# Patient Record
Sex: MALE | Race: WHITE | NOT HISPANIC OR LATINO | Employment: OTHER | URBAN - METROPOLITAN AREA
[De-identification: names, ages, dates, MRNs, and addresses within clinical notes are randomized per-mention and may not be internally consistent; named-entity substitution may affect disease eponyms.]

---

## 2017-03-16 ENCOUNTER — TRANSFERRED RECORDS (OUTPATIENT)
Dept: HEALTH INFORMATION MANAGEMENT | Facility: CLINIC | Age: 67
End: 2017-03-16

## 2017-11-09 ENCOUNTER — TRANSFERRED RECORDS (OUTPATIENT)
Dept: HEALTH INFORMATION MANAGEMENT | Facility: CLINIC | Age: 67
End: 2017-11-09

## 2018-03-28 ENCOUNTER — TRANSFERRED RECORDS (OUTPATIENT)
Dept: HEALTH INFORMATION MANAGEMENT | Facility: CLINIC | Age: 68
End: 2018-03-28

## 2018-04-12 ENCOUNTER — TRANSFERRED RECORDS (OUTPATIENT)
Dept: HEALTH INFORMATION MANAGEMENT | Facility: CLINIC | Age: 68
End: 2018-04-12

## 2018-04-12 LAB
EJECTION FRACTION: 44 %
EJECTION FRACTION: 44 %

## 2018-05-24 ENCOUNTER — TRANSFERRED RECORDS (OUTPATIENT)
Dept: HEALTH INFORMATION MANAGEMENT | Facility: CLINIC | Age: 68
End: 2018-05-24

## 2018-06-19 ENCOUNTER — TRANSFERRED RECORDS (OUTPATIENT)
Dept: HEALTH INFORMATION MANAGEMENT | Facility: CLINIC | Age: 68
End: 2018-06-19

## 2018-08-01 ENCOUNTER — TRANSFERRED RECORDS (OUTPATIENT)
Dept: HEALTH INFORMATION MANAGEMENT | Facility: CLINIC | Age: 68
End: 2018-08-01

## 2018-09-16 ENCOUNTER — TRANSFERRED RECORDS (OUTPATIENT)
Dept: HEALTH INFORMATION MANAGEMENT | Facility: CLINIC | Age: 68
End: 2018-09-16

## 2018-09-26 ENCOUNTER — TRANSFERRED RECORDS (OUTPATIENT)
Dept: HEALTH INFORMATION MANAGEMENT | Facility: CLINIC | Age: 68
End: 2018-09-26

## 2018-10-01 ENCOUNTER — TRANSFERRED RECORDS (OUTPATIENT)
Dept: HEALTH INFORMATION MANAGEMENT | Facility: CLINIC | Age: 68
End: 2018-10-01

## 2020-04-12 ENCOUNTER — TRANSFERRED RECORDS (OUTPATIENT)
Dept: HEALTH INFORMATION MANAGEMENT | Facility: CLINIC | Age: 70
End: 2020-04-12

## 2020-09-09 ENCOUNTER — TRANSFERRED RECORDS (OUTPATIENT)
Dept: HEALTH INFORMATION MANAGEMENT | Facility: CLINIC | Age: 70
End: 2020-09-09

## 2020-12-18 ENCOUNTER — TRANSFERRED RECORDS (OUTPATIENT)
Dept: HEALTH INFORMATION MANAGEMENT | Facility: CLINIC | Age: 70
End: 2020-12-18

## 2021-01-25 NOTE — TELEPHONE ENCOUNTER
RECORDS RECEIVED FROM: Ref by Dr. Montana from [facility], imaging and records    Previous surgery 10/01/2018 at [place], Thoracic and Lumbar fusion at [facility].   DATE RECEIVED: Mar 4, 2021   NOTES STATUS DETAILS   OFFICE NOTE from referring provider Care Everywhere    OFFICE NOTE from other specialist N/A    DISCHARGE SUMMARY from hospital N/A    DISCHARGE REPORT from the ER N/A    OPERATIVE REPORT Care Everywhere x4   MEDICATION LIST Care Everywhere    IMPLANT RECORD/STICKER Care Everywhere    LABS     CBC/DIFF N/A    CULTURES N/A    INJECTIONS DONE IN RADIOLOGY N/A    MRI In process    CT SCAN In process    XRAYS (IMAGES & REPORTS) In process    TUMOR     PATHOLOGY  Slides & report N/A      01/25/21   7:54 AM   Called [facility] for all images  Janice Hampton CMA

## 2021-01-29 ENCOUNTER — TRANSFERRED RECORDS (OUTPATIENT)
Dept: HEALTH INFORMATION MANAGEMENT | Facility: CLINIC | Age: 71
End: 2021-01-29

## 2021-02-01 NOTE — TELEPHONE ENCOUNTER
Action 2.1.21 1:00 PM PANDA   Action Taken Request sent to [place]     Action Catrina 2/3/2021 12:37PM   Action Taken CSS Resolved images below from [place]   9/23/20, 1/21/20 -Entire Spine Scoliosis   9/23/20 - Lumbar Spine  9/9/20 - MR Thoracic lumbar spine   7/1/20 -CT Thoracic Lumbar Spine   Knee images (9/2020)  Colored images in  PACS

## 2021-02-26 ENCOUNTER — DOCUMENTATION ONLY (OUTPATIENT)
Dept: CARE COORDINATION | Facility: CLINIC | Age: 71
End: 2021-02-26

## 2021-03-04 ENCOUNTER — ANCILLARY PROCEDURE (OUTPATIENT)
Dept: GENERAL RADIOLOGY | Facility: CLINIC | Age: 71
End: 2021-03-04
Attending: ORTHOPAEDIC SURGERY
Payer: COMMERCIAL

## 2021-03-04 ENCOUNTER — PRE VISIT (OUTPATIENT)
Dept: ORTHOPEDICS | Facility: CLINIC | Age: 71
End: 2021-03-04

## 2021-03-04 ENCOUNTER — OFFICE VISIT (OUTPATIENT)
Dept: ORTHOPEDICS | Facility: CLINIC | Age: 71
End: 2021-03-04
Payer: COMMERCIAL

## 2021-03-04 VITALS — BODY MASS INDEX: 26.51 KG/M2 | WEIGHT: 200 LBS | HEIGHT: 73 IN

## 2021-03-04 DIAGNOSIS — Z98.1 HX OF SPINAL FUSION: Primary | ICD-10-CM

## 2021-03-04 DIAGNOSIS — Z98.1 HX OF SPINAL FUSION: ICD-10-CM

## 2021-03-04 DIAGNOSIS — S24.109D: ICD-10-CM

## 2021-03-04 DIAGNOSIS — Z98.1 HISTORY OF SPINAL FUSION: ICD-10-CM

## 2021-03-04 DIAGNOSIS — M40.30 FLAT BACK: Primary | ICD-10-CM

## 2021-03-04 PROCEDURE — 72170 X-RAY EXAM OF PELVIS: CPT | Mod: GC | Performed by: RADIOLOGY

## 2021-03-04 PROCEDURE — 99205 OFFICE O/P NEW HI 60 MIN: CPT | Mod: GC | Performed by: ORTHOPAEDIC SURGERY

## 2021-03-04 RX ORDER — FLUTICASONE PROPIONATE 50 MCG
2 SPRAY, SUSPENSION (ML) NASAL DAILY PRN
Status: ON HOLD | COMMUNITY
End: 2021-07-29

## 2021-03-04 RX ORDER — NITROGLYCERIN 0.4 MG/1
0.4 TABLET SUBLINGUAL PRN
COMMUNITY
Start: 2021-01-03

## 2021-03-04 RX ORDER — METOPROLOL SUCCINATE 25 MG/1
12.5 TABLET, EXTENDED RELEASE ORAL AT BEDTIME
Status: ON HOLD | COMMUNITY
Start: 2020-07-20 | End: 2021-07-02

## 2021-03-04 RX ORDER — ATORVASTATIN CALCIUM 80 MG/1
80 TABLET, FILM COATED ORAL AT BEDTIME
COMMUNITY
Start: 2021-03-02

## 2021-03-04 RX ORDER — DOCUSATE SODIUM 100 MG/1
100 CAPSULE, LIQUID FILLED ORAL 2 TIMES DAILY
Status: ON HOLD | COMMUNITY
End: 2021-06-22

## 2021-03-04 RX ORDER — MORPHINE SULFATE 15 MG/1
15-30 TABLET ORAL
Status: ON HOLD | COMMUNITY
Start: 2021-02-22 | End: 2021-07-01

## 2021-03-04 RX ORDER — DIAZEPAM 2 MG
2-4 TABLET ORAL
Status: ON HOLD | COMMUNITY
Start: 2020-09-03 | End: 2021-06-30

## 2021-03-04 RX ORDER — ASPIRIN 81 MG
1 TABLET, DELAYED RELEASE (ENTERIC COATED) ORAL DAILY
COMMUNITY

## 2021-03-04 RX ORDER — POLYETHYLENE GLYCOL 3350 17 G/17G
17 POWDER, FOR SOLUTION ORAL DAILY PRN
Status: ON HOLD | COMMUNITY
Start: 2020-11-09 | End: 2021-06-30

## 2021-03-04 ASSESSMENT — PATIENT HEALTH QUESTIONNAIRE - PHQ9
SUM OF ALL RESPONSES TO PHQ QUESTIONS 1-9: 6
SUM OF ALL RESPONSES TO PHQ QUESTIONS 1-9: 6

## 2021-03-04 ASSESSMENT — MIFFLIN-ST. JEOR: SCORE: 1711.07

## 2021-03-04 NOTE — NURSING NOTE
"Reason For Visit:   Chief Complaint   Patient presents with     Consult     wants opinion of surgery coming up, .  Ref by Dr. Montana from [facility], imaging and records Previous surgery 10/01/2018 at [place], Thoracic and Lumbar fusion at [place]        Primary MD: Dr. Tyler Dunphy [facility]  Ref. MD: Dr. Montana from [place]     ?  No  Occupation Disabled Vet    Date of injury: 2009  Type of injury: Fall, also hurt while in the service    Date of surgery: 2018  Type of surgery: Decompression Posterior ThoracoLumbar, Instrumented Fusion.    Smoker: No  Request smoking cessation information: No    Ht 1.854 m (6' 1\")   Wt 90.7 kg (200 lb)   BMI 26.39 kg/m      Pain Assessment  Patient Currently in Pain: Yes  0-10 Pain Scale: 4  Primary Pain Location: Back    Oswestry (PEDRITO) Questionnaire    OSWESTRY DISABILITY INDEX 3/4/2021   Count 9   Sum 28   Oswestry Score (%) 62.22          Visual Analog Pain Scale  Back Pain Scale 0-10: 4  Right leg pain: 0  Left leg pain: 0  Neck Pain Scale 0-10: 0  Right arm pain: 0  Left arm pain: 0    Promis 10 Assessment    PROMIS 10 3/4/2021   In general, would you say your health is: Very good   In general, would you say your quality of life is: Poor   In general, how would you rate your physical health? Poor   In general, how would you rate your mental health, including your mood and your ability to think? Excellent   In general, how would you rate your satisfaction with your social activities and relationships? Good   In general, please rate how well you carry out your usual social activities and roles Fair   To what extent are you able to carry out your everyday physical activities such as walking, climbing stairs, carrying groceries, or moving a chair? A little   How often have you been bothered by emotional problems such as feeling anxious, depressed or irritable? Sometimes   How would you rate your fatigue on average? Moderate   How would you rate your pain " on average?   0 = No Pain  to  10 = Worst Imaginable Pain 7   In general, would you say your health is: 4   In general, would you say your quality of life is: 1   In general, how would you rate your physical health? 1   In general, how would you rate your mental health, including your mood and your ability to think? 5   In general, how would you rate your satisfaction with your social activities and relationships? 3   In general, please rate how well you carry out your usual social activities and roles. (This includes activities at home, at work and in your community, and responsibilities as a parent, child, spouse, employee, friend, etc.) 2   To what extent are you able to carry out your everyday physical activities such as walking, climbing stairs, carrying groceries, or moving a chair? 2   In the past 7 days, how often have you been bothered by emotional problems such as feeling anxious, depressed, or irritable? 3   In the past 7 days, how would you rate your fatigue on average? 3   In the past 7 days, how would you rate your pain on average, where 0 means no pain, and 10 means worst imaginable pain? 7   Global Mental Health Score 12   Global Physical Health Score 8   PROMIS TOTAL - SUBSCORES 20                Magnolia Vela LPN

## 2021-03-04 NOTE — PROGRESS NOTES
"Service Date: 03/04/2021      HISTORY OF PRESENT ILLNESS:  Mr. Simmons is a 70-year-old male presenting to [facility] today for evaluation of back pain and impaired ability to ambulate.  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  He describes a history of quadriplegia that followed and he slowly was able to regain his ability to ambulate.  In 2018, he underwent posterior decompression thoracolumbar fusion at [facility].  It was thought that he would be primarily a sitter and was fused in a fairly straight posture.  The patient continued to work on his ability to ambulate and has returned to primarily an ambulating status.  Due to the orientation of his fusion, he has found it to be quite difficult to maintain an erect posture and ambulate with low energy.  He primarily describes pain between his shoulder blades as well as down into his pelvis.  His thighs become very tired with any sort of distance walking or standing.      The patient has a fairly significant past cardiac history having had a pacemaker placed as well as prior quadruple bypass.  He also describes a history of a cardiac arrest requiring him to be airlifted to a facility.  He denies any symptoms of angina today.      PHYSICAL EXAMINATION:   Ht 1.854 m (6' 1\")   Wt 90.7 kg (200 lb)   BMI 26.39 kg/m     PEDRITO 62.22%.  VAS 4.  The patient is alert and fairly talkative.  Presents with a walker.  He is fiercely independent and is able to stand and ambulate throughout the examination room.  Stands with a profound flat back posture and demonstrates severely limited mobility while ambulating.  Incision is midline and well healed without significant tenderness.  There is a fair amount of paraspinal fibrosis.  Wears knee braces bilaterally.  Sensation intact in bilateral lower extremities.  Left hip flexion and knee flexion 4/5, otherwise 5/5 " in lower extremity strength exam.  Left ankle is swollen as he describes following a prior surgery.      IMAGING:  Full-spine EOS x-ray was obtained prior to today's visit.  The patient is standing with his knees flexed and his mid and upper thoracic spine fully extended.  Associated sagittal measurements can be referenced below.  There is a very hendricks PILL mismatch as well as some kyphosis across the thoracolumbar junction.  Pelvic retroversion is immediately apparent.  There is quite a bit of motion artifact on this study.  Spinal CT dated 07/01/2020 was reviewed.  Instrumentation appears stable without significant loosening.  There is vacuum disk phenomenon as well as fairly significant osteophyte formation at the superadjacent levels.  In the lumbar spine, posterolateral fusion mass appears intact.  There is fairly wide laminectomy defect throughout the lumbar region.  Opportunistic bone mineral density ranges between 100 and 120 Hounsfield units. DEXA on 1/26/21 demonstrated a lowest T-score of -2.4.       ASSESSMENT:   1.  Flat back syndrome.  History of T9-pelvis posterior instrumentation and fusion with proximal junctional degenerative disk disease and thoracic hypokyphosis.      PLAN:   1.  The patient's difficulty and pain are clearly related to his flat back syndrome.  His original deformity surgery was well designed for a sitting patient.  However, given his fantastic recovery, Jorje is now primarily an ambulator.  In order to correct the deformity, he would either require a pedicle subtraction osteotomy at L4 versus L5 or it may be possible to provide enough lordosis with segmental Kurtz-Childs osteotomies.  I would also recommend extending his fusion up at least 2 levels due to the vacuum disk phenomenon that is present.  I had a lengthy conversation with the patient in the clinic today where we looked at his imaging and my proposed procedure.  He would like to continue to follow up at the [facility] at this time and will give us a call back if he decides to pursue further surgical intervention with us.   2.  If the patient does elect to move forward with surgical intervention here at the [facility], we would refer him to PAC Clinic for a preoperative evaluation.  I would then also add the patient to the complex spine conference for medical and surgical decision making complexities.   3.  I would then have the patient return to clinic for a preoperative discussion regarding the final operative plan.            BRANDI AKINS JR, MD       As dictated by MICHAEL CABRAL MD     I saw and evaluated the patient and developed the plan.  My previsit time reviewing his imaging studies was 10 minutes. My face to face time with the patient in the room was 70 minutes. My time preparing a potential surgical plan was 5 minutes.  Brandi Akins MD           D: 2021   T: 2021   MT: marianna      Name:     RUFINA BLAS   MRN:      [id number]        Account:      [id number]   :      1950           Service Date: 2021      Document: [id number]

## 2021-03-04 NOTE — LETTER
"    3/4/2021         RE: Darleen Simmons  [address]  [place] [address]        Dear Colleague,    Thank you for referring your patient, Darleen Simmons, to the [facility] [place]. Please see a copy of my visit note below.    Service Date: 03/04/2021      HISTORY OF PRESENT ILLNESS:  Mr. Simmons is a 70-year-old male presenting to [facility] today for evaluation of back pain and impaired ability to ambulate.  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  He describes a history of quadriplegia that followed and he slowly was able to regain his ability to ambulate.  In 2018, he underwent posterior decompression thoracolumbar fusion at [facility].  It was thought that he would be primarily a sitter and was fused in a fairly straight posture.  The patient continued to work on his ability to ambulate and has returned to primarily an ambulating status.  Due to the orientation of his fusion, he has found it to be quite difficult to maintain an erect posture and ambulate with low energy.  He primarily describes pain between his shoulder blades as well as down into his pelvis.  His thighs become very tired with any sort of distance walking or standing.      The patient has a fairly significant past cardiac history having had a pacemaker placed as well as prior quadruple bypass.  He also describes a history of a cardiac arrest requiring him to be airlifted to a facility.  He denies any symptoms of angina today.      PHYSICAL EXAMINATION:   Ht 1.854 m (6' 1\")   Wt 90.7 kg (200 lb)   BMI 26.39 kg/m     PEDRITO 62.22%.  VAS 4.  The patient is alert and fairly talkative.  Presents with a walker.  He is fiercely independent and is able to stand and ambulate throughout the examination room.  Stands with a profound flat back posture and demonstrates severely limited mobility while " ambulating.  Incision is midline and well healed without significant tenderness.  There is a fair amount of paraspinal fibrosis.  Wears knee braces bilaterally.  Sensation intact in bilateral lower extremities.  Left hip flexion and knee flexion 4/5, otherwise 5/5 in lower extremity strength exam.  Left ankle is swollen as he describes following a prior surgery.      IMAGING:  Full-spine EOS x-ray was obtained prior to today's visit.  The patient is standing with his knees flexed and his mid and upper thoracic spine fully extended.  Associated sagittal measurements can be referenced below.  There is a very hendricks PILL mismatch as well as some kyphosis across the thoracolumbar junction.  Pelvic retroversion is immediately apparent.  There is quite a bit of motion artifact on this study.  Spinal CT dated 07/01/2020 was reviewed.  Instrumentation appears stable without significant loosening.  There is vacuum disk phenomenon as well as fairly significant osteophyte formation at the superadjacent levels.  In the lumbar spine, posterolateral fusion mass appears intact.  There is fairly wide laminectomy defect throughout the lumbar region.  Opportunistic bone mineral density ranges between 100 and 120 Hounsfield units. DEXA on 1/26/21 demonstrated a lowest T-score of -2.4.       ASSESSMENT:   1.  Flat back syndrome.  History of T9-pelvis posterior instrumentation and fusion with proximal junctional degenerative disk disease and thoracic hypokyphosis.      PLAN:   1.  The patient's difficulty and pain are clearly related to his flat back syndrome.  His original deformity surgery was well designed for a sitting patient.  However, given his fantastic recovery, Jorje is now primarily an ambulator.  In order to correct the deformity, he would either require a pedicle subtraction osteotomy at L4 versus L5 or it may be possible to provide enough lordosis with segmental Kurtz-Childs osteotomies.  I would also recommend extending  his fusion up at least 2 levels due to the vacuum disk phenomenon that is present.  I had a lengthy conversation with the patient in the clinic today where we looked at his imaging and my proposed procedure.  He would like to continue to follow up at the [facility] at this time and will give us a call back if he decides to pursue further surgical intervention with us.   2.  If the patient does elect to move forward with surgical intervention here at the [facility], we would refer him to PAC Clinic for a preoperative evaluation.  I would then also add the patient to the complex spine conference for medical and surgical decision making complexities.   3.  I would then have the patient return to clinic for a preoperative discussion regarding the final operative plan.            BRANDI AKINS JR, MD       As dictated by MICHAEL CABRAL MD     I saw and evaluated the patient and developed the plan.  My previsit time reviewing his imaging studies was 10 minutes. My face to face time with the patient in the room was 70 minutes. My time preparing a potential surgical plan was 5 minutes.  Brandi Akins MD           D: 2021   T: 2021   MT: marianna      Name:     RUFINA BLAS   MRN:      [id number]        Account:      [id number]   :      1950           Service Date: 2021      Document: [id number]

## 2021-03-05 ASSESSMENT — PATIENT HEALTH QUESTIONNAIRE - PHQ9: SUM OF ALL RESPONSES TO PHQ QUESTIONS 1-9: 6

## 2021-03-19 ENCOUNTER — TELEPHONE (OUTPATIENT)
Dept: ORTHOPEDICS | Facility: CLINIC | Age: 71
End: 2021-03-19

## 2021-03-19 NOTE — TELEPHONE ENCOUNTER
See phone message. See dictation for plan.    Lives in [place].    I called back & spoke to pt & wife.  He has decided to have his spine surgery here & wanted to know next steps.  I told them::  1) PAC RISK EVAL ONLY appt. [phone]  2) After PAC appt then we will discuss his case at Complex Spine Conference  2nd WEd of every month  3) After Conference then RTN Video appt. With  to decide about surgery.  If surgery order is written , then needs Insurance approval before can pick a date.    They understood & he will call to schedule.    Call back prn.  They agreed.  W.O./Alda Ellis RN.

## 2021-03-19 NOTE — TELEPHONE ENCOUNTER
[facility] Call Center    Phone Message    May a detailed message be left on voicemail: yes     Reason for Call: Other: call back     Action Taken: Message routed to:  [facility]o    Travel Screening: Not Applicable     Patient wants Alda to call back to discuss scheduling surgery // and what test needs to be done before surgery    Patient says he Wants call back asap

## 2021-03-22 ENCOUNTER — TELEPHONE (OUTPATIENT)
Dept: ORTHOPEDICS | Facility: CLINIC | Age: 71
End: 2021-03-22

## 2021-03-22 NOTE — TELEPHONE ENCOUNTER
[facility] Call Center    Phone Message    May a detailed message be left on voicemail: yes     Reason for Call: Other: Message is for Alda - patient is following instructions to call back after he talks to pre-anesthesia.    Action Taken: Message routed to:  [facility] Orthopedics    Travel Screening: Not Applicable

## 2021-03-22 NOTE — TELEPHONE ENCOUNTER
FUTURE VISIT INFORMATION      SURGERY INFORMATION:    Date:     Location:     Surgeon:      Anesthesia Type:      Procedure:     Consult:     *procedure not scheduled yet    RECORDS REQUESTED FROM:       Primary Care Provider: Dr. Dunphy    Most recent EKG+ Tracing: 10.2.18 [facility]    Most recent ECHO:8.9.17 [facility]      Action 3.22.21 MJ   Action Taken Requested EKG strips from [facility]     Action 3.24.21 MJ   Action Taken Sent strips to scanning.

## 2021-03-22 NOTE — TELEPHONE ENCOUNTER
See phone message & my previous Triage note of Fri 3-19-21 & dictation for plan.    I called pt back.  He made PAC Risk eval only apt for tomorrow 3-23-21 so I told him I notified our Spine PA Danielle to add him to our Complex Spine conference April 14 so he needs to call Ortho clinic [phone] & make RTN Video appt with  for April 28 to make final decision about surgery. He agreed.  Call back prn.  Pt agreed.  W.O./Alda Ellis RN.

## 2021-03-23 ENCOUNTER — VIRTUAL VISIT (OUTPATIENT)
Dept: SURGERY | Facility: CLINIC | Age: 71
End: 2021-03-23
Attending: STUDENT IN AN ORGANIZED HEALTH CARE EDUCATION/TRAINING PROGRAM
Payer: COMMERCIAL

## 2021-03-23 ENCOUNTER — ANESTHESIA EVENT (OUTPATIENT)
Dept: SURGERY | Facility: CLINIC | Age: 71
End: 2021-03-23

## 2021-03-23 ENCOUNTER — TELEPHONE (OUTPATIENT)
Dept: SURGERY | Facility: CLINIC | Age: 71
End: 2021-03-23

## 2021-03-23 ENCOUNTER — TELEPHONE (OUTPATIENT)
Dept: ORTHOPEDICS | Facility: CLINIC | Age: 71
End: 2021-03-23

## 2021-03-23 ENCOUNTER — OFFICE VISIT (OUTPATIENT)
Dept: SURGERY | Facility: CLINIC | Age: 71
End: 2021-03-23
Payer: COMMERCIAL

## 2021-03-23 ENCOUNTER — PRE VISIT (OUTPATIENT)
Dept: SURGERY | Facility: CLINIC | Age: 71
End: 2021-03-23

## 2021-03-23 DIAGNOSIS — M40.30 FLATBACK SYNDROME: ICD-10-CM

## 2021-03-23 DIAGNOSIS — I25.5 ISCHEMIC CARDIOMYOPATHY WITH IMPLANTABLE CARDIOVERTER-DEFIBRILLATOR (ICD): Primary | ICD-10-CM

## 2021-03-23 DIAGNOSIS — Z95.810 ISCHEMIC CARDIOMYOPATHY WITH IMPLANTABLE CARDIOVERTER-DEFIBRILLATOR (ICD): Primary | ICD-10-CM

## 2021-03-23 DIAGNOSIS — Z01.818 PREOP EXAMINATION: Primary | ICD-10-CM

## 2021-03-23 PROCEDURE — 99204 OFFICE O/P NEW MOD 45 MIN: CPT | Mod: 95 | Performed by: CLINICAL NURSE SPECIALIST

## 2021-03-23 RX ORDER — SIMETHICONE 125 MG
375 TABLET,CHEWABLE ORAL PRN
COMMUNITY

## 2021-03-23 RX ORDER — BENZOCAINE/MENTHOL 6 MG-10 MG
LOZENGE MUCOUS MEMBRANE 2 TIMES DAILY PRN
Status: ON HOLD | COMMUNITY
End: 2021-07-29

## 2021-03-23 RX ORDER — ACETAMINOPHEN 500 MG
500-1000 TABLET ORAL EVERY 8 HOURS PRN
Status: ON HOLD | COMMUNITY
End: 2021-06-30

## 2021-03-23 RX ORDER — ASPIRIN 81 MG/1
81 TABLET ORAL DAILY
Status: ON HOLD | COMMUNITY
End: 2021-06-30

## 2021-03-23 SDOH — HEALTH STABILITY: MENTAL HEALTH: HOW OFTEN DO YOU HAVE 6 OR MORE DRINKS ON ONE OCCASION?: NOT ASKED

## 2021-03-23 SDOH — HEALTH STABILITY: MENTAL HEALTH: HOW MANY STANDARD DRINKS CONTAINING ALCOHOL DO YOU HAVE ON A TYPICAL DAY?: NOT ASKED

## 2021-03-23 SDOH — HEALTH STABILITY: MENTAL HEALTH: HOW OFTEN DO YOU HAVE A DRINK CONTAINING ALCOHOL?: NOT ASKED

## 2021-03-23 ASSESSMENT — LIFESTYLE VARIABLES: TOBACCO_USE: 1

## 2021-03-23 ASSESSMENT — ENCOUNTER SYMPTOMS: DYSRHYTHMIAS: 1

## 2021-03-23 NOTE — TELEPHONE ENCOUNTER
Records Requested  03/23/21    Facility  [facility] recs. [id number]   Outcome Sent a fax for: last echocardiogram, last stress test, last angiogram    3/25/2021 2:36PM called [facility] records, following up on request sent 3/23/21. Spoke with Linnette, she stated records were sent to a different fax number from a similar request on the patient. Notified linnette that the fax number they sent it to was not the fax number that was on my request. Linnette will try to work on my request today and send what she can. Notified Linnette that I will call back tomorrow morning if I do not see the records. Looks like a 10/2/2018 ECG tracing was received in [employer].  - Amay     3/25/2021 4:26PM received recs, sent to scan and notified provider - Amay   12/18/2020 angiogram op report   4/12/2018 echocardiography       Records Requested  03/23/21    Facility  [facility] recs fx. [phone]   Outcome Sent a fax for most recent cardiology note    3/25/2021 2:35PM received most recent note, 1/29/2021 [facility] report. Sent to scan. - Amay

## 2021-03-23 NOTE — PROGRESS NOTES
Preoperative Assessment Center Medication History Note    Medication history completed on March 23, 2021 by this writer. See Epic admission navigator for prior to admission medications. Operating room staff will still need to confirm medications and last dose information on day of surgery.     Medication history interview sources:  patient, payor information, care everywhere.     Changes made to PTA medication list (reason)  Added: tylenol, simethicone.   Deleted: none  Changed: metoprolol, miralax, xarelto, aspirin, morphine dose/sig, diazepam dose/sig    Additional medication history information (including reliability of information, actions taken by pharmacist):    Re: Xarelto - March 25, 2021 - pateint only taking once daily (original Rx was for BID dosing).    Note - discussed with patient's vascular surgery team on March 25, 2021 and per Dr. Kim patient can stop the Xarelto now.  They have sent a message to patient regarding this and I have a call out to him to confirm plan moving forward. Marked as 'not taking' for now.     -- Patient is on chronic daily opioid therapy though is reducing his dose as much as able and is not averaging > 60 mg OME/day at this time.   -- No recent (within 30 days) course of antibiotics  -- No recent (within 30 days) course of systemic steroids  -- Patient declines being on any other prescription or over-the-counter medications    Prior to Admission medications    Medication Sig Last Dose Taking? Auth Provider   acetaminophen (TYLENOL) 500 MG tablet Take 500-1,000 mg by mouth every 8 hours as needed for mild pain Taking Yes Unknown, Entered By History   aspirin 81 MG EC tablet Take 81 mg by mouth daily Taking Yes Unknown, Entered By History   atorvastatin (LIPITOR) 80 MG tablet Take 80 mg by mouth At Bedtime  Taking Yes Reported, Patient   diazepam (VALIUM) 2 MG tablet Take 2-4 mg by mouth nightly as needed  Taking Yes Reported, Patient   diclofenac (VOLTAREN) 1 % topical gel  Apply topically as needed (shoulder pain)  Taking Yes Reported, Patient   docusate sodium (DSS) 100 MG capsule Take 100 mg by mouth 2 times daily Taking Yes Reported, Patient   fluticasone (FLONASE) 50 MCG/ACT nasal spray Spray 2 sprays into both nostrils daily as needed  Taking Yes Reported, Patient   hydrocortisone (CORTAID) 1 % external cream Apply topically 2 times daily as needed Taking Yes Unknown, Entered By History   metoprolol succinate ER (TOPROL-XL) 25 MG 24 hr tablet Take 12.5 mg by mouth At Bedtime  Taking Yes Reported, Patient   morphine (MSIR) 15 MG IR tablet Take 15-45 mg by mouth every 3 hours as needed Every 3 hrs as needed Taking Yes Reported, Patient   multivitamin, therapeutic with minerals (THERA-VIT-M) TABS tablet Take 1 tablet by mouth daily Taking Yes Reported, Patient   polyethylene glycol (MIRALAX) 17 GM/Dose powder Take 17 g by mouth daily as needed  Taking Yes Reported, Patient   rivaroxaban ANTICOAGULANT (XARELTO) 2.5 MG TABS tablet Take 2.5 mg by mouth every evening  Taking Yes Reported, Patient   simethicone (MYLICON) 125 MG chewable tablet Take 375 mg by mouth as needed for intestinal gas Taking Yes Unknown, Entered By History   naloxone (NARCAN) 4 MG/0.1ML nasal spray Spray 4 mg into one nostril alternating nostrils once as needed for opioid reversal every 2-3 minutes until assistance arrives Not Taking  Unknown, Entered By History   nitroGLYcerin (NITROSTAT) 0.4 MG sublingual tablet Place 0.4 mg under the tongue as needed Not Taking  Reported, Patient       Medication history completed by: Jose Roberto Gonzalez [facility]

## 2021-03-23 NOTE — PROGRESS NOTES
Jorje is a 70 year old who is being evaluated via a billable video visit.      How would you like to obtain your AVS? Mail a copy  If the video visit is dropped, the invitation should be resent by:[email or web address]   Will anyone else be joining your video visit? No    HPI         Review of Systems                Physical Exam     KOBY Montana LPN

## 2021-03-23 NOTE — CONSULTS
Anesthesia Consult Note      Reason for consult: Flatback syndrome    Date of Encounter: 3/23/2021  Referring Physician: Dr. Akins  Primary Care Physician:  Dunphy, Tyler J HPI Gale W Troy is a 70 year old male who presents for anesthesia consult in preparation for complex spine surgery with Dr. Akins, david TBD as surgical evaluation and planning continues. Surgery will take place at [facility]. History is obtained from the patient, wife and medical records.       At the beginning of this visit patient ID was confirmed using name and .     Video-Visit Details    Type of service:  Video Visit    Patient verbally consented to video service today: YES      Video Start Time: 2:04pm  Video End Time (time video stopped): pm    Originating Location (pt. Location): Home    Distant Location (provider location):  [facility] PREOPERATIVE [facility]ESSMENT [facility]    Mode of Communication:  Video Conference via [employer]    Patient who has been recently followed by Dr. Akins for progressive back pain and impaired ability to ambulate. 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 This was treated surgically in . Afterwards he experienced quadriplegia but he slowly was able to regain his ability to ambulate. In 2018, he underwent posterior decompression thoracolumbar fusion at [facility]. At that time It was thought that he would be primarily a sitter and he was fused in a fairly straight posture. The patient continued to work on his rehab and has returned to ambulation, however because of his fusion, he has a stooped posture. He describes pain between his shoulder blades as well as down into his pelvis. His thighs become very tired with any sort of distance walking or standing. His imaging has been reviewed and he has been counseled regarding surgical options.      His history is otherwise significant for HLD, HYPERTENSION, CAD s/p MI  and CABG X 4 in 1986, ischemic cardiomyopathy, ICD placement, PAD, s/p left SFA balloon angioplasty in the past to assist healing a latissimus dorsi flap to the left calf, atrial fibrillation, flutter, chronically anticoagulated,  former smoker, and history of blood transfusions.     Today patient denies fever, cough, shortness of breath, chest pain, irregular HR, or ankle edema. He reports that his heart condition is currently the best it has been in a long time. His ejection fraction was at one time reportedly as low as 38%, now up to 48-52%. He is working with physical therapy three times weekly and is able to walk on the treadmill on for 30-45 minutes. He is followed by a cardiologist and has routine device checks. He reports having satellite visits. He reports that most of his cardiac testing has been completed through the [employer] system.       Past Medical History  Past Medical History:   Diagnosis Date     Acute osteomyelitis of left lower leg (H) 2017     Acute superficial venous thrombosis of lower extremity, right 2018     Atrial flutter (H)      Automatic implantable cardioverter-defibrillator in situ      CAD (coronary artery disease)      Depression      Flatback syndrome      History of acute inferior wall MI      HTN (hypertension)      Hyperlipidemia LDL goal <100      Ischemic cardiomyopathy      Kyphosis (acquired) (postural)      JOANNA (obstructive sleep apnea)      PAD (peripheral artery disease) (H)      Paroxysmal atrial fibrillation (H)      PVD (peripheral vascular disease) (H)        Past Surgical History  Past Surgical History:   Procedure Laterality Date     BYPASS GRAFT ARTERY CORONARY  1986     Decompression posterior lumbar and instrumented fusion  2018     Endovascular femoral and/or popliteal and/or tibial artery angioplasty/stent       Hardware removal tib/fib       I and D left extremity wound       Knee hardware removal       KNEE SURGERY       STSG left extremity wound        "TONSILLECTOMY           Prior to Admission Medications  Current Outpatient Medications   Medication Sig Dispense Refill     acetaminophen (TYLENOL) 500 MG tablet Take 500-1,000 mg by mouth every 8 hours as needed for mild pain       aspirin 81 MG EC tablet Take 81 mg by mouth daily       atorvastatin (LIPITOR) 80 MG tablet Take 80 mg by mouth At Bedtime        diazepam (VALIUM) 2 MG tablet Take 2-4 mg by mouth nightly as needed        diclofenac (VOLTAREN) 1 % topical gel Apply topically as needed (shoulder pain)        docusate sodium (DSS) 100 MG capsule Take 100 mg by mouth 2 times daily       fluticasone (FLONASE) 50 MCG/ACT nasal spray Spray 2 sprays into both nostrils daily as needed        hydrocortisone (CORTAID) 1 % external cream Apply topically 2 times daily as needed       metoprolol succinate ER (TOPROL-XL) 25 MG 24 hr tablet Take 12.5 mg by mouth At Bedtime        morphine (MSIR) 15 MG IR tablet Take 15-45 mg by mouth every 3 hours as needed Every 3 hrs as needed       multivitamin, therapeutic with minerals (THERA-VIT-M) TABS tablet Take 1 tablet by mouth daily       naloxone (NARCAN) 4 MG/0.1ML nasal spray Spray 4 mg into one nostril alternating nostrils once as needed for opioid reversal every 2-3 minutes until assistance arrives       nitroGLYcerin (NITROSTAT) 0.4 MG sublingual tablet Place 0.4 mg under the tongue as needed       polyethylene glycol (MIRALAX) 17 GM/Dose powder Take 17 g by mouth daily as needed        rivaroxaban ANTICOAGULANT (XARELTO) 2.5 MG TABS tablet Take 2.5 mg by mouth every evening        simethicone (MYLICON) 125 MG chewable tablet Take 375 mg by mouth as needed for intestinal gas         Allergies  Allergies   Allergen Reactions     Bee Venom Anaphylaxis     Can't breath     Wasp Venom Protein Shortness Of Breath and Swelling     sob  swelling  Other reaction(s): Swelling  sob  \"black wasps\"     Adhesive Tape      Paper tape - rash     Gelfoam [Gelatin]      rash     " Hydroxyzine Other (See Comments)     Nightmares     Influenza Vaccines Other (See Comments)     Avoid influenza vaccine, history of Guillain Organ      Lorazepam Anxiety     Other reaction(s): Irritability, Mental Status Change, Other (see comments)  Pt states he becomes very angry and mean after taking at [facility].    Tolerates diazepam       Social History  Social History     Socioeconomic History     Marital status:      Spouse name: Not on file     Number of children: Not on file     Years of education: Not on file     Highest education level: Not on file   Occupational History     Not on file   Social Needs     Financial resource strain: Not on file     Food insecurity     Worry: Not on file     Inability: Not on file     Transportation needs     Medical: Not on file     Non-medical: Not on file   Tobacco Use     Smoking status: Former Smoker     Types: Cigarettes     Quit date: 1988     Years since quittin.2     Smokeless tobacco: Former User   Substance and Sexual Activity     Alcohol use: Not Currently     Drug use: Not on file     Sexual activity: Not on file   Lifestyle     Physical activity     Days per week: Not on file     Minutes per session: Not on file     Stress: Not on file   Relationships     Social connections     Talks on phone: Not on file     Gets together: Not on file     Attends [employer] service: Not on file     Active member of club or organization: Not on file     Attends meetings of clubs or organizations: Not on file     Relationship status: Not on file     Intimate partner violence     Fear of current or ex partner: Not on file     Emotionally abused: Not on file     Physically abused: Not on file     Forced sexual activity: Not on file   Other Topics Concern     Not on file   Social History Narrative     Not on file       Family History  Family History   Problem Relation Age of Onset     Coronary Artery Disease Father      Cancer Mother      Heart Disease Sister       Suicide Brother      Heart Disease Sister        Review of Systems    The complete review of systems is negative other than noted in the HPI or here.   ROS/MED HX  ENT/Pulmonary:     (+) JOANNA risk factors, hypertension, tobacco use, Past use,     Neurologic: Comment: Spinal stenosis      Cardiovascular:     (+) hypertension-Peripheral Vascular Disease-- Other. CAD -past MI CABG-date: X4 1986. -Taking blood thinners Pt has not received instructions: CHF etiology: ischemic pacemaker, type: Medtronic, ICD  type;Medtronic dysrhythmias, a-fib and a-flutter, Previous cardiac testing   Echo: Date: 2016 Results:     Stress Test: Date: Results:     ECG Reviewed: Date: 2018 Results:  Biventricular pacer, atrial flutter  Cath: Date: Results:   METS/Exercise Tolerance: 3 - Able to walk 1-2 blocks without stopping    Hematologic:     (+) anemia, history of blood transfusion, no previous transfusion reaction, Known PRBC Anitbodies: Yes, - Patient unable to provide details of antibody,     Musculoskeletal: Comment: Severe back pain. Stooped forward positioning, reported 38 degrees      GI/Hepatic:  - neg GI/hepatic ROS     Renal/Genitourinary:  - neg Renal ROS     Endo:  - neg endo ROS     Psychiatric/Substance Use:     (+) psychiatric history depression     Infectious Disease: Comment: History MRSA LLE      Malignancy:  - neg malignancy ROS     Other:  - neg other ROS    (+) , H/O Chronic Pain,       Dental   Upper/lower dentures    Constitutional: Awake, alert, no apparent distress, and appears stated age. Accompanied by wife who is nurse practitioner.   Respiratory: No cough or obvious dyspnea.  Neuropsychiatric: Calm, cooperative. Normal affect.     Please refer to the physical examination documented by the anesthesiologist in the anesthesia record on the day of surgery    Labs / testing: (personally reviewed) Last Cr 0.75 on 2/11/21  Last CBC 1/21/20   WBC 6.6  Hgb 13.3  hematocrit 41.8  Platelets 184  Antibody screen OSH  in 2015  Positive Anti-Begum A  Last EKG 2018 Biventricular pacer, atrial flutter  2016 Echo (old)  Interpretation Summary  Very technically difficult transthoracic echocardiogram with Definity contrast enhancement.  The left ventricle is mildly enlarged.  The left ventricular systolic function was depressed with an estimated ejection fraction of 40-45%.  There is mild concentric hypertrophy.  There was a wall motion abnormality involving the inferior and inferolateral walls.  There is a pacemaker noted traversing the right heart chambers.  There was severe left atrial enlargement.  The aortic valve is mildly sclerotic.  Mitral valve leaflets appear thickened.  There is mild mitral regurgitation.  Trace or physiologic pulmonic regurgitation.  Incidentally noted was a right pleural effusion.  Echocardiogram 2018 ([place])  Final impressions:  Suboptimall image quality despite administration of contrast agent.   Mild-moderately enlarged left ventricle, mildly decreased function calciulated ejection fraction of 44%.  Mild eccentric increase in left ventricular wall thickness.   Probable hypokinesis of inferior +inferoseptal (base to mid), and all apical wall segments of left ventricle.  Calcified mitral annulus, mild mitral valve regurgitation, mild left atrial enlargement.   Normal right ventricular size probable mildly decreased systolic function, unable to estimate RV systolic pressure.   Device leads in right atrium, targeted to right atrium, right ventricle X 2 and coronary sinus.  Trivial tricuspid valve regurgitation, mild atrial enlargement.  Normal inferior vena cava size with normal inspiratory collapse (>50%)  Comments  Compared to the prior study done 8/9/17, the following changes have occurred.   1. The left ventricular ejection fraction has decreased.  2. Wall motion abnormalities are better visualized on today's contrast study.     Pharmacological stress test 2018  Final impressions:  Stress echocardiogram  was changed to a resting study due to reduced measured LVEF, new wall motion abnormalities, and supoptimal image quality despite contrast agent administration.   Dobutatamine stress not done  Test changed to a ZAMZAM (above).    Xray pelvis 3/4/21                                                                   Impression:  1. No acute osseous abnormality.  2. Partially visualized fusion and instrumentation of the lower lumbar  spine with 2 sacral alar iliac screws without evidence of hardware  complication.     2/11/21 Lower extremity MARIAMA  Right: Doppler Waveforms:       Abnormal signals starting at or above the superficial femoral level.   Resting Index:     MARIAMA (PT)-  0.67      MARIAMA (DP)-  0.68      TBI-  0.42   TcPO2:     Values as noted.    Left: Doppler Waveforms:  Normal at all levels evaluated.   Resting Index:     MARIAMA (PT)-  0.87      MARIAMA (DP)-  0.83      TBI-  0.43   TcPO2:     Values as noted.    Conclusions: Right:  Moderate peripheral arterial disease starting at or proximal to the superficial femoral level. Left:  Moderate peripheral arterial disease location not well determined.  Normal TCPO2 values at all sites.   Unchanged from prior study.    Xray spine 3 view 1/27/21  Posterior wilfredo and pedicle screw fixation T10 through sacrum with  bilateral sacroiliac screws. Vertebroplasty L2 with six lumbar-type vertebrae.  Anterior plate and screw fixation C5 to C7. 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 AICD. Fracture of superior sternal wires. Full-length standing views  from the head to the toes obtained for scoliosis evaluation. No significant  change since 9/23/2020. Degenerative changes both knees with varus deformity  Bilaterally.    MR Thoracic and Lumbar spine 9/9/20  1. 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  2. Stable syrinx within the upper thoracic spinal cord.  3. The thecal sac is patent throughout the thoracic and lumbar spine allowing  for  metallic artifact with the exception of the prominent calcified disc  osteophyte complex at T11-12 on the right where there is partial effacement and  mild cord deformity.    Imaging and cardiac testing reviewed by this provider    Outside records reviewed from: Care Everywhere    ASSESSMENT and PLAN  Darleen Simmons is a 70 year old male being considered for complex spine surgery with Dr. Akins, date TBD as surgical evaluation and planning continues. He has the following specific operative considerations:   RCRI: 0.9% risk of significant cardiac event  VTE: 0.5%  JOANNA: 2/8=Low risk   CHADS/Vasc: 4=High risk   PONV: 2     --Flatback syndrome with fused bent forward positioning. Progressive back pain. Above procedure being considered. MSIR prn.  --No history of problems with anesthesia but reports he is sometimes hard to medicate adequately.   --Complex cardiac history as above including HLD atorvastatin, HYPERTENSION, metoprolol, CAD, s/p CABG X 4 in 1986, ischemic cardiomopathy, PAD s/p intervention, ICD, atrial fib/flutter. Patient reports feeling well without cardiac symptoms and able to walk on treadmill 30-40 minutes 3 times weekly. Discussed need to obtain OSH cardiac records for review to determine if updated testing is needed prior to surgery. Chronically anticoagulated on Xarelto and on aspirin after vascular procedures. Pharm D has reached out to his vascular team to begin discussion of periop management.  --Former smoker. Denies pulmonary symptoms.   --History of blood transfusions with reported blood antibody. Outside records show positive Anti-Begum A.     Discussed anesthesia expectations and questions answered. Informed patient that our team will work on gathering his cardiovascular records for further review and will inform him if anything else required prior to sugery. Also informed patient that he will an H and P after receiving a surgery date, and will need to have an updated type and screen drawn  1-3 days prior to surgery in our system.    Discussed with Dr. Ang.     ADDENDUM: Patient's outside cardiac records were reviewed by Anesthesia, last from 2018. He was contacted to inform that he would need a cardiac evaluation with updated testing. Patient and wife wanted to come to [facility] for this. Orders placed and visit arranged.     Cardiac evaluation with Dr. Reis. Per notes:  ADDENDUM (5/5/2121): I reviewed the Lexiscan results with Dr. Lentz.  There are multiple areas of nontransmural infarction in all three vessel territories.  However, there is no ischemia.  I do not have the remote cardiac history but suspect the NSTEMIs are remote, possibly dating back to the CABG decades ago.  In the absence of ischemia, I would not recommend coronary angiography.   The patient is at moderate risk of a myocardial infarction given the Hx of multivessel CAD and prior infarctions.  I would continue him on Lopressor and Lipitor throughout the perioperative phase and would consider keeping him on ASA if the surgical risk of bleeding is not deemed high.  The Rivaroxaban may be stopped 48 hours prior to surgery and should be resumed post op when the risk of bleeding is deemed low.     Yovany Reis MD       NM Stress test  5/5/21  Conclusion      The nuclear stress test is abnormal.  There is a medium sized area of a moderate degree of nontransmural infarction in the entire apical segment(s) of the left ventricle. This appears to be in the left anterior descending distribution. No significant area of ischemia identified.  There is a medium sized area of a severe degree of transmural infarction in the entire inferior segment(s) of the left ventricle. This appears to be in the right coronary artery distribution. No ischemia identified.  There is a medium sized area of transmural infarction in the entire inferolateral segment(s) of the left ventricle. This appears to be in the left circumflex  distribution. No ischemia identified.  LVEDv  187ml. LVESv 86ml. LV EF 54%.     ECG Summary    ECG Baseline electrocardiogram demonstrates ventricular pacing.   The stress electrocardiogram was non-diagnostic due to ventricular pre-excitation.        BEBETO Avila CNS      Preoperative Assessment Center  [facility]  Office phone: [phone]  Fax: [phone]

## 2021-03-23 NOTE — TELEPHONE ENCOUNTER
[facility] Call Center    Phone Message    May a detailed message be left on voicemail: yes     Reason for Call: Other: Anesthesia eval was very encouraging per Neyda. Please call back patient with next steps.     Action Taken: Message routed to:  [facility] ([facility] ortho    Travel Screening: Not Applicable

## 2021-03-23 NOTE — TELEPHONE ENCOUNTER
Writer called and reassured pt that at this time they need to wait for the 4/28/2021 virtual apt for the next steps with Dr. Akins. Pt agreed to plan and states that they will anxiously await for that apt.     Magnolia Vela LPN

## 2021-03-23 NOTE — PHARMACY - PREOPERATIVE ASSESSMENT CENTER
Anticoagulation Note - Preoperative Assessment Center (PAC) Pharmacist     Patient was interviewed on March 23, 2021 as a part of PAC clinic appointment. The purpose of this note is to document the perioperative anticoagulation plan outlined by the providers caring for Darleen Simmons.     Current Regimen  Anticoagulation Regimen as of March 23, 2021: rivaroxaban 2.5 mg po daily in the evening (note dosing is supposed to be 2.5 mg BID but patient only taking once daily in the evening). Indication: h/o LLE angiogram w/ angioplasty and stenting of SFA on 12/18/2020  Prescriber:  Dr. Kim (vascular surgery at [place])  Expected Duration of therapy: undetermined  Current medications that may interact with this include: aspirin  Renal function: last SCr 2/11/21 from care everywhere ([place]) SCR 0.75 mg/dL     Perioperative plan  Darleen Simmons is being seen for PAC eval only for potential revision spine surgery with Dr. Akins in the future (Surgery >30 days out).  I spoke with Temi Saunders RN with Dr. Kim and she confirmed with Dr. Kim that patient can stop Xarelto now and continue on just the aspirin 81 mg daily.  They sent a message to the patient regarding this change in plan.  She also confirmed that it is acceptable to hold the aspirin for 7-10 days pre op once surgery is scheduled and to just resume aspirin as soon as it is acceptable from a surgical bleeding standpoint postoperatively.     Jose Roberto Gonzalez RPH  March 23, 2021  10:37 AM

## 2021-03-23 NOTE — ANESTHESIA PREPROCEDURE EVALUATION
"Anesthesia Pre-Procedure Evaluation    Patient: Darleen Simmons   MRN: [id number] : 1950        Preoperative Diagnosis: * No surgery found *   Procedure :      Past Medical History:   Diagnosis Date     Acute osteomyelitis of left lower leg (H) 2017     Acute superficial venous thrombosis of lower extremity, right 2018     Atrial flutter (H)      Automatic implantable cardioverter-defibrillator in situ      CAD (coronary artery disease)      Depression      Flatback syndrome      History of acute inferior wall MI      HTN (hypertension)      Hyperlipidemia LDL goal <100      Ischemic cardiomyopathy      Kyphosis (acquired) (postural)      JOANNA (obstructive sleep apnea)      PAD (peripheral artery disease) (H)      Paroxysmal atrial fibrillation (H)      PVD (peripheral vascular disease) (H)       Past Surgical History:   Procedure Laterality Date     BYPASS GRAFT ARTERY CORONARY  1986     Decompression posterior lumbar and instrumented fusion  2018     Endovascular femoral and/or popliteal and/or tibial artery angioplasty/stent       Hardware removal tib/fib       I and D left extremity wound       Knee hardware removal       KNEE SURGERY       STSG left extremity wound       STSG right lower extremity       TONSILLECTOMY        Allergies   Allergen Reactions     Bee Venom Anaphylaxis     Can't breath     Wasp Venom Protein Shortness Of Breath and Swelling     sob  swelling  Other reaction(s): Swelling  sob  \"black wasps\"     Adhesive Tape      Paper tape - rash     Gelfoam [Gelatin]      rash     Hydroxyzine Other (See Comments)     Nightmares     Influenza Vaccines Other (See Comments)     Avoid influenza vaccine, history of Guillain [place]      Lorazepam Anxiety     Other reaction(s): Irritability, Mental Status Change, Other (see comments)  Pt states he becomes very angry and mean after taking at [facility].    Tolerates diazepam      Social History     Tobacco Use     Smoking status: Former Smoker     " Types: Cigarettes     Quit date: 1988     Years since quittin.2     Smokeless tobacco: Former User   Substance Use Topics     Alcohol use: Not Currently      Wt Readings from Last 1 Encounters:   21 90.7 kg (200 lb)        Anesthesia Evaluation   Pt has had prior anesthetic. Type: General and MAC.    No history of anesthetic complications       ROS/MED HX  ENT/Pulmonary:     (+) JOANNA risk factors, hypertension, tobacco use, Past use,     Neurologic: Comment: Spinal stenosis      Cardiovascular: Comment: left SFA balloon angioplasty in the past to assist healing a latissimus dorsi flap to the left calf.    (+) hypertension-Peripheral Vascular Disease-- Other. CAD -past MI CABG-date: . -Taking blood thinners Pt has not received instructions: CHF etiology: ischemic pacemaker, type: Medtronic, ICD  type;Medtronic dysrhythmias, a-fib and a-flutter, Previous cardiac testing   Echo: Date:  Results:    Stress Test: Date: Results:    ECG Reviewed: Date:  Results:  Biventricular pacer, atrial flutter  Cath: Date: Results:      METS/Exercise Tolerance: 3 - Able to walk 1-2 blocks without stopping    Hematologic:     (+) anemia, history of blood transfusion, no previous transfusion reaction, Known PRBC Anitbodies: Yes, - Patient unable to provide details of antibody,     Musculoskeletal: Comment: Severe back pain. Stooped forward positioning, reported 38 degrees      GI/Hepatic:  - neg GI/hepatic ROS     Renal/Genitourinary:  - neg Renal ROS     Endo:  - neg endo ROS     Psychiatric/Substance Use:     (+) psychiatric history depression     Infectious Disease: Comment: History MRSA LLE      Malignancy:  - neg malignancy ROS     Other:  - neg other ROS    (+) , H/O Chronic Pain,        Physical Exam    Airway   unable to assess          Respiratory Devices and Support         Dental       (+) upper dentures and lower dentures      Cardiovascular    unable to assess         Pulmonary    Unable to  assess           Other findings: Virtual visit    OUTSIDE LABS:  CBC: No results found for: WBC, HGB, HCT, PLT  BMP: No results found for: NA, POTASSIUM, CHLORIDE, CO2, BUN, CR, GLC  COAGS: No results found for: PTT, INR, FIBR  POC: No results found for: BGM, HCG, HCGS  HEPATIC: No results found for: ALBUMIN, PROTTOTAL, ALT, AST, GGT, ALKPHOS, BILITOTAL, BILIDIRECT, AAKASH  OTHER: No results found for: PH, LACT, A1C, [place], PHOS, MAG, LIPASE, AMYLASE, TSH, T4, T3, CRP, SED          PAC Discussion and Assessment    ASA Classification: 3  Case is suitable for: [place]  Anesthetic techniques and relevant risks discussed: GA  Invasive monitoring and risk discussed: No    Possibility and Risk of blood transfusion discussed: Yes            PAC Resident/NP Anesthesia Assessment: Darleen Simmons is a 70 year old male being considered for complex spine surgery with Dr. Akins, date TBD as surgical evaluation and planning continues. He has the following specific operative considerations:   RCRI: 0.9% risk of significant cardiac event  VTE: 0.5%  JOANNA: 2/8=Low risk   CHADS/Vasc: 4=High risk   PONV: 2     --Flatback syndrome with fused bent forward positioning. Progressive back pain. Above procedure being considered. MSIR prn.  --No history of problems with anesthesia.  --Complex cardiac history as above including HLD atorvastatin, HYPERTENSION, metoprolol, CAD, s/p CABG X 4 in 1986, ischemic cardiomopathy, PAD s/p intervention, ICD, atrial fib/flutter. Patient reports feeling well without cardiac symptoms and able to walk on treadmill 30-40 minutes 3 times weekly. Discussed need to obtain OSH cardiac records for review to determine if updated testing is needed prior to surgery. Chronically anticoagulated on Xarelto and on aspirin after vascular procedures. Pharm D has reached out to his vascular team to begin discussion of periop management.  --Former smoker. Denies pulmonary symptoms.   --History of blood transfusions with  reported blood antibody. Outside records show positive Anti-Begum A.     Discussed anesthesia expectations and questions answered. Informed patient that our team will work on gathering his cardiovascular records for further review and will inform him if anything else required prior to sugery. Also informed patient that he will an H and P after receiving a surgery date, and will need to have an updated type and screen drawn 1-3 days prior to surgery in our system.    Discussed with Dr. Ang.     ADDENDUM: Patient's outside cardiac records were reviewed by Anesthesia, last from 2018. He was contacted to inform that he would need a cardiac evaluation with updated testing. Patient and wife wanted to come to [facility] for this. Orders placed and visit arranged.     Cardiac evaluation with Dr. Reis. Per notes:  ADDENDUM (5/5/2121): I reviewed the Lexiscan results with Dr. Lentz.  There are multiple areas of nontransmural infarction in all three vessel territories.  However, there is no ischemia.  I do not have the remote cardiac history but suspect the NSTEMIs are remote, possibly dating back to the CABG decades ago.  In the absence of ischemia, I would not recommend coronary angiography.   The patient is at moderate risk of a myocardial infarction given the Hx of multivessel CAD and prior infarctions.  I would continue him on Lopressor and Lipitor throughout the perioperative phase and would consider keeping him on ASA if the surgical risk of bleeding is not deemed high.  The Rivaroxaban may be stopped 48 hours prior to surgery and should be resumed post op when the risk of bleeding is deemed low.     Yovany Reis MD       NM Stress test  5/5/21  Conclusion      The nuclear stress test is abnormal.  There is a medium sized area of a moderate degree of nontransmural infarction in the entire apical segment(s) of the left ventricle. This appears to be in the left anterior descending  distribution. No significant area of ischemia identified.  There is a medium sized area of a severe degree of transmural infarction in the entire inferior segment(s) of the left ventricle. This appears to be in the right coronary artery distribution. No ischemia identified.  There is a medium sized area of transmural infarction in the entire inferolateral segment(s) of the left ventricle. This appears to be in the left circumflex distribution. No ischemia identified.  LVEDv  187ml. LVESv 86ml. LV EF 54%.     ECG Summary    ECG Baseline electrocardiogram demonstrates ventricular pacing.   The stress electrocardiogram was non-diagnostic due to ventricular pre-excitation.              Reviewed and Signed by PAC Mid-Level Provider/Resident  Mid-Level Provider/Resident: BEBETO Rahman, CNS  Date: 3/23/21  Time: 2:57pm                               BEBETO Avila CNS

## 2021-04-20 ENCOUNTER — TRANSFERRED RECORDS (OUTPATIENT)
Dept: HEALTH INFORMATION MANAGEMENT | Facility: CLINIC | Age: 71
End: 2021-04-20

## 2021-04-20 LAB
ALT SERPL-CCNC: 20 IU/L (ref 6–40)
AST SERPL-CCNC: 19 IU/L (ref 10–40)
CREAT SERPL-MCNC: 0.72 MG/DL (ref 0.7–1.2)
GFR SERPL CREATININE-BSD FRML MDRD: >60 ML/MIN/1.73M2
GLUCOSE SERPL-MCNC: 105 MG/DL (ref 70–99)
POTASSIUM SERPL-SCNC: 4.3 MEQ/L (ref 3.4–5.1)

## 2021-04-22 ENCOUNTER — TELEPHONE (OUTPATIENT)
Dept: ORTHOPEDICS | Facility: CLINIC | Age: 71
End: 2021-04-22

## 2021-04-22 NOTE — TELEPHONE ENCOUNTER
[facility] Call Center    Phone Message    May a detailed message be left on voicemail: yes     Reason for Call: Other: This pt of Dr. Akins's requested a call back from Alda or someone on Dr. Akins's care team. The pt would not tell me what this call was in regards to. The pt requested that Alda reaches back out today, if possible.     Action Taken: Message routed to:  [facility] ([facility] Ortho    Travel Screening: Not Applicable

## 2021-04-22 NOTE — TELEPHONE ENCOUNTER
RN called patient on behalf of Alda EDEN as she left clinic early today.  Per patient he is really frustrated at the process of getting surgery approval. He claims he has no heart issues and he is in a lot of pain and he wants to know if he has a surgery date. RN doesn't see a case request put it or have any surgery date on file. RN can see that patient's next appt with Dr. Akins is on the 28th which is next week. RN explained to patient again but patient stated he just really wanted some answers and would really like Alda EDEN to call her back.  RN told patient he will notify Alda EDEN to call patient back.    Hannah Hampton RN

## 2021-04-23 NOTE — ADDENDUM NOTE
Addendum  created 04/23/21 1229 by Ivy Healy, BEBETO CNS    Clinical Note Signed, Diagnosis association updated, Order list changed, Visit diagnoses modified

## 2021-04-23 NOTE — TELEPHONE ENCOUNTER
See phone message from pt.   I called pt & wife back-see my triage note in other phone encounter same day.  Alda Ellis RN.

## 2021-04-23 NOTE — TELEPHONE ENCOUNTER
See phone message.  I called back & spoke to pt & wife.  They had questions about process to get surgery done.    See PAC Risk eval dictation for plan.  Ivy JACKSON from PAC clinic let me know that she called them yesterday & told them she was finally able to get Old cardiology records from [place] & reviewed them & because his last Cardiology eval. Was 2018,  it needs to be repeated before they can say if he can have spine surgery safely.  She said he can be seen at home by Cardiology or here at .  The pt & wife both said where ever can be done soon & they both agreed might be better to do it here so they have established care with Card. here at  in case he needs any Cardiac CAre in hospital during or after surgery.  Wife will call [phone] to schedule & knows he will need an Echo. Before appt possibly same day.  Cardiology will order & schedule if needed.    I also reexplained next steps to keep RTN Video appt with  for next week 4-28-21 to make a final decision about surgery & if they decide yes,  then write surgery order.  Then PA Dept has to get Insurance approval before we can pick a surgery date.  They understood.  Call back prn. They agreed.  [facility] JACKSON PAC clinic //Alda Ellis RN.

## 2021-04-28 ENCOUNTER — VIRTUAL VISIT (OUTPATIENT)
Dept: ORTHOPEDICS | Facility: CLINIC | Age: 71
End: 2021-04-28
Payer: COMMERCIAL

## 2021-04-28 DIAGNOSIS — Z98.1 HX OF SPINAL FUSION: Primary | ICD-10-CM

## 2021-04-28 DIAGNOSIS — M40.30 FLAT BACK: ICD-10-CM

## 2021-04-28 DIAGNOSIS — S24.109D: ICD-10-CM

## 2021-04-28 PROCEDURE — 99417 PROLNG OP E/M EACH 15 MIN: CPT | Performed by: ORTHOPAEDIC SURGERY

## 2021-04-28 PROCEDURE — 99215 OFFICE O/P EST HI 40 MIN: CPT | Mod: 95 | Performed by: ORTHOPAEDIC SURGERY

## 2021-04-28 NOTE — NURSING NOTE
Teaching Flowsheet   Relevant Diagnosis: spine  Teaching Topic: preop     Person(s) involved in teaching:   Patient and Wife     Motivation Level:  Asks Questions: Yes  Eager to Learn: Yes  Cooperative: Yes  Receptive (willing/able to accept information): Yes  Any cultural factors/[employer] beliefs that may influence understanding or compliance? No       Patient and Family demonstrates understanding of the following:  Reason for the appointment, diagnosis and treatment plan: Yes  Knowledge of proper use of medications and conditions for which they are ordered (with special attention to potential side effects or drug interactions): Yes  Which situations necessitate calling provider and whom to contact: Yes       Teaching Concerns Addressed: over phone       Proper use and care of meds. (medical equip, care aids, etc.): Yes  Nutritional needs and diet plan: Yes  Pain management techniques: Yes  Wound Care: Yes  How and/when to access community resources: Yes     Instructional Materials Used/Given: preop pkt mailed     Time spent with patient: 15 minutes.

## 2021-04-28 NOTE — LETTER
4/28/2021         RE: Darleen Simmons  [id number] [address] [address]        Dear Colleague,    Thank you for referring your patient, Darleen Simmons, to the [facility] [place]. Please see a copy of my visit note below.    Reason For Visit:   Chief Complaint   Patient presents with     RECHECK     [id number]. .  F/U PAC RISK Eval-PAC  REVIEWED CARDIAC RECORDS FROM [place] & ORDERED NEW ECHO & LEXISCAN & SEE CARDIOLOGY AGAIN. F/U Complex Spine conference. Rediscuss Surgery for Flat back syndrome.  History of T9-pelvis posterior instrumentation and fusion with proximal junctional degenerative disk disease and thoracic hypokyphosis         Primary MD: Dunphy, Tyler J  Ref. MD: Est    ?  No  Occupation Disabled Vet     Date of injury: 2009  Type of injury: Fall, also hurt while in the service     Date of surgery: 2018  Type of surgery: Decompression Posterior ThoracoLumbar, Instrumented Fusion.     Smoker: No  Request smoking cessation information: No    There were no vitals taken for this visit.    Pain Assessment  Patient Currently in Pain: Yes  0-10 Pain Scale: 5  Primary Pain Location: Back    Oswestry (PEDRITO) Questionnaire    OSWESTRY DISABILITY INDEX 4/28/2021   Count 9   Sum 25   Oswestry Score (%) 55.56   Some recent data might be hidden        Visual Analog Pain Scale  Back Pain Scale 0-10: 5  Right leg pain: 0  Left leg pain: 0  Neck Pain Scale 0-10: 0  Right arm pain: 0  Left arm pain: 0    Promis 10 Assessment    PROMIS 10 4/28/2021   In general, would you say your health is: Very good   In general, would you say your quality of life is: Fair   In general, how would you rate your physical health? Poor   In general, how would you rate your mental health, including your mood and your ability to think? Excellent   In general, how would you rate your satisfaction with your social activities and relationships? Very good   In general, please rate how well you carry  out your usual social activities and roles Very good   To what extent are you able to carry out your everyday physical activities such as walking, climbing stairs, carrying groceries, or moving a chair? Completely   How often have you been bothered by emotional problems such as feeling anxious, depressed or irritable? Often   How would you rate your fatigue on average? Very severe   How would you rate your pain on average?   0 = No Pain  to  10 = Worst Imaginable Pain 5   In general, would you say your health is: 4   In general, would you say your quality of life is: 2   In general, how would you rate your physical health? 1   In general, how would you rate your mental health, including your mood and your ability to think? 5   In general, how would you rate your satisfaction with your social activities and relationships? 4   In general, please rate how well you carry out your usual social activities and roles. (This includes activities at home, at work and in your community, and responsibilities as a parent, child, spouse, employee, friend, etc.) 4   To what extent are you able to carry out your everyday physical activities such as walking, climbing stairs, carrying groceries, or moving a chair? 5   In the past 7 days, how often have you been bothered by emotional problems such as feeling anxious, depressed, or irritable? 4   In the past 7 days, how would you rate your fatigue on average? 5   In the past 7 days, how would you rate your pain on average, where 0 means no pain, and 10 means worst imaginable pain? 5   Global Mental Health Score 13   Global Physical Health Score 10   PROMIS TOTAL - SUBSCORES 23   Some recent data might be hidden          Magnolia Vela LPN        Jorje is a 70 year old who is being evaluated via a billable video visit.      How would you like to obtain your AVS? Mail a copy  If the video visit is dropped, the invitation should be resent by: Text to cell phone: [phone]  Will anyone  else be joining your video visit? No      Video Start Time: 10:41 AM  Video-Visit Details    Type of service:  Video Visit    Video End Time:11:44    Originating Location (pt. Location): Home    Distant Location (provider location):  [facility]     Platform used for Video Visit: Doximity     Flatback syndrome. Needs osteotomy(ies) to correct. Prior surgery by Dr. Montana at [place] who personally called me and asked me to see Jorje and consider albina on his surgery. Jorje has been evalauted by PAC and there are concerns about his heart given his heart surgery a number of years ago and not having a recent cardiology eval to understand his current heart physiology.        We had >55 minutes of discussion about his condition. He is troubled by having to heart a current heart evaluation as the previous ones have been good. He does have a pacemake and a h/o CAD.  He reiterated issues with his leg and infection and that other doctors had recommended amputation.   He has had a vascular stent in his leg and had been on anitcoagulation but that has recently been stopped.    Radiographic sagittal plane measures  PI 50  L4-S1 28  LL 15  T10-L2 24  TPA 29    Target alignment for age 70 and PI 50  TPA 14-18  PI-LL 9.6  PT 20.7   SVA 40mm  Surgery plan:  PSF T7-S1  Segmental instrumentation T7-8  Reinsert instrumentation T9-S1  Pelvic fixation  Kurtz-Childs osteotomies thoracic and lumbar  TLIF T12-L1 possibly L1-2, L2-3 insertion intervertebral device same levels  Possible pedicle subtraction osteotomy L4 or L3  Tether T5-7  Image guided surgery    Surgery sheet completed and submitted    Akhil Akins MD

## 2021-04-28 NOTE — PROGRESS NOTES
Reason For Visit:   Chief Complaint   Patient presents with     RECHECK     [id number]. .  F/U PAC RISK Eval-PAC  REVIEWED CARDIAC RECORDS FROM [place] & ORDERED NEW ECHO & LEXISCAN & SEE CARDIOLOGY AGAIN. F/U Complex Spine conference. Rediscuss Surgery for Flat back syndrome.  History of T9-pelvis posterior instrumentation and fusion with proximal junctional degenerative disk disease and thoracic hypokyphosis         Primary MD: Dunphy, Tyler J  Ref. MD: Est    ?  No  Occupation Disabled Vet     Date of injury: 2009  Type of injury: Fall, also hurt while in the service     Date of surgery: 2018  Type of surgery: Decompression Posterior ThoracoLumbar, Instrumented Fusion.     Smoker: No  Request smoking cessation information: No    There were no vitals taken for this visit.    Pain Assessment  Patient Currently in Pain: Yes  0-10 Pain Scale: 5  Primary Pain Location: Back    Oswestry (PEDRITO) Questionnaire    OSWESTRY DISABILITY INDEX 4/28/2021   Count 9   Sum 25   Oswestry Score (%) 55.56   Some recent data might be hidden        Visual Analog Pain Scale  Back Pain Scale 0-10: 5  Right leg pain: 0  Left leg pain: 0  Neck Pain Scale 0-10: 0  Right arm pain: 0  Left arm pain: 0    Promis 10 Assessment    PROMIS 10 4/28/2021   In general, would you say your health is: Very good   In general, would you say your quality of life is: Fair   In general, how would you rate your physical health? Poor   In general, how would you rate your mental health, including your mood and your ability to think? Excellent   In general, how would you rate your satisfaction with your social activities and relationships? Very good   In general, please rate how well you carry out your usual social activities and roles Very good   To what extent are you able to carry out your everyday physical activities such as walking, climbing stairs, carrying groceries, or moving a chair? Completely   How often have you been bothered by emotional  problems such as feeling anxious, depressed or irritable? Often   How would you rate your fatigue on average? Very severe   How would you rate your pain on average?   0 = No Pain  to  10 = Worst Imaginable Pain 5   In general, would you say your health is: 4   In general, would you say your quality of life is: 2   In general, how would you rate your physical health? 1   In general, how would you rate your mental health, including your mood and your ability to think? 5   In general, how would you rate your satisfaction with your social activities and relationships? 4   In general, please rate how well you carry out your usual social activities and roles. (This includes activities at home, at work and in your community, and responsibilities as a parent, child, spouse, employee, friend, etc.) 4   To what extent are you able to carry out your everyday physical activities such as walking, climbing stairs, carrying groceries, or moving a chair? 5   In the past 7 days, how often have you been bothered by emotional problems such as feeling anxious, depressed, or irritable? 4   In the past 7 days, how would you rate your fatigue on average? 5   In the past 7 days, how would you rate your pain on average, where 0 means no pain, and 10 means worst imaginable pain? 5   Global Mental Health Score 13   Global Physical Health Score 10   PROMIS TOTAL - SUBSCORES 23   Some recent data might be hidden          Magnolia Vela LPN        Jorje is a 70 year old who is being evaluated via a billable video visit.      How would you like to obtain your AVS? Mail a copy  If the video visit is dropped, the invitation should be resent by: Text to cell phone: [phone]  Will anyone else be joining your video visit? No      Video Start Time: 10:41 AM  Video-Visit Details    Type of service:  Video Visit    Video End Time:11:44    Originating Location (pt. Location): Home    Distant Location (provider location):  [facility] [place]     Platform used for Video Visit: Doximity     Flatback syndrome. Needs osteotomy(ies) to correct. Prior surgery by Dr. Montana at [place] who personally called me and asked me to see Jorje and consider albina on his surgery. Jorje has been evalauted by PAC and there are concerns about his heart given his heart surgery a number of years ago and not having a recent cardiology eval to understand his current heart physiology.        We had >55 minutes of discussion about his condition. He is troubled by having to heart a current heart evaluation as the previous ones have been good. He does have a pacemake and a h/o CAD.  He reiterated issues with his leg and infection and that other doctors had recommended amputation.   He has had a vascular stent in his leg and had been on anitcoagulation but that has recently been stopped.    Radiographic sagittal plane measures  PI 50  L4-S1 28  LL 15  T10-L2 24  TPA 29    Target alignment for age 70 and PI 50  TPA 14-18  PI-LL 9.6  PT 20.7   SVA 40mm  Surgery plan:  PSF T7-S1  Segmental instrumentation T7-8  Reinsert instrumentation T9-S1  Pelvic fixation  Kurtz-Childs osteotomies thoracic and lumbar  TLIF T12-L1 possibly L1-2, L2-3 insertion intervertebral device same levels  Possible pedicle subtraction osteotomy L4 or L3  Tether T5-7  Image guided surgery    Surgery sheet completed and submitted    Akhil Akins MD

## 2021-04-29 ENCOUNTER — PREP FOR PROCEDURE (OUTPATIENT)
Dept: ORTHOPEDICS | Facility: CLINIC | Age: 71
End: 2021-04-29

## 2021-04-29 ENCOUNTER — TRANSFERRED RECORDS (OUTPATIENT)
Dept: HEALTH INFORMATION MANAGEMENT | Facility: CLINIC | Age: 71
End: 2021-04-29

## 2021-04-29 DIAGNOSIS — Z98.1 H/O SPINAL FUSION: ICD-10-CM

## 2021-04-29 DIAGNOSIS — M40.30 FLATBACK SYNDROME: Primary | ICD-10-CM

## 2021-04-29 DIAGNOSIS — Z87.828 H/O SPINAL CORD INJURY: ICD-10-CM

## 2021-04-30 NOTE — TELEPHONE ENCOUNTER
Action    Action Taken 4-30: requested pacemaker interrogation from CC 2-3-21  5-3: sent pacemaker interrogation to scanning

## 2021-05-02 NOTE — PROGRESS NOTES
[facility] [place]    Referring Provider:  Evelio Francis  Primary Care Provider:   Dunphy, Tyler J  Indication for Consultation:  Pre-operative evaluation for lumbar fusion surgery.     HPI: Darleen Simmons is a 70 year old male being seen today for evaluation of pre-operative evaluation for lumbar fusion surgery.   The patient's risk factor profile is: (+) HTN, (-) DM, (+) hypercholesterolemia, (+)  Prior tobacco use, (-) fam Hx premature CAD.  The patient has a history of three-vessel coronary artery disease with a four-vessel bypass in 1986 and subsequent PCI in 2011.  The records from his bypass and PCI are unavailable for review.  He additionally has ischemic cardiomyopathy with an ICD placed.  Last device check shows device is programmed to VVI pacing mode and atrial EGM shows atrial fibrillation.  He had no ventricular arrhythmias or ICD therapies on that last device check.  The patient denies a history of chest discomfort, dyspnea, PND, orthopnea, pedal edema, palpitations, lightheadedness, and syncope.  He notes he stays active by doing physical therapy 3 times a week and walking on a treadmill for 30 minutes at a time with each physical therapy session.  Further notes he is able to walk up and down stairs in his home.  Denies any symptoms with exertion.  Notes his last back surgery was in 2018 he tolerated this without any issues.  No recent stress testing or coronary angiogram.  Per the patient and his wife, blood pressures at home range 120-130/60-80.    PAST MEDICAL HISTORY:  Past Medical History:   Diagnosis Date     Acute osteomyelitis of left lower leg (H) 2017     Acute superficial venous thrombosis of lower extremity, right 2018     Atrial flutter (H)      Automatic implantable cardioverter-defibrillator in situ      CAD (coronary artery disease)      Depression      Flatback syndrome      History of acute inferior wall MI      HTN (hypertension)      Hyperlipidemia LDL goal <100       Ischemic cardiomyopathy      Kyphosis (acquired) (postural)      JOANNA (obstructive sleep apnea)      PAD (peripheral artery disease) (H)      Paroxysmal atrial fibrillation (H)      PVD (peripheral vascular disease) (H)        CURRENT MEDICATIONS:  Current Outpatient Medications   Medication Sig     acetaminophen (TYLENOL) 500 MG tablet Take 500-1,000 mg by mouth every 8 hours as needed for mild pain     aspirin 81 MG EC tablet Take 81 mg by mouth daily     atorvastatin (LIPITOR) 80 MG tablet Take 80 mg by mouth At Bedtime      diazepam (VALIUM) 2 MG tablet Take 2-4 mg by mouth nightly as needed      diclofenac (VOLTAREN) 1 % topical gel Apply topically as needed (shoulder pain)      docusate sodium (DSS) 100 MG capsule Take 100 mg by mouth 2 times daily     fluticasone (FLONASE) 50 MCG/ACT nasal spray Spray 2 sprays into both nostrils daily as needed      hydrocortisone (CORTAID) 1 % external cream Apply topically 2 times daily as needed     metoprolol succinate ER (TOPROL-XL) 25 MG 24 hr tablet Take 12.5 mg by mouth At Bedtime      morphine (MSIR) 15 MG IR tablet Take 15-45 mg by mouth every 3 hours as needed Every 3 hrs as needed     multivitamin, therapeutic with minerals (THERA-VIT-M) TABS tablet Take 1 tablet by mouth daily     naloxone (NARCAN) 4 MG/0.1ML nasal spray Spray 4 mg into one nostril alternating nostrils once as needed for opioid reversal every 2-3 minutes until assistance arrives     nitroGLYcerin (NITROSTAT) 0.4 MG sublingual tablet Place 0.4 mg under the tongue as needed     polyethylene glycol (MIRALAX) 17 GM/Dose powder Take 17 g by mouth daily as needed      rivaroxaban ANTICOAGULANT (XARELTO) 2.5 MG TABS tablet Take 2.5 mg by mouth every evening      simethicone (MYLICON) 125 MG chewable tablet Take 375 mg by mouth as needed for intestinal gas     No current facility-administered medications for this visit.        PAST SURGICAL HISTORY:  Past Surgical History:   Procedure Laterality Date      BYPASS GRAFT ARTERY CORONARY  1986     Decompression posterior lumbar and instrumented fusion  2018     Endovascular femoral and/or popliteal and/or tibial artery angioplasty/stent       Hardware removal tib/fib       I and D left extremity wound       Knee hardware removal       KNEE SURGERY       STSG left extremity wound       TONSILLECTOMY         ALLERGIES  Wasp venom protein, Adhesive tape, Gelfoam [gelatin], Hydroxyzine, Influenza vaccines, and Lorazepam    FAMILY HX:  Family History   Problem Relation Age of Onset     Coronary Artery Disease Father      Cancer Mother      Heart Disease Sister      Suicide Brother      Heart Disease Sister        SOCIAL HX:  Social History     Socioeconomic History     Marital status:      Spouse name: Not on file     Number of children: Not on file     Years of education: Not on file     Highest education level: Not on file   Occupational History     Not on file   Social Needs     Financial resource strain: Not on file     Food insecurity     Worry: Not on file     Inability: Not on file     Transportation needs     Medical: Not on file     Non-medical: Not on file   Tobacco Use     Smoking status: Former Smoker     Types: Cigarettes     Quit date: 1988     Years since quittin.3     Smokeless tobacco: Former User   Substance and Sexual Activity     Alcohol use: Not Currently     Drug use: Not on file     Sexual activity: Not on file   Lifestyle     Physical activity     Days per week: Not on file     Minutes per session: Not on file     Stress: Not on file   Relationships     Social connections     Talks on phone: Not on file     Gets together: Not on file     Attends Congregation service: Not on file     Active member of club or organization: Not on file     Attends meetings of clubs or organizations: Not on file     Relationship status: Not on file     Intimate partner violence     Fear of current or ex partner: Not on file     Emotionally abused: Not on file      Physically abused: Not on file     Forced sexual activity: Not on file   Other Topics Concern     Not on file   Social History Narrative     Not on file       ROS:  Constitutional: No fever, chills, or sweats. No weight gain/loss.   HEENT: No visual disturbance, ear ache, epistaxis, sore throat.   Allergies/Immunologic: Negative.   Respiratory: No cough, hemoptysis.   Cardiovascular: As per HPI.   GI: No nausea, vomiting, hematemesis, melena, or hematochezia.   : No urinary frequency, dysuria, or hematuria.   Integument: No rash.   Psychiatric: No anxiety / depression.   Neuro: No speech disturbance, focal sensory or motor deficit.   Endocrinology: No polyuria / polyphagia.   Musculoskeletal: No myalgia.    VITAL SIGNS:  There were no vitals taken for this visit.  There is no height or weight on file to calculate BMI.  Wt Readings from Last 2 Encounters:   03/04/21 90.7 kg (200 lb)       PHYSICAL EXAM  Darleen Simmons is a 70 year old male.in no acute distress.  HEENT: Eyes Nonicteric.  Neck: JVP 2cm H20.  Carotids +2 bilaterally without bruits.  Lungs: CTA.  Cor: RRR. Normal S1 and S2.  No murmur, rub, or gallop.  PMI in Lf 5th ICS.  Abd: Soft, nontender, nondistended.  NABS.  No pulsatile mass.  Extremities: No C/C/E.  Pulses +3/3 symmetric in upper and lower extremities.  Neuro: Grossly intact.  Psych: A&O x 3.  Skin: No rash.    LABS  No results for input(s): WBC, HGB, HCT, PLT in the last 91134 hours.  Recent Labs   Lab Test 04/20/21   POTASSIUM 4.3   *   CR 0.72     No results for input(s): CHOL, HDL, LDL, TRIG, CHOLHDLRATIO, NHDL in the last 58829 hours.     EKG:  Ventricular paced.     EP:  5/2/21      ECHO: 4/12/2020      CARDIAC MRI: None    CORONARY CTA: None    STRESS TEST:  None    CARDIAC CATH: None    ABIs:  2/11/2021  Right: Doppler Waveforms:     Abnormal signals starting at or above the superficial femoral level.   Resting Index:     MARIAMA (PT)-  0.67      MARIAMA (DP)-  0.68      TBI-  0.42    TcPO2:     Values as noted.    Left: Doppler Waveforms:     Normal at all levels evaluated.   Resting Index:     MARIAMA (PT)-  0.87      MARIAMA (DP)-  0.83      TBI-  0.43   TcPO2:     Values as noted.    Conclusions:   Right:  Moderate peripheral arterial disease starting at or proximal to the superficial femoral level.   Left:  Moderate peripheral arterial disease location not well determined. Normal TCPO2 values at all sites.  Unchanged from prior study.    PERIPHERAL INTERVENTION: (12/18/20)  1. Ultrasound guided access to right common femoral artery and placement of a 6 fr sheath   2. Left leg angiography   3. Left common femoral artery 3rd order vessel catheter placement   4. Intravascular Lithotripsy (IVL) of the left proximal and distal SFA with a 5.5 mm lithotripsy balloon.   5. Stenting of the proximal SFA with a 6 x 40 mm Innova stent, post-dilated to a 5 mm diameter.   6. Closure of right femoral arteriotomy with a perclose device (Left)    ARTERIAL DUPLEX AORTA / ILIAC:  5/21/19    IMPRESSION:  Distal abdominal aorta is normal in caliber.   Velocities in the aorta and throughout the iliac vessels are increased compared to the prior exam.    RIGHT: Stented common iliac artery is patent with no focal stenosis. Velocities mildly increase through the external iliac artery where the maximal velocity is 195cm/sec distally. No focal stenosis is seen and no significant stenosis is appreciated on color flow.  Moderate stenosis right internal iliac artery. Right common femoral artery and SFA are patent and negative for stenosis. Mildly elevated velocities in the PFA with no visual stenosis.    LEFT:  Stented left common and external iliac arteries are patent and negative for stenosis. Mild probable darius intimal hyperplasia is noted in the proximal to mid portion of the stented external iliac artery but with no significant in stent stenosis. The left internal iliac artery elevated velocities could not be reproduced on  today's exam.     ARTERIAL DUPLEX, LLE  5/21/2019  IMPRESSION:  1) Mild to moderate elevated velocities in the profunda femoral  artery(195cm/sec) consistent with mild to moderate stenosis.  2) High grade stenosis of the proximal SFA (364cm/sec). Remainder of the SFA is patent with no additional focal stenosis.   3) Scattered atheromatous plaque in the lower extremity arteries. Left common femoral and popliteal arteries are patent with no stenosis.    ASSESSMENT AND PLAN:   Darleen Simmons is a 70 year old man with a PMHx coronary artery disease status post CABG '86 and PCI '11 (details unavailable), ICM s/p ICD, HTN, PAD, permanent atrial fibrillation and former tobacco abuse who presents for preoperative evaluation prior to going lumbar fusion surgery in the setting of chronic low back pain.  He is tolerating physical activity and physical therapy 3 times weekly with no symptoms of ischemia.  Further endorses no symptoms suggestive of heart failure or arrhythmia. TTE today shows LVEF 45-50% with RCA and LCx territories with wall motion abnormalities, similar to prior echo.  His RCRI score is 2 which correlates to 10.1% risk of 30-day MI, death and cardiac arrest.  While he is asymptomatic, given the remote nature of his CABG, will proceed with ischemic evaluation and obtain a nuclear stress test.  Should this be normal or show small area of ischemia, he can proceed with surgery.    # CAD s/p CABG '86 and PCI '11  # ICM s/p ICD  -Nuclear stress test as above, if normal or small area of ischemia can proceed with surgery  -Continue atorvastatin, ASA can be held in the perioperative as needed     # Permanent atrial fibrillation (most recent device check shows device is programmed to VVI and EGM consistent with atrial fibrillation)  -Can hold Xarelto in the perioperative period (48 hours before surgery)  -Continue Toprol-XL 12.5 mg daily    FOLLOW UP:  PCP    Sharad Vera MD   Cardiology Fellow, PGY-5  [phone]      ATTENDING NOTE:  Patient has been seen and evaluated by me on 05/04/2021. I have reviewed the cardiology consultation.  Pleave refer to it for additonal details.  I have reviewed today's vital signs, medications, labs, and imaging results.  I have reviewed and edited, as necessary, the history, review of systems, physical examination, and assessment and plan.  I have discussed my assessment and plan with the cardiology fellow.  Darleen Simmons is a 70 year old male with risk factor profile (+) HTN, (-) DM, (+) hypercholesterolemia, (+)  prior tobacco use, (-) fam Hx premature CAD, Hx 3V CAD s/p 4V CABG (1986), s/p PCI (2011), prior records not available, referred to cardiology prior to consideration for lumbar fusion (scoliosis, flat back syndrome).  He walks on treadmill for 30 minutes several times a week without cardiopulmonary symptoms.  No symptoms of angina, CHF, valvular heart disease, or arrhythmia.   His cardiopulmonaryexam is unremarkable.  His LVEF is 45-50%, WMA c/w prior RCA and possible LCx infarction.  He has reported Hx of A Flutter but is not anticoagulated with full dose Xarelto.  He also has Hx PAD with prior stenting in left iliac or SFA and is on low dose Rivaroxaban (2.5 mg every day).  He is on BB and ASA 81 mg every day, and Lipitor 80 mg every day.  His ECG shows no P waves and ventricular paced rhythm.  Recent EP interrogation showed 97% AS-, 3% AS-VS.  His RCRI score = 2.  Schedule Lexiscan to ensure to significant myocardium at risk.  If small area of ischemia or normal Lexiscan, proceed with surgery.   Xarelto can be held 48 hours prior to surgery.   Continue beta blocker throughout the perioperative phase.  ASA can be temporarily held.    LEXISCAN: (5/5/21):     The nuclear stress test is abnormal.     There is a medium sized area of a moderate degree of nontransmural infarction in the entire apical segment(s) of the left ventricle. This appears to be in the left anterior  descending distribution. No significant area of ischemia identified.     There is a medium sized area of a severe degree of transmural infarction in the entire inferior segment(s) of the left ventricle. This appears to be in the right coronary artery distribution.     There is a medium sized area of transmural infarction in the entire inferolateral segment(s) of the left ventricle. This appears to be in the left circumflex distribution.     LVEDv  187ml. LVESv 86ml. LV EF 54%.    ADDENDUM (5/5/2121): I reviewed the Lexiscan results with Dr. Lentz.  There are multiple areas of nontransmural infarction in all three vessel territories.  However, there is no ischemia.  I do not have the remote cardiac history but suspect the NSTEMIs are remote, possibly dating back to the CABG decades ago.  In the absence of ischemia, I would not recommend coronary angiography.   The patient is at moderate risk of a myocardial infarction given the Hx of multivessel CAD and prior infarctions.  I would continue him on Lopressor and Lipitor throughout the perioperative phase and would consider keeping him on ASA if the surgical risk of bleeding is not deemed high.  The Rivaroxaban may be stopped 48 hours prior to surgery and should be resumed post op when the risk of bleeding is deemed low.    Yovany Reis MD     Cardiovascular Division    CC  Patient Care Team:  Dunphy, Tyler J as PCP - General (Internal Medicine)  Wallace Montana MD, MD as Akhil Munoz MD as MD (Orthopaedic Surgery)  Akhil Akins MD as Assigned Musculoskeletal Provider  TRACY MATSON

## 2021-05-04 ENCOUNTER — HOSPITAL ENCOUNTER (OUTPATIENT)
Dept: CARDIOLOGY | Facility: CLINIC | Age: 71
End: 2021-05-04
Attending: CLINICAL NURSE SPECIALIST
Payer: COMMERCIAL

## 2021-05-04 ENCOUNTER — PRE VISIT (OUTPATIENT)
Dept: CARDIOLOGY | Facility: CLINIC | Age: 71
End: 2021-05-04

## 2021-05-04 VITALS
SYSTOLIC BLOOD PRESSURE: 137 MMHG | BODY MASS INDEX: 28.75 KG/M2 | HEART RATE: 82 BPM | OXYGEN SATURATION: 97 % | HEIGHT: 74 IN | DIASTOLIC BLOOD PRESSURE: 69 MMHG | WEIGHT: 224 LBS

## 2021-05-04 DIAGNOSIS — I25.10 CORONARY ARTERY DISEASE INVOLVING NATIVE CORONARY ARTERY OF NATIVE HEART WITHOUT ANGINA PECTORIS: ICD-10-CM

## 2021-05-04 DIAGNOSIS — M40.30 FLATBACK SYNDROME: ICD-10-CM

## 2021-05-04 DIAGNOSIS — Z01.810 PRE-OPERATIVE CARDIOVASCULAR EXAMINATION: Primary | ICD-10-CM

## 2021-05-04 PROCEDURE — G0463 HOSPITAL OUTPT CLINIC VISIT: HCPCS | Mod: 25

## 2021-05-04 PROCEDURE — 99204 OFFICE O/P NEW MOD 45 MIN: CPT | Mod: 25 | Performed by: INTERNAL MEDICINE

## 2021-05-04 PROCEDURE — 93306 TTE W/DOPPLER COMPLETE: CPT | Mod: 26 | Performed by: STUDENT IN AN ORGANIZED HEALTH CARE EDUCATION/TRAINING PROGRAM

## 2021-05-04 PROCEDURE — 999N000208 ECHOCARDIOGRAM COMPLETE

## 2021-05-04 PROCEDURE — 93005 ELECTROCARDIOGRAM TRACING: CPT

## 2021-05-04 PROCEDURE — 255N000002 HC RX 255 OP 636: Performed by: STUDENT IN AN ORGANIZED HEALTH CARE EDUCATION/TRAINING PROGRAM

## 2021-05-04 RX ORDER — METHOCARBAMOL 750 MG/1
1250 TABLET ORAL
Status: ON HOLD | COMMUNITY
Start: 2021-04-22 | End: 2021-07-29

## 2021-05-04 RX ADMIN — HUMAN ALBUMIN MICROSPHERES AND PERFLUTREN 5 ML: 10; .22 INJECTION, SOLUTION INTRAVENOUS at 08:06

## 2021-05-04 ASSESSMENT — MIFFLIN-ST. JEOR: SCORE: 1827.87

## 2021-05-04 ASSESSMENT — PAIN SCALES - GENERAL: PAINLEVEL: NO PAIN (0)

## 2021-05-04 NOTE — PATIENT INSTRUCTIONS
It was a pleasure to see you in the cardiology clinic today.    If you have any questions, you can reach my nurse, Lexus Monge, at [phone].  Press Option #1 for the [facility], and then press Option #f 4 or nursing.    Note the new medications: None    Stop the following medications: None    The results from today include: None    Tests ordered today: Lexiscan    I would like you to follow up with his primary care provider as needed.    Sincerely,      Yovany Reis MD     [facility]

## 2021-05-04 NOTE — LETTER
5/4/2021      RE: Darleen Simmons  [address] [facility] [address]       Dear Colleague,    Thank you for the opportunity to participate in the care of your patient, Darleen Simmons, at the [facility]. Please see a copy of my visit note below.    [facility] CARDIOLOGY CONSULTATION    Referring Provider:  Evelio Francis  Primary Care Provider:   Dunphy, Tyler J  Indication for Consultation:  Pre-operative evaluation for lumbar fusion surgery.     HPI: Darleen Simmons is a 70 year old male being seen today for evaluation of pre-operative evaluation for lumbar fusion surgery.   The patient's risk factor profile is: (+) HTN, (-) DM, (+) hypercholesterolemia, (+)  Prior tobacco use, (-) fam Hx premature CAD.  The patient has a history of three-vessel coronary artery disease with a four-vessel bypass in 1986 and subsequent PCI in 2011.  The records from his bypass and PCI are unavailable for review.  He additionally has ischemic cardiomyopathy with an ICD placed.  Last device check shows device is programmed to VVI pacing mode and atrial EGM shows atrial fibrillation.  He had no ventricular arrhythmias or ICD therapies on that last device check.  The patient denies a history of chest discomfort, dyspnea, PND, orthopnea, pedal edema, palpitations, lightheadedness, and syncope.  He notes he stays active by doing physical therapy 3 times a week and walking on a treadmill for 30 minutes at a time with each physical therapy session.  Further notes he is able to walk up and down stairs in his home.  Denies any symptoms with exertion.  Notes his last back surgery was in 2018 he tolerated this without any issues.  No recent stress testing or coronary angiogram.  Per the patient and his wife, blood pressures at home range 120-130/60-80.    PAST MEDICAL HISTORY:  Past Medical History:   Diagnosis Date     Acute osteomyelitis of left lower leg (H)  2017     Acute superficial venous thrombosis of lower extremity, right 2018     Atrial flutter (H)      Automatic implantable cardioverter-defibrillator in situ      CAD (coronary artery disease)      Depression      Flatback syndrome      History of acute inferior wall MI      HTN (hypertension)      Hyperlipidemia LDL goal <100      Ischemic cardiomyopathy      Kyphosis (acquired) (postural)      JOANNA (obstructive sleep apnea)      PAD (peripheral artery disease) (H)      Paroxysmal atrial fibrillation (H)      PVD (peripheral vascular disease) (H)        CURRENT MEDICATIONS:  Current Outpatient Medications   Medication Sig     acetaminophen (TYLENOL) 500 MG tablet Take 500-1,000 mg by mouth every 8 hours as needed for mild pain     aspirin 81 MG EC tablet Take 81 mg by mouth daily     atorvastatin (LIPITOR) 80 MG tablet Take 80 mg by mouth At Bedtime      diazepam (VALIUM) 2 MG tablet Take 2-4 mg by mouth nightly as needed      diclofenac (VOLTAREN) 1 % topical gel Apply topically as needed (shoulder pain)      docusate sodium (DSS) 100 MG capsule Take 100 mg by mouth 2 times daily     fluticasone (FLONASE) 50 MCG/ACT nasal spray Spray 2 sprays into both nostrils daily as needed      hydrocortisone (CORTAID) 1 % external cream Apply topically 2 times daily as needed     metoprolol succinate ER (TOPROL-XL) 25 MG 24 hr tablet Take 12.5 mg by mouth At Bedtime      morphine (MSIR) 15 MG IR tablet Take 15-45 mg by mouth every 3 hours as needed Every 3 hrs as needed     multivitamin, therapeutic with minerals (THERA-VIT-M) TABS tablet Take 1 tablet by mouth daily     naloxone (NARCAN) 4 MG/0.1ML nasal spray Spray 4 mg into one nostril alternating nostrils once as needed for opioid reversal every 2-3 minutes until assistance arrives     nitroGLYcerin (NITROSTAT) 0.4 MG sublingual tablet Place 0.4 mg under the tongue as needed     polyethylene glycol (MIRALAX) 17 GM/Dose powder Take 17 g by mouth daily as needed       rivaroxaban ANTICOAGULANT (XARELTO) 2.5 MG TABS tablet Take 2.5 mg by mouth every evening      simethicone (MYLICON) 125 MG chewable tablet Take 375 mg by mouth as needed for intestinal gas     No current facility-administered medications for this visit.        PAST SURGICAL HISTORY:  Past Surgical History:   Procedure Laterality Date     BYPASS GRAFT ARTERY CORONARY       Decompression posterior lumbar and instrumented fusion  2018     Endovascular femoral and/or popliteal and/or tibial artery angioplasty/stent       Hardware removal tib/fib       I and D left extremity wound       Knee hardware removal       KNEE SURGERY       STSG left extremity wound       TONSILLECTOMY         ALLERGIES  Wasp venom protein, Adhesive tape, Gelfoam [gelatin], Hydroxyzine, Influenza vaccines, and Lorazepam    FAMILY HX:  Family History   Problem Relation Age of Onset     Coronary Artery Disease Father      Cancer Mother      Heart Disease Sister      Suicide Brother      Heart Disease Sister        SOCIAL HX:  Social History     Socioeconomic History     Marital status:      Spouse name: Not on file     Number of children: Not on file     Years of education: Not on file     Highest education level: Not on file   Occupational History     Not on file   Social Needs     Financial resource strain: Not on file     Food insecurity     Worry: Not on file     Inability: Not on file     Transportation needs     Medical: Not on file     Non-medical: Not on file   Tobacco Use     Smoking status: Former Smoker     Types: Cigarettes     Quit date: 1988     Years since quittin.3     Smokeless tobacco: Former User   Substance and Sexual Activity     Alcohol use: Not Currently     Drug use: Not on file     Sexual activity: Not on file   Lifestyle     Physical activity     Days per week: Not on file     Minutes per session: Not on file     Stress: Not on file   Relationships     Social connections     Talks on phone: Not on  file     Gets together: Not on file     Attends Zoroastrian service: Not on file     Active member of club or organization: Not on file     Attends meetings of clubs or organizations: Not on file     Relationship status: Not on file     Intimate partner violence     Fear of current or ex partner: Not on file     Emotionally abused: Not on file     Physically abused: Not on file     Forced sexual activity: Not on file   Other Topics Concern     Not on file   Social History Narrative     Not on file       ROS:  Constitutional: No fever, chills, or sweats. No weight gain/loss.   HEENT: No visual disturbance, ear ache, epistaxis, sore throat.   Allergies/Immunologic: Negative.   Respiratory: No cough, hemoptysis.   Cardiovascular: As per HPI.   GI: No nausea, vomiting, hematemesis, melena, or hematochezia.   : No urinary frequency, dysuria, or hematuria.   Integument: No rash.   Psychiatric: No anxiety / depression.   Neuro: No speech disturbance, focal sensory or motor deficit.   Endocrinology: No polyuria / polyphagia.   Musculoskeletal: No myalgia.    VITAL SIGNS:  There were no vitals taken for this visit.  There is no height or weight on file to calculate BMI.  Wt Readings from Last 2 Encounters:   03/04/21 90.7 kg (200 lb)       PHYSICAL EXAM  Darleen Simmons is a 70 year old male.in no acute distress.  HEENT: Eyes Nonicteric.  Neck: JVP 2cm H20.  Carotids +2 bilaterally without bruits.  Lungs: CTA.  Cor: RRR. Normal S1 and S2.  No murmur, rub, or gallop.  PMI in Lf 5th ICS.  Abd: Soft, nontender, nondistended.  NABS.  No pulsatile mass.  Extremities: No C/C/E.  Pulses +3/3 symmetric in upper and lower extremities.  Neuro: Grossly intact.  Psych: A&O x 3.  Skin: No rash.    LABS  No results for input(s): WBC, HGB, HCT, PLT in the last 82550 hours.  Recent Labs   Lab Test 04/20/21   POTASSIUM 4.3   *   CR 0.72     No results for input(s): CHOL, HDL, LDL, TRIG, CHOLHDLRATIO, NHDL in the last 28486 hours.      EKG:  Ventricular paced.     EP:  5/2/21      ECHO: 4/12/2020      CARDIAC MRI: None    CORONARY CTA: None    STRESS TEST:  None    CARDIAC CATH: None    ABIs:  2/11/2021  Right: Doppler Waveforms:     Abnormal signals starting at or above the superficial femoral level.   Resting Index:     MARIAMA (PT)-  0.67      MARIAMA (DP)-  0.68      TBI-  0.42   TcPO2:     Values as noted.    Left: Doppler Waveforms:     Normal at all levels evaluated.   Resting Index:     MARIAMA (PT)-  0.87      MARIAMA (DP)-  0.83      TBI-  0.43   TcPO2:     Values as noted.    Conclusions:   Right:  Moderate peripheral arterial disease starting at or proximal to the superficial femoral level.   Left:  Moderate peripheral arterial disease location not well determined. Normal TCPO2 values at all sites.  Unchanged from prior study.    PERIPHERAL INTERVENTION: (12/18/20)  1. Ultrasound guided access to right common femoral artery and placement of a 6 fr sheath   2. Left leg angiography   3. Left common femoral artery 3rd order vessel catheter placement   4. Intravascular Lithotripsy (IVL) of the left proximal and distal SFA with a 5.5 mm lithotripsy balloon.   5. Stenting of the proximal SFA with a 6 x 40 mm Innova stent, post-dilated to a 5 mm diameter.   6. Closure of right femoral arteriotomy with a perclose device (Left)    ARTERIAL DUPLEX AORTA / ILIAC:  5/21/19    IMPRESSION:  Distal abdominal aorta is normal in caliber.   Velocities in the aorta and throughout the iliac vessels are increased compared to the prior exam.    RIGHT: Stented common iliac artery is patent with no focal stenosis. Velocities mildly increase through the external iliac artery where the maximal velocity is 195cm/sec distally. No focal stenosis is seen and no significant stenosis is appreciated on color flow.  Moderate stenosis right internal iliac artery. Right common femoral artery and SFA are patent and negative for stenosis. Mildly elevated velocities in the PFA with no  visual stenosis.    LEFT:  Stented left common and external iliac arteries are patent and negative for stenosis. Mild probable darius intimal hyperplasia is noted in the proximal to mid portion of the stented external iliac artery but with no significant in stent stenosis. The left internal iliac artery elevated velocities could not be reproduced on today's exam.     ARTERIAL DUPLEX, LLE  5/21/2019  IMPRESSION:  1) Mild to moderate elevated velocities in the profunda femoral  artery(195cm/sec) consistent with mild to moderate stenosis.  2) High grade stenosis of the proximal SFA (364cm/sec). Remainder of the SFA is patent with no additional focal stenosis.   3) Scattered atheromatous plaque in the lower extremity arteries. Left common femoral and popliteal arteries are patent with no stenosis.    ASSESSMENT AND PLAN:   Darleen Simmons is a 70 year old man with a PMHx coronary artery disease status post CABG '86 and PCI '11 (details unavailable), ICM s/p ICD, HTN, PAD, permanent atrial fibrillation and former tobacco abuse who presents for preoperative evaluation prior to going lumbar fusion surgery in the setting of chronic low back pain.  He is tolerating physical activity and physical therapy 3 times weekly with no symptoms of ischemia.  Further endorses no symptoms suggestive of heart failure or arrhythmia. TTE today shows LVEF 45-50% with RCA and LCx territories with wall motion abnormalities, similar to prior echo.  His RCRI score is 2 which correlates to 10.1% risk of 30-day MI, death and cardiac arrest.  While he is asymptomatic, given the remote nature of his CABG, will proceed with ischemic evaluation and obtain a nuclear stress test.  Should this be normal or show small area of ischemia, he can proceed with surgery.    # CAD s/p CABG '86 and PCI '11  # ICM s/p ICD  -Nuclear stress test as above, if normal or small area of ischemia can proceed with surgery  -Continue atorvastatin, ASA can be held in the  perioperative as needed     # Permanent atrial fibrillation (most recent device check shows device is programmed to VVI and EGM consistent with atrial fibrillation)  -Can hold Xarelto in the perioperative period (48 hours before surgery)  -Continue Toprol-XL 12.5 mg daily    FOLLOW UP:  PCP    Sharad Vera MD   Cardiology Fellow, PGY-5  [phone]     ATTENDING NOTE:  Patient has been seen and evaluated by me on 05/04/2021. I have reviewed the cardiology consultation.  Pleave refer to it for additonal details.  I have reviewed today's vital signs, medications, labs, and imaging results.  I have reviewed and edited, as necessary, the history, review of systems, physical examination, and assessment and plan.  I have discussed my assessment and plan with the cardiology fellow.  Darleen Simmons is a 70 year old male with risk factor profile (+) HTN, (-) DM, (+) hypercholesterolemia, (+)  prior tobacco use, (-) fam Hx premature CAD, Hx 3V CAD s/p 4V CABG (1986), s/p PCI (2011), prior records not available, referred to cardiology prior to consideration for lumbar fusion (scoliosis, flat back syndrome).  He walks on treadmill for 30 minutes several times a week without cardiopulmonary symptoms.  No symptoms of angina, CHF, valvular heart disease, or arrhythmia.   His cardiopulmonaryexam is unremarkable.  His LVEF is 45-50%, WMA c/w prior RCA and possible LCx infarction.  He has reported Hx of A Flutter but is not anticoagulated with full dose Xarelto.  He also has Hx PAD with prior stenting in left iliac or SFA and is on low dose Rivaroxaban (2.5 mg every day).  He is on BB and ASA 81 mg every day, and Lipitor 80 mg every day.  His ECG shows no P waves and ventricular paced rhythm.  Recent EP interrogation showed 97% AS-, 3% AS-VS.  His RCRI score = 2.  Schedule Lexiscan to ensure to significant myocardium at risk.  If small area of ischemia or normal Lexiscan, proceed with surgery.   Xarelto can be held 48 hours  prior to surgery.   Continue beta blocker throughout the perioperative phase.  ASA can be temporarily held.    LEXISCAN: (5/5/21):     The nuclear stress test is abnormal.     There is a medium sized area of a moderate degree of nontransmural infarction in the entire apical segment(s) of the left ventricle. This appears to be in the left anterior descending distribution. No significant area of ischemia identified.     There is a medium sized area of a severe degree of transmural infarction in the entire inferior segment(s) of the left ventricle. This appears to be in the right coronary artery distribution.     There is a medium sized area of transmural infarction in the entire inferolateral segment(s) of the left ventricle. This appears to be in the left circumflex distribution.     LVEDv  187ml. LVESv 86ml. LV EF 54%.    ADDENDUM (5/5/2121): I reviewed the Lexiscan results with Dr. Lentz.  There are multiple areas of nontransmural infarction in all three vessel territories.  However, there is no ischemia.  I do not have the remote cardiac history but suspect the NSTEMIs are remote, possibly dating back to the CABG decades ago.  In the absence of ischemia, I would not recommend coronary angiography.   The patient is at moderate risk of a myocardial infarction given the Hx of multivessel CAD and prior infarctions.  I would continue him on Lopressor and Lipitor throughout the perioperative phase and would consider keeping him on ASA if the surgical risk of bleeding is not deemed high.  The Rivaroxaban may be stopped 48 hours prior to surgery and should be resumed post op when the risk of bleeding is deemed low.    Yovany Reis MD     Cardiovascular Division    CC  Patient Care Team:  Dunphy, Tyler J as PCP - General (Internal Medicine)  Wallace Montana MD, MD as MD Akins, Akhil Recinos MD as MD (Orthopaedic Surgery)  TRACY MATSON

## 2021-05-04 NOTE — NURSING NOTE
Chief Complaint   Patient presents with     New Patient     referral from Ivy Healy APRN CNS for pre op evaluation      Vitals were taken, medications reconciled and EKG performed.     STAR Kapadia  9:08 AM

## 2021-05-05 ENCOUNTER — HOSPITAL ENCOUNTER (OUTPATIENT)
Dept: NUCLEAR MEDICINE | Facility: CLINIC | Age: 71
Setting detail: NUCLEAR MEDICINE
End: 2021-05-05
Attending: INTERNAL MEDICINE
Payer: COMMERCIAL

## 2021-05-05 ENCOUNTER — TELEPHONE (OUTPATIENT)
Dept: CARDIOLOGY | Facility: CLINIC | Age: 71
End: 2021-05-05

## 2021-05-05 ENCOUNTER — HOSPITAL ENCOUNTER (OUTPATIENT)
Dept: CARDIOLOGY | Facility: CLINIC | Age: 71
Discharge: HOME | End: 2021-05-05
Attending: INTERNAL MEDICINE | Admitting: INTERNAL MEDICINE
Payer: COMMERCIAL

## 2021-05-05 DIAGNOSIS — I25.10 CORONARY ARTERY DISEASE INVOLVING NATIVE CORONARY ARTERY OF NATIVE HEART WITHOUT ANGINA PECTORIS: ICD-10-CM

## 2021-05-05 LAB
CV STRESS MAX HR HE: 80
INTERPRETATION ECG - MUSE: NORMAL
RATE PRESSURE PRODUCT: NORMAL
STRESS ECHO BASELINE DIASTOLIC HE: 69
STRESS ECHO BASELINE HR: 80
STRESS ECHO BASELINE SYSTOLIC BP: 130
STRESS ECHO CALCULATED PERCENT HR: 54 %
STRESS ECHO LAST STRESS DIASTOLIC BP: 69
STRESS ECHO LAST STRESS SYSTOLIC BP: 149
STRESS ECHO TARGET HR: 148

## 2021-05-05 PROCEDURE — 250N000011 HC RX IP 250 OP 636: Performed by: INTERNAL MEDICINE

## 2021-05-05 PROCEDURE — 343N000001 HC RX 343: Performed by: INTERNAL MEDICINE

## 2021-05-05 PROCEDURE — 93017 CV STRESS TEST TRACING ONLY: CPT

## 2021-05-05 PROCEDURE — 93018 CV STRESS TEST I&R ONLY: CPT | Performed by: INTERNAL MEDICINE

## 2021-05-05 PROCEDURE — 93016 CV STRESS TEST SUPVJ ONLY: CPT | Performed by: INTERNAL MEDICINE

## 2021-05-05 PROCEDURE — 78452 HT MUSCLE IMAGE SPECT MULT: CPT | Mod: 26 | Performed by: INTERNAL MEDICINE

## 2021-05-05 PROCEDURE — A9502 TC99M TETROFOSMIN: HCPCS | Performed by: INTERNAL MEDICINE

## 2021-05-05 PROCEDURE — 78452 HT MUSCLE IMAGE SPECT MULT: CPT

## 2021-05-05 RX ORDER — ACYCLOVIR 200 MG/1
0-1 CAPSULE ORAL
Status: DISCONTINUED | OUTPATIENT
Start: 2021-05-05 | End: 2021-05-06 | Stop reason: HOSPADM

## 2021-05-05 RX ORDER — REGADENOSON 0.08 MG/ML
0.4 INJECTION, SOLUTION INTRAVENOUS ONCE
Status: COMPLETED | OUTPATIENT
Start: 2021-05-05 | End: 2021-05-05

## 2021-05-05 RX ORDER — AMINOPHYLLINE 25 MG/ML
50-100 INJECTION, SOLUTION INTRAVENOUS
Status: DISCONTINUED | OUTPATIENT
Start: 2021-05-05 | End: 2021-05-06 | Stop reason: HOSPADM

## 2021-05-05 RX ORDER — CAFFEINE CITRATE 20 MG/ML
60 SOLUTION INTRAVENOUS
Status: DISCONTINUED | OUTPATIENT
Start: 2021-05-05 | End: 2021-05-06 | Stop reason: HOSPADM

## 2021-05-05 RX ORDER — ALBUTEROL SULFATE 90 UG/1
2 AEROSOL, METERED RESPIRATORY (INHALATION) EVERY 5 MIN PRN
Status: DISCONTINUED | OUTPATIENT
Start: 2021-05-05 | End: 2021-05-06 | Stop reason: HOSPADM

## 2021-05-05 RX ADMIN — TETROFOSMIN 38 MCI.: 1.38 INJECTION, POWDER, LYOPHILIZED, FOR SOLUTION INTRAVENOUS at 09:27

## 2021-05-05 RX ADMIN — REGADENOSON 0.4 MG: 0.08 INJECTION, SOLUTION INTRAVENOUS at 09:23

## 2021-05-05 RX ADMIN — TETROFOSMIN 9.9 MCI.: 1.38 INJECTION, POWDER, LYOPHILIZED, FOR SOLUTION INTRAVENOUS at 08:02

## 2021-05-05 NOTE — TELEPHONE ENCOUNTER
----- Message from Yovany Reis MD sent at 5/5/2021  4:19 PM CDT -----  Lexus,    I addended the clinic note.  He is intermediate risk of a cardiovascular event.  Anesthesia and Ortho/Neurosurg should review my addendum.   I would not recommend coronary angiogram (given absence of ischemia) but I am sure we would find problems if we did.    Yovany Reis MD

## 2021-05-05 NOTE — PROGRESS NOTES
Pt here for Lexiscan nuclear stress test.  Medication and side effects reviewed with patient. Lung sounds clear to auscultation bilaterally. Denied caffeine use. Patient tolerated Lexiscan dose without any adverse reactions. VSS. Monitored post injection and then taken to nuclear medicine for follow up imaging.

## 2021-05-05 NOTE — TELEPHONE ENCOUNTER
Patient has had a lot back pain this morning and is not sure he will be able to do the test per wife

## 2021-05-05 NOTE — TELEPHONE ENCOUNTER
[facility] Call Center    Phone Message    May a detailed message be left on voicemail: no     Reason for Call: Other: Saadia called because Pt Jorje has concerns regarding testing scheduled for today 5/5/2021. She would like to speak with Lexus. Please reach out at [phone].     Action Taken: Message routed to:  [facility] ([facility]): Cardiology    Travel Screening: Not Applicable

## 2021-05-08 ENCOUNTER — TELEPHONE (OUTPATIENT)
Dept: ORTHOPEDICS | Facility: CLINIC | Age: 71
End: 2021-05-08

## 2021-05-08 NOTE — TELEPHONE ENCOUNTER
I called pt & wife on TH 5-6-21 & confirmed with them that they had heard from Cardiology & our PAC clinic -see both notes,  That he is medically Cleared OK to have spine surgery & they understand he does have a medium Cardiac risk.  They want to proceed with surgery plan.    I told them we had heard from [place] that Insurance denied surgery coverage so  did a Peer to peer review with Insurance this week & they Denied it again.    Bree in [place] is now working on an appeal with Insurance & will update them when she hears.  They will F/U with Bree [phone].  They understood.    Alda Ellis RN.

## 2021-05-11 ENCOUNTER — HOSPITAL ENCOUNTER (INPATIENT)
Facility: CLINIC | Age: 71
Setting detail: SURGERY ADMIT
End: 2021-05-11
Attending: ORTHOPAEDIC SURGERY | Admitting: ORTHOPAEDIC SURGERY
Payer: COMMERCIAL

## 2021-05-11 DIAGNOSIS — Z87.828 H/O SPINAL CORD INJURY: ICD-10-CM

## 2021-05-11 DIAGNOSIS — M40.30 FLATBACK SYNDROME: ICD-10-CM

## 2021-05-11 DIAGNOSIS — Z98.1 H/O SPINAL FUSION: ICD-10-CM

## 2021-05-12 NOTE — TELEPHONE ENCOUNTER
FUTURE VISIT INFORMATION      SURGERY INFORMATION:    Date: 21    Location: [facility] OR    Surgeon:  Akhil Akins MD Jones, Kristen Elizabeth, MD    Anesthesia Type:  General    Procedure: Posterior spinal fusion Thoracic 7 to Sacral 1 with segmental instrumentation Thoracic 7 to 8; reinsertion of instrumentation Thoracic 9 to Sacral 1; pelvic fixation Kurtz Childs osteotomies and transforaminal lumbar interbody fusions Thoracic 12 to Lumbar 3; possible pedicle subtraction osteotomy Lumbar 3 or 4; tether Thoracic 5-7    Consult: virtual visit     RECORDS REQUESTED FROM:       [facility]    Most recent EKG+ Tracin21    Most recent ECHO: 3/23/21    Most recent Cardiac Stress Test: 21

## 2021-05-17 ENCOUNTER — TEAM CONFERENCE (OUTPATIENT)
Dept: ORTHOPEDICS | Facility: CLINIC | Age: 71
End: 2021-05-17

## 2021-05-17 NOTE — TELEPHONE ENCOUNTER
Complex spine case review    Date of meetin21  Presented by:   Dr. Mable MD  Recorded by:   Danielle Rizvi PA-C     *Final Report*    [facility]    Darleen SINGH Troy was discussed at the Complex Spine Case Review.  Representatives from Spine Surgery, Neurosurgery, Radiology and Anesthesia were present at the meeting.    Concerns:  10% cardiac risk with surgery  Will need T&S 48 hr pre-op due to antibody    Recommendation:  Anesthesia recommends he be done at [place] where Cath lab available.   Discussed this will add time to length of case.   Anesthesia would need to be pre-assigned, Dr. Matheus Guerrier will pre-assign.  No clear contraindication to high dose TXA but anesthesia to do a review and report back on appropriate dose.     Danielle Rizvi PA-C

## 2021-05-20 ENCOUNTER — DOCUMENTATION ONLY (OUTPATIENT)
Dept: OTHER | Facility: CLINIC | Age: 71
End: 2021-05-20

## 2021-05-25 RX ORDER — EPINEPHRINE 0.3 MG/.3ML
0.3 INJECTION SUBCUTANEOUS PRN
Status: ON HOLD | COMMUNITY
End: 2021-07-29

## 2021-05-25 NOTE — PROGRESS NOTES
Preoperative Assessment Center Medication History Note    Medication history completed on May 25, 2021 by this writer. See Epic admission navigator for prior to admission medications. Operating room staff will still need to confirm medications and last dose information on day of surgery.     Medication history interview sources:  patient, maris    Changes made to PTA medication list (reason)  Added: epi pen, Uriva Rx, Follinex   Deleted: Xarelto  Changed: valium sig,     Additional medication history information (including reliability of information, actions taken by pharmacist):    -- Patient is on daily morphine. Average oral morphine dosing <60 mg/day so no formal pain management plan created. Please feel free to consult the inpatient pain management team if there are any issues with postoperative pain management.   -- No recent (within 30 days) course of antibiotics  -- No recent (within 30 days) course of systemic steroids  -- Patient declines being on any other prescription or over-the-counter medications    Prior to Admission medications    Medication Sig Last Dose Taking? Auth Provider   acetaminophen (TYLENOL) 500 MG tablet Take 500-1,000 mg by mouth every 8 hours as needed for mild pain Taking Yes Unknown, Entered By History   aspirin 81 MG EC tablet Take 81 mg by mouth daily Taking Yes Unknown, Entered By History   atorvastatin (LIPITOR) 80 MG tablet Take 80 mg by mouth At Bedtime  Taking Yes Reported, Patient   D3-50 1.25 MG (68581 UT) capsule Take 1,250 mcg by mouth every 7 days Takes on Saturdays. Taking Yes Reported, Patient   diazepam (VALIUM) 2 MG tablet Take 2-4 mg by mouth nightly as needed for muscle spasms  Taking Yes Reported, Patient   diclofenac (VOLTAREN) 1 % topical gel Apply topically as needed (shoulder pain)  Taking Yes Reported, Patient   docusate sodium (DSS) 100 MG capsule Take 100 mg by mouth 2 times daily Taking Yes Reported, Patient   EPINEPHrine (ANY BX GENERIC EQUIV) 0.3 MG/0.3ML  injection 2-pack Inject 0.3 mg into the muscle as needed for anaphylaxis Taking Yes Unknown, Entered By History   fluticasone (FLONASE) 50 MCG/ACT nasal spray Spray 2 sprays into both nostrils daily as needed  Taking Yes Reported, Patient   metoprolol succinate ER (TOPROL-XL) 25 MG 24 hr tablet Take 12.5 mg by mouth At Bedtime  Taking Yes Reported, Patient   morphine (MSIR) 15 MG IR tablet Take 15-30 mg by mouth every 3 hours as needed Every 3 hrs as needed Taking Yes Reported, Patient   multivitamin, therapeutic with minerals (THERA-VIT-M) TABS tablet Take 1 tablet by mouth daily Taking Yes Reported, Patient   naloxone (NARCAN) 4 MG/0.1ML nasal spray Spray 4 mg into one nostril alternating nostrils once as needed for opioid reversal every 2-3 minutes until assistance arrives Taking Yes Unknown, Entered By History   NONFORMULARY Uriva Rx - supplement for bladder tone Taking Yes Unknown, Entered By History   NONFORMULARY Follinex supplement - herbal supplement for hair growth. Taking Yes Unknown, Entered By History   polyethylene glycol (MIRALAX) 17 GM/Dose powder Take 17 g by mouth daily as needed  Taking Yes Reported, Patient   simethicone (MYLICON) 125 MG chewable tablet Take 375 mg by mouth as needed for intestinal gas Taking Yes Unknown, Entered By History   hydrocortisone (CORTAID) 1 % external cream Apply topically 2 times daily as needed Not Taking  Unknown, Entered By History   nitroGLYcerin (NITROSTAT) 0.4 MG sublingual tablet Place 0.4 mg under the tongue as needed Not Taking  Reported, Patient        Medication history completed by: Jose Roberto Gonzalez [facility]

## 2021-05-27 ENCOUNTER — OFFICE VISIT (OUTPATIENT)
Dept: SURGERY | Facility: CLINIC | Age: 71
End: 2021-05-27
Payer: COMMERCIAL

## 2021-05-27 ENCOUNTER — PRE VISIT (OUTPATIENT)
Dept: SURGERY | Facility: CLINIC | Age: 71
End: 2021-05-27

## 2021-05-27 ENCOUNTER — ANESTHESIA EVENT (OUTPATIENT)
Dept: SURGERY | Facility: CLINIC | Age: 71
End: 2021-05-27

## 2021-05-27 VITALS
TEMPERATURE: 97.7 F | HEIGHT: 73 IN | BODY MASS INDEX: 29.29 KG/M2 | WEIGHT: 221 LBS | DIASTOLIC BLOOD PRESSURE: 79 MMHG | HEART RATE: 84 BPM | OXYGEN SATURATION: 97 % | RESPIRATION RATE: 12 BRPM | SYSTOLIC BLOOD PRESSURE: 131 MMHG

## 2021-05-27 DIAGNOSIS — Z11.59 ENCOUNTER FOR SCREENING FOR OTHER VIRAL DISEASES: Primary | ICD-10-CM

## 2021-05-27 DIAGNOSIS — Z01.818 PREOP EXAMINATION: ICD-10-CM

## 2021-05-27 DIAGNOSIS — R76.8 RED BLOOD CELL ANTIBODY POSITIVE: Primary | ICD-10-CM

## 2021-05-27 PROCEDURE — 99215 OFFICE O/P EST HI 40 MIN: CPT | Performed by: CLINICAL NURSE SPECIALIST

## 2021-05-27 RX ORDER — ACETAMINOPHEN 325 MG/1
975 TABLET ORAL ONCE
Status: CANCELLED | OUTPATIENT
Start: 2021-05-27 | End: 2021-05-27

## 2021-05-27 RX ORDER — GABAPENTIN 100 MG/1
100 CAPSULE ORAL
Status: CANCELLED | OUTPATIENT
Start: 2021-05-27

## 2021-05-27 RX ORDER — CEFAZOLIN SODIUM 2 G/50ML
2 SOLUTION INTRAVENOUS SEE ADMIN INSTRUCTIONS
Status: CANCELLED | OUTPATIENT
Start: 2021-05-27

## 2021-05-27 RX ORDER — CEFAZOLIN SODIUM 2 G/50ML
2 SOLUTION INTRAVENOUS
Status: CANCELLED | OUTPATIENT
Start: 2021-05-27

## 2021-05-27 RX ORDER — PROPOFOL 10 MG/ML
25-150 INJECTION, EMULSION INTRAVENOUS CONTINUOUS
Status: CANCELLED | OUTPATIENT
Start: 2021-05-27

## 2021-05-27 ASSESSMENT — MIFFLIN-ST. JEOR: SCORE: 1806.33

## 2021-05-27 ASSESSMENT — PAIN SCALES - GENERAL: PAINLEVEL: MODERATE PAIN (5)

## 2021-05-27 ASSESSMENT — ENCOUNTER SYMPTOMS: DYSRHYTHMIAS: 1

## 2021-05-27 ASSESSMENT — LIFESTYLE VARIABLES: TOBACCO_USE: 1

## 2021-05-27 NOTE — ANESTHESIA PREPROCEDURE EVALUATION
"Anesthesia Pre-Procedure Evaluation    Patient: Darleen Simmons   MRN: [id number] : 1950        Preoperative Diagnosis: * No surgery found *   Procedure :      Past Medical History:   Diagnosis Date     Acute osteomyelitis of left lower leg (H) 2017     Acute superficial venous thrombosis of lower extremity, right 2018     Atrial flutter (H)      Automatic implantable cardioverter-defibrillator in situ      CAD (coronary artery disease)      Depression      Flatback syndrome      History of acute inferior wall MI      HTN (hypertension)      Hyperlipidemia LDL goal <100      Ischemic cardiomyopathy      Kyphosis (acquired) (postural)      JOANNA (obstructive sleep apnea)      PAD (peripheral artery disease) (H)      Paroxysmal atrial fibrillation (H)      PVD (peripheral vascular disease) (H)       Past Surgical History:   Procedure Laterality Date     BYPASS GRAFT ARTERY CORONARY  1986     Decompression posterior lumbar and instrumented fusion       Endovascular femoral and/or popliteal and/or tibial artery angioplasty/stent       Hardware removal tib/fib       I and D left extremity wound       Knee hardware removal       KNEE SURGERY       STSG left extremity wound       TONSILLECTOMY        Allergies   Allergen Reactions     Wasp Venom Protein Shortness Of Breath and Swelling     sob  swelling  Other reaction(s): Swelling  sob  \"black wasps\"     Adhesive Tape      Paper tape - rash     Gelfoam [Gelatin]      rash     Hydroxyzine Other (See Comments)     Nightmares     Influenza Vaccines Other (See Comments)     Avoid influenza vaccine, history of Guillain Fyffe      Lorazepam Anxiety     Other reaction(s): Irritability, Mental Status Change, Other (see comments)  Pt states he becomes very angry and mean after taking at [facility].    Tolerates diazepam      Social History     Tobacco Use     Smoking status: Former Smoker     Types: Cigarettes     Quit date: 1988     Years since quittin.4 "     Smokeless tobacco: Former User   Substance Use Topics     Alcohol use: Not Currently      Wt Readings from Last 1 Encounters:   05/27/21 100.2 kg (221 lb)        Anesthesia Evaluation   Pt has had prior anesthetic. Type: General and MAC.    No history of anesthetic complications       ROS/MED HX  ENT/Pulmonary:     (+) JOANNA risk factors, hypertension, tobacco use, Past use,  (-) recent URI   Neurologic: Comment: Spinal stenosis      Cardiovascular: Comment: left SFA balloon angioplasty in the past to assist healing a latissimus dorsi flap to the left calf.    (+) hypertension-Peripheral Vascular Disease-- Other. CAD -past MI CABG-date: X4 1986. -Taking blood thinners Pt has not received instructions: Instructions Given to patient: Planned 7 day hold for aspirin. CHF etiology: ischemic Last EF: 54% date: 5/5/21 pacemaker, type: Medtronic, settings: VVI, ICD  type;Medtronic dysrhythmias, a-fib and a-flutter, Previous cardiac testing   Echo: Date: 5/4/21 Results:    Stress Test: Date: 5/5/21 Results:    ECG Reviewed: Date: 5/4/21 Results:  A fib with V paced rhythm, BiV pacer  Cath: Date: Results:      METS/Exercise Tolerance: 3 - Able to walk 1-2 blocks without stopping    Hematologic:     (+) anemia, history of blood transfusion, no previous transfusion reaction, Known PRBC Anitbodies: Yes, - Patient unable to provide details of antibody,     Musculoskeletal: Comment: Severe back pain. Stooped forward positioning, reported 38 degrees      GI/Hepatic:  - neg GI/hepatic ROS     Renal/Genitourinary:  - neg Renal ROS     Endo:  - neg endo ROS     Psychiatric/Substance Use:     (+) psychiatric history depression H/O chronic opiod use .     Infectious Disease: Comment: History MRSA LLE      Malignancy:  - neg malignancy ROS     Other:  - neg other ROS    (+) , H/O Chronic Pain,        Physical Exam    Airway        Mallampati: I   TM distance: > 3 FB   Neck ROM: limited   Mouth opening: > 3 cm    Respiratory Devices and  Support         Dental       (+) upper dentures and lower dentures      Cardiovascular          Rhythm and rate: regular and normal     Pulmonary           breath sounds clear to auscultation           OUTSIDE LABS:  CBC: No results found for: WBC, HGB, HCT, PLT  BMP:   Lab Results   Component Value Date    POTASSIUM 4.3 04/20/2021    CR 0.72 04/20/2021     (H) 04/20/2021     COAGS: No results found for: PTT, INR, FIBR  POC: No results found for: BGM, HCG, HCGS  HEPATIC:   Lab Results   Component Value Date    ALT 20 04/20/2021    AST 19 04/20/2021     OTHER: No results found for: PH, LACT, A1C, [place], PHOS, MAG, LIPASE, AMYLASE, TSH, T4, T3, CRP, SED          PAC Discussion and Assessment    ASA Classification: 3  Case is suitable for: [place]  Anesthetic techniques and relevant risks discussed: GA  Invasive monitoring and risk discussed: No    Possibility and Risk of blood transfusion discussed: Yes            PAC Resident/NP Anesthesia Assessment: Darleen Simmons is a 70 year old male scheduled to undergo o-arm/stealth assisted Posterior spinal fusion Thoracic 7 to Sacral 1 with segmental instrumentation Thoracic 7 to 8; reinsertion of instrumentation Thoracic 9 to Sacral 1; pelvic fixation, Kurtz Childs osteotomies and transforaminal lumbar interbody fusions Thoracic 12 to Lumbar 3; possible pedicle subtraction osteotomy Lumbar 3 or 4; tether Thoracic 5-7 with Dr. Akins on 6/21/21. He has the following specific operative considerations:   - RCRI : 0.9% risk of major adverse cardiac event.   - VTE risk: 0.5%  - JOANNA # of risks 2/8 = Low risk   - If afib, [id number] score 4.  Risk category High.    - Risk of PONV score = 2.  If > 2, anti-emetic intervention recommended.     --Flatback syndrome with fused bent forward positioning. Progressive back pain. Above procedure planned. MSIR prn.  --No history of problems with anesthesia but reports he is sometimes hard to medicate adequately. Limited neck  "extension due to fusion.  --Complex cardiac history as above including HLD atorvastatin at HS, HYPERTENSION, metoprolol at HS, CAD, s/p CABG X 4 in 1986, ischemic cardiomopathy, PAD s/p intervention, ICD, atrial fib/flutter. Patient reports that he continues to feel well without cardiac symptoms and able to walk on treadmill 30-40 minutes 3 times weekly. Above cardiac preop evaluation with approval for surgery with moderate risk. Per Dr. Reis's note  \"Last device check shows device is programmed to VVI pacing mode and atrial EGM shows atrial fibrillation. He had no ventricular arrhythmias or ICD therapies on that last device check\"  --Former smoker. Denies pulmonary symptoms. Low risk for JOANNA.  --History of blood transfusions with reported blood antibody. Outside records show positive Anti-Begum A. Discussed need to have updated type and screen drawn within 72 hours of surgery. Plan made for patient to have updated labs including type and screen on day before surgery at [place]. Order and instructions provided. Surgery team also notified.   --Optimal recovery pathway.       Patient was discussed with Dr Gerardo who provided orders for DOS.      Reviewed and Signed by PAC Mid-Level Provider/Resident  Mid-Level Provider/Resident: BEBETO Rahman, CNS                                   BEBETO Avila CNS  "

## 2021-05-27 NOTE — PATIENT INSTRUCTIONS
Preparing for Your Surgery      Name:  Darleen Simmons   MRN:  [id number]   :  1950   Today's Date:  2021       Arriving for surgery:  Surgery date:  21  Arrival time:  5:30AM    Restrictions due to COVID 19:  One consistent visitor per patient is allowed.  The visitor will be allowed in the pre-op area.  Visitors are asked to leave the building during the surgery.  No ill visitors.  All visitors must wear face mask.    [employer] parking is available for anyone with mobility limitations or disabilities.  ([employer]  24 hours/ 7 days a week; [employer]  7 am- 3:30 pm, Mon- Fri)    Please come to:     [facility] [address]  [address] [place] [address]    When entering the hospital you will be asked COVID screening questions, you will then be directed to Security and registration.  Registration will direct you to the correct lobby to wait until escorted to the preop area. Preop number- [phone]?     What can I eat or drink?  -  You may eat and drink normally for up to 8 hours before your surgery. (Until 21, 11:30PM)  -  You may have clear liquids until 2 hours before surgery. (Until 21, 5:30AM)    Examples of clear liquids:  Water  Clear broth  Juices (apple, white grape, white cranberry  and cider) without pulp  Noncarbonated, powder based beverages  (lemonade and Tito-Aid)  Sodas (Sprite, 7-Up, ginger ale and seltzer)  Coffee or tea (without milk or cream)  Gatorade    -  No Alcohol for at least 24 hours before surgery     Which medicines can I take?    Hold Aspirin for 7 days before surgery.   Hold Multivitamins for 7 days before surgery.  Hold Supplements, Uriva and Follinex for 7 days before surgery.  Hold Ibuprofen (Advil, Motrin) for 1 day before surgery--unless otherwise directed by surgeon.  Hold Diclofenac(Voltaren) gel for 24 hours prior to surgery.  Hold Naproxen (Aleve) for 4 days before surgery.    -  DO NOT  take these medications the day of surgery:    Docusate    Miralax    Simethicone(Mylicon)        -  PLEASE TAKE these medications the day of surgery:    Acetaminophen(Tylenol) as needed  Flonase Nasal spray    Morphine as needed        How do I prepare myself?  - Please take 2 showers before surgery using Scrubcare or Hibiclens soap.    Use this soap only from the neck to your toes.     Leave the soap on your skin for one minute--then rinse thoroughly.      You may use your own shampoo and conditioner; no other hair products.   - Please remove all jewelry and body piercings.  - No lotions, deodorants or fragrance.  - Bring your ID and insurance card.    - All patients are required to have a Covid-19 test within 4 days of surgery/procedure.      -Patients will be contacted by the [facility] scheduling team within 1 week of surgery to make an appointment.      - Patients may call the Scheduling team at [phone] if they have not been scheduled within 4 days of  surgery.      When entering the hospital you will be asked COVID screening questions, you will then be directed to Security and registration.  Registration will direct you to the correct lobby to wait until escorted to the preop area. Preop number- [phone]    Questions or Concerns:    - For any questions regarding the day of surgery or your hospital stay, please contact the Pre Admission Nursing Office at [phone].       - If you have health changes between today and your surgery please call your surgeon.       For questions after surgery please call your surgeons office.

## 2021-05-27 NOTE — H&P (VIEW-ONLY)
Pre-Operative H & P     CC:  Preoperative exam to assess for increased cardiopulmonary risk while undergoing surgery and anesthesia.    Date of Encounter: 5/27/2021  Primary Care Physician:  Dunphy, Tyler J  Reason for visit: Flatback syndrome [M40.30]  H/O spinal fusion [Z98.1]  H/O spinal cord injury [Z87.828]  HPI  Darleen Simmons is a 70 year old male who presents for pre-operative H & P in preparation for o-arm/stealth assisted Posterior spinal fusion Thoracic 7 to Sacral 1 with segmental instrumentation Thoracic 7 to 8; reinsertion of instrumentation Thoracic 9 to Sacral 1; pelvic fixation, Kurtz Childs osteotomies and transforaminal lumbar interbody fusions Thoracic 12 to Lumbar 3; possible pedicle subtraction osteotomy Lumbar 3 or 4; tether Thoracic 5-7 with Dr. Akins on 6/21/21 at [facility]. History is obtained from the patient, wife and medical records.    Patient who has been recently followed by Dr. Akins for progressive back pain and impaired ability to ambulate. 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 Afterwards he experienced quadriplegia but he slowly was able to regain his ability to ambulate. In 2018, he underwent posterior decompression thoracolumbar fusion at [facility]. At that time It was thought that he would be primarily a sitter and he was fused in a fairly straight posture. The patient continued to work on his rehab and has returned to ambulation, however because of his fusion, he has a stooped posture. He describes pain between his shoulder blades as well as down into his pelvis. His thighs become very tired with any sort of distance walking or standing. His imaging has been reviewed and he has been counseled regarding surgical options.      His history is otherwise significant for HLD, HYPERTENSION, CAD s/p MI and CABG X 4 in 1986, ischemic cardiomyopathy, ICD  "placement, PAD, s/p left SFA balloon angioplasty in the past to assist healing a latissimus dorsi flap to the left calf, atrial fibrillation, flutter, chronically anticoagulated,  former smoker, and history of blood transfusions.     The patient was initially seen by the [facility] for risk evaluation on 3/23/21. At that time he was informed that we would need to obtain his last cardiac records for evaluation as they were not available to us. He reported that most of his cardiac testing had been completed through the [facility] system.      Patient's outside cardiac records were reviewed by Anesthesia, last from 2018. He was contacted to inform that he would need a cardiac evaluation with updated testing. Patient and wife wanted to come to [facility] for this. Orders placed and visit arranged. He saw Dr. Reis on 5/4/21 with notes:  \"I reviewed the Lexiscan results with Dr. Lentz.  There are multiple areas of nontransmural infarction in all three vessel territories.  However, there is no ischemia.  I do not have the remote cardiac history but suspect the NSTEMIs are remote, possibly dating back to the CABG decades ago.  In the absence of ischemia, I would not recommend coronary angiography.   The patient is at moderate risk of a myocardial infarction given the Hx of multivessel CAD and prior infarctions.  I would continue him on Lopressor and Lipitor throughout the perioperative phase and would consider keeping him on ASA if the surgical risk of bleeding is not deemed high.\"     Mr. Simmons returns today with his wife to complete his history and physical for above procedure. He reports that nothing has changed. He denies fever, cough, shortness of breath, chest pain, and irregular HR. He continues to work with physical therapy three times weekly and is able to walk on the treadmill on for 30-45 minutes.       Past Medical History  Past Medical History:   Diagnosis Date     Acute osteomyelitis of left " lower leg (H) 2017     Acute superficial venous thrombosis of lower extremity, right 2018     Atrial flutter (H)      Automatic implantable cardioverter-defibrillator in situ      CAD (coronary artery disease)      Depression      Flatback syndrome      History of acute inferior wall MI      HTN (hypertension)      Hyperlipidemia LDL goal <100      Ischemic cardiomyopathy      Kyphosis (acquired) (postural)      JOANNA (obstructive sleep apnea)      PAD (peripheral artery disease) (H)      Paroxysmal atrial fibrillation (H)      PVD (peripheral vascular disease) (H)        Past Surgical History  Past Surgical History:   Procedure Laterality Date     BYPASS GRAFT ARTERY CORONARY  1986     Decompression posterior lumbar and instrumented fusion  2018     Endovascular femoral and/or popliteal and/or tibial artery angioplasty/stent       Hardware removal tib/fib       I and D left extremity wound       Knee hardware removal       KNEE SURGERY       STSG left extremity wound       TONSILLECTOMY         Hx of Blood transfusions/reactions: Multiple in past. Antibody screen OSH in 2015 Positive Anti-Begum A.     Hx of abnormal bleeding or anti-platelet use: ASA 81 mg daily. He is no longer anticoagulated.     Menstrual history: No LMP for male patient.    Steroid use in the last year: Denies.     Personal or FH with difficulty with Anesthesia:  Reports being difficult to medicate at times.     Prior to Admission Medications  Current Outpatient Medications   Medication Sig Dispense Refill     acetaminophen (TYLENOL) 500 MG tablet Take 500-1,000 mg by mouth every 8 hours as needed for mild pain       aspirin 81 MG EC tablet Take 81 mg by mouth daily       atorvastatin (LIPITOR) 80 MG tablet Take 80 mg by mouth At Bedtime        D3-50 1.25 MG (94079 UT) capsule Take 1,250 mcg by mouth every 7 days Takes on Saturdays.       diazepam (VALIUM) 2 MG tablet Take 2-4 mg by mouth nightly as needed for muscle spasms        diclofenac  "(VOLTAREN) 1 % topical gel Apply topically as needed (shoulder pain)        docusate sodium (DSS) 100 MG capsule Take 100 mg by mouth 2 times daily       EPINEPHrine (ANY BX GENERIC EQUIV) 0.3 MG/0.3ML injection 2-pack Inject 0.3 mg into the muscle as needed for anaphylaxis       fluticasone (FLONASE) 50 MCG/ACT nasal spray Spray 2 sprays into both nostrils daily as needed        metoprolol succinate ER (TOPROL-XL) 25 MG 24 hr tablet Take 12.5 mg by mouth At Bedtime        morphine (MSIR) 15 MG IR tablet Take 15-30 mg by mouth every 3 hours as needed Every 3 hrs as needed       multivitamin, therapeutic with minerals (THERA-VIT-M) TABS tablet Take 1 tablet by mouth daily       naloxone (NARCAN) 4 MG/0.1ML nasal spray Spray 4 mg into one nostril alternating nostrils once as needed for opioid reversal every 2-3 minutes until assistance arrives       NONFORMULARY Uriva Rx - supplement for bladder tone       NONFORMULARY Follinex supplement - herbal supplement for hair growth.       polyethylene glycol (MIRALAX) 17 GM/Dose powder Take 17 g by mouth daily as needed        simethicone (MYLICON) 125 MG chewable tablet Take 375 mg by mouth as needed for intestinal gas       hydrocortisone (CORTAID) 1 % external cream Apply topically 2 times daily as needed       nitroGLYcerin (NITROSTAT) 0.4 MG sublingual tablet Place 0.4 mg under the tongue as needed         Allergies  Allergies   Allergen Reactions     Wasp Venom Protein Shortness Of Breath and Swelling     sob  swelling  Other reaction(s): Swelling  sob  \"black wasps\"     Adhesive Tape      Paper tape - rash     Gelfoam [Gelatin]      rash     Hydroxyzine Other (See Comments)     Nightmares     Influenza Vaccines Other (See Comments)     Avoid influenza vaccine, history of Guillain Pearl      Lorazepam Anxiety     Other reaction(s): Irritability, Mental Status Change, Other (see comments)  Pt states he becomes very angry and mean after taking at [facility] " hosp.    Tolerates diazepam       Social History  Social History     Socioeconomic History     Marital status:      Spouse name: Not on file     Number of children: Not on file     Years of education: Not on file     Highest education level: Not on file   Occupational History     Not on file   Social Needs     Financial resource strain: Not on file     Food insecurity     Worry: Not on file     Inability: Not on file     Transportation needs     Medical: Not on file     Non-medical: Not on file   Tobacco Use     Smoking status: Former Smoker     Types: Cigarettes     Quit date: 1988     Years since quittin.4     Smokeless tobacco: Former User   Substance and Sexual Activity     Alcohol use: Not Currently     Drug use: Not on file     Sexual activity: Not on file   Lifestyle     Physical activity     Days per week: Not on file     Minutes per session: Not on file     Stress: Not on file   Relationships     Social connections     Talks on phone: Not on file     Gets together: Not on file     Attends Taoism service: Not on file     Active member of club or organization: Not on file     Attends meetings of clubs or organizations: Not on file     Relationship status: Not on file     Intimate partner violence     Fear of current or ex partner: Not on file     Emotionally abused: Not on file     Physically abused: Not on file     Forced sexual activity: Not on file   Other Topics Concern     Not on file   Social History Narrative     Not on file       Family History  Family History   Problem Relation Age of Onset     Coronary Artery Disease Father      Cancer Mother      Heart Disease Sister      Suicide Brother      Heart Disease Sister      ROS/MED HISTORY  The complete review of systems is negative other than noted in the HPI or here.    ENT/Pulmonary:     (+) JOANNA risk factors, hypertension, tobacco use, Past use,  (-) recent URI   Neurologic: Comment: Spinal stenosis     Cardiovascular: Comment: left  "SFA balloon angioplasty in the past to assist healing a latissimus dorsi flap to the left calf.    (+) hypertension-Peripheral Vascular Disease-- Other. CAD -past MI CABG-date: X4 1986. -Taking blood thinners Pt has not received instructions: Instructions Given to patient: Planned 7 day hold for aspirin. CHF etiology: ischemic Last EF: 54% date: 5/5/21 pacemaker, type: Medtronic, settings: VVIR, ICD  type;Medtronic dysrhythmias, a-fib and a-flutter, Previous cardiac testing   Echo: Date: 5/4/21 Results:    Stress Test: Date: 5/5/21 Results:    ECG Reviewed: Date: 5/4/21 Results:  A fib with V paced rhythm, BiV pacer  Cath: Date: Results:      METS/Exercise Tolerance: 3 - Able to walk 1-2 blocks without stopping    Hematologic:     (+) anemia, history of blood transfusion, no previous transfusion reaction, Known PRBC Anitbodies: Yes, - Patient unable to provide details of antibody,     Musculoskeletal: Comment: Severe back pain. Stooped forward positioning, reported 38 degrees      GI/Hepatic:  - neg GI/hepatic ROS     Renal/Genitourinary:  - neg Renal ROS     Endo:  - neg endo ROS     Psychiatric/Substance Use:     (+) psychiatric history depression H/O chronic opiod use .     Infectious Disease: Comment: History MRSA LLE      Malignancy:  - neg malignancy ROS     Other:  - neg other ROS    (+) , H/O Chronic Pain,      OSH Labs: Personally reviewed 4/20/21  Na 141  K 4.3  Cl 104  Cr 0.72  GFR >60  Glu 105  LFTs normal  WBC 8.8  Hgb 14.4  hematocrit 43.1  Platelets 173    Temp: 97.7  F (36.5  C) Temp src: Oral BP: 131/79 Pulse: 84   Resp: 12 SpO2: 97 %         221 lbs 0 oz  6' 1\"   Body mass index is 29.16 kg/m .       Physical Exam  Constitutional: Awake, alert, cooperative, no apparent distress, and appears stated age. In w/c. Accompanied by wife.   Eyes: Pupils equal, round and reactive to light, extra ocular muscles intact, sclera clear, conjunctiva normal.  HENT: Normocephalic, oral pharynx with moist mucus " membranes, dentures.No goiter appreciated.   Respiratory: Clear to auscultation bilaterally, no crackles or wheezing. No cough or obvious dyspnea.  Cardiovascular: Regular rate and rhythm, normal S1 and S2, and no murmur noted.  Carotids +2, no bruits. Mild swelling in left ankle after previous surgeries. Palpable pulses to radial  DP and PT arteries.   GI: Normal bowel sounds, soft, non-distended, non-tender, no masses palpated.   Lymph/Hematologic: No cervical lymphadenopathy and no supraclavicular lymphadenopathy.  Genitourinary: Deferred.   Skin: Warm and dry.    Musculoskeletal: Limited ROM of neck. There is no redness, warmth, or swelling of the joints. At the left outer ankle there is a lump which is a graft site from a previous surgery. Gross motor strength is normal.    Neurologic: Awake, alert, oriented to name, place and time. Cranial nerves II-XII are grossly intact.    Neuropsychiatric: Calm, cooperative. Normal affect.     EKG: Personally reviewed 5/4/21 Atrial fibrillation with V paced rhythm, biventricular pacer.   Cardiac echo:  5/4/21  Interpretation Summary  Technically difficult study. Poor acoustic windows.  Mildly (EF 45-50%) reduced left ventricular function is present. There is a  pattern of wall motion abnormalities consistent with a prior RCA and,  possibly, LCx infarction.  The RV is not clearly visualized despite the use of contrast, but the function  appears to be mildly reduced with normal size.  There are no significant valvular abnormalities.  IVC diameter and respiratory changes fall into an intermediate range  suggesting an RA pressure of 8 mmHg.  There is no prior study for direct comparison.  Stress test: NM Stress test  5/5/21  Conclusion  The nuclear stress test is abnormal.  There is a medium sized area of a moderate degree of nontransmural infarction in the entire apical segment(s) of the left ventricle. This appears to be in the left anterior descending distribution. No  significant area of ischemia identified.  There is a medium sized area of a severe degree of transmural infarction in the entire inferior segment(s) of the left ventricle. This appears to be in the right coronary artery distribution. No ischemia identified.  There is a medium sized area of transmural infarction in the entire inferolateral segment(s) of the left ventricle. This appears to be in the left circumflex distribution. No ischemia identified.  LVEDv  187ml. LVESv 86ml. LV EF 54%.     ECG Summary     ECG    Baseline electrocardiogram demonstrates ventricular pacing.   The stress electrocardiogram was non-diagnostic due to ventricular pre-excitation.    Xray pelvis 3/4/21                                                                   Impression:  1. No acute osseous abnormality.  2. Partially visualized fusion and instrumentation of the lower lumbar  spine with 2 sacral alar iliac screws without evidence of hardware  complication.     2/11/21 Lower extremity MARIAMA  Right: Doppler Waveforms:       Abnormal signals starting at or above the superficial femoral level.   Resting Index:     MARIAMA (PT)-  0.67      MARIAMA (DP)-  0.68      TBI-  0.42   TcPO2:     Values as noted.    Left: Doppler Waveforms:  Normal at all levels evaluated.   Resting Index:     MARIAMA (PT)-  0.87      MARIAMA (DP)-  0.83      TBI-  0.43   TcPO2:     Values as noted.    Conclusions: Right:  Moderate peripheral arterial disease starting at or proximal to the superficial femoral level. Left:  Moderate peripheral arterial disease location not well determined.  Normal TCPO2 values at all sites.   Unchanged from prior study.     Xray spine 3 view 1/27/21  Posterior wilfredo and pedicle screw fixation T10 through sacrum with  bilateral sacroiliac screws. Vertebroplasty L2 with six lumbar-type vertebrae.  Anterior plate and screw fixation C5 to C7. 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 AICD. Fracture of superior sternal wires. Full-length standing  views  from the head to the toes obtained for scoliosis evaluation. No significant  change since 9/23/2020. Degenerative changes both knees with varus deformity  Bilaterally.     MR Thoracic and Lumbar spine 9/9/20  1. 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  2. Stable syrinx within the upper thoracic spinal cord.  3. The thecal sac is patent throughout the thoracic and lumbar spine allowing  for metallic artifact with the exception of the prominent calcified disc  osteophyte complex at T11-12 on the right where there is partial effacement and  mild cord deformity    Imaging and cardiac testing reviewed by this provider     Outside records reviewed from: [facility], [facility], [place] Everywhere    ASSESSMENT and PLAN  Darleen Simmons is a 70 year old male scheduled to undergo o-arm/stealth assisted Posterior spinal fusion Thoracic 7 to Sacral 1 with segmental instrumentation Thoracic 7 to 8; reinsertion of instrumentation Thoracic 9 to Sacral 1; pelvic fixation, Kurtz Childs osteotomies and transforaminal lumbar interbody fusions Thoracic 12 to Lumbar 3; possible pedicle subtraction osteotomy Lumbar 3 or 4; tether Thoracic 5-7 with Dr. Akins on 6/21/21. He has the following specific operative considerations:   - RCRI : 0.9% risk of major adverse cardiac event.   - Anesthesia considerations:  Refer to PAC assessment in anesthesia records  - VTE risk: 0.5%  - JOANNA # of risks 2/8 = Low risk   - If afib, [id number] score 4.  Risk category High.    - Risk of PONV score = 2.  If > 2, anti-emetic intervention recommended.     --Flatback syndrome with fused bent forward positioning. Progressive back pain. Above procedure planned. MSIR prn.  --No history of problems with anesthesia but reports he is sometimes hard to medicate adequately. Limited neck extension due to fusion.   --Complex cardiac history as above including HLD atorvastatin at HS, HYPERTENSION, metoprolol at HS, CAD,  "s/p CABG X 4 in 1986, ischemic cardiomopathy, PAD s/p intervention, ICD, atrial fib/flutter. Patient reports that he continues to feel well without cardiac symptoms and able to walk on treadmill 30-40 minutes 3 times weekly. Above cardiac preop evaluation with approval for surgery with moderate risk. Per Dr. Reis s note  \"Last device check shows device is programmed to VVI pacing mode and atrial EGM shows atrial fibrillation.  He had no ventricular arrhythmias or ICD therapies on that last device check\"  --Former smoker. Denies pulmonary symptoms. Low risk for JOANNA.  --History of blood transfusions with reported blood antibody. Outside records show positive Anti-Begum A. Discussed need to have updated type and screen drawn within 72 hours of surgery. Plan made for patient to have updated labs including type and screen on day before surgery at [place]. Order and instructions provided. Surgery team also notified.   --Optimal recovery pathway.     Arrival time, NPO, shower and medication instructions provided by nursing staff today.     Patient was discussed with Dr Gerardo who provided orders for DOS.    BEBETO Avila CNS  Preoperative Assessment Center  [facility]  Phone: [phone]  Fax: [phone]  "

## 2021-05-27 NOTE — H&P
Pre-Operative H & P     CC:  Preoperative exam to assess for increased cardiopulmonary risk while undergoing surgery and anesthesia.    Date of Encounter: 5/27/2021  Primary Care Physician:  Dunphy, Tyler J  Reason for visit: Flatback syndrome [M40.30]  H/O spinal fusion [Z98.1]  H/O spinal cord injury [Z87.828]  HPI  Darleen Simmons is a 70 year old male who presents for pre-operative H & P in preparation for o-arm/stealth assisted Posterior spinal fusion Thoracic 7 to Sacral 1 with segmental instrumentation Thoracic 7 to 8; reinsertion of instrumentation Thoracic 9 to Sacral 1; pelvic fixation, Kurtz Childs osteotomies and transforaminal lumbar interbody fusions Thoracic 12 to Lumbar 3; possible pedicle subtraction osteotomy Lumbar 3 or 4; tether Thoracic 5-7 with Dr. Akins on 6/21/21 at [facility]. History is obtained from the patient, wife and medical records.    Patient who has been recently followed by Dr. Akins for progressive back pain and impaired ability to ambulate. 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 Afterwards he experienced quadriplegia but he slowly was able to regain his ability to ambulate. In 2018, he underwent posterior decompression thoracolumbar fusion at [facility]. At that time It was thought that he would be primarily a sitter and he was fused in a fairly straight posture. The patient continued to work on his rehab and has returned to ambulation, however because of his fusion, he has a stooped posture. He describes pain between his shoulder blades as well as down into his pelvis. His thighs become very tired with any sort of distance walking or standing. His imaging has been reviewed and he has been counseled regarding surgical options.      His history is otherwise significant for HLD, HYPERTENSION, CAD s/p MI and CABG X 4 in 1986, ischemic cardiomyopathy, ICD  "placement, PAD, s/p left SFA balloon angioplasty in the past to assist healing a latissimus dorsi flap to the left calf, atrial fibrillation, flutter, chronically anticoagulated,  former smoker, and history of blood transfusions.     The patient was initially seen by the [facility] Assessment [facility] for risk evaluation on 3/23/21. At that time he was informed that we would need to obtain his last cardiac records for evaluation as they were not available to us. He reported that most of his cardiac testing had been completed through the [facility] system.      Patient's outside cardiac records were reviewed by Anesthesia, last from 2018. He was contacted to inform that he would need a cardiac evaluation with updated testing. Patient and wife wanted to come to [facility] for this. Orders placed and visit arranged. He saw Dr. Reis on 5/4/21 with notes:  \"I reviewed the Lexiscan results with Dr. Lentz.  There are multiple areas of nontransmural infarction in all three vessel territories.  However, there is no ischemia.  I do not have the remote cardiac history but suspect the NSTEMIs are remote, possibly dating back to the CABG decades ago.  In the absence of ischemia, I would not recommend coronary angiography.   The patient is at moderate risk of a myocardial infarction given the Hx of multivessel CAD and prior infarctions.  I would continue him on Lopressor and Lipitor throughout the perioperative phase and would consider keeping him on ASA if the surgical risk of bleeding is not deemed high.\"     Mr. Simmons returns today with his wife to complete his history and physical for above procedure. He reports that nothing has changed. He denies fever, cough, shortness of breath, chest pain, and irregular HR. He continues to work with physical therapy three times weekly and is able to walk on the treadmill on for 30-45 minutes.       Past Medical History  Past Medical History:   Diagnosis Date     Acute osteomyelitis of left " lower leg (H) 2017     Acute superficial venous thrombosis of lower extremity, right 2018     Atrial flutter (H)      Automatic implantable cardioverter-defibrillator in situ      CAD (coronary artery disease)      Depression      Flatback syndrome      History of acute inferior wall MI      HTN (hypertension)      Hyperlipidemia LDL goal <100      Ischemic cardiomyopathy      Kyphosis (acquired) (postural)      JOANNA (obstructive sleep apnea)      PAD (peripheral artery disease) (H)      Paroxysmal atrial fibrillation (H)      PVD (peripheral vascular disease) (H)        Past Surgical History  Past Surgical History:   Procedure Laterality Date     BYPASS GRAFT ARTERY CORONARY  1986     Decompression posterior lumbar and instrumented fusion  2018     Endovascular femoral and/or popliteal and/or tibial artery angioplasty/stent       Hardware removal tib/fib       I and D left extremity wound       Knee hardware removal       KNEE SURGERY       STSG left extremity wound       TONSILLECTOMY         Hx of Blood transfusions/reactions: Multiple in past. Antibody screen OSH in 2015 Positive Anti-Begum A.     Hx of abnormal bleeding or anti-platelet use: ASA 81 mg daily. He is no longer anticoagulated.     Menstrual history: No LMP for male patient.    Steroid use in the last year: Denies.     Personal or FH with difficulty with Anesthesia:  Reports being difficult to medicate at times.     Prior to Admission Medications  Current Outpatient Medications   Medication Sig Dispense Refill     acetaminophen (TYLENOL) 500 MG tablet Take 500-1,000 mg by mouth every 8 hours as needed for mild pain       aspirin 81 MG EC tablet Take 81 mg by mouth daily       atorvastatin (LIPITOR) 80 MG tablet Take 80 mg by mouth At Bedtime        D3-50 1.25 MG (42206 UT) capsule Take 1,250 mcg by mouth every 7 days Takes on Saturdays.       diazepam (VALIUM) 2 MG tablet Take 2-4 mg by mouth nightly as needed for muscle spasms        diclofenac  "(VOLTAREN) 1 % topical gel Apply topically as needed (shoulder pain)        docusate sodium (DSS) 100 MG capsule Take 100 mg by mouth 2 times daily       EPINEPHrine (ANY BX GENERIC EQUIV) 0.3 MG/0.3ML injection 2-pack Inject 0.3 mg into the muscle as needed for anaphylaxis       fluticasone (FLONASE) 50 MCG/ACT nasal spray Spray 2 sprays into both nostrils daily as needed        metoprolol succinate ER (TOPROL-XL) 25 MG 24 hr tablet Take 12.5 mg by mouth At Bedtime        morphine (MSIR) 15 MG IR tablet Take 15-30 mg by mouth every 3 hours as needed Every 3 hrs as needed       multivitamin, therapeutic with minerals (THERA-VIT-M) TABS tablet Take 1 tablet by mouth daily       naloxone (NARCAN) 4 MG/0.1ML nasal spray Spray 4 mg into one nostril alternating nostrils once as needed for opioid reversal every 2-3 minutes until assistance arrives       NONFORMULARY Uriva Rx - supplement for bladder tone       NONFORMULARY Follinex supplement - herbal supplement for hair growth.       polyethylene glycol (MIRALAX) 17 GM/Dose powder Take 17 g by mouth daily as needed        simethicone (MYLICON) 125 MG chewable tablet Take 375 mg by mouth as needed for intestinal gas       hydrocortisone (CORTAID) 1 % external cream Apply topically 2 times daily as needed       nitroGLYcerin (NITROSTAT) 0.4 MG sublingual tablet Place 0.4 mg under the tongue as needed         Allergies  Allergies   Allergen Reactions     Wasp Venom Protein Shortness Of Breath and Swelling     sob  swelling  Other reaction(s): Swelling  sob  \"black wasps\"     Adhesive Tape      Paper tape - rash     Gelfoam [Gelatin]      rash     Hydroxyzine Other (See Comments)     Nightmares     Influenza Vaccines Other (See Comments)     Avoid influenza vaccine, history of Guillain Clune      Lorazepam Anxiety     Other reaction(s): Irritability, Mental Status Change, Other (see comments)  Pt states he becomes very angry and mean after taking at [facility] " hosp.    Tolerates diazepam       Social History  Social History     Socioeconomic History     Marital status:      Spouse name: Not on file     Number of children: Not on file     Years of education: Not on file     Highest education level: Not on file   Occupational History     Not on file   Social Needs     Financial resource strain: Not on file     Food insecurity     Worry: Not on file     Inability: Not on file     Transportation needs     Medical: Not on file     Non-medical: Not on file   Tobacco Use     Smoking status: Former Smoker     Types: Cigarettes     Quit date: 1988     Years since quittin.4     Smokeless tobacco: Former User   Substance and Sexual Activity     Alcohol use: Not Currently     Drug use: Not on file     Sexual activity: Not on file   Lifestyle     Physical activity     Days per week: Not on file     Minutes per session: Not on file     Stress: Not on file   Relationships     Social connections     Talks on phone: Not on file     Gets together: Not on file     Attends Alevism service: Not on file     Active member of club or organization: Not on file     Attends meetings of clubs or organizations: Not on file     Relationship status: Not on file     Intimate partner violence     Fear of current or ex partner: Not on file     Emotionally abused: Not on file     Physically abused: Not on file     Forced sexual activity: Not on file   Other Topics Concern     Not on file   Social History Narrative     Not on file       Family History  Family History   Problem Relation Age of Onset     Coronary Artery Disease Father      Cancer Mother      Heart Disease Sister      Suicide Brother      Heart Disease Sister      ROS/MED HISTORY  The complete review of systems is negative other than noted in the HPI or here.    ENT/Pulmonary:     (+) JOANNA risk factors, hypertension, tobacco use, Past use,  (-) recent URI   Neurologic: Comment: Spinal stenosis     Cardiovascular: Comment: left  "SFA balloon angioplasty in the past to assist healing a latissimus dorsi flap to the left calf.    (+) hypertension-Peripheral Vascular Disease-- Other. CAD -past MI CABG-date: X4 1986. -Taking blood thinners Pt has not received instructions: Instructions Given to patient: Planned 7 day hold for aspirin. CHF etiology: ischemic Last EF: 54% date: 5/5/21 pacemaker, type: Medtronic, settings: VVIR, ICD  type;Medtronic dysrhythmias, a-fib and a-flutter, Previous cardiac testing   Echo: Date: 5/4/21 Results:    Stress Test: Date: 5/5/21 Results:    ECG Reviewed: Date: 5/4/21 Results:  A fib with V paced rhythm, BiV pacer  Cath: Date: Results:      METS/Exercise Tolerance: 3 - Able to walk 1-2 blocks without stopping    Hematologic:     (+) anemia, history of blood transfusion, no previous transfusion reaction, Known PRBC Anitbodies: Yes, - Patient unable to provide details of antibody,     Musculoskeletal: Comment: Severe back pain. Stooped forward positioning, reported 38 degrees      GI/Hepatic:  - neg GI/hepatic ROS     Renal/Genitourinary:  - neg Renal ROS     Endo:  - neg endo ROS     Psychiatric/Substance Use:     (+) psychiatric history depression H/O chronic opiod use .     Infectious Disease: Comment: History MRSA LLE      Malignancy:  - neg malignancy ROS     Other:  - neg other ROS    (+) , H/O Chronic Pain,      OSH Labs: Personally reviewed 4/20/21  Na 141  K 4.3  Cl 104  Cr 0.72  GFR >60  Glu 105  LFTs normal  WBC 8.8  Hgb 14.4  hematocrit 43.1  Platelets 173    Temp: 97.7  F (36.5  C) Temp src: Oral BP: 131/79 Pulse: 84   Resp: 12 SpO2: 97 %         221 lbs 0 oz  6' 1\"   Body mass index is 29.16 kg/m .       Physical Exam  Constitutional: Awake, alert, cooperative, no apparent distress, and appears stated age. In w/c. Accompanied by wife.   Eyes: Pupils equal, round and reactive to light, extra ocular muscles intact, sclera clear, conjunctiva normal.  HENT: Normocephalic, oral pharynx with moist mucus " membranes, dentures.No goiter appreciated.   Respiratory: Clear to auscultation bilaterally, no crackles or wheezing. No cough or obvious dyspnea.  Cardiovascular: Regular rate and rhythm, normal S1 and S2, and no murmur noted.  Carotids +2, no bruits. Mild swelling in left ankle after previous surgeries. Palpable pulses to radial  DP and PT arteries.   GI: Normal bowel sounds, soft, non-distended, non-tender, no masses palpated.   Lymph/Hematologic: No cervical lymphadenopathy and no supraclavicular lymphadenopathy.  Genitourinary: Deferred.   Skin: Warm and dry.    Musculoskeletal: Limited ROM of neck. There is no redness, warmth, or swelling of the joints. At the left outer ankle there is a lump which is a graft site from a previous surgery. Gross motor strength is normal.    Neurologic: Awake, alert, oriented to name, place and time. Cranial nerves II-XII are grossly intact.    Neuropsychiatric: Calm, cooperative. Normal affect.     EKG: Personally reviewed 5/4/21 Atrial fibrillation with V paced rhythm, biventricular pacer.   Cardiac echo:  5/4/21  Interpretation Summary  Technically difficult study. Poor acoustic windows.  Mildly (EF 45-50%) reduced left ventricular function is present. There is a  pattern of wall motion abnormalities consistent with a prior RCA and,  possibly, LCx infarction.  The RV is not clearly visualized despite the use of contrast, but the function  appears to be mildly reduced with normal size.  There are no significant valvular abnormalities.  IVC diameter and respiratory changes fall into an intermediate range  suggesting an RA pressure of 8 mmHg.  There is no prior study for direct comparison.  Stress test: NM Stress test  5/5/21  Conclusion  The nuclear stress test is abnormal.  There is a medium sized area of a moderate degree of nontransmural infarction in the entire apical segment(s) of the left ventricle. This appears to be in the left anterior descending distribution. No  significant area of ischemia identified.  There is a medium sized area of a severe degree of transmural infarction in the entire inferior segment(s) of the left ventricle. This appears to be in the right coronary artery distribution. No ischemia identified.  There is a medium sized area of transmural infarction in the entire inferolateral segment(s) of the left ventricle. This appears to be in the left circumflex distribution. No ischemia identified.  LVEDv  187ml. LVESv 86ml. LV EF 54%.     ECG Summary     ECG    Baseline electrocardiogram demonstrates ventricular pacing.   The stress electrocardiogram was non-diagnostic due to ventricular pre-excitation.    Xray pelvis 3/4/21                                                                   Impression:  1. No acute osseous abnormality.  2. Partially visualized fusion and instrumentation of the lower lumbar  spine with 2 sacral alar iliac screws without evidence of hardware  complication.     2/11/21 Lower extremity MARIAMA  Right: Doppler Waveforms:       Abnormal signals starting at or above the superficial femoral level.   Resting Index:     MARIAMA (PT)-  0.67      MARIAMA (DP)-  0.68      TBI-  0.42   TcPO2:     Values as noted.    Left: Doppler Waveforms:  Normal at all levels evaluated.   Resting Index:     MARIAMA (PT)-  0.87      MARIAMA (DP)-  0.83      TBI-  0.43   TcPO2:     Values as noted.    Conclusions: Right:  Moderate peripheral arterial disease starting at or proximal to the superficial femoral level. Left:  Moderate peripheral arterial disease location not well determined.  Normal TCPO2 values at all sites.   Unchanged from prior study.     Xray spine 3 view 1/27/21  Posterior wilfredo and pedicle screw fixation T10 through sacrum with  bilateral sacroiliac screws. Vertebroplasty L2 with six lumbar-type vertebrae.  Anterior plate and screw fixation C5 to C7. 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 AICD. Fracture of superior sternal wires. Full-length standing  views  from the head to the toes obtained for scoliosis evaluation. No significant  change since 9/23/2020. Degenerative changes both knees with varus deformity  Bilaterally.     MR Thoracic and Lumbar spine 9/9/20  1. 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  2. Stable syrinx within the upper thoracic spinal cord.  3. The thecal sac is patent throughout the thoracic and lumbar spine allowing  for metallic artifact with the exception of the prominent calcified disc  osteophyte complex at T11-12 on the right where there is partial effacement and  mild cord deformity    Imaging and cardiac testing reviewed by this provider     Outside records reviewed from: [facility], [facility] Everywhere    ASSESSMENT and PLAN  Darleen Simmons is a 70 year old male scheduled to undergo o-arm/stealth assisted Posterior spinal fusion Thoracic 7 to Sacral 1 with segmental instrumentation Thoracic 7 to 8; reinsertion of instrumentation Thoracic 9 to Sacral 1; pelvic fixation, Kurtz Childs osteotomies and transforaminal lumbar interbody fusions Thoracic 12 to Lumbar 3; possible pedicle subtraction osteotomy Lumbar 3 or 4; tether Thoracic 5-7 with Dr. Akins on 6/21/21. He has the following specific operative considerations:   - RCRI : 0.9% risk of major adverse cardiac event.   - Anesthesia considerations:  Refer to PAC assessment in anesthesia records  - VTE risk: 0.5%  - JOANNA # of risks 2/8 = Low risk   - If afib, [id number] score 4.  Risk category High.    - Risk of PONV score = 2.  If > 2, anti-emetic intervention recommended.     --Flatback syndrome with fused bent forward positioning. Progressive back pain. Above procedure planned. MSIR prn.  --No history of problems with anesthesia but reports he is sometimes hard to medicate adequately. Limited neck extension due to fusion.   --Complex cardiac history as above including HLD atorvastatin at HS, HYPERTENSION, metoprolol at HS, CAD,  "s/p CABG X 4 in 1986, ischemic cardiomopathy, PAD s/p intervention, ICD, atrial fib/flutter. Patient reports that he continues to feel well without cardiac symptoms and able to walk on treadmill 30-40 minutes 3 times weekly. Above cardiac preop evaluation with approval for surgery with moderate risk. Per Dr. Reis s note  \"Last device check shows device is programmed to VVI pacing mode and atrial EGM shows atrial fibrillation.  He had no ventricular arrhythmias or ICD therapies on that last device check\"  --Former smoker. Denies pulmonary symptoms. Low risk for JOANNA.  --History of blood transfusions with reported blood antibody. Outside records show positive Anti-Begum A. Discussed need to have updated type and screen drawn within 72 hours of surgery. Plan made for patient to have updated labs including type and screen on day before surgery at [place]. Order and instructions provided. Surgery team also notified.   --Optimal recovery pathway.     Arrival time, NPO, shower and medication instructions provided by nursing staff today.     Patient was discussed with Dr Gerardo who provided orders for DOS.    BEBETO Avila CNS  Preoperative Assessment Center  [facility]  Phone: [phone]  Fax: [phone]  "

## 2021-06-04 RX ORDER — MAGNESIUM SULFATE HEPTAHYDRATE 40 MG/ML
2 INJECTION, SOLUTION INTRAVENOUS ONCE
Status: CANCELLED | OUTPATIENT
Start: 2021-06-04 | End: 2021-06-04

## 2021-06-04 RX ORDER — CHLORHEXIDINE GLUCONATE ORAL RINSE 1.2 MG/ML
15 SOLUTION DENTAL ONCE
Status: CANCELLED | OUTPATIENT
Start: 2021-06-04 | End: 2021-06-04

## 2021-06-04 RX ORDER — GABAPENTIN 300 MG/1
300 CAPSULE ORAL ONCE
Status: CANCELLED | OUTPATIENT
Start: 2021-06-04 | End: 2021-06-04

## 2021-06-04 RX ORDER — ACETAMINOPHEN 325 MG/1
975 TABLET ORAL ONCE
Status: CANCELLED | OUTPATIENT
Start: 2021-06-04 | End: 2021-06-04

## 2021-06-04 NOTE — ADDENDUM NOTE
Addendum  created 06/04/21 0656 by Ivy Healy APRN CNS    Order list changed, Order sets accessed

## 2021-06-10 ENCOUNTER — TELEPHONE (OUTPATIENT)
Dept: ORTHOPEDICS | Facility: CLINIC | Age: 71
End: 2021-06-10

## 2021-06-10 NOTE — TELEPHONE ENCOUNTER
[facility] Call Center    Phone Message    May a detailed message be left on voicemail: yes     Reason for Call: Other: This pt of Dr. Akins's called to speak with Alda or someone on Dr. Akins's care team. This pt stated that he has a time sensitive issue/question regarding surgery. The pt requested that someone reach back out to him today (6/10) at [phone].     Action Taken: Message routed to:  [facility] ([facility]): Ortho    Travel Screening: Not Applicable

## 2021-06-10 NOTE — TELEPHONE ENCOUNTER
See phone message.  See triage response separate note from  Vinay GUZMAN.  I called back & spoke to pt & wife.    He stated he has his preop COVID test scheduled for 6-1`7-21 at home in [place] & his clinic has the correct fax# to fax result to us ASAP.   Due to HX..  + Antibody he understands he has to have Type & Screen drawn 3 days or less before surgery & originally he decided he would have blood drawn here at  on Sun.,  But he is worried that waiting until day before surgery will delay surgery,  so he has changed his mind & will come to [facility] on Sat for walk in lab draw at [place] inpt. Lab.  Reviewed instructions again including wife & they agreed.    Call back prn . They agreed.  Alda Ellis RN.

## 2021-06-10 NOTE — TELEPHONE ENCOUNTER
See phone message.  See triage response separate note from  Vinay GUZMAN.  I called back & spoke to pt & wife.    He stated he has his preop COVID test scheduled for 6-1`7-21 at home in [place] & his clinic has the correct fax# to fax result to us ASAP.   Due to HX..  + Antibody he understands he has to have Type & Screen drawn 3 days or less before surgery & originally he decided he would have blood drawn here at  on Sun.,  But he is worried that waiting until day before surgery will delay surgery,  so he has changed his mind & will come to [facility] on Sat for walk in lab draw at [place] inpt. Lab.  Reviewed instructions again including wife & they agreed.    Call back prn . They agreed.  Alda Ellis RN.

## 2021-06-10 NOTE — TELEPHONE ENCOUNTER
RN called patient back prior to sending this phone message to Alda EDEN.  Per patient he wants to knows if he can get his Covid testing and type and in screen done on the same day. RN told him usually the type and screen has to be done about 24-48 hours prior to surgery. Patient would like a better confirmation regarding this and would appreciate a phone call from Alda EDEN. RN told patient that is okay, and RN will notify Alda EDEN to call them.    Hannah Hampton RN

## 2021-06-16 ENCOUNTER — TELEPHONE (OUTPATIENT)
Dept: ORTHOPEDICS | Facility: CLINIC | Age: 71
End: 2021-06-16

## 2021-06-16 NOTE — TELEPHONE ENCOUNTER
RN called and spoke with Saadia. Pleasant conversation. Answered all her questions in regards to storage, what she can bring for her , etc to RN's best ability.    Hannah Hampton RN         Health Call Center    Phone Message    May a detailed message be left on voicemail: yes     Reason for Call: Other: Patients wife would like Alda to call back and clarify if she needs to pack anything else for the overnight stay after surgery 6/21. She has Medicine bottles and toiletry items packed -- is wondering if Pajamas bottoms are provided and to go over what else they need to bring and what is provided by the hospital   ??    Action Taken: Message routed to:  [facility] ([facility]): ortho    Travel Screening: Not Applicable

## 2021-06-20 ENCOUNTER — ANESTHESIA EVENT (OUTPATIENT)
Dept: SURGERY | Facility: CLINIC | Age: 71
DRG: 456 | End: 2021-06-20
Payer: COMMERCIAL

## 2021-06-20 ASSESSMENT — ENCOUNTER SYMPTOMS: DYSRHYTHMIAS: 1

## 2021-06-20 ASSESSMENT — LIFESTYLE VARIABLES: TOBACCO_USE: 1

## 2021-06-20 NOTE — ANESTHESIA PREPROCEDURE EVALUATION
"Anesthesia Pre-Procedure Evaluation    Patient: Darleen Simmons   MRN: [id number] : 1950        Preoperative Diagnosis: * No surgery found *   Procedure :      Past Medical History:   Diagnosis Date     Acute osteomyelitis of left lower leg (H) 2017     Acute superficial venous thrombosis of lower extremity, right 2018     Atrial flutter (H)      Automatic implantable cardioverter-defibrillator in situ      CAD (coronary artery disease)      Depression      Flatback syndrome      History of acute inferior wall MI      HTN (hypertension)      Hyperlipidemia LDL goal <100      Ischemic cardiomyopathy      Kyphosis (acquired) (postural)      JOANNA (obstructive sleep apnea)      PAD (peripheral artery disease) (H)      Paroxysmal atrial fibrillation (H)      PVD (peripheral vascular disease) (H)       Past Surgical History:   Procedure Laterality Date     BYPASS GRAFT ARTERY CORONARY  1986     Decompression posterior lumbar and instrumented fusion       Endovascular femoral and/or popliteal and/or tibial artery angioplasty/stent       Hardware removal tib/fib       I and D left extremity wound       Knee hardware removal       KNEE SURGERY       STSG left extremity wound       TONSILLECTOMY        Allergies   Allergen Reactions     Wasp Venom Protein Shortness Of Breath and Swelling     sob  swelling  Other reaction(s): Swelling  sob  \"black wasps\"     Adhesive Tape      Paper tape - rash     Gelfoam [Gelatin]      rash     Hydroxyzine Other (See Comments)     Nightmares     Influenza Vaccines Other (See Comments)     Avoid influenza vaccine, history of Guillain [place]      Lorazepam Anxiety     Other reaction(s): Irritability, Mental Status Change, Other (see comments)  Pt states he becomes very angry and mean after taking at [facility].    Tolerates diazepam      Social History     Tobacco Use     Smoking status: Former Smoker     Types: Cigarettes     Quit date: 1988     Years since quittin.4 "     Smokeless tobacco: Former User   Substance Use Topics     Alcohol use: Not Currently      Wt Readings from Last 1 Encounters:   05/27/21 100.2 kg (221 lb)        Anesthesia Evaluation   Pt has had prior anesthetic. Type: General and MAC.    No history of anesthetic complications       ROS/MED HX  ENT/Pulmonary:     (+) JOANNA risk factors, hypertension, tobacco use, Past use,  (-) recent URI   Neurologic: Comment: Spinal stenosis      Cardiovascular: Comment: left SFA balloon angioplasty in the past to assist healing a latissimus dorsi flap to the left calf.    (+) hypertension-Peripheral Vascular Disease-- Other. CAD -past MI CABG-date: X4 1986. -Taking blood thinners Pt has not received instructions: Instructions Given to patient: Planned 7 day hold for aspirin. CHF etiology: ischemic Last EF: 54% date: 5/5/21 pacemaker, type: Medtronic, settings: VVI, ICD  type;Medtronic dysrhythmias, a-fib and a-flutter, Previous cardiac testing   Echo: Date: 5/4/21 Results:    Stress Test: Date: 5/5/21 Results:    ECG Reviewed: Date: 5/4/21 Results:  A fib with V paced rhythm, BiV pacer  Cath: Date: Results:      METS/Exercise Tolerance: 3 - Able to walk 1-2 blocks without stopping    Hematologic:     (+) anemia, history of blood transfusion, no previous transfusion reaction, Known PRBC Anitbodies: Yes, - Patient unable to provide details of antibody,     Musculoskeletal: Comment: Severe back pain. Stooped forward positioning, reported 38 degrees      GI/Hepatic:  - neg GI/hepatic ROS     Renal/Genitourinary:  - neg Renal ROS     Endo:  - neg endo ROS     Psychiatric/Substance Use:     (+) psychiatric history depression H/O chronic opiod use .     Infectious Disease: Comment: History MRSA LLE      Malignancy:  - neg malignancy ROS     Other:  - neg other ROS    (+) , H/O Chronic Pain,        Physical Exam    Airway        Mallampati: I   TM distance: > 3 FB   Neck ROM: limited   Mouth opening: > 3 cm    Respiratory Devices and  Support         Dental       (+) upper dentures and lower dentures      Cardiovascular          Rhythm and rate: regular and normal     Pulmonary           breath sounds clear to auscultation           OUTSIDE LABS:  CBC: No results found for: WBC, HGB, HCT, PLT  BMP:   Lab Results   Component Value Date    POTASSIUM 4.3 04/20/2021    CR 0.72 04/20/2021     (H) 04/20/2021     COAGS: No results found for: PTT, INR, FIBR  POC: No results found for: BGM, HCG, HCGS  HEPATIC:   Lab Results   Component Value Date    ALT 20 04/20/2021    AST 19 04/20/2021     OTHER: No results found for: PH, LACT, A1C, [place], PHOS, MAG, LIPASE, AMYLASE, TSH, T4, T3, CRP, SED    Anesthesia Plan    ASA Status:  3   NPO Status:  NPO Appropriate    Anesthesia Type: General.     - Airway: ETT   Induction: Intravenous.   Maintenance: Balanced.   Techniques and Equipment:     - Lines/Monitors: 2nd IV, Arterial Line, Central Line, BIS, NIRS     - Blood: Blood in Room     - Drips/Meds: Ketamine, Sufentanil, Phenylephrine, Tranexamic acid     Consents    Anesthesia Plan(s) and associated risks, benefits, and realistic alternatives discussed. Questions answered and patient/representative(s) expressed understanding.     - Discussed with:  Patient      - Extended Intubation/Ventilatory Support Discussed: Yes.      - Patient is DNR/DNI Status: No    Use of blood products discussed: Yes.     - Discussed with: Patient.     - Consented: consented to blood products            Reason for refusal: other.     Postoperative Care    Pain management: Multi-modal analgesia, IV analgesics.   PONV prophylaxis: Ondansetron (or other 5HT-3), Dexamethasone or Solumedrol     Comments:    Discussed risks and benefits of general ETT anesthesia with patient.  Questions answered, patient states understanding and wishes to proceed.              PAC Discussion and Assessment    ASA Classification: 3  Case is suitable for: [place]  Anesthetic techniques and relevant  "risks discussed: GA  Invasive monitoring and risk discussed: No    Possibility and Risk of blood transfusion discussed: Yes            PAC Resident/NP Anesthesia Assessment: Darleen Simmons is a 70 year old male scheduled to undergo o-arm/stealth assisted Posterior spinal fusion Thoracic 7 to Sacral 1 with segmental instrumentation Thoracic 7 to 8; reinsertion of instrumentation Thoracic 9 to Sacral 1; pelvic fixation, Kurtz Childs osteotomies and transforaminal lumbar interbody fusions Thoracic 12 to Lumbar 3; possible pedicle subtraction osteotomy Lumbar 3 or 4; tether Thoracic 5-7 with Dr. Akins on 6/21/21. He has the following specific operative considerations:   - RCRI : 0.9% risk of major adverse cardiac event.   - VTE risk: 0.5%  - JOANNA # of risks 2/8 = Low risk   - If afib, [id number] score 4.  Risk category High.    - Risk of PONV score = 2.  If > 2, anti-emetic intervention recommended.     --Flatback syndrome with fused bent forward positioning. Progressive back pain. Above procedure planned. MSIR prn.  --No history of problems with anesthesia but reports he is sometimes hard to medicate adequately. Limited neck extension due to fusion.  --Complex cardiac history as above including HLD atorvastatin at HS, HYPERTENSION, metoprolol at HS, CAD, s/p CABG X 4 in 1986, ischemic cardiomopathy, PAD s/p intervention, ICD, atrial fib/flutter. Patient reports that he continues to feel well without cardiac symptoms and able to walk on treadmill 30-40 minutes 3 times weekly. Above cardiac preop evaluation with approval for surgery with moderate risk. Per Dr. Reis's note  \"Last device check shows device is programmed to VVI pacing mode and atrial EGM shows atrial fibrillation. He had no ventricular arrhythmias or ICD therapies on that last device check\"  --Former smoker. Denies pulmonary symptoms. Low risk for JOANNA.  --History of blood transfusions with reported blood antibody. 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 Discussed need to have updated type and screen drawn within 72 hours of surgery. Plan made for patient to have updated labs including type and screen on day before surgery at [place]. Order and instructions provided. Surgery team also notified.   --Optimal recovery pathway.       Patient was discussed with Dr Gerardo who provided orders for DOS.      Reviewed and Signed by PAC Mid-Level Provider/Resident  Mid-Level Provider/Resident: BEBETO Rahman, CNS                                   Catalino Arreaga MD

## 2021-06-21 ENCOUNTER — ANESTHESIA (OUTPATIENT)
Dept: SURGERY | Facility: CLINIC | Age: 71
DRG: 456 | End: 2021-06-21
Payer: COMMERCIAL

## 2021-06-21 ENCOUNTER — APPOINTMENT (OUTPATIENT)
Dept: GENERAL RADIOLOGY | Facility: CLINIC | Age: 71
DRG: 456 | End: 2021-06-21
Attending: ORTHOPAEDIC SURGERY
Payer: COMMERCIAL

## 2021-06-21 ENCOUNTER — HOSPITAL ENCOUNTER (INPATIENT)
Facility: CLINIC | Age: 71
LOS: 16 days | Discharge: NURSING, REHAB OR LONG-TERM CARE | DRG: 456 | End: 2021-07-07
Attending: ORTHOPAEDIC SURGERY | Admitting: ORTHOPAEDIC SURGERY
Payer: COMMERCIAL

## 2021-06-21 ENCOUNTER — ANCILLARY PROCEDURE (OUTPATIENT)
Dept: CARDIOLOGY | Facility: CLINIC | Age: 71
DRG: 456 | End: 2021-06-21
Attending: ORTHOPAEDIC SURGERY
Payer: COMMERCIAL

## 2021-06-21 DIAGNOSIS — M40.30 FLATBACK SYNDROME: ICD-10-CM

## 2021-06-21 DIAGNOSIS — Z98.1 H/O SPINAL FUSION: ICD-10-CM

## 2021-06-21 DIAGNOSIS — Z87.828 H/O SPINAL CORD INJURY: ICD-10-CM

## 2021-06-21 DIAGNOSIS — I25.5 ISCHEMIC CARDIOMYOPATHY: ICD-10-CM

## 2021-06-21 DIAGNOSIS — I25.5 ISCHEMIC CARDIOMYOPATHY: Primary | ICD-10-CM

## 2021-06-21 LAB
ALBUMIN SERPL-MCNC: 2.6 G/DL (ref 3.4–5)
ALP SERPL-CCNC: 30 U/L (ref 40–150)
ALT SERPL W P-5'-P-CCNC: 22 U/L (ref 0–70)
ANGLE RATE OF CLOT GROWTH: 72.1 DEG (ref 59–74)
ANGLE RATE OF CLOT STRENGTH: 73.9 DEGREES (ref 53–72)
ANION GAP SERPL CALCULATED.3IONS-SCNC: 5 MMOL/L (ref 3–14)
ANION GAP SERPL CALCULATED.3IONS-SCNC: 6 MMOL/L (ref 3–14)
APTT PPP: 35 SEC (ref 22–37)
APTT PPP: 39 SEC (ref 22–37)
APTT PPP: 42 SEC (ref 22–37)
AST SERPL W P-5'-P-CCNC: 58 U/L (ref 0–45)
BASE DEFICIT BLDA-SCNC: 1 MMOL/L
BASE DEFICIT BLDA-SCNC: 2.2 MMOL/L
BASE EXCESS BLDA CALC-SCNC: 1.3 MMOL/L
BILIRUB SERPL-MCNC: 1.8 MG/DL (ref 0.2–1.3)
BLD PROD TYP BPU: NORMAL
BLD UNIT ID BPU: 0
BLOOD PRODUCT CODE: NORMAL
BPU ID: NORMAL
BUN SERPL-MCNC: 13 MG/DL (ref 7–30)
BUN SERPL-MCNC: 13 MG/DL (ref 7–30)
CA-I BLD-MCNC: 4.6 MG/DL (ref 4.4–5.2)
CA-I BLD-MCNC: 4.7 MG/DL (ref 4.4–5.2)
CA-I BLD-MCNC: 4.7 MG/DL (ref 4.4–5.2)
CALCIUM SERPL-MCNC: 7.8 MG/DL (ref 8.5–10.1)
CALCIUM SERPL-MCNC: 9.4 MG/DL (ref 8.5–10.1)
CHLORIDE SERPL-SCNC: 107 MMOL/L (ref 94–109)
CHLORIDE SERPL-SCNC: 114 MMOL/L (ref 94–109)
CI HYPERCOAGULATION INDEX: 2.8 RATIO (ref 0–3)
CI HYPERCOAGULATION INDEX: 3.2 RATIO (ref 0–3)
CLOT LYSIS 30M P MA LENFR BLD TEG: 0 % (ref 0–8)
CLOT STRENGTH BLD TEG: 10.5 KD/SC (ref 5.3–13.2)
CO2 SERPL-SCNC: 20 MMOL/L (ref 20–32)
CO2 SERPL-SCNC: 29 MMOL/L (ref 20–32)
CREAT SERPL-MCNC: 0.61 MG/DL (ref 0.66–1.25)
CREAT SERPL-MCNC: 0.68 MG/DL (ref 0.66–1.25)
ERYTHROCYTE [DISTWIDTH] IN BLOOD BY AUTOMATED COUNT: 12.6 % (ref 10–15)
ERYTHROCYTE [DISTWIDTH] IN BLOOD BY AUTOMATED COUNT: 13.7 % (ref 10–15)
ERYTHROCYTE [DISTWIDTH] IN BLOOD BY AUTOMATED COUNT: 14.3 % (ref 10–15)
FIBRINOGEN PPP-MCNC: 216 MG/DL (ref 200–420)
FIBRINOGEN PPP-MCNC: 237 MG/DL (ref 200–420)
FIBRINOGEN PPP-MCNC: 254 MG/DL (ref 200–420)
G ACTUAL CLOT STRENGTH: 10.5 KD/SC (ref 4.5–11)
GFR SERPL CREATININE-BSD FRML MDRD: >90 ML/MIN/{1.73_M2}
GFR SERPL CREATININE-BSD FRML MDRD: >90 ML/MIN/{1.73_M2}
GLUCOSE BLD-MCNC: 101 MG/DL (ref 70–99)
GLUCOSE BLD-MCNC: 168 MG/DL (ref 70–99)
GLUCOSE BLD-MCNC: 98 MG/DL (ref 70–99)
GLUCOSE BLDC GLUCOMTR-MCNC: 83 MG/DL (ref 70–99)
GLUCOSE SERPL-MCNC: 166 MG/DL (ref 70–99)
GLUCOSE SERPL-MCNC: 90 MG/DL (ref 70–99)
HCO3 BLD-SCNC: 24 MMOL/L (ref 21–28)
HCO3 BLD-SCNC: 24 MMOL/L (ref 21–28)
HCO3 BLD-SCNC: 26 MMOL/L (ref 21–28)
HCT VFR BLD AUTO: 36.5 % (ref 40–53)
HCT VFR BLD AUTO: 36.7 % (ref 40–53)
HCT VFR BLD AUTO: 43.2 % (ref 40–53)
HGB BLD-MCNC: 11.1 G/DL (ref 13.3–17.7)
HGB BLD-MCNC: 11.3 G/DL (ref 13.3–17.7)
HGB BLD-MCNC: 12 G/DL (ref 13.3–17.7)
HGB BLD-MCNC: 12.2 G/DL (ref 13.3–17.7)
HGB BLD-MCNC: 12.2 G/DL (ref 13.3–17.7)
HGB BLD-MCNC: 12.5 G/DL (ref 13.3–17.7)
HGB BLD-MCNC: 14.1 G/DL (ref 13.3–17.7)
INR PPP: 1.66 (ref 0.86–1.14)
INR PPP: 1.68 (ref 0.86–1.14)
INR PPP: 1.99 (ref 0.86–1.14)
K TIME TO SPEC CLOT STRENGTH: 1.1 MINUTE (ref 1–3)
K TIME TO SPEC CLOT STRENGTH: 1.2 MIN (ref 1–3)
LACTATE BLD-SCNC: 1.9 MMOL/L (ref 0.7–2)
LY30 LYSIS AT 30 MINUTES: 0 % (ref 0–8)
LY60 LYSIS AT 60 MINUTES: 0.7 % (ref 0–15)
LY60 LYSIS AT 60 MINUTES: 1.1 % (ref 0–15)
MA MAXIMUM CLOT STRENGTH: 67.7 MM (ref 50–70)
MA MAXIMUM CLOT STRENGTH: 67.8 MM (ref 55–74)
MCH RBC QN AUTO: 31.6 PG (ref 26.5–33)
MCH RBC QN AUTO: 31.7 PG (ref 26.5–33)
MCH RBC QN AUTO: 32.1 PG (ref 26.5–33)
MCHC RBC AUTO-ENTMCNC: 32.6 G/DL (ref 31.5–36.5)
MCHC RBC AUTO-ENTMCNC: 32.7 G/DL (ref 31.5–36.5)
MCHC RBC AUTO-ENTMCNC: 33.4 G/DL (ref 31.5–36.5)
MCV RBC AUTO: 95 FL (ref 78–100)
MCV RBC AUTO: 97 FL (ref 78–100)
MCV RBC AUTO: 98 FL (ref 78–100)
NT-PROBNP SERPL-MCNC: 646 PG/ML (ref 0–900)
NUM BPU REQUESTED: 20
O2/TOTAL GAS SETTING VFR VENT: 40 %
PCO2 BLD: 41 MM HG (ref 35–45)
PCO2 BLD: 42 MM HG (ref 35–45)
PCO2 BLD: 46 MM HG (ref 35–45)
PH BLD: 7.33 PH (ref 7.35–7.45)
PH BLD: 7.38 PH (ref 7.35–7.45)
PH BLD: 7.41 PH (ref 7.35–7.45)
PLATELET # BLD AUTO: 124 10E9/L (ref 150–450)
PLATELET # BLD AUTO: 129 10E9/L (ref 150–450)
PLATELET # BLD AUTO: 65 10E9/L (ref 150–450)
PLATELET # BLD AUTO: 67 10E9/L (ref 150–450)
PO2 BLD: 189 MM HG (ref 80–105)
PO2 BLD: 190 MM HG (ref 80–105)
PO2 BLD: 210 MM HG (ref 80–105)
POTASSIUM BLD-SCNC: 3.4 MMOL/L (ref 3.4–5.3)
POTASSIUM BLD-SCNC: 3.5 MMOL/L (ref 3.4–5.3)
POTASSIUM BLD-SCNC: 3.9 MMOL/L (ref 3.4–5.3)
POTASSIUM SERPL-SCNC: 3.9 MMOL/L (ref 3.4–5.3)
POTASSIUM SERPL-SCNC: 4 MMOL/L (ref 3.4–5.3)
PROT SERPL-MCNC: 3.9 G/DL (ref 6.8–8.8)
R TIME UNTIL CLOT FORMS: 3.8 MINUTE (ref 5–10)
R TIME UNTIL CLOT FORMS: 4.2 MIN (ref 4–9)
RBC # BLD AUTO: 3.79 10E12/L (ref 4.4–5.9)
RBC # BLD AUTO: 3.86 10E12/L (ref 4.4–5.9)
RBC # BLD AUTO: 4.39 10E12/L (ref 4.4–5.9)
SODIUM BLD-SCNC: 139 MMOL/L (ref 133–144)
SODIUM BLD-SCNC: 140 MMOL/L (ref 133–144)
SODIUM BLD-SCNC: 141 MMOL/L (ref 133–144)
SODIUM SERPL-SCNC: 140 MMOL/L (ref 133–144)
SODIUM SERPL-SCNC: 141 MMOL/L (ref 133–144)
TRANSFUSION STATUS PATIENT QL: NORMAL
TROPONIN I SERPL-MCNC: 0.03 UG/L (ref 0–0.04)
WBC # BLD AUTO: 19.4 10E9/L (ref 4–11)
WBC # BLD AUTO: 19.7 10E9/L (ref 4–11)
WBC # BLD AUTO: 8 10E9/L (ref 4–11)

## 2021-06-21 PROCEDURE — 84132 ASSAY OF SERUM POTASSIUM: CPT

## 2021-06-21 PROCEDURE — 85018 HEMOGLOBIN: CPT | Performed by: ORTHOPAEDIC SURGERY

## 2021-06-21 PROCEDURE — 272N000002 HC OR SUPPLY OTHER OPNP: Performed by: ORTHOPAEDIC SURGERY

## 2021-06-21 PROCEDURE — 82803 BLOOD GASES ANY COMBINATION: CPT

## 2021-06-21 PROCEDURE — 93005 ELECTROCARDIOGRAM TRACING: CPT

## 2021-06-21 PROCEDURE — 999N000141 HC STATISTIC PRE-PROCEDURE NURSING ASSESSMENT: Performed by: ORTHOPAEDIC SURGERY

## 2021-06-21 PROCEDURE — 250N000011 HC RX IP 250 OP 636: Performed by: NURSE ANESTHETIST, CERTIFIED REGISTERED

## 2021-06-21 PROCEDURE — 84484 ASSAY OF TROPONIN QUANT: CPT | Performed by: ORTHOPAEDIC SURGERY

## 2021-06-21 PROCEDURE — 250N000011 HC RX IP 250 OP 636: Performed by: ANESTHESIOLOGY

## 2021-06-21 PROCEDURE — P9012 CRYOPRECIPITATE EACH UNIT: HCPCS | Performed by: ORTHOPAEDIC SURGERY

## 2021-06-21 PROCEDURE — 36415 COLL VENOUS BLD VENIPUNCTURE: CPT | Performed by: ANESTHESIOLOGY

## 2021-06-21 PROCEDURE — 82330 ASSAY OF CALCIUM: CPT

## 2021-06-21 PROCEDURE — 85027 COMPLETE CBC AUTOMATED: CPT | Performed by: INTERNAL MEDICINE

## 2021-06-21 PROCEDURE — C1713 ANCHOR/SCREW BN/BN,TIS/BN: HCPCS | Performed by: ORTHOPAEDIC SURGERY

## 2021-06-21 PROCEDURE — 85049 AUTOMATED PLATELET COUNT: CPT | Performed by: ORTHOPAEDIC SURGERY

## 2021-06-21 PROCEDURE — 250N000011 HC RX IP 250 OP 636: Performed by: PHYSICIAN ASSISTANT

## 2021-06-21 PROCEDURE — 88300 SURGICAL PATH GROSS: CPT | Mod: 26 | Performed by: PATHOLOGY

## 2021-06-21 PROCEDURE — 258N000003 HC RX IP 258 OP 636: Performed by: PHYSICIAN ASSISTANT

## 2021-06-21 PROCEDURE — 250N000009 HC RX 250

## 2021-06-21 PROCEDURE — 85027 COMPLETE CBC AUTOMATED: CPT | Performed by: ORTHOPAEDIC SURGERY

## 2021-06-21 PROCEDURE — 85027 COMPLETE CBC AUTOMATED: CPT | Performed by: ANESTHESIOLOGY

## 2021-06-21 PROCEDURE — 0PP404Z REMOVAL OF INTERNAL FIXATION DEVICE FROM THORACIC VERTEBRA, OPEN APPROACH: ICD-10-PCS | Performed by: ORTHOPAEDIC SURGERY

## 2021-06-21 PROCEDURE — 999N001017 HC STATISTIC GLUCOSE BY METER IP

## 2021-06-21 PROCEDURE — 0PS40ZZ REPOSITION THORACIC VERTEBRA, OPEN APPROACH: ICD-10-PCS | Performed by: ORTHOPAEDIC SURGERY

## 2021-06-21 PROCEDURE — 360N000085 HC SURGERY LEVEL 5 W/ FLUORO, PER MIN: Performed by: ORTHOPAEDIC SURGERY

## 2021-06-21 PROCEDURE — 258N000003 HC RX IP 258 OP 636

## 2021-06-21 PROCEDURE — 0RG7071 FUSION OF 2 TO 7 THORACIC VERTEBRAL JOINTS WITH AUTOLOGOUS TISSUE SUBSTITUTE, POSTERIOR APPROACH, POSTERIOR COLUMN, OPEN APPROACH: ICD-10-PCS | Performed by: ORTHOPAEDIC SURGERY

## 2021-06-21 PROCEDURE — 250N000011 HC RX IP 250 OP 636

## 2021-06-21 PROCEDURE — 83605 ASSAY OF LACTIC ACID: CPT | Performed by: INTERNAL MEDICINE

## 2021-06-21 PROCEDURE — 0QS00ZZ REPOSITION LUMBAR VERTEBRA, OPEN APPROACH: ICD-10-PCS | Performed by: ORTHOPAEDIC SURGERY

## 2021-06-21 PROCEDURE — 84295 ASSAY OF SERUM SODIUM: CPT | Performed by: ORTHOPAEDIC SURGERY

## 2021-06-21 PROCEDURE — 250N000011 HC RX IP 250 OP 636: Performed by: STUDENT IN AN ORGANIZED HEALTH CARE EDUCATION/TRAINING PROGRAM

## 2021-06-21 PROCEDURE — 87640 STAPH A DNA AMP PROBE: CPT | Performed by: STUDENT IN AN ORGANIZED HEALTH CARE EDUCATION/TRAINING PROGRAM

## 2021-06-21 PROCEDURE — 82330 ASSAY OF CALCIUM: CPT | Performed by: ORTHOPAEDIC SURGERY

## 2021-06-21 PROCEDURE — 88300 SURGICAL PATH GROSS: CPT | Mod: TC | Performed by: ORTHOPAEDIC SURGERY

## 2021-06-21 PROCEDURE — 85730 THROMBOPLASTIN TIME PARTIAL: CPT | Performed by: ORTHOPAEDIC SURGERY

## 2021-06-21 PROCEDURE — 86850 RBC ANTIBODY SCREEN: CPT | Performed by: PHYSICIAN ASSISTANT

## 2021-06-21 PROCEDURE — 84100 ASSAY OF PHOSPHORUS: CPT | Performed by: ORTHOPAEDIC SURGERY

## 2021-06-21 PROCEDURE — 80053 COMPREHEN METABOLIC PANEL: CPT | Performed by: ORTHOPAEDIC SURGERY

## 2021-06-21 PROCEDURE — 84132 ASSAY OF SERUM POTASSIUM: CPT | Performed by: ORTHOPAEDIC SURGERY

## 2021-06-21 PROCEDURE — 250N000013 HC RX MED GY IP 250 OP 250 PS 637: Performed by: STUDENT IN AN ORGANIZED HEALTH CARE EDUCATION/TRAINING PROGRAM

## 2021-06-21 PROCEDURE — 82947 ASSAY GLUCOSE BLOOD QUANT: CPT | Performed by: ORTHOPAEDIC SURGERY

## 2021-06-21 PROCEDURE — 83880 ASSAY OF NATRIURETIC PEPTIDE: CPT | Performed by: ORTHOPAEDIC SURGERY

## 2021-06-21 PROCEDURE — 00UT0KZ SUPPLEMENT SPINAL MENINGES WITH NONAUTOLOGOUS TISSUE SUBSTITUTE, OPEN APPROACH: ICD-10-PCS | Performed by: NEUROLOGICAL SURGERY

## 2021-06-21 PROCEDURE — 250N000025 HC SEVOFLURANE, PER MIN: Performed by: ORTHOPAEDIC SURGERY

## 2021-06-21 PROCEDURE — 250N000013 HC RX MED GY IP 250 OP 250 PS 637: Performed by: PHYSICIAN ASSISTANT

## 2021-06-21 PROCEDURE — 258N000003 HC RX IP 258 OP 636: Performed by: ORTHOPAEDIC SURGERY

## 2021-06-21 PROCEDURE — 85384 FIBRINOGEN ACTIVITY: CPT | Performed by: ORTHOPAEDIC SURGERY

## 2021-06-21 PROCEDURE — 370N000017 HC ANESTHESIA TECHNICAL FEE, PER MIN: Performed by: ORTHOPAEDIC SURGERY

## 2021-06-21 PROCEDURE — 8E0WXBZ COMPUTER ASSISTED PROCEDURE OF TRUNK REGION: ICD-10-PCS | Performed by: ORTHOPAEDIC SURGERY

## 2021-06-21 PROCEDURE — P9041 ALBUMIN (HUMAN),5%, 50ML: HCPCS | Performed by: NURSE ANESTHETIST, CERTIFIED REGISTERED

## 2021-06-21 PROCEDURE — 250N000013 HC RX MED GY IP 250 OP 250 PS 637: Performed by: ANESTHESIOLOGY

## 2021-06-21 PROCEDURE — 86900 BLOOD TYPING SEROLOGIC ABO: CPT | Performed by: PHYSICIAN ASSISTANT

## 2021-06-21 PROCEDURE — 84295 ASSAY OF SERUM SODIUM: CPT

## 2021-06-21 PROCEDURE — 93287 PERI-PX DEVICE EVAL & PRGR: CPT | Mod: 26 | Performed by: INTERNAL MEDICINE

## 2021-06-21 PROCEDURE — 999N000155 HC STATISTIC RAPCV CVP MONITORING

## 2021-06-21 PROCEDURE — 71045 X-RAY EXAM CHEST 1 VIEW: CPT | Mod: 26 | Performed by: RADIOLOGY

## 2021-06-21 PROCEDURE — 999N000157 HC STATISTIC RCP TIME EA 10 MIN

## 2021-06-21 PROCEDURE — 258N000003 HC RX IP 258 OP 636: Performed by: STUDENT IN AN ORGANIZED HEALTH CARE EDUCATION/TRAINING PROGRAM

## 2021-06-21 PROCEDURE — 272N000004 HC RX 272: Performed by: ORTHOPAEDIC SURGERY

## 2021-06-21 PROCEDURE — 999N000065 XR CHEST PORT 1 VIEW

## 2021-06-21 PROCEDURE — 250N000009 HC RX 250: Performed by: ANESTHESIOLOGY

## 2021-06-21 PROCEDURE — 272N000001 HC OR GENERAL SUPPLY STERILE: Performed by: ORTHOPAEDIC SURGERY

## 2021-06-21 PROCEDURE — 93010 ELECTROCARDIOGRAM REPORT: CPT | Mod: 59 | Performed by: INTERNAL MEDICINE

## 2021-06-21 PROCEDURE — 250N000011 HC RX IP 250 OP 636: Performed by: ORTHOPAEDIC SURGERY

## 2021-06-21 PROCEDURE — 86850 RBC ANTIBODY SCREEN: CPT | Performed by: ORTHOPAEDIC SURGERY

## 2021-06-21 PROCEDURE — 0QH204Z INSERTION OF INTERNAL FIXATION DEVICE INTO RIGHT PELVIC BONE, OPEN APPROACH: ICD-10-PCS | Performed by: ORTHOPAEDIC SURGERY

## 2021-06-21 PROCEDURE — C1762 CONN TISS, HUMAN(INC FASCIA): HCPCS | Performed by: ORTHOPAEDIC SURGERY

## 2021-06-21 PROCEDURE — 0QH304Z INSERTION OF INTERNAL FIXATION DEVICE INTO LEFT PELVIC BONE, OPEN APPROACH: ICD-10-PCS | Performed by: ORTHOPAEDIC SURGERY

## 2021-06-21 PROCEDURE — 258N000003 HC RX IP 258 OP 636: Performed by: ANESTHESIOLOGY

## 2021-06-21 PROCEDURE — 86900 BLOOD TYPING SEROLOGIC ABO: CPT | Performed by: ORTHOPAEDIC SURGERY

## 2021-06-21 PROCEDURE — 85396 CLOTTING ASSAY WHOLE BLOOD: CPT | Performed by: ORTHOPAEDIC SURGERY

## 2021-06-21 PROCEDURE — 99291 CRITICAL CARE FIRST HOUR: CPT | Mod: 24 | Performed by: ANESTHESIOLOGY

## 2021-06-21 PROCEDURE — 0SW004Z REVISION OF INTERNAL FIXATION DEVICE IN LUMBAR VERTEBRAL JOINT, OPEN APPROACH: ICD-10-PCS | Performed by: ORTHOPAEDIC SURGERY

## 2021-06-21 PROCEDURE — 710N000010 HC RECOVERY PHASE 1, LEVEL 2, PER MIN: Performed by: ORTHOPAEDIC SURGERY

## 2021-06-21 PROCEDURE — 200N000002 HC R&B ICU UMMC

## 2021-06-21 PROCEDURE — 0QP304Z REMOVAL OF INTERNAL FIXATION DEVICE FROM LEFT PELVIC BONE, OPEN APPROACH: ICD-10-PCS | Performed by: ORTHOPAEDIC SURGERY

## 2021-06-21 PROCEDURE — 85610 PROTHROMBIN TIME: CPT | Performed by: ORTHOPAEDIC SURGERY

## 2021-06-21 PROCEDURE — 999N000015 HC STATISTIC ARTERIAL MONITORING DAILY

## 2021-06-21 PROCEDURE — 80048 BASIC METABOLIC PNL TOTAL CA: CPT | Performed by: ANESTHESIOLOGY

## 2021-06-21 PROCEDURE — 86901 BLOOD TYPING SEROLOGIC RH(D): CPT | Performed by: ORTHOPAEDIC SURGERY

## 2021-06-21 PROCEDURE — 87641 MR-STAPH DNA AMP PROBE: CPT | Performed by: STUDENT IN AN ORGANIZED HEALTH CARE EDUCATION/TRAINING PROGRAM

## 2021-06-21 PROCEDURE — 999N000179 XR SURGERY CARM FLUORO LESS THAN 5 MIN W STILLS: Mod: TC

## 2021-06-21 PROCEDURE — 00NY0ZZ RELEASE LUMBAR SPINAL CORD, OPEN APPROACH: ICD-10-PCS | Performed by: ORTHOPAEDIC SURGERY

## 2021-06-21 PROCEDURE — 250N000009 HC RX 250: Performed by: NURSE ANESTHETIST, CERTIFIED REGISTERED

## 2021-06-21 PROCEDURE — 999N001063 HC STATISTIC THAWING COMPONENT: Performed by: ORTHOPAEDIC SURGERY

## 2021-06-21 PROCEDURE — 93287 PERI-PX DEVICE EVAL & PRGR: CPT

## 2021-06-21 PROCEDURE — 0QP204Z REMOVAL OF INTERNAL FIXATION DEVICE FROM RIGHT PELVIC BONE, OPEN APPROACH: ICD-10-PCS | Performed by: ORTHOPAEDIC SURGERY

## 2021-06-21 PROCEDURE — 250N000009 HC RX 250: Performed by: PHYSICIAN ASSISTANT

## 2021-06-21 PROCEDURE — 82803 BLOOD GASES ANY COMBINATION: CPT | Performed by: ORTHOPAEDIC SURGERY

## 2021-06-21 PROCEDURE — 250N000009 HC RX 250: Performed by: STUDENT IN AN ORGANIZED HEALTH CARE EDUCATION/TRAINING PROGRAM

## 2021-06-21 PROCEDURE — 82947 ASSAY GLUCOSE BLOOD QUANT: CPT

## 2021-06-21 DEVICE — IMP SCR MEDT 5.5/6.0MM SOLERA 7.5X55MM MA 55840007555: Type: IMPLANTABLE DEVICE | Site: SPINE LUMBAR | Status: FUNCTIONAL

## 2021-06-21 DEVICE — GRAFT DUREPAIR DURA MATRIX 2X2" 62100: Type: IMPLANTABLE DEVICE | Site: SPINE THORACIC | Status: FUNCTIONAL

## 2021-06-21 DEVICE — IMP SCR MEDT 5.5/6.0MM SOLERA 7.5X60MM MA 55840007560: Type: IMPLANTABLE DEVICE | Site: SPINE LUMBAR | Status: FUNCTIONAL

## 2021-06-21 DEVICE — IMP ROD MEDT SOLERA CVD 5.5X80MM CHR 1555501080: Type: IMPLANTABLE DEVICE | Site: SPINE LUMBAR | Status: FUNCTIONAL

## 2021-06-21 DEVICE — IMP SCR SET MEDT SOLERA BREAK OFF 5.5MM TI 5540030: Type: IMPLANTABLE DEVICE | Site: SPINE LUMBAR | Status: FUNCTIONAL

## 2021-06-21 DEVICE — GRAFT DUREPAIR DURA MATRIX 12.5X10CM (4X5") 62110: Type: IMPLANTABLE DEVICE | Site: SPINE THORACIC | Status: FUNCTIONAL

## 2021-06-21 DEVICE — IMP SCR MEDT 5.5/6.0MM SOLERA 5.5X55MM MA 55840005555: Type: IMPLANTABLE DEVICE | Site: SPINE LUMBAR | Status: FUNCTIONAL

## 2021-06-21 DEVICE — IMP ROD MEDT SOLERA CVD 5.5X40MM CHR 1555501040: Type: IMPLANTABLE DEVICE | Site: SPINE LUMBAR | Status: FUNCTIONAL

## 2021-06-21 DEVICE — IMPLANTABLE DEVICE: Type: IMPLANTABLE DEVICE | Site: SPINE LUMBAR | Status: FUNCTIONAL

## 2021-06-21 DEVICE — IMP ROD MEDT SOLERA LINED 5.5X500MM CHR 1555200500: Type: IMPLANTABLE DEVICE | Site: SPINE LUMBAR | Status: FUNCTIONAL

## 2021-06-21 DEVICE — IMP SCR MEDT 5.5/6.0MM SOLERA 7.5X65MM MA 55840007565: Type: IMPLANTABLE DEVICE | Site: SPINE LUMBAR | Status: FUNCTIONAL

## 2021-06-21 DEVICE — IMP SCR MEDT 5.5/6.0MM SOLERA 8.5X55MM MA 55840008555: Type: IMPLANTABLE DEVICE | Site: SPINE LUMBAR | Status: FUNCTIONAL

## 2021-06-21 DEVICE — IMP SCR MEDT 5.5/6.0MM SOLERA 6.5X55MM MA 55840006555: Type: IMPLANTABLE DEVICE | Site: SPINE LUMBAR | Status: FUNCTIONAL

## 2021-06-21 RX ORDER — ACETAMINOPHEN 325 MG/1
975 TABLET ORAL EVERY 8 HOURS
Status: DISCONTINUED | OUTPATIENT
Start: 2021-06-21 | End: 2021-06-22

## 2021-06-21 RX ORDER — PHENYLEPHRINE HCL IN 0.9% NACL 50MG/250ML
.1-1 PLASTIC BAG, INJECTION (ML) INTRAVENOUS CONTINUOUS
Status: DISCONTINUED | OUTPATIENT
Start: 2021-06-21 | End: 2021-06-21 | Stop reason: HOSPADM

## 2021-06-21 RX ORDER — ACETAMINOPHEN 650 MG/1
650 SUPPOSITORY RECTAL EVERY 6 HOURS
Status: DISCONTINUED | OUTPATIENT
Start: 2021-06-21 | End: 2021-06-22 | Stop reason: CLARIF

## 2021-06-21 RX ORDER — LIDOCAINE 40 MG/G
CREAM TOPICAL
Status: DISCONTINUED | OUTPATIENT
Start: 2021-06-21 | End: 2021-06-21 | Stop reason: HOSPADM

## 2021-06-21 RX ORDER — ACETAMINOPHEN 325 MG/1
975 TABLET ORAL ONCE
Status: COMPLETED | OUTPATIENT
Start: 2021-06-21 | End: 2021-06-21

## 2021-06-21 RX ORDER — EPHEDRINE SULFATE 50 MG/ML
INJECTION, SOLUTION INTRAMUSCULAR; INTRAVENOUS; SUBCUTANEOUS PRN
Status: DISCONTINUED | OUTPATIENT
Start: 2021-06-21 | End: 2021-06-21

## 2021-06-21 RX ORDER — FENTANYL CITRATE 50 UG/ML
25-50 INJECTION, SOLUTION INTRAMUSCULAR; INTRAVENOUS
Status: DISCONTINUED | OUTPATIENT
Start: 2021-06-21 | End: 2021-06-21 | Stop reason: DRUGHIGH

## 2021-06-21 RX ORDER — HYDROMORPHONE HYDROCHLORIDE 1 MG/ML
.3-.5 INJECTION, SOLUTION INTRAMUSCULAR; INTRAVENOUS; SUBCUTANEOUS
Status: DISCONTINUED | OUTPATIENT
Start: 2021-06-21 | End: 2021-06-22

## 2021-06-21 RX ORDER — ONDANSETRON 4 MG/1
4-8 TABLET, ORALLY DISINTEGRATING ORAL EVERY 6 HOURS PRN
Status: DISCONTINUED | OUTPATIENT
Start: 2021-06-21 | End: 2021-07-07 | Stop reason: HOSPADM

## 2021-06-21 RX ORDER — MULTIVITAMIN,THERAPEUTIC
1 TABLET ORAL DAILY
Status: DISCONTINUED | OUTPATIENT
Start: 2021-06-22 | End: 2021-07-07 | Stop reason: HOSPADM

## 2021-06-21 RX ORDER — AMOXICILLIN 250 MG
2 CAPSULE ORAL 2 TIMES DAILY
Status: DISCONTINUED | OUTPATIENT
Start: 2021-06-21 | End: 2021-06-21

## 2021-06-21 RX ORDER — NALOXONE HYDROCHLORIDE 0.4 MG/ML
0.2 INJECTION, SOLUTION INTRAMUSCULAR; INTRAVENOUS; SUBCUTANEOUS
Status: DISCONTINUED | OUTPATIENT
Start: 2021-06-21 | End: 2021-07-07 | Stop reason: HOSPADM

## 2021-06-21 RX ORDER — LIDOCAINE 40 MG/G
CREAM TOPICAL
Status: DISCONTINUED | OUTPATIENT
Start: 2021-06-21 | End: 2021-07-07 | Stop reason: HOSPADM

## 2021-06-21 RX ORDER — HYDROMORPHONE HYDROCHLORIDE 1 MG/ML
.3-.5 INJECTION, SOLUTION INTRAMUSCULAR; INTRAVENOUS; SUBCUTANEOUS EVERY 5 MIN PRN
Status: DISCONTINUED | OUTPATIENT
Start: 2021-06-21 | End: 2021-06-21 | Stop reason: DRUGHIGH

## 2021-06-21 RX ORDER — DIAZEPAM 2 MG
2-4 TABLET ORAL EVERY 6 HOURS PRN
Status: DISCONTINUED | OUTPATIENT
Start: 2021-06-21 | End: 2021-06-22

## 2021-06-21 RX ORDER — LIDOCAINE HYDROCHLORIDE ANHYDROUS AND DEXTROSE MONOHYDRATE .8; 5 G/100ML; G/100ML
1 INJECTION, SOLUTION INTRAVENOUS CONTINUOUS
Status: DISPENSED | OUTPATIENT
Start: 2021-06-21 | End: 2021-06-23

## 2021-06-21 RX ORDER — ONDANSETRON 2 MG/ML
4-8 INJECTION INTRAMUSCULAR; INTRAVENOUS EVERY 6 HOURS PRN
Status: DISCONTINUED | OUTPATIENT
Start: 2021-06-21 | End: 2021-07-07 | Stop reason: HOSPADM

## 2021-06-21 RX ORDER — DEXAMETHASONE SODIUM PHOSPHATE 4 MG/ML
INJECTION, SOLUTION INTRA-ARTICULAR; INTRALESIONAL; INTRAMUSCULAR; INTRAVENOUS; SOFT TISSUE PRN
Status: DISCONTINUED | OUTPATIENT
Start: 2021-06-21 | End: 2021-06-21

## 2021-06-21 RX ORDER — ACETAMINOPHEN 325 MG/1
650 TABLET ORAL EVERY 4 HOURS PRN
Status: DISCONTINUED | OUTPATIENT
Start: 2021-06-24 | End: 2021-06-22

## 2021-06-21 RX ORDER — LIDOCAINE HYDROCHLORIDE 20 MG/ML
INJECTION, SOLUTION INFILTRATION; PERINEURAL PRN
Status: DISCONTINUED | OUTPATIENT
Start: 2021-06-21 | End: 2021-06-21

## 2021-06-21 RX ORDER — CHOLECALCIFEROL (VITAMIN D3) 1250 MCG
1250 CAPSULE ORAL
Status: DISCONTINUED | OUTPATIENT
Start: 2021-06-26 | End: 2021-07-07 | Stop reason: HOSPADM

## 2021-06-21 RX ORDER — CHLORHEXIDINE GLUCONATE ORAL RINSE 1.2 MG/ML
15 SOLUTION DENTAL ONCE
Status: COMPLETED | OUTPATIENT
Start: 2021-06-21 | End: 2021-06-21

## 2021-06-21 RX ORDER — CALCIUM CHLORIDE 100 MG/ML
INJECTION INTRAVENOUS; INTRAVENTRICULAR PRN
Status: DISCONTINUED | OUTPATIENT
Start: 2021-06-21 | End: 2021-06-21

## 2021-06-21 RX ORDER — ONDANSETRON 2 MG/ML
4 INJECTION INTRAMUSCULAR; INTRAVENOUS EVERY 30 MIN PRN
Status: DISCONTINUED | OUTPATIENT
Start: 2021-06-21 | End: 2021-06-21

## 2021-06-21 RX ORDER — PROPOFOL 10 MG/ML
INJECTION, EMULSION INTRAVENOUS CONTINUOUS PRN
Status: DISCONTINUED | OUTPATIENT
Start: 2021-06-21 | End: 2021-06-21

## 2021-06-21 RX ORDER — PROCHLORPERAZINE MALEATE 5 MG
5 TABLET ORAL EVERY 6 HOURS PRN
Status: DISCONTINUED | OUTPATIENT
Start: 2021-06-21 | End: 2021-07-07 | Stop reason: HOSPADM

## 2021-06-21 RX ORDER — NALOXONE HYDROCHLORIDE 0.4 MG/ML
0.4 INJECTION, SOLUTION INTRAMUSCULAR; INTRAVENOUS; SUBCUTANEOUS
Status: DISCONTINUED | OUTPATIENT
Start: 2021-06-21 | End: 2021-07-07 | Stop reason: HOSPADM

## 2021-06-21 RX ORDER — DIAZEPAM 2 MG
2-4 TABLET ORAL EVERY 6 HOURS PRN
Status: DISCONTINUED | OUTPATIENT
Start: 2021-06-21 | End: 2021-06-21

## 2021-06-21 RX ORDER — CEFAZOLIN SODIUM 2 G/100ML
2 INJECTION, SOLUTION INTRAVENOUS SEE ADMIN INSTRUCTIONS
Status: DISCONTINUED | OUTPATIENT
Start: 2021-06-21 | End: 2021-06-21 | Stop reason: HOSPADM

## 2021-06-21 RX ORDER — ONDANSETRON 4 MG/1
4 TABLET, ORALLY DISINTEGRATING ORAL EVERY 30 MIN PRN
Status: DISCONTINUED | OUTPATIENT
Start: 2021-06-21 | End: 2021-06-21

## 2021-06-21 RX ORDER — SIMETHICONE 125 MG
125 TABLET,CHEWABLE ORAL 3 TIMES DAILY PRN
Status: DISCONTINUED | OUTPATIENT
Start: 2021-06-21 | End: 2021-06-22

## 2021-06-21 RX ORDER — FENTANYL CITRATE 50 UG/ML
INJECTION, SOLUTION INTRAMUSCULAR; INTRAVENOUS PRN
Status: DISCONTINUED | OUTPATIENT
Start: 2021-06-21 | End: 2021-06-21

## 2021-06-21 RX ORDER — GABAPENTIN 300 MG/1
300 CAPSULE ORAL ONCE
Status: COMPLETED | OUTPATIENT
Start: 2021-06-21 | End: 2021-06-21

## 2021-06-21 RX ORDER — CEFAZOLIN SODIUM 2 G/100ML
2 INJECTION, SOLUTION INTRAVENOUS
Status: COMPLETED | OUTPATIENT
Start: 2021-06-21 | End: 2021-06-21

## 2021-06-21 RX ORDER — NOREPINEPHRINE BITARTRATE 0.06 MG/ML
.01-.6 INJECTION, SOLUTION INTRAVENOUS CONTINUOUS
Status: DISCONTINUED | OUTPATIENT
Start: 2021-06-21 | End: 2021-06-23

## 2021-06-21 RX ORDER — SODIUM CHLORIDE, SODIUM LACTATE, POTASSIUM CHLORIDE, CALCIUM CHLORIDE 600; 310; 30; 20 MG/100ML; MG/100ML; MG/100ML; MG/100ML
INJECTION, SOLUTION INTRAVENOUS CONTINUOUS PRN
Status: DISCONTINUED | OUTPATIENT
Start: 2021-06-21 | End: 2021-06-21

## 2021-06-21 RX ORDER — AMOXICILLIN 250 MG
2 CAPSULE ORAL 2 TIMES DAILY
Status: DISCONTINUED | OUTPATIENT
Start: 2021-06-21 | End: 2021-06-22

## 2021-06-21 RX ORDER — GABAPENTIN 100 MG/1
100 CAPSULE ORAL
Status: DISCONTINUED | OUTPATIENT
Start: 2021-06-21 | End: 2021-06-21 | Stop reason: HOSPADM

## 2021-06-21 RX ORDER — FLUTICASONE PROPIONATE 50 MCG
2 SPRAY, SUSPENSION (ML) NASAL DAILY PRN
Status: DISCONTINUED | OUTPATIENT
Start: 2021-06-21 | End: 2021-07-07 | Stop reason: HOSPADM

## 2021-06-21 RX ORDER — LANOLIN ALCOHOL/MO/W.PET/CERES
6 CREAM (GRAM) TOPICAL AT BEDTIME
Status: DISCONTINUED | OUTPATIENT
Start: 2021-06-21 | End: 2021-07-07 | Stop reason: HOSPADM

## 2021-06-21 RX ORDER — ACETAMINOPHEN 325 MG/1
975 TABLET ORAL ONCE
Status: DISCONTINUED | OUTPATIENT
Start: 2021-06-21 | End: 2021-06-21

## 2021-06-21 RX ORDER — CHOLECALCIFEROL (VITAMIN D3) 125 MCG
20000 CAPSULE ORAL DAILY
Status: COMPLETED | OUTPATIENT
Start: 2021-06-22 | End: 2021-06-26

## 2021-06-21 RX ORDER — MORPHINE SULFATE 1 MG/ML
5 INJECTION, SOLUTION EPIDURAL; INTRATHECAL; INTRAVENOUS ONCE
Status: COMPLETED | OUTPATIENT
Start: 2021-06-21 | End: 2021-06-21

## 2021-06-21 RX ORDER — PROPOFOL 10 MG/ML
INJECTION, EMULSION INTRAVENOUS PRN
Status: DISCONTINUED | OUTPATIENT
Start: 2021-06-21 | End: 2021-06-21

## 2021-06-21 RX ORDER — METOPROLOL TARTRATE 1 MG/ML
10 INJECTION, SOLUTION INTRAVENOUS ONCE
Status: COMPLETED | OUTPATIENT
Start: 2021-06-21 | End: 2021-06-21

## 2021-06-21 RX ORDER — DOCUSATE SODIUM 100 MG/1
100 CAPSULE, LIQUID FILLED ORAL 2 TIMES DAILY
Status: DISCONTINUED | OUTPATIENT
Start: 2021-06-21 | End: 2021-06-22

## 2021-06-21 RX ORDER — BISACODYL 10 MG
10 SUPPOSITORY, RECTAL RECTAL DAILY PRN
Status: DISCONTINUED | OUTPATIENT
Start: 2021-06-21 | End: 2021-06-29

## 2021-06-21 RX ORDER — CEFAZOLIN SODIUM 2 G/100ML
2 INJECTION, SOLUTION INTRAVENOUS EVERY 8 HOURS
Status: COMPLETED | OUTPATIENT
Start: 2021-06-22 | End: 2021-06-22

## 2021-06-21 RX ORDER — SODIUM CHLORIDE, SODIUM LACTATE, POTASSIUM CHLORIDE, CALCIUM CHLORIDE 600; 310; 30; 20 MG/100ML; MG/100ML; MG/100ML; MG/100ML
INJECTION, SOLUTION INTRAVENOUS CONTINUOUS
Status: DISCONTINUED | OUTPATIENT
Start: 2021-06-21 | End: 2021-06-21 | Stop reason: HOSPADM

## 2021-06-21 RX ORDER — ATORVASTATIN CALCIUM 40 MG/1
80 TABLET, FILM COATED ORAL AT BEDTIME
Status: DISCONTINUED | OUTPATIENT
Start: 2021-06-21 | End: 2021-07-07 | Stop reason: HOSPADM

## 2021-06-21 RX ORDER — ZINC SULFATE 50(220)MG
220 CAPSULE ORAL DAILY
Status: DISCONTINUED | OUTPATIENT
Start: 2021-06-22 | End: 2021-07-07 | Stop reason: HOSPADM

## 2021-06-21 RX ORDER — LIDOCAINE HYDROCHLORIDE ANHYDROUS AND DEXTROSE MONOHYDRATE .8; 5 G/100ML; G/100ML
1-4 INJECTION, SOLUTION INTRAVENOUS CONTINUOUS
Status: DISCONTINUED | OUTPATIENT
Start: 2021-06-21 | End: 2021-06-21 | Stop reason: HOSPADM

## 2021-06-21 RX ORDER — ALBUMIN, HUMAN INJ 5% 5 %
SOLUTION INTRAVENOUS CONTINUOUS PRN
Status: DISCONTINUED | OUTPATIENT
Start: 2021-06-21 | End: 2021-06-21

## 2021-06-21 RX ORDER — SODIUM CHLORIDE, SODIUM LACTATE, POTASSIUM CHLORIDE, CALCIUM CHLORIDE 600; 310; 30; 20 MG/100ML; MG/100ML; MG/100ML; MG/100ML
INJECTION, SOLUTION INTRAVENOUS CONTINUOUS
Status: DISCONTINUED | OUTPATIENT
Start: 2021-06-21 | End: 2021-06-22

## 2021-06-21 RX ORDER — NITROGLYCERIN 0.4 MG/1
0.4 TABLET SUBLINGUAL EVERY 5 MIN PRN
Status: DISCONTINUED | OUTPATIENT
Start: 2021-06-21 | End: 2021-07-07 | Stop reason: HOSPADM

## 2021-06-21 RX ORDER — SODIUM CHLORIDE 9 MG/ML
INJECTION, SOLUTION INTRAVENOUS CONTINUOUS PRN
Status: DISCONTINUED | OUTPATIENT
Start: 2021-06-21 | End: 2021-06-21

## 2021-06-21 RX ORDER — MAGNESIUM SULFATE HEPTAHYDRATE 40 MG/ML
2 INJECTION, SOLUTION INTRAVENOUS ONCE
Status: DISCONTINUED | OUTPATIENT
Start: 2021-06-21 | End: 2021-06-21 | Stop reason: HOSPADM

## 2021-06-21 RX ORDER — POLYETHYLENE GLYCOL 3350 17 G/17G
17 POWDER, FOR SOLUTION ORAL 2 TIMES DAILY
Status: DISCONTINUED | OUTPATIENT
Start: 2021-06-22 | End: 2021-06-25

## 2021-06-21 RX ORDER — DIAZEPAM 10 MG/2ML
2-4 INJECTION, SOLUTION INTRAMUSCULAR; INTRAVENOUS EVERY 6 HOURS PRN
Status: DISCONTINUED | OUTPATIENT
Start: 2021-06-21 | End: 2021-06-22

## 2021-06-21 RX ORDER — SODIUM CHLORIDE 9 MG/ML
INJECTION, SOLUTION INTRAVENOUS CONTINUOUS
Status: DISCONTINUED | OUTPATIENT
Start: 2021-06-21 | End: 2021-06-21

## 2021-06-21 RX ORDER — ASCORBIC ACID 250 MG
250 TABLET,CHEWABLE ORAL DAILY
Status: DISCONTINUED | OUTPATIENT
Start: 2021-06-22 | End: 2021-07-07 | Stop reason: HOSPADM

## 2021-06-21 RX ADMIN — ACETAMINOPHEN 975 MG: 325 TABLET, FILM COATED ORAL at 07:24

## 2021-06-21 RX ADMIN — Medication 5 MG: at 14:04

## 2021-06-21 RX ADMIN — SUGAMMADEX 200 MG: 100 INJECTION, SOLUTION INTRAVENOUS at 09:57

## 2021-06-21 RX ADMIN — CALCIUM CHLORIDE 500 MG: 100 INJECTION INTRAVENOUS; INTRAVENTRICULAR at 18:26

## 2021-06-21 RX ADMIN — PHENYLEPHRINE HYDROCHLORIDE 100 MCG: 10 INJECTION INTRAVENOUS at 15:47

## 2021-06-21 RX ADMIN — METOPROLOL TARTRATE 10 MG: 5 INJECTION INTRAVENOUS at 07:25

## 2021-06-21 RX ADMIN — Medication 5 MG: at 11:55

## 2021-06-21 RX ADMIN — CHLORHEXIDINE GLUCONATE 15 ML: 1.2 RINSE ORAL at 07:30

## 2021-06-21 RX ADMIN — SODIUM CHLORIDE: 9 INJECTION, SOLUTION INTRAVENOUS at 08:05

## 2021-06-21 RX ADMIN — DEXAMETHASONE SODIUM PHOSPHATE 8 MG: 4 INJECTION, SOLUTION INTRA-ARTICULAR; INTRALESIONAL; INTRAMUSCULAR; INTRAVENOUS; SOFT TISSUE at 09:35

## 2021-06-21 RX ADMIN — PHENYLEPHRINE HYDROCHLORIDE 100 MCG: 10 INJECTION INTRAVENOUS at 16:35

## 2021-06-21 RX ADMIN — HYDROMORPHONE HYDROCHLORIDE 0.5 MG: 1 INJECTION, SOLUTION INTRAMUSCULAR; INTRAVENOUS; SUBCUTANEOUS at 23:24

## 2021-06-21 RX ADMIN — Medication 5 MG: at 11:44

## 2021-06-21 RX ADMIN — PROPOFOL 65 MCG/KG/MIN: 10 INJECTION, EMULSION INTRAVENOUS at 08:15

## 2021-06-21 RX ADMIN — ACETAMINOPHEN 650 MG: 650 SUPPOSITORY RECTAL at 23:03

## 2021-06-21 RX ADMIN — LIDOCAINE HYDROCHLORIDE 1 MG/MIN: 8 INJECTION, SOLUTION INTRAVENOUS at 09:20

## 2021-06-21 RX ADMIN — Medication 2 G: at 16:19

## 2021-06-21 RX ADMIN — CALCIUM CHLORIDE 500 MG: 100 INJECTION INTRAVENOUS; INTRAVENTRICULAR at 16:26

## 2021-06-21 RX ADMIN — ROCURONIUM BROMIDE 70 MG: 10 INJECTION INTRAVENOUS at 08:07

## 2021-06-21 RX ADMIN — PHENYLEPHRINE HYDROCHLORIDE 100 MCG: 10 INJECTION INTRAVENOUS at 11:31

## 2021-06-21 RX ADMIN — TRANEXAMIC ACID 3000 MG: 100 INJECTION, SOLUTION INTRAVENOUS at 08:15

## 2021-06-21 RX ADMIN — SODIUM CHLORIDE: 9 INJECTION, SOLUTION INTRAVENOUS at 20:48

## 2021-06-21 RX ADMIN — HYDROMORPHONE HYDROCHLORIDE 0.3 MG: 1 INJECTION, SOLUTION INTRAMUSCULAR; INTRAVENOUS; SUBCUTANEOUS at 22:29

## 2021-06-21 RX ADMIN — Medication 0.5 MCG/KG/MIN: at 12:17

## 2021-06-21 RX ADMIN — ALBUMIN (HUMAN): 12.5 SOLUTION INTRAVENOUS at 16:30

## 2021-06-21 RX ADMIN — FENTANYL CITRATE 150 MCG: 50 INJECTION, SOLUTION INTRAMUSCULAR; INTRAVENOUS at 08:05

## 2021-06-21 RX ADMIN — MORPHINE SULFATE 5 MG: 1 INJECTION, SOLUTION EPIDURAL; INTRATHECAL; INTRAVENOUS at 07:47

## 2021-06-21 RX ADMIN — SODIUM CHLORIDE, POTASSIUM CHLORIDE, SODIUM LACTATE AND CALCIUM CHLORIDE: 600; 310; 30; 20 INJECTION, SOLUTION INTRAVENOUS at 07:59

## 2021-06-21 RX ADMIN — ALBUMIN (HUMAN): 12.5 SOLUTION INTRAVENOUS at 15:02

## 2021-06-21 RX ADMIN — SODIUM CHLORIDE, POTASSIUM CHLORIDE, SODIUM LACTATE AND CALCIUM CHLORIDE: 600; 310; 30; 20 INJECTION, SOLUTION INTRAVENOUS at 14:52

## 2021-06-21 RX ADMIN — SODIUM CHLORIDE, POTASSIUM CHLORIDE, SODIUM LACTATE AND CALCIUM CHLORIDE: 600; 310; 30; 20 INJECTION, SOLUTION INTRAVENOUS at 18:32

## 2021-06-21 RX ADMIN — CALCIUM CHLORIDE 500 MG: 100 INJECTION INTRAVENOUS; INTRAVENTRICULAR at 18:29

## 2021-06-21 RX ADMIN — LIDOCAINE HYDROCHLORIDE 100 MG: 20 INJECTION, SOLUTION INFILTRATION; PERINEURAL at 08:05

## 2021-06-21 RX ADMIN — PHENYLEPHRINE HYDROCHLORIDE 100 MCG: 10 INJECTION INTRAVENOUS at 16:31

## 2021-06-21 RX ADMIN — PROPOFOL 150 MG: 10 INJECTION, EMULSION INTRAVENOUS at 08:06

## 2021-06-21 RX ADMIN — Medication 0.03 MCG/KG/MIN: at 21:41

## 2021-06-21 RX ADMIN — TRANEXAMIC ACID 10 MG/KG/HR: 1 INJECTION, SOLUTION INTRAVENOUS at 08:55

## 2021-06-21 RX ADMIN — Medication 2 G: at 08:50

## 2021-06-21 RX ADMIN — ALBUMIN (HUMAN): 12.5 SOLUTION INTRAVENOUS at 14:53

## 2021-06-21 RX ADMIN — FENTANYL CITRATE 50 MCG: 50 INJECTION, SOLUTION INTRAMUSCULAR; INTRAVENOUS at 21:17

## 2021-06-21 RX ADMIN — SODIUM CHLORIDE, POTASSIUM CHLORIDE, SODIUM LACTATE AND CALCIUM CHLORIDE: 600; 310; 30; 20 INJECTION, SOLUTION INTRAVENOUS at 22:09

## 2021-06-21 RX ADMIN — FENTANYL CITRATE 50 MCG: 50 INJECTION, SOLUTION INTRAMUSCULAR; INTRAVENOUS at 20:40

## 2021-06-21 RX ADMIN — GABAPENTIN 300 MG: 300 CAPSULE ORAL at 07:24

## 2021-06-21 RX ADMIN — PHENYLEPHRINE HYDROCHLORIDE 150 MCG: 10 INJECTION INTRAVENOUS at 10:01

## 2021-06-21 RX ADMIN — CALCIUM CHLORIDE 500 MG: 100 INJECTION INTRAVENOUS; INTRAVENTRICULAR at 16:12

## 2021-06-21 RX ADMIN — Medication 2 G: at 12:19

## 2021-06-21 RX ADMIN — FENTANYL CITRATE 50 MCG: 50 INJECTION, SOLUTION INTRAMUSCULAR; INTRAVENOUS at 20:16

## 2021-06-21 RX ADMIN — SUFENTANIL CITRATE 0.2 MCG/KG/HR: 50 INJECTION EPIDURAL; INTRAVENOUS at 08:15

## 2021-06-21 ASSESSMENT — MIFFLIN-ST. JEOR: SCORE: 1787.88

## 2021-06-21 NOTE — PROGRESS NOTES
"I saw and examined the patient this morning prior to surgery.  I explained my role as a co-surgeon requested by Dr. Akins to minimize blood loss and operative time during complex spine surgery today.  I gave the patient opportunity to ask questions and I reviewed that surgery carries risk of neurologic worsening or death in addition to the extensive review that Dr. Akins already has carried out on multiple visits with the patient.  The patient states that he has \"quivering\" when he lays down in all of the muscles from his mid-thoracic spine to his legs. He asked if that will change after this surgery. I answered that I don't know. It could worsen if it is due to underlying cord injury or it could improve if it is due to flat alignment of spine that we are changing today, there is not a good predictor of this. He states that this is a common symptom that people have. I told him this not a common symptom that I have seen in my complex spinal deformity practice.    He was a former quadriplegic after accident and has regained ambulation ability.    I asked him several times if his right foot dorsiflexion is weaker than left because he has a right footdrop on my exam today. After asking the question in multiple different ways, he finally told me that until he gets his \"muscles warmed up,\" the right foot is indeed less range of motion in dorsiflexion compared to left. Therefore I verified that this is his baseline.    Of note, I communicated with patient by sitting in a chair next to him at head of bed, because he expressed hearing difficulties with background noise. He stated no difficulty with hearing or comprehension once I was seated next to him.    I answered the patient's questions and he wishes to proceed with surgery.    Sara Estevez MD    [facility] Department of Neurosurgery  Office: [phone]    6/21/2021  7:34 AM        "

## 2021-06-21 NOTE — PROGRESS NOTES
IDALIA HOLLAND ordered 5mg of IV Morphine and 10 mg of IV Metoprolol. Orquidea Tucker RN on 6/21/2021 at 7:13 AM

## 2021-06-21 NOTE — PROGRESS NOTES
"SPIRITUAL HEALTH SERVICES Progress Note  Unit Pre-Surg Referral when admitted    While the medical team worked with pt, SH spoke with spouse (Ayah), who expressed feeling \"a bit nervous.\" I introduced myself to pt and he requested prayer. We prayed for his time in surgery and prayed the Lord's Prayer.    Plan: SH will remain available for any emotional/spiritual needs per referral from pt, family, or staff.    Perry Mclaughlin   Intern  Pager [phone]    "

## 2021-06-21 NOTE — PROGRESS NOTES
Paged device nurse Neema: Jorje Simmons Preop 6. pt has ICD and is having lumbar surgery today. please advise thanks Orquidea *[phone]    Paged device nurse again: Darleen Simmons. pt has ICD and is having spine surgery please advise Orquidea *[phone]  Orquidea Tucker RN on 6/21/2021 at 7:09 AM     Device Nurse Liana came to pt's bedside and assessed/changed settings on ICD. MDA notified of these changes. Device nurse to be notified in PACU. Orquidea Tucker RN on 6/21/2021 at 8:11 AM

## 2021-06-21 NOTE — H&P
SURGICAL ICU ADMISSION NOTE  6/21/2021    PRIMARY TEAM: Orthopedic Surgery  PRIMARY PHYSICIAN: Dr. Akhil Akins    REASON FOR CRITICAL CARE ADMISSION: Close hemodynamic monitoring   ADMITTING PHYSICIAN: Dr. DYLAN Griffin    ASSESSMENT: Jorje Simmons is a 70 year old male with history of hyperlipidemia, hypertension, CAD with MI s/p CABG x4 (1986), ischemic cardiomyopathy s/p ICD placement, PAD s/p left SFA balloon angioplasty to assist a latissimus dorsi flap to the left calf, atrial fibrillation/flutter (no longer on anticoagulation). He underwent T7-S1 posterior spinal fusion with pelvic fixation and osteotomies of T12-L1 and L5 on 6/21/21 with Dr. Akins. Intraoperatively significant blood loss (4.3 L). He is admitted to the SICU postoperatively for close hemodynamic monitoring.     Patient admitted to the SICU hypotensive, requiring NE. Bedside echo obtained with Dr. Griffin. Showed likely decreased LV function but limited due to poor views. No overt signs of volume depletion.     PLAN:   Neuro/ pain/ sedation:  # Acute Pain  # Chronic morphine use for back pain  - Noted small dural rent intraop, no CSF leak, so no HOB precautions.  - Dilaudid PCA- will switch to PRN while patient is somnolent and unable to use PCA, S Tylenol (rectal while somnolent and NPO), Ketamine infusion, valium  - Lidocaine infusion until POD#2  - Holding PTA morphine     Pulmonary care:   # Former smoker, no history of pulmonary disease  - Extubated postop  - Supplemental oxygen to keep saturation above 92 %.  - Incentive spirometer every 15- 30 minutes when awake.     Cardiovascular:    # Hypertension  # Hyperlipidemia  # Coronary Artery Disease c/b MI s/p CABG x4 in 1986  # Ischemic cardiomyopathy s/p ICD placement   # PAD s/p left SFA balloon angioplasty for a latissimus dorsi flap to the left calf  # Atrial Fibrillation/Flutter  - ECHO 5/4/21: mildly reduced (45-50%) w/o significant valvular abnormalities  - Nuclear stress test  5/5/21 demonstrates nontransmural infarction in the entire apical segment of the left ventricle.   - ICD reprogrammed pre-op to VOO, Cardiology to reprogram back to VVI tonight  - requiring NE. Bedside cardiac echo post-op with likely decreased LV function (though limited due to poor views). Will hold off on further volume resuscitation at this time, though will reassess later as needed. Post-op Troponin 0.033- will trend, .  - hold PTA Metoprolol succinate 12.5 mg qHS while requiring pressors  - PTA Atorvastatin      GI/Nutrition:   - NPO while still somnolent, once wakes up okay to advance diet as tolerated  - Bowel regimen  - LFTs given pressor requirements    Renal/Fluids:  - Intra-op fluids: 750 mL 5% albumin, 800 mL NS, 4000 mL LR  - Baseline Cr 0.61  - LR @ 100 ml/hr     Endocrine:    - 8 mg dexamethasone given intraop  - No history of diabetes, thyroid disease, HPA dysregulation.      ID/ Antibiotics:  - Periop Ancef. Patient with prior MRSA infection, most recent swab in 2018 negative. Will repeat here, though continue ancef at this time.     Heme:    # Acute blood loss anemia - EBL 4.3 L    - 1.6 L returned via Cell Saver, additional 3 units pRBCs, TXA given.   - Stopped chronic anticoagulation in May      Prophylaxis:    - Anticoagulation per primary team, none tonight     MSK:    # History of thoracolumbar injury, remote  # History of Quadriplegia s/p extensive spinal surgery  # Flatback syndrome s/p posterior T12-S1 spinal fusion    Activity:  - No activity precautions including spinal  - No lifting > 10 lbs      Lines/ tubes/ drains:   - L radial arterial line  - R IJ MAC  - PIV x2  - Casiano  - Hemovac drain     Disposition:  - Surgical ICU    Patient seen, findings and plan discussed with surgical ICU staff, Dr. Griffin.    Domonique Juarez MD  Surgery, PGY2  [id number]    - - - - - - - - - - - - - - - - - - - - - - - - - - - - - - - - - - - - - - - - - - - - - - - - - - - - - - - - - - - - - -  - - - - - - - - - -     HISTORY OF PRESENTING ILLNESS: Darleen Simmons is a 70 year old male with flat back syndrome who presented to [facility] and underwent o-arm/stealth-assisted posterior spinal fusion T7-S1 with segmental instrumentation T7-T8; reinsertion of instrumentation T9-S1; pelvic fixation, posterior 3 column osteotomy T12-L1; pedicle subtraction osteotomy L5 with Dr. Akins on 6/21/2021. Intraop EBL was 4.3 L and he received 1.6 L cell saver, 3 units pRBC, 750 mL 5% albumin, 800 mL NS, and 4 L LR. He was admitted to the SICU post-operatively.      Patient has been followed by Dr. Akins for progressive back pain which impaired his ability to ambulate. 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 Afterwards he experienced quadriplegia but he slowly was able to regain his ability to ambulate. In 2018, he underwent posterior decompression thoracolumbar fusion at [facility]. At that time It was thought that he would be primarily a sitter and he was fused in a fairly straight posture. The patient continued to work on his rehab and has returned to ambulation, however because of his fusion, he has a stooped posture. He describes pain between his shoulder blades as well as down into his pelvis. His thighs become very tired with any sort of distance walking or standing. He was agreeable for surgical intervention.      His medical history is significant for HLD, HYPERTENSION, CAD s/p MI and CABG X 4 in 1986, ischemic cardiomyopathy, ICD placement, PAD, s/p left SFA balloon angioplasty in the past to assist healing a latissimus dorsi flap to the left calf, atrial fibrillation, flutter (no longer on anticoagulation), former smoker, and history of blood transfusions.     REVIEW OF SYSTEMS: 10 point ROS neg other than the symptoms noted above in the HPI.    PAST MEDICAL HISTORY:     Past Medical History:   Diagnosis Date     Acute osteomyelitis of left lower  "leg (H) 2017     Acute superficial venous thrombosis of lower extremity, right 2018     Atrial flutter (H)      Automatic implantable cardioverter-defibrillator in situ      CAD (coronary artery disease)      Depression      Flatback syndrome      History of acute inferior wall MI      HTN (hypertension)      Hyperlipidemia LDL goal <100      Ischemic cardiomyopathy      Kyphosis (acquired) (postural)      JOANNA (obstructive sleep apnea)      PAD (peripheral artery disease) (H)      Paroxysmal atrial fibrillation (H)      PVD (peripheral vascular disease) (H)        SURGICAL HISTORY:   Past Surgical History:   Procedure Laterality Date     BYPASS GRAFT ARTERY CORONARY  1986     Decompression posterior lumbar and instrumented fusion  2018     Endovascular femoral and/or popliteal and/or tibial artery angioplasty/stent       Hardware removal tib/fib       I and D left extremity wound       Knee hardware removal       KNEE SURGERY       STSG left extremity wound       TONSILLECTOMY         SOCIAL HISTORY:     Former smoker, 33PY, quit '88  No etoh  No illicit substances    FAMILY HISTORY:  Non-contributory    ALLERGIES:      Allergies   Allergen Reactions     Wasp Venom Protein Shortness Of Breath and Swelling     sob  swelling  Other reaction(s): Swelling  sob  \"black wasps\"     Adhesive Tape      Paper tape - rash     Gelfoam [Gelatin]      rash     Hydroxyzine Other (See Comments)     Nightmares     Influenza Vaccines Other (See Comments)     Avoid influenza vaccine, history of Guillain [place]      Lorazepam Anxiety     Other reaction(s): Irritability, Mental Status Change, Other (see comments)  Pt states he becomes very angry and mean after taking at [facility].    Tolerates diazepam       MEDICATIONS:  No current facility-administered medications on file prior to encounter.   acetaminophen (TYLENOL) 500 MG tablet, Take 500-1,000 mg by mouth every 8 hours as needed for mild pain  aspirin 81 MG EC tablet, " Take 81 mg by mouth daily  atorvastatin (LIPITOR) 80 MG tablet, Take 80 mg by mouth At Bedtime   D3-50 1.25 MG (44653 UT) capsule, Take 1,250 mcg by mouth every 7 days Takes on Saturdays.  diazepam (VALIUM) 2 MG tablet, Take 2-4 mg by mouth nightly as needed for muscle spasms   diclofenac (VOLTAREN) 1 % topical gel, Apply topically as needed (shoulder pain)   docusate sodium (DSS) 100 MG capsule, Take 100 mg by mouth 2 times daily  fluticasone (FLONASE) 50 MCG/ACT nasal spray, Spray 2 sprays into both nostrils daily as needed   hydrocortisone (CORTAID) 1 % external cream, Apply topically 2 times daily as needed  metoprolol succinate ER (TOPROL-XL) 25 MG 24 hr tablet, Take 12.5 mg by mouth At Bedtime   morphine (MSIR) 15 MG IR tablet, Take 15-30 mg by mouth every 3 hours as needed Every 3 hrs as needed  multivitamin, therapeutic with minerals (THERA-VIT-M) TABS tablet, Take 1 tablet by mouth daily  polyethylene glycol (MIRALAX) 17 GM/Dose powder, Take 17 g by mouth daily as needed   simethicone (MYLICON) 125 MG chewable tablet, Take 375 mg by mouth as needed for intestinal gas  naloxone (NARCAN) 4 MG/0.1ML nasal spray, Spray 4 mg into one nostril alternating nostrils once as needed for opioid reversal every 2-3 minutes until assistance arrives  nitroGLYcerin (NITROSTAT) 0.4 MG sublingual tablet, Place 0.4 mg under the tongue as needed        PHYSICAL EXAMINATION:  Resp:  [16] 16  BP: (179)/(97) 179/97  SpO2:  [100 %] 100 %    GEN: alternating between sleeping and moaning in pain. Intermittently follows commands.  CV: paced rhythm  RESP: Non-labored breathing on 5L NC, CTAB  ABD: Soft, non-distended, non-tender.  EXT: well healed prior graft LLE  NEURO: bilateral  strength intact and symmetric. Moves BLE- wiggles bilateral toes  SKIN: Normal  PSYCH: Cooperative      LABS: Reviewed.   Arterial Blood Gases   Recent Labs   Lab 06/21/21  1555 06/21/21  1400 06/21/21  1130 06/21/21  1000   PH 7.37 7.33* 7.38 7.41    PCO2 41 46* 41 42   PO2 205* 190* 189* 210*   HCO3 23 24 24 26     Complete Blood Count   Recent Labs   Lab 06/21/21  1555 06/21/21  1400 06/21/21  1130 06/21/21  1000 06/21/21  0738   WBC  --   --   --   --  8.0   HGB 11.8*  11.3* 12.5* 11.1* 12.2* 14.1   *  --   --   --  124*     Basic Metabolic Panel  Recent Labs   Lab 06/21/21  1555 06/21/21  1400 06/21/21  1130 06/21/21  1000 06/21/21  0738    139 140 141 141   POTASSIUM 3.9 3.9 3.5 3.4 3.9   CHLORIDE  --   --   --   --  107   CO2  --   --   --   --  29   BUN  --   --   --   --  13   CR  --   --   --   --  0.61*   * 168* 101* 98 90     Liver Function Tests  Recent Labs   Lab 06/21/21  1555 06/21/21  1400   INR 1.68* 1.66*     Pancreatic Enzymes  No lab results found in last 7 days.  Coagulation Profile  Recent Labs   Lab 06/21/21  1555 06/21/21  1400   INR 1.68* 1.66*   PTT 35 42*     IMAGING:  [place] [facility] [place]

## 2021-06-21 NOTE — BRIEF OP NOTE
[facility]    Brief Operative Note    Pre-operative diagnosis: Flatback syndrome [M40.30]  H/O spinal fusion [Z98.1]  H/O spinal cord injury [Z87.828]  Post-operative diagnosis Flatback syndrome [M40.30]  H/O spinal fusion [Z98.1]  H/O spinal cord injury [Z87.828]    Procedure: Procedure(s):  o-arm/stealth assisted Posterior spinal fusion Thoracic 7 to Sacral 1 with segmental instrumentation Thoracic 7 to 8; reinsertion of instrumentation Thoracic 9 to Sacral 1; pelvic fixation  Posterior 3 column osteotomy T12-L1; pedicle subtraction osteotomy Lumbar 5  Surgeon: Surgeon(s) and Role:     * Akhil Akins MD - Primary     * Sara Estevez MD - Assisting     * Joseph Maravilla MD - Resident - Assisting  Anesthesia: General   Estimated blood loss: 4320cc + 1630cc returned via CellSaver  Drains: Hemovac  Specimens:   ID Type Source Tests Collected by Time Destination   A : Surgical Implants Other (specify in comments) Other SURGICAL PATHOLOGY EXAM Akhil Akins MD 6/21/2021  1:12 PM      Findings:  Good final placement of instrumentation on intraoperative imaging. Correction of deformity. Dural tear repaired primarily.  Complications: None.  Implants:   Implant Name Type Inv. Item Serial No.  Lot No. LRB No. Used Action   Dual John Screw 8.5 x 55mm   [id number] [employer]  N/A 2 Implanted   Cannulated MAS 10.5 x 110mm    [employer] [id number] N/A 2 Implanted   Cannulated MAS 10.5 x 100mm    [employer] [id number] N/A 2 Implanted   IMP SCR MEDT 5.5/6.0MM SOLERA 7.5X60MM MA [id number] - SNA Metallic Hardware/[place] IMP SCR MEDT 5.5/6.0MM SOLERA 7.5X60MM MA [id number] NA [employer]  N/A 7 Implanted   IMP SCR MEDT 5.5/6.0MM SOLERA 7.5X55MM MA [id number] - SNA Metallic Hardware/[place] IMP SCR MEDT 5.5/6.0MM SOLERA 7.5X55MM MA [id number] NA [employer]  N/A 3 Implanted   IMP SCR MEDT 5.5/6.0MM SOLERA 8.5X55MM MA [id number] - [facility] Metallic  "Hardware/[place] IMP SCR MEDT 5.5/6.0MM SOLERA 8.5X55MM MA [id number] NA [employer]  N/A 2 Implanted   IMP SCR MEDT 5.5/6.0MM SOLERA 8.5X65MM MA [id number] - SNA Metallic Hardware/[place] IMP SCR MEDT 5.5/6.0MM SOLERA 8.5X65MM MA [id number] NA [employer]  N/A 1 Implanted   IMP SCR MEDT 5.5/6.0MM SOLERA 7.5X65MM MA [id number] - SNA Metallic Hardware/[place] IMP SCR MEDT 5.5/6.0MM SOLERA 7.5X65MM MA [id number] NA [employer]  N/A 2 Implanted   IMP SCR MEDT 5.5/6.0MM SOLERA 6.5X55MM MA [id number] - SNA Metallic Hardware/[place] IMP SCR MEDT 5.5/6.0MM SOLERA 6.5X55MM MA [id number] NA [employer]  N/A 2 Implanted   IMP SCR MEDT 5.5/6.0MM SOLERA 5.5X55MM MA [id number] - SNA Metallic Hardware/[place] IMP SCR MEDT 5.5/6.0MM SOLERA 5.5X55MM MA [id number] NA [employer]  N/A 2 Implanted   IMP SCR SET MEDT SOLERA BREAK OFF 5.5MM TI [id number] - SNA Metallic Hardware/[place] IMP SCR SET MEDT SOLERA BREAK OFF 5.5MM TI [id number] NA [employer]  N/A 27 Implanted   GRAFT DUREPAIR DURA MATRIX 2X2\" [id number] - SNONE Bone/Tissue/Biologic GRAFT DUREPAIR DURA MATRIX 2X2\" 03960 NONE [employer], INC-NEURO [id number] N/A 1 Implanted   GRAFT DUREPAIR DURA MATRIX 12.5X10CM (4X5\") [id number] - SNONE Bone/Tissue/Biologic GRAFT DUREPAIR DURA MATRIX 12.5X10CM (4X5\") 75585 NONE [employer]-NEURO [id number] N/A 1 Implanted   IMP CINTHIA MEDT SOLERA LINED 5.4Z789KE CHR [id number] - S0 Metallic Hardware/[place] IMP CINTHIA MEDT SOLERA LINED 5.4T264EW CHR [id number] 0 [employer] 0 N/A 2 Implanted   IMP CINTHIA MEDT SOLERA CVD 5.5X40MM CHR [id number] - S0 Metallic Hardware/[place] IMP CINTHIA MEDT SOLERA CVD 5.5X40MM CHR [id number] 0 [employer]  N/A 1 Implanted   IMP CINTHIA MEDT SOLERA CVD 5.5X80MM CHR [id number] - S0 Metallic Hardware/[place] IMP CINTHIA MEDT SOLERA CVD 5.5X80MM CHR [id number] 0 [employer]  N/A 1 Implanted   IMP SCR SET MEDT SOLERA BREAK OFF 5.5MM TI [id number] - S0 Metallic Hardware/[place] IMP SCR SET MEDT SOLERA BREAK OFF 5.5MM TI [id number] 0 [employer] " INC  N/A 2 Wasted

## 2021-06-21 NOTE — OR NURSING
Hearing aides (x2) placed in turquoise cup marked with patient sticker in OR9. Placed with patient chart in OR.  ~SPJ

## 2021-06-21 NOTE — ANESTHESIA PROCEDURE NOTES
Airway       Patient location during procedure: OR  Staff -        Performed By: anesthesiologist and with residents       Procedure performed by resident/fellow/CRNA in presence of a teaching physician.    Consent for Airway        Urgency: elective  Indications and Patient Condition       Indications for airway management: tricia-procedural       Induction type:intravenous       Mask difficulty assessment: 1 - vent by mask    Final Airway Details       Final airway type: endotracheal airway       Successful airway: ETT - single  Endotracheal Airway Details        ETT size (mm): 8.0       Cuffed: yes       Successful intubation technique: flexible bronchoscopy       Grade View of Cords: 1       Position: Right       Measured from: gums/teeth       Secured at (cm): 26       Bite block used: None    Post intubation assessment        Placement verified by: capnometry, equal breath sounds and chest rise        Number of attempts at approach: 1       Number of other approaches attempted: 0       Secured with: pink tape       Ease of procedure: easy       Dentition: Unchanged and Intact    Medication(s) Administered   Medication Administration Time: 6/21/2021 8:14 AM

## 2021-06-21 NOTE — ANESTHESIA PROCEDURE NOTES
Central Line/PA Catheter Placement  Pre-Procedure   Staff -        Anesthesiologist:  Joel Puckett MD       Resident/Fellow: Catalino Arreaga MD       Performed By: anesthesiologist and with residents       Procedure performed by resident/fellow/CRNA in presence of a teaching physician.         Location: OR       Pre-Anesthestic Checklist: patient identified, IV checked, site marked, risks and benefits discussed, informed consent, monitors and equipment checked, pre-op evaluation and at physician/surgeon's request  Timeout:       Correct Patient: Yes        Correct Procedure: Yes        Correct Site: Yes        Correct Position: Yes        Correct Laterality: Yes     Procedure   Procedure: central line       Laterality: right       Insertion Site: internal jugular.       Patient Position: Trendelenburg  Sterile Prep        All elements of maximal sterile barrier technique followed       Patient Prep/Sterile Barriers: draped, hand hygiene, gloves , hat , mask , draped, gown, sterile gel and probe cover       Skin prep: Chloraprep  Insertion/Injection        Technique: ultrasound guided and Seldinger Technique        1. Ultrasound was used to evaluate the access site.       2. Vein evaluated via ultrasound for patency/adequacy.       3. Using real-time ultrasound the needle/catheter was observed entering the artery/vein.       Introducer Type: 9 Fr, 2-lumen MAC        Hands-off Catheter: Hands-off 1-lumen via introducer   Narrative         Secured by: suture       Tegaderm and Biopatch dressing used.       Complications: None apparent,        blood aspirated from all lumens,        All lumens flushed: Yes       Verification method: Placement to be verified post-op

## 2021-06-21 NOTE — ANESTHESIA PROCEDURE NOTES
Arterial Line Procedure Note  Pre-Procedure   Staff -        Anesthesiologist:  Joel Puckett MD       Resident/Fellow: Catalino Arreaga MD       Performed By: anesthesiologist and with residents       Procedure performed by resident/fellow/CRNA in presence of a teaching physician.         Location: OR       Pre-Anesthestic Checklist: patient identified, IV checked, risks and benefits discussed, informed consent, monitors and equipment checked, pre-op evaluation and at physician/surgeon's request  Timeout:       Correct Patient: Yes        Correct Procedure: Yes        Correct Site: Yes        Correct Position: Yes   Procedure   Procedure: arterial line       Laterality: left       Insertion Site: radial.  Sterile Prep        Standard elements of sterile barrier followed       Skin prep: Chloraprep  Insertion/Injection        Technique: Seldinger Technique        Catheter Type/Size: 20 G, 1.75 in/4.5 cm quick cath (integral wire)  Narrative         Secured by: other       Tegaderm dressing used.       Complications: None apparent,      Arterial waveform: Yes

## 2021-06-22 ENCOUNTER — APPOINTMENT (OUTPATIENT)
Dept: CARDIOLOGY | Facility: CLINIC | Age: 71
DRG: 456 | End: 2021-06-22
Attending: SURGERY
Payer: COMMERCIAL

## 2021-06-22 ENCOUNTER — ANCILLARY PROCEDURE (OUTPATIENT)
Dept: CARDIOLOGY | Facility: CLINIC | Age: 71
DRG: 456 | End: 2021-06-22
Attending: STUDENT IN AN ORGANIZED HEALTH CARE EDUCATION/TRAINING PROGRAM
Payer: COMMERCIAL

## 2021-06-22 ENCOUNTER — APPOINTMENT (OUTPATIENT)
Dept: CT IMAGING | Facility: CLINIC | Age: 71
DRG: 456 | End: 2021-06-22
Attending: STUDENT IN AN ORGANIZED HEALTH CARE EDUCATION/TRAINING PROGRAM
Payer: COMMERCIAL

## 2021-06-22 ENCOUNTER — APPOINTMENT (OUTPATIENT)
Dept: OCCUPATIONAL THERAPY | Facility: CLINIC | Age: 71
DRG: 456 | End: 2021-06-22
Attending: STUDENT IN AN ORGANIZED HEALTH CARE EDUCATION/TRAINING PROGRAM
Payer: COMMERCIAL

## 2021-06-22 LAB
ANGLE RATE OF CLOT STRENGTH: 67 DEGREES (ref 53–72)
ANION GAP SERPL CALCULATED.3IONS-SCNC: 6 MMOL/L (ref 3–14)
BASOPHILS # BLD AUTO: 0 10E9/L (ref 0–0.2)
BASOPHILS NFR BLD AUTO: 0.1 %
BLD PROD TYP BPU: NORMAL
BLD PROD TYP BPU: NORMAL
BLD UNIT ID BPU: 0
BLOOD PRODUCT CODE: NORMAL
BPU ID: NORMAL
BUN SERPL-MCNC: 15 MG/DL (ref 7–30)
CALCIUM SERPL-MCNC: 8.3 MG/DL (ref 8.5–10.1)
CHLORIDE SERPL-SCNC: 108 MMOL/L (ref 94–109)
CI HYPERCOAGULATION INDEX: 1.4 RATIO (ref 0–3)
CO2 SERPL-SCNC: 23 MMOL/L (ref 20–32)
CREAT SERPL-MCNC: 0.7 MG/DL (ref 0.66–1.25)
DIFFERENTIAL METHOD BLD: ABNORMAL
EOSINOPHIL # BLD AUTO: 0 10E9/L (ref 0–0.7)
EOSINOPHIL NFR BLD AUTO: 0 %
ERYTHROCYTE [DISTWIDTH] IN BLOOD BY AUTOMATED COUNT: 14.5 % (ref 10–15)
FIBRINOGEN PPP-MCNC: 306 MG/DL (ref 200–420)
G ACTUAL CLOT STRENGTH: 6.4 KD/SC (ref 4.5–11)
GFR SERPL CREATININE-BSD FRML MDRD: >90 ML/MIN/{1.73_M2}
GLUCOSE BLDC GLUCOMTR-MCNC: 107 MG/DL (ref 70–99)
GLUCOSE BLDC GLUCOMTR-MCNC: 123 MG/DL (ref 70–99)
GLUCOSE BLDC GLUCOMTR-MCNC: 171 MG/DL (ref 70–99)
GLUCOSE BLDC GLUCOMTR-MCNC: 98 MG/DL (ref 70–99)
GLUCOSE SERPL-MCNC: 181 MG/DL (ref 70–99)
HBA1C MFR BLD: 6 % (ref 0–5.6)
HCT VFR BLD AUTO: 32.6 % (ref 40–53)
HGB BLD-MCNC: 11 G/DL (ref 13.3–17.7)
IMM GRANULOCYTES # BLD: 0.1 10E9/L (ref 0–0.4)
IMM GRANULOCYTES NFR BLD: 0.5 %
INR PPP: 1.62 (ref 0.86–1.14)
INTERPRETATION ECG - MUSE: NORMAL
K TIME TO SPEC CLOT STRENGTH: 2.1 MINUTE (ref 1–3)
LY30 LYSIS AT 30 MINUTES: ABNORMAL % (ref 0–8)
LY60 LYSIS AT 60 MINUTES: ABNORMAL % (ref 0–15)
LYMPHOCYTES # BLD AUTO: 0.8 10E9/L (ref 0.8–5.3)
LYMPHOCYTES NFR BLD AUTO: 6 %
MA MAXIMUM CLOT STRENGTH: 56 MM (ref 50–70)
MAGNESIUM SERPL-MCNC: 1.2 MG/DL (ref 1.6–2.3)
MCH RBC QN AUTO: 31.4 PG (ref 26.5–33)
MCHC RBC AUTO-ENTMCNC: 33.7 G/DL (ref 31.5–36.5)
MCV RBC AUTO: 93 FL (ref 78–100)
MDC_IDC_LEAD_IMPLANT_DT: NORMAL
MDC_IDC_LEAD_LOCATION: NORMAL
MDC_IDC_LEAD_LOCATION_DETAIL_1: NORMAL
MDC_IDC_LEAD_MFG: NORMAL
MDC_IDC_LEAD_MODEL: NORMAL
MDC_IDC_LEAD_POLARITY_TYPE: NORMAL
MDC_IDC_LEAD_SERIAL: NORMAL
MDC_IDC_MSMT_BATTERY_DTM: NORMAL
MDC_IDC_MSMT_BATTERY_REMAINING_LONGEVITY: 18 MO
MDC_IDC_MSMT_BATTERY_RRT_TRIGGER: 2.73
MDC_IDC_MSMT_BATTERY_STATUS: NORMAL
MDC_IDC_MSMT_BATTERY_VOLTAGE: 2.85 V
MDC_IDC_MSMT_CAP_CHARGE_DTM: NORMAL
MDC_IDC_MSMT_CAP_CHARGE_ENERGY: 18 J
MDC_IDC_MSMT_CAP_CHARGE_TIME: 4.39
MDC_IDC_MSMT_CAP_CHARGE_TYPE: NORMAL
MDC_IDC_MSMT_LEADCHNL_LV_IMPEDANCE_VALUE: 304 OHM
MDC_IDC_MSMT_LEADCHNL_LV_IMPEDANCE_VALUE: 475 OHM
MDC_IDC_MSMT_LEADCHNL_LV_IMPEDANCE_VALUE: 703 OHM
MDC_IDC_MSMT_LEADCHNL_LV_PACING_THRESHOLD_AMPLITUDE: 1.25 V
MDC_IDC_MSMT_LEADCHNL_LV_PACING_THRESHOLD_PULSEWIDTH: 0.5 MS
MDC_IDC_MSMT_LEADCHNL_RA_IMPEDANCE_VALUE: 342 OHM
MDC_IDC_MSMT_LEADCHNL_RA_SENSING_INTR_AMPL: 1 MV
MDC_IDC_MSMT_LEADCHNL_RV_IMPEDANCE_VALUE: 342 OHM
MDC_IDC_MSMT_LEADCHNL_RV_IMPEDANCE_VALUE: 418 OHM
MDC_IDC_MSMT_LEADCHNL_RV_PACING_THRESHOLD_AMPLITUDE: 0.75 V
MDC_IDC_MSMT_LEADCHNL_RV_PACING_THRESHOLD_PULSEWIDTH: 0.4 MS
MDC_IDC_PG_IMPLANT_DTM: NORMAL
MDC_IDC_PG_MFG: NORMAL
MDC_IDC_PG_MODEL: NORMAL
MDC_IDC_PG_SERIAL: NORMAL
MDC_IDC_PG_TYPE: NORMAL
MDC_IDC_SESS_CLINIC_NAME: NORMAL
MDC_IDC_SESS_DTM: NORMAL
MDC_IDC_SESS_TYPE: NORMAL
MDC_IDC_SET_BRADY_LOWRATE: 80 {BEATS}/MIN
MDC_IDC_SET_BRADY_MAX_SENSOR_RATE: 130 {BEATS}/MIN
MDC_IDC_SET_BRADY_MODE: NORMAL
MDC_IDC_SET_CRT_LVRV_DELAY: 0 MS
MDC_IDC_SET_CRT_PACED_CHAMBERS: NORMAL
MDC_IDC_SET_LEADCHNL_LV_PACING_AMPLITUDE: 1.75 V
MDC_IDC_SET_LEADCHNL_LV_PACING_ANODE_ELECTRODE_1: NORMAL
MDC_IDC_SET_LEADCHNL_LV_PACING_ANODE_LOCATION_1: NORMAL
MDC_IDC_SET_LEADCHNL_LV_PACING_CAPTURE_MODE: NORMAL
MDC_IDC_SET_LEADCHNL_LV_PACING_CATHODE_ELECTRODE_1: NORMAL
MDC_IDC_SET_LEADCHNL_LV_PACING_CATHODE_LOCATION_1: NORMAL
MDC_IDC_SET_LEADCHNL_LV_PACING_POLARITY: NORMAL
MDC_IDC_SET_LEADCHNL_LV_PACING_PULSEWIDTH: 0.5 MS
MDC_IDC_SET_LEADCHNL_RA_PACING_ANODE_ELECTRODE_1: NORMAL
MDC_IDC_SET_LEADCHNL_RA_PACING_ANODE_LOCATION_1: NORMAL
MDC_IDC_SET_LEADCHNL_RA_PACING_CAPTURE_MODE: NORMAL
MDC_IDC_SET_LEADCHNL_RA_PACING_CATHODE_ELECTRODE_1: NORMAL
MDC_IDC_SET_LEADCHNL_RA_PACING_CATHODE_LOCATION_1: NORMAL
MDC_IDC_SET_LEADCHNL_RA_PACING_POLARITY: NORMAL
MDC_IDC_SET_LEADCHNL_RA_SENSING_ANODE_ELECTRODE_1: NORMAL
MDC_IDC_SET_LEADCHNL_RA_SENSING_ANODE_LOCATION_1: NORMAL
MDC_IDC_SET_LEADCHNL_RA_SENSING_CATHODE_ELECTRODE_1: NORMAL
MDC_IDC_SET_LEADCHNL_RA_SENSING_CATHODE_LOCATION_1: NORMAL
MDC_IDC_SET_LEADCHNL_RA_SENSING_POLARITY: NORMAL
MDC_IDC_SET_LEADCHNL_RA_SENSING_SENSITIVITY: 4 MV
MDC_IDC_SET_LEADCHNL_RV_PACING_AMPLITUDE: 2 V
MDC_IDC_SET_LEADCHNL_RV_PACING_ANODE_ELECTRODE_1: NORMAL
MDC_IDC_SET_LEADCHNL_RV_PACING_ANODE_LOCATION_1: NORMAL
MDC_IDC_SET_LEADCHNL_RV_PACING_CAPTURE_MODE: NORMAL
MDC_IDC_SET_LEADCHNL_RV_PACING_CATHODE_ELECTRODE_1: NORMAL
MDC_IDC_SET_LEADCHNL_RV_PACING_CATHODE_LOCATION_1: NORMAL
MDC_IDC_SET_LEADCHNL_RV_PACING_POLARITY: NORMAL
MDC_IDC_SET_LEADCHNL_RV_PACING_PULSEWIDTH: 0.4 MS
MDC_IDC_SET_LEADCHNL_RV_SENSING_ANODE_ELECTRODE_1: NORMAL
MDC_IDC_SET_LEADCHNL_RV_SENSING_ANODE_LOCATION_1: NORMAL
MDC_IDC_SET_LEADCHNL_RV_SENSING_CATHODE_ELECTRODE_1: NORMAL
MDC_IDC_SET_LEADCHNL_RV_SENSING_CATHODE_LOCATION_1: NORMAL
MDC_IDC_SET_LEADCHNL_RV_SENSING_POLARITY: NORMAL
MDC_IDC_SET_LEADCHNL_RV_SENSING_SENSITIVITY: 0.3 MV
MDC_IDC_SET_ZONE_DETECTION_BEATS_DENOMINATOR: 32 {BEATS}
MDC_IDC_SET_ZONE_DETECTION_BEATS_NUMERATOR: 24 {BEATS}
MDC_IDC_SET_ZONE_DETECTION_INTERVAL: 320 MS
MDC_IDC_SET_ZONE_DETECTION_INTERVAL: 350 MS
MDC_IDC_SET_ZONE_DETECTION_INTERVAL: 360 MS
MDC_IDC_SET_ZONE_DETECTION_INTERVAL: 400 MS
MDC_IDC_SET_ZONE_DETECTION_INTERVAL: NORMAL
MDC_IDC_SET_ZONE_TYPE: NORMAL
MDC_IDC_STAT_AT_BURDEN_PERCENT: 0 %
MDC_IDC_STAT_AT_DTM_END: NORMAL
MDC_IDC_STAT_AT_DTM_START: NORMAL
MDC_IDC_STAT_BRADY_AP_VP_PERCENT: 0 %
MDC_IDC_STAT_BRADY_AP_VS_PERCENT: 0 %
MDC_IDC_STAT_BRADY_AS_VP_PERCENT: 100 %
MDC_IDC_STAT_BRADY_AS_VS_PERCENT: 0 %
MDC_IDC_STAT_BRADY_DTM_END: NORMAL
MDC_IDC_STAT_BRADY_DTM_START: NORMAL
MDC_IDC_STAT_BRADY_RA_PERCENT_PACED: 0 %
MDC_IDC_STAT_BRADY_RV_PERCENT_PACED: 99.84 %
MDC_IDC_STAT_CRT_DTM_END: NORMAL
MDC_IDC_STAT_CRT_DTM_START: NORMAL
MDC_IDC_STAT_CRT_LV_PERCENT_PACED: 99.84 %
MDC_IDC_STAT_CRT_PERCENT_PACED: 99.84 %
MDC_IDC_STAT_EPISODE_RECENT_COUNT: 0
MDC_IDC_STAT_EPISODE_RECENT_COUNT_DTM_END: NORMAL
MDC_IDC_STAT_EPISODE_RECENT_COUNT_DTM_START: NORMAL
MDC_IDC_STAT_EPISODE_TOTAL_COUNT: 0
MDC_IDC_STAT_EPISODE_TOTAL_COUNT: 25
MDC_IDC_STAT_EPISODE_TOTAL_COUNT_DTM_END: NORMAL
MDC_IDC_STAT_EPISODE_TOTAL_COUNT_DTM_START: NORMAL
MDC_IDC_STAT_EPISODE_TYPE: NORMAL
MDC_IDC_STAT_TACHYTHERAPY_ATP_DELIVERED_RECENT: 0
MDC_IDC_STAT_TACHYTHERAPY_ATP_DELIVERED_TOTAL: 0
MDC_IDC_STAT_TACHYTHERAPY_RECENT_DTM_END: NORMAL
MDC_IDC_STAT_TACHYTHERAPY_RECENT_DTM_START: NORMAL
MDC_IDC_STAT_TACHYTHERAPY_SHOCKS_ABORTED_RECENT: 0
MDC_IDC_STAT_TACHYTHERAPY_SHOCKS_ABORTED_TOTAL: 0
MDC_IDC_STAT_TACHYTHERAPY_SHOCKS_DELIVERED_RECENT: 0
MDC_IDC_STAT_TACHYTHERAPY_SHOCKS_DELIVERED_TOTAL: 0
MDC_IDC_STAT_TACHYTHERAPY_TOTAL_DTM_END: NORMAL
MDC_IDC_STAT_TACHYTHERAPY_TOTAL_DTM_START: NORMAL
MONOCYTES # BLD AUTO: 1.1 10E9/L (ref 0–1.3)
MONOCYTES NFR BLD AUTO: 7.8 %
MRSA DNA SPEC QL NAA+PROBE: NEGATIVE
NEUTROPHILS # BLD AUTO: 11.8 10E9/L (ref 1.6–8.3)
NEUTROPHILS NFR BLD AUTO: 85.6 %
NRBC # BLD AUTO: 0 10*3/UL
NRBC BLD AUTO-RTO: 0 /100
NUM BPU REQUESTED: 1
PHOSPHATE SERPL-MCNC: 3.6 MG/DL (ref 2.5–4.5)
PHOSPHATE SERPL-MCNC: 3.6 MG/DL (ref 2.5–4.5)
PLATELET # BLD AUTO: 40 10E9/L (ref 150–450)
PLATELET # BLD EST: ABNORMAL 10*3/UL
POTASSIUM SERPL-SCNC: 4 MMOL/L (ref 3.4–5.3)
R TIME UNTIL CLOT FORMS: 3.1 MINUTE (ref 5–10)
RBC # BLD AUTO: 3.5 10E12/L (ref 4.4–5.9)
SODIUM SERPL-SCNC: 137 MMOL/L (ref 133–144)
SPECIMEN SOURCE: NORMAL
TRANSFUSION STATUS PATIENT QL: NORMAL
TRANSFUSION STATUS PATIENT QL: NORMAL
TROPONIN I SERPL-MCNC: 0.06 UG/L (ref 0–0.04)
TROPONIN I SERPL-MCNC: 0.07 UG/L (ref 0–0.04)
TROPONIN I SERPL-MCNC: 0.07 UG/L (ref 0–0.04)
WBC # BLD AUTO: 13.8 10E9/L (ref 4–11)

## 2021-06-22 PROCEDURE — P9037 PLATE PHERES LEUKOREDU IRRAD: HCPCS | Performed by: STUDENT IN AN ORGANIZED HEALTH CARE EDUCATION/TRAINING PROGRAM

## 2021-06-22 PROCEDURE — 97166 OT EVAL MOD COMPLEX 45 MIN: CPT | Mod: GO | Performed by: OCCUPATIONAL THERAPIST

## 2021-06-22 PROCEDURE — 85610 PROTHROMBIN TIME: CPT | Performed by: ORTHOPAEDIC SURGERY

## 2021-06-22 PROCEDURE — 258N000003 HC RX IP 258 OP 636: Performed by: ANESTHESIOLOGY

## 2021-06-22 PROCEDURE — 93284 PRGRMG EVAL IMPLANTABLE DFB: CPT

## 2021-06-22 PROCEDURE — 84100 ASSAY OF PHOSPHORUS: CPT | Performed by: ORTHOPAEDIC SURGERY

## 2021-06-22 PROCEDURE — 85396 CLOTTING ASSAY WHOLE BLOOD: CPT | Performed by: STUDENT IN AN ORGANIZED HEALTH CARE EDUCATION/TRAINING PROGRAM

## 2021-06-22 PROCEDURE — 36415 COLL VENOUS BLD VENIPUNCTURE: CPT | Performed by: ORTHOPAEDIC SURGERY

## 2021-06-22 PROCEDURE — 93321 DOPPLER ECHO F-UP/LMTD STD: CPT | Mod: 26 | Performed by: INTERNAL MEDICINE

## 2021-06-22 PROCEDURE — 250N000013 HC RX MED GY IP 250 OP 250 PS 637: Performed by: STUDENT IN AN ORGANIZED HEALTH CARE EDUCATION/TRAINING PROGRAM

## 2021-06-22 PROCEDURE — 99232 SBSQ HOSP IP/OBS MODERATE 35: CPT | Mod: 24 | Performed by: SURGERY

## 2021-06-22 PROCEDURE — 85384 FIBRINOGEN ACTIVITY: CPT | Performed by: ORTHOPAEDIC SURGERY

## 2021-06-22 PROCEDURE — 93284 PRGRMG EVAL IMPLANTABLE DFB: CPT | Mod: 26 | Performed by: INTERNAL MEDICINE

## 2021-06-22 PROCEDURE — 84484 ASSAY OF TROPONIN QUANT: CPT | Performed by: ORTHOPAEDIC SURGERY

## 2021-06-22 PROCEDURE — 97530 THERAPEUTIC ACTIVITIES: CPT | Mod: GO | Performed by: OCCUPATIONAL THERAPIST

## 2021-06-22 PROCEDURE — 250N000011 HC RX IP 250 OP 636: Performed by: STUDENT IN AN ORGANIZED HEALTH CARE EDUCATION/TRAINING PROGRAM

## 2021-06-22 PROCEDURE — 93321 DOPPLER ECHO F-UP/LMTD STD: CPT

## 2021-06-22 PROCEDURE — 83735 ASSAY OF MAGNESIUM: CPT | Performed by: ORTHOPAEDIC SURGERY

## 2021-06-22 PROCEDURE — 72131 CT LUMBAR SPINE W/O DYE: CPT | Mod: 26 | Performed by: RADIOLOGY

## 2021-06-22 PROCEDURE — 999N000127 HC STATISTIC PERIPHERAL IV START W US GUIDANCE

## 2021-06-22 PROCEDURE — 250N000011 HC RX IP 250 OP 636: Performed by: INTERNAL MEDICINE

## 2021-06-22 PROCEDURE — 85025 COMPLETE CBC W/AUTO DIFF WBC: CPT | Performed by: ORTHOPAEDIC SURGERY

## 2021-06-22 PROCEDURE — 999N000015 HC STATISTIC ARTERIAL MONITORING DAILY

## 2021-06-22 PROCEDURE — 258N000003 HC RX IP 258 OP 636: Performed by: STUDENT IN AN ORGANIZED HEALTH CARE EDUCATION/TRAINING PROGRAM

## 2021-06-22 PROCEDURE — 250N000012 HC RX MED GY IP 250 OP 636 PS 637: Performed by: DIETITIAN, REGISTERED

## 2021-06-22 PROCEDURE — 999N001017 HC STATISTIC GLUCOSE BY METER IP

## 2021-06-22 PROCEDURE — 80048 BASIC METABOLIC PNL TOTAL CA: CPT | Performed by: ORTHOPAEDIC SURGERY

## 2021-06-22 PROCEDURE — 255N000002 HC RX 255 OP 636: Performed by: STUDENT IN AN ORGANIZED HEALTH CARE EDUCATION/TRAINING PROGRAM

## 2021-06-22 PROCEDURE — 72131 CT LUMBAR SPINE W/O DYE: CPT

## 2021-06-22 PROCEDURE — 93325 DOPPLER ECHO COLOR FLOW MAPG: CPT | Mod: 26 | Performed by: INTERNAL MEDICINE

## 2021-06-22 PROCEDURE — 83036 HEMOGLOBIN GLYCOSYLATED A1C: CPT | Performed by: ORTHOPAEDIC SURGERY

## 2021-06-22 PROCEDURE — 200N000002 HC R&B ICU UMMC

## 2021-06-22 PROCEDURE — 250N000009 HC RX 250: Performed by: ANESTHESIOLOGY

## 2021-06-22 PROCEDURE — 93308 TTE F-UP OR LMTD: CPT | Mod: 26 | Performed by: INTERNAL MEDICINE

## 2021-06-22 PROCEDURE — 250N000013 HC RX MED GY IP 250 OP 250 PS 637: Performed by: DIETITIAN, REGISTERED

## 2021-06-22 PROCEDURE — G0463 HOSPITAL OUTPT CLINIC VISIT: HCPCS

## 2021-06-22 RX ORDER — SIMETHICONE 125 MG
125 TABLET,CHEWABLE ORAL 3 TIMES DAILY PRN
Status: DISCONTINUED | OUTPATIENT
Start: 2021-06-22 | End: 2021-07-07 | Stop reason: HOSPADM

## 2021-06-22 RX ORDER — HYDROMORPHONE HYDROCHLORIDE 1 MG/ML
.5-1 INJECTION, SOLUTION INTRAMUSCULAR; INTRAVENOUS; SUBCUTANEOUS
Status: DISCONTINUED | OUTPATIENT
Start: 2021-06-22 | End: 2021-06-22

## 2021-06-22 RX ORDER — SODIUM CHLORIDE, SODIUM LACTATE, POTASSIUM CHLORIDE, CALCIUM CHLORIDE 600; 310; 30; 20 MG/100ML; MG/100ML; MG/100ML; MG/100ML
INJECTION, SOLUTION INTRAVENOUS CONTINUOUS
Status: DISCONTINUED | OUTPATIENT
Start: 2021-06-22 | End: 2021-06-25

## 2021-06-22 RX ORDER — NICOTINE POLACRILEX 4 MG
15-30 LOZENGE BUCCAL
Status: DISCONTINUED | OUTPATIENT
Start: 2021-06-22 | End: 2021-07-07 | Stop reason: HOSPADM

## 2021-06-22 RX ORDER — ACETAMINOPHEN 500 MG
500 TABLET ORAL EVERY 8 HOURS SCHEDULED
Status: DISCONTINUED | OUTPATIENT
Start: 2021-06-22 | End: 2021-06-24

## 2021-06-22 RX ORDER — MAGNESIUM SULFATE HEPTAHYDRATE 40 MG/ML
4 INJECTION, SOLUTION INTRAVENOUS ONCE
Status: COMPLETED | OUTPATIENT
Start: 2021-06-22 | End: 2021-06-22

## 2021-06-22 RX ORDER — AMOXICILLIN 250 MG
2 CAPSULE ORAL 2 TIMES DAILY
Status: DISCONTINUED | OUTPATIENT
Start: 2021-06-22 | End: 2021-07-07 | Stop reason: HOSPADM

## 2021-06-22 RX ORDER — AMOXICILLIN 250 MG
1 CAPSULE ORAL 2 TIMES DAILY
Status: ON HOLD | COMMUNITY
End: 2021-06-30

## 2021-06-22 RX ORDER — DEXTROSE MONOHYDRATE 25 G/50ML
25-50 INJECTION, SOLUTION INTRAVENOUS
Status: DISCONTINUED | OUTPATIENT
Start: 2021-06-22 | End: 2021-07-07 | Stop reason: HOSPADM

## 2021-06-22 RX ORDER — AMOXICILLIN 250 MG
1 CAPSULE ORAL 2 TIMES DAILY
Status: DISCONTINUED | OUTPATIENT
Start: 2021-06-22 | End: 2021-06-22

## 2021-06-22 RX ORDER — ACETAMINOPHEN 500 MG
500-1000 TABLET ORAL EVERY 8 HOURS PRN
Status: DISCONTINUED | OUTPATIENT
Start: 2021-06-22 | End: 2021-06-22

## 2021-06-22 RX ORDER — MORPHINE SULFATE 10 MG/ML
5-10 INJECTION, SOLUTION INTRAMUSCULAR; INTRAVENOUS
Status: DISCONTINUED | OUTPATIENT
Start: 2021-06-22 | End: 2021-06-23

## 2021-06-22 RX ORDER — MORPHINE SULFATE 15 MG/1
15 TABLET ORAL
Status: DISCONTINUED | OUTPATIENT
Start: 2021-06-22 | End: 2021-06-23

## 2021-06-22 RX ORDER — DIAZEPAM 2 MG
2-4 TABLET ORAL
Status: DISCONTINUED | OUTPATIENT
Start: 2021-06-22 | End: 2021-06-28

## 2021-06-22 RX ORDER — ACETAMINOPHEN 500 MG
500 TABLET ORAL EVERY 8 HOURS PRN
Status: DISCONTINUED | OUTPATIENT
Start: 2021-06-22 | End: 2021-07-07 | Stop reason: HOSPADM

## 2021-06-22 RX ORDER — METHOCARBAMOL 500 MG/1
500 TABLET, FILM COATED ORAL 4 TIMES DAILY
Status: DISCONTINUED | OUTPATIENT
Start: 2021-06-22 | End: 2021-07-07 | Stop reason: HOSPADM

## 2021-06-22 RX ADMIN — METHOCARBAMOL 500 MG: 500 TABLET, FILM COATED ORAL at 15:28

## 2021-06-22 RX ADMIN — POLYETHYLENE GLYCOL 3350 17 G: 17 POWDER, FOR SOLUTION ORAL at 19:55

## 2021-06-22 RX ADMIN — INSULIN ASPART 1 UNITS: 100 INJECTION, SOLUTION INTRAVENOUS; SUBCUTANEOUS at 10:34

## 2021-06-22 RX ADMIN — KETAMINE HYDROCHLORIDE 10 MG/HR: 100 INJECTION, SOLUTION, CONCENTRATE INTRAMUSCULAR; INTRAVENOUS at 07:50

## 2021-06-22 RX ADMIN — MORPHINE SULFATE 10 MG: 10 INJECTION INTRAVENOUS at 21:49

## 2021-06-22 RX ADMIN — DOCUSATE SODIUM 100 MG: 100 CAPSULE, LIQUID FILLED ORAL at 09:36

## 2021-06-22 RX ADMIN — ATORVASTATIN CALCIUM 80 MG: 40 TABLET, FILM COATED ORAL at 21:30

## 2021-06-22 RX ADMIN — HYDROMORPHONE HYDROCHLORIDE 1 MG: 1 INJECTION, SOLUTION INTRAMUSCULAR; INTRAVENOUS; SUBCUTANEOUS at 08:07

## 2021-06-22 RX ADMIN — MORPHINE SULFATE 15 MG: 15 TABLET ORAL at 09:48

## 2021-06-22 RX ADMIN — DOCUSATE SODIUM 50 MG AND SENNOSIDES 8.6 MG 2 TABLET: 8.6; 5 TABLET, FILM COATED ORAL at 09:37

## 2021-06-22 RX ADMIN — Medication 6.25 MG: at 10:35

## 2021-06-22 RX ADMIN — HUMAN ALBUMIN MICROSPHERES AND PERFLUTREN 6 ML: 10; .22 INJECTION, SOLUTION INTRAVENOUS at 09:35

## 2021-06-22 RX ADMIN — HYDROMORPHONE HYDROCHLORIDE 0.5 MG: 1 INJECTION, SOLUTION INTRAMUSCULAR; INTRAVENOUS; SUBCUTANEOUS at 00:42

## 2021-06-22 RX ADMIN — MELATONIN TAB 3 MG 6 MG: 3 TAB at 21:30

## 2021-06-22 RX ADMIN — SODIUM CHLORIDE, POTASSIUM CHLORIDE, SODIUM LACTATE AND CALCIUM CHLORIDE: 600; 310; 30; 20 INJECTION, SOLUTION INTRAVENOUS at 15:32

## 2021-06-22 RX ADMIN — Medication 250 MG: at 09:35

## 2021-06-22 RX ADMIN — SODIUM CHLORIDE, POTASSIUM CHLORIDE, SODIUM LACTATE AND CALCIUM CHLORIDE 500 ML: 600; 310; 30; 20 INJECTION, SOLUTION INTRAVENOUS at 09:07

## 2021-06-22 RX ADMIN — MORPHINE SULFATE 15 MG: 15 TABLET ORAL at 19:55

## 2021-06-22 RX ADMIN — POLYETHYLENE GLYCOL 3350 17 G: 17 POWDER, FOR SOLUTION ORAL at 09:33

## 2021-06-22 RX ADMIN — MORPHINE SULFATE 10 MG: 10 INJECTION INTRAVENOUS at 15:33

## 2021-06-22 RX ADMIN — MAGNESIUM SULFATE IN WATER 4 G: 40 INJECTION, SOLUTION INTRAVENOUS at 07:05

## 2021-06-22 RX ADMIN — THERA TABS 1 TABLET: TAB at 09:37

## 2021-06-22 RX ADMIN — MORPHINE SULFATE 10 MG: 10 INJECTION INTRAVENOUS at 11:56

## 2021-06-22 RX ADMIN — SODIUM CHLORIDE, POTASSIUM CHLORIDE, SODIUM LACTATE AND CALCIUM CHLORIDE 500 ML: 600; 310; 30; 20 INJECTION, SOLUTION INTRAVENOUS at 05:01

## 2021-06-22 RX ADMIN — LIDOCAINE HYDROCHLORIDE 1 MG/KG/HR: 8 INJECTION, SOLUTION INTRAVENOUS at 01:24

## 2021-06-22 RX ADMIN — Medication 20000 UNITS: at 09:36

## 2021-06-22 RX ADMIN — MORPHINE SULFATE 10 MG: 10 INJECTION INTRAVENOUS at 13:33

## 2021-06-22 RX ADMIN — SODIUM CHLORIDE, POTASSIUM CHLORIDE, SODIUM LACTATE AND CALCIUM CHLORIDE 500 ML: 600; 310; 30; 20 INJECTION, SOLUTION INTRAVENOUS at 02:32

## 2021-06-22 RX ADMIN — Medication 500 MG: at 21:32

## 2021-06-22 RX ADMIN — MORPHINE SULFATE 10 MG: 10 INJECTION INTRAVENOUS at 10:10

## 2021-06-22 RX ADMIN — LIDOCAINE HYDROCHLORIDE 1 MG/KG/HR: 8 INJECTION, SOLUTION INTRAVENOUS at 10:52

## 2021-06-22 RX ADMIN — Medication 2 TABLET: at 21:30

## 2021-06-22 RX ADMIN — HYDROMORPHONE HYDROCHLORIDE 1 MG: 1 INJECTION, SOLUTION INTRAMUSCULAR; INTRAVENOUS; SUBCUTANEOUS at 05:22

## 2021-06-22 RX ADMIN — LIDOCAINE HYDROCHLORIDE 1 MG/KG/HR: 8 INJECTION, SOLUTION INTRAVENOUS at 15:07

## 2021-06-22 RX ADMIN — METHOCARBAMOL 500 MG: 500 TABLET, FILM COATED ORAL at 19:55

## 2021-06-22 RX ADMIN — DIAZEPAM 2 MG: 5 INJECTION, SOLUTION INTRAMUSCULAR; INTRAVENOUS at 00:16

## 2021-06-22 RX ADMIN — LIDOCAINE HYDROCHLORIDE 1 MG/KG/HR: 8 INJECTION, SOLUTION INTRAVENOUS at 23:17

## 2021-06-22 RX ADMIN — MORPHINE SULFATE 15 MG: 15 TABLET ORAL at 15:28

## 2021-06-22 RX ADMIN — HYDROMORPHONE HYDROCHLORIDE 0.5 MG: 1 INJECTION, SOLUTION INTRAMUSCULAR; INTRAVENOUS; SUBCUTANEOUS at 03:08

## 2021-06-22 RX ADMIN — Medication 2 G: at 09:38

## 2021-06-22 RX ADMIN — ZINC SULFATE 220 MG (50 MG) CAPSULE 220 MG: CAPSULE at 09:36

## 2021-06-22 RX ADMIN — Medication 500 MG: at 15:27

## 2021-06-22 RX ADMIN — Medication 6.25 MG: at 19:57

## 2021-06-22 RX ADMIN — MORPHINE SULFATE 10 MG: 10 INJECTION INTRAVENOUS at 19:56

## 2021-06-22 RX ADMIN — Medication 2 G: at 00:46

## 2021-06-22 RX ADMIN — LIDOCAINE HYDROCHLORIDE 1 MG/KG/HR: 8 INJECTION, SOLUTION INTRAVENOUS at 06:35

## 2021-06-22 RX ADMIN — Medication 200 UNITS: at 09:36

## 2021-06-22 RX ADMIN — LIDOCAINE HYDROCHLORIDE 1 MG/KG/HR: 8 INJECTION, SOLUTION INTRAVENOUS at 19:23

## 2021-06-22 ASSESSMENT — ACTIVITIES OF DAILY LIVING (ADL)
WERE_AUXILIARY_AIDS_OFFERED?: NO
DOING_ERRANDS_INDEPENDENTLY_DIFFICULTY: YES
DIFFICULTY_EATING/SWALLOWING: NO
CONCENTRATING,_REMEMBERING_OR_MAKING_DECISIONS_DIFFICULTY: NO
TOILETING_ISSUES: NO
ADLS_ACUITY_SCORE: 25
WHICH_OF_THE_ABOVE_FUNCTIONAL_RISKS_HAD_A_RECENT_ONSET_OR_CHANGE?: AMBULATION;TRANSFERRING
HEARING_DIFFICULTY_OR_DEAF: YES
DRESSING/BATHING_DIFFICULTY: YES
DRESSING/BATHING: BATHING DIFFICULTY, ASSISTANCE 1 PERSON
WALKING_OR_CLIMBING_STAIRS_DIFFICULTY: NO
PATIENT'S_PREFERRED_MEANS_OF_COMMUNICATION: VERBAL
DESCRIBE_HEARING_LOSS: BILATERAL HEARING LOSS
FALL_HISTORY_WITHIN_LAST_SIX_MONTHS: NO
PATIENT_/_FAMILY_COMMUNICATION_STYLE: SPOKEN LANGUAGE (ENGLISH OR BILINGUAL)
ADLS_ACUITY_SCORE: 25
USE_OF_HEARING_ASSISTIVE_DEVICES: BILATERAL HEARING AIDS
ADLS_ACUITY_SCORE: 23
EQUIPMENT_CURRENTLY_USED_AT_HOME: WALKER, STANDARD;WHEELCHAIR, MANUAL
WEAR_GLASSES_OR_BLIND: YES
WALKING_OR_CLIMBING_STAIRS: TRANSFERRING DIFFICULTY, REQUIRES EQUIPMENT;AMBULATION DIFFICULTY, REQUIRES EQUIPMENT
ADLS_ACUITY_SCORE: 23
DIFFICULTY_COMMUNICATING: NO

## 2021-06-22 ASSESSMENT — MIFFLIN-ST. JEOR: SCORE: 1878.45

## 2021-06-22 NOTE — PROGRESS NOTES
SURGICAL ICU PROGRESS NOTE  06/22/2021      ASSESSMENT: Jorje Simmons is a 70 year old male with history of hyperlipidemia, hypertension, CAD with MI s/p CABG x4 (1986), ischemic cardiomyopathy s/p ICD placement, PAD s/p left SFA balloon angioplasty to assist a latissimus dorsi flap to the left calf, atrial fibrillation/flutter (no longer on anticoagulation). He underwent T7-S1 posterior spinal fusion with pelvic fixation and osteotomies of T12-L1 and L5 on 6/21/21 with Dr. Akins. Intraoperatively significant blood loss (4.3 L). Patient admitted to the SICU hypotensive, requiring NE. Bedside echo obtained with Dr. Griffin. Showed likely decreased LV function but limited due to poor views. He's off of pressors, extubated, and alert.    PROGRESS / CHANGES FOR TODAY  - Discontinued PCA dilaudid  - Started PTA morphine  - Added PRN IV Morphine (5-10mg q hr)  - Added Robaxin 500mg TID  - PTA Tylenol (Gell capsule)  - Echocardiogram  - Trending troponin  - PTA Metoprolol 6.25mg BID  - Passed Bedwisde swallow  - Okay for Regular Diet  - Sliding scale insulin  - Remove R IJ MAC    PLAN:   Neuro/ pain/ sedation:  # Acute Pain  # Chronic morphine use for back pain  - Noted small dural rent intraop, no CSF leak, so no HOB precautions. Following commands  - Ketamine infusion  - Oral valium PRN HS  - Lidocaine infusion until 6/23  - PTA morphine 15 - 30 Q3H  - Added PRN IV Morphine (5-10mg q hr)  - Added Robaxin 500mg TID  - PTA Tylenol (gel foam version) scheduled     Pulmonary care:   # Former smoker, no history of pulmonary disease  - Extubated postop  - RR: 8 - 13  - SpO2 > 93%  - Supplemental oxygen to keep saturation above 92 %.  - Incentive spirometer every 15- 30 minutes when awake.     Cardiovascular:    # Hypertension  # Hyperlipidemia  # Coronary Artery Disease c/b MI s/p CABG x4 in 1986  # Ischemic cardiomyopathy s/p ICD placement   # PAD s/p left SFA balloon angioplasty for a latissimus dorsi flap to the left  calf  # Atrial Fibrillation/Flutter  ECHO 5/4/21: mildly reduced (45-50%) w/o significant valvular abnormalities  Nuclear stress test 5/5/21 demonstrates nontransmural infarction in the entire apical segment of the left ventricle.   ICD reprogrammed pre-op to VOO, Cardiology to reprogram back to VVIR 6/22  Requiring NE. Bedside cardiac echo post-op with likely decreased LV function (though limited due to poor views). Will hold off on further volume resuscitation at this time, though will reassess later as needed. Post-op Troponin 0.033- will trend, .  ECHO 6/22/2021: EF of 35-40%, Ventricular function mildly reduced, pulmonary HTN present.   - Continue trending troponin 0.033 // 0.07  - Metoprolol 6.25 BID (modified from PTA 12.5 daily)  - PTA Atorvastatin   - Follow up on formal echo     GI/Nutrition:   - Regular diet  - Bowel regimen in place  - LFTs given pressor requirements  - Passed Bedwisde swallow  - PTA simethicone    Renal/Fluids/Electrolytes:  - Receieved 1 L LR and 500 ml AM LR for low UOP  - Na/K/Cl/HCO3 wnl  - Mg 1.2 --> Replacement protocol  - BMP / CBC AM  - LR @ 100 ml/hr  - Baseline Cr 0.61 (0.7 this morning)  - Held PTA urinary herbal supplements     Endocrine:   #Prediabetic A1C   8 mg dexamethasone given intraop  - Glucose 166 // 181  - Sliding scale insulin  - A1C 6 on 6/22  - No history of prior diabetes, thyroid disease, HPA dysregulation.     ID/ Antibiotics:  - Afebrile  - WBC 19.4 (post op) // 13.8  - Periop Ancef. Patient with prior MRSA infection, most recent swab in 2018 negative. Will repeat here, though continue ancef at this time.     Heme:    # Acute blood loss anemia - EBL 4.3 L    1.6 L returned via Cell Saver, additional 3 units pRBCs, TXA given  - Hemoglobin 12.2 // 11  - Platelets 65 // 40 -> received 1 unit of platelets  - Stopped anticoagulation for Afib/flutter in May on his own     Prophylaxis:    - Anticoagulation per primary team, none tonight     MSK:    # History  of thoracolumbar injury, remote  # History of Quadriplegia s/p extensive spinal surgery  # Flatback syndrome s/p posterior T12-S1 spinal fusion  -     Activity:  - No activity precautions including spinal  - No lifting > 10 lbs      Lines/ tubes/ drains:   - L radial arterial line  - PIV x2  - Casiano  - Hemovac drain  - Remove MAC/RIJ     Disposition:  - Surgical ICU    Patient seen, findings and plan discussed with surgical ICU staff, Dr. Donato.    Sharad Zacarias, Ph.D., MS4  [employer] [facility] [employer]    - - - - - - - - - - - - - - - - - - - - - - - - - - - - - - - - - - - - - - - - - - - - - - - - - - - - - - - - - - - - - - - - - - - - - - - -     INTERVAL HISTORY:  - Sleeping comfortably this morning  - Later in the day awake and able to interact. Stated he is in a lot of pain still and muscles are spasaming. Stated he was taking 45 mg of morphine at a time.      REVIEW OF SYSTEMS: 10 point ROS neg other than the symptoms noted above in the HPI.    PAST MEDICAL HISTORY:     Past Medical History:   Diagnosis Date     Acute osteomyelitis of left lower leg (H) 2017     Acute superficial venous thrombosis of lower extremity, right 2018     Atrial flutter (H)      Automatic implantable cardioverter-defibrillator in situ      CAD (coronary artery disease)      Depression      Flatback syndrome      History of acute inferior wall MI      HTN (hypertension)      Hyperlipidemia LDL goal <100      Ischemic cardiomyopathy      Kyphosis (acquired) (postural)      JOANNA (obstructive sleep apnea)      PAD (peripheral artery disease) (H)      Paroxysmal atrial fibrillation (H)      PVD (peripheral vascular disease) (H)        SURGICAL HISTORY:   Past Surgical History:   Procedure Laterality Date     BYPASS GRAFT ARTERY CORONARY  1986     Decompression posterior lumbar and instrumented fusion  2018     Endovascular femoral and/or popliteal and/or tibial artery angioplasty/stent       Hardware removal tib/fib       I  "and D left extremity wound       Knee hardware removal       KNEE SURGERY       STSG left extremity wound       TONSILLECTOMY         ALLERGIES:      Allergies   Allergen Reactions     Wasp Venom Protein Shortness Of Breath and Swelling     sob  swelling  Other reaction(s): Swelling  sob  \"black wasps\"     Adhesive Tape      Paper tape - rash     Gelfoam [Gelatin]      rash     Hydroxyzine Other (See Comments)     Nightmares     Influenza Vaccines Other (See Comments)     Avoid influenza vaccine, history of Guillain [place]      Lorazepam Anxiety     Other reaction(s): Irritability, Mental Status Change, Other (see comments)  Pt states he becomes very angry and mean after taking at [facility].    Tolerates diazepam       MEDICATIONS:  No current facility-administered medications on file prior to encounter.   acetaminophen (TYLENOL) 500 MG tablet, Take 500-1,000 mg by mouth every 8 hours as needed for mild pain  aspirin 81 MG EC tablet, Take 81 mg by mouth daily  atorvastatin (LIPITOR) 80 MG tablet, Take 80 mg by mouth At Bedtime   D3-50 1.25 MG (89249 UT) capsule, Take 1,250 mcg by mouth every 7 days Takes on Saturdays.  diazepam (VALIUM) 2 MG tablet, Take 2-4 mg by mouth nightly as needed for muscle spasms   diclofenac (VOLTAREN) 1 % topical gel, Apply topically as needed (shoulder pain)   docusate sodium (DSS) 100 MG capsule, Take 100 mg by mouth 2 times daily  fluticasone (FLONASE) 50 MCG/ACT nasal spray, Spray 2 sprays into both nostrils daily as needed   hydrocortisone (CORTAID) 1 % external cream, Apply topically 2 times daily as needed  metoprolol succinate ER (TOPROL-XL) 25 MG 24 hr tablet, Take 12.5 mg by mouth At Bedtime   morphine (MSIR) 15 MG IR tablet, Take 15-30 mg by mouth every 3 hours as needed Every 3 hrs as needed  multivitamin, therapeutic with minerals (THERA-VIT-M) TABS tablet, Take 1 tablet by mouth daily  polyethylene glycol (MIRALAX) 17 GM/Dose powder, Take 17 g by mouth daily as needed "   simethicone (MYLICON) 125 MG chewable tablet, Take 375 mg by mouth as needed for intestinal gas  naloxone (NARCAN) 4 MG/0.1ML nasal spray, Spray 4 mg into one nostril alternating nostrils once as needed for opioid reversal every 2-3 minutes until assistance arrives  nitroGLYcerin (NITROSTAT) 0.4 MG sublingual tablet, Place 0.4 mg under the tongue as needed        PHYSICAL EXAMINATION:  Temp:  [96.3  F (35.7  C)-99.4  F (37.4  C)] 99.4  F (37.4  C)  Pulse:  [76-91] 82  Resp:  [7-22] 10  BP: ()/(69-99) 116/99  MAP:  [55 mmHg-163 mmHg] 104 mmHg  Arterial Line BP: ()/() 108/101  SpO2:  [93 %-100 %] 93 %    GEN: alternating between sleeping and moaning in pain. But follows commands and somewhat awake  HEENT: Hard of hearing with hearing aids in place  CV: paced rhythm, no ST changed on EKG, LE capillary refill delayed (>2s)  RESP: Non-labored breathing on 5L NC, CTAB  ABD: Soft, non-distended  EXT: well healed prior graft LLE  NEURO: bilateral  strength intact and symmetric. Moves BLE- wiggles bilateral toes  SKIN: Normal  PSYCH: Cooperative but agitated due to pain control issues      LABS: Reviewed.   Arterial Blood Gases   Recent Labs   Lab 06/21/21  1827 06/21/21  1718 06/21/21  1555 06/21/21  1400   PH 7.34* 7.35 7.37 7.33*   PCO2 43 31* 41 46*   PO2 423* 189* 205* 190*   HCO3 23 17* 23 24     Complete Blood Count   Recent Labs   Lab 06/22/21  0450 06/21/21  2225 06/21/21  1827 06/21/21  1826 06/21/21  1555 06/21/21  1555 06/21/21  0738 06/21/21  0738   WBC 13.8* 19.4*  --  19.7*  --   --   --  8.0   HGB 11.0* 12.2* 12.7* 12.0*   < > 11.8*  11.3*   < > 14.1   PLT 40* 65*  --  67*  --  129*  --  124*    < > = values in this interval not displayed.     Basic Metabolic Panel  Recent Labs   Lab 06/22/21  0450 06/21/21  2150 06/21/21  1827 06/21/21  1718 06/21/21  0738 06/21/21  0738    140 137 141   < > 141   POTASSIUM 4.0 4.0 4.3 3.1*   < > 3.9   CHLORIDE 108 114*  --   --   --  107   CO2  23 20  --   --   --  29   BUN 15 13  --   --   --  13   CR 0.70 0.68  --   --   --  0.61*   * 166* 186* 165*   < > 90    < > = values in this interval not displayed.     Liver Function Tests  Recent Labs   Lab 06/22/21  0450 06/21/21  2150 06/21/21  1826 06/21/21  1555 06/21/21  1400   AST  --  58*  --   --   --    ALT  --  22  --   --   --    ALKPHOS  --  30*  --   --   --    BILITOTAL  --  1.8*  --   --   --    ALBUMIN  --  2.6*  --   --   --    INR 1.62*  --  1.99* 1.68* 1.66*     Pancreatic Enzymes  No lab results found in last 7 days.  Coagulation Profile  Recent Labs   Lab 06/22/21  0450 06/21/21  1826 06/21/21  1555 06/21/21  1400   INR 1.62* 1.99* 1.68* 1.66*   PTT  --  39* 35 42*     IMAGING:  CXR w/ R IJ MAC in [facility] [place]      Resident/Fellow Attestation   I, Brenton Hamm, was present with the medical student who participated in the service and in the documentation of the note.  I have verified the history and personally performed the physical exam and medical decision making.  I agree with the assessment and plan of care as documented in the note and have made necssary edits where appropriate.      Brenton Hamm MD  PGY1  Date of Service (when I saw the patient): 06/22/21

## 2021-06-22 NOTE — PLAN OF CARE
Major Shift Events:  Pt follows commands. 100% paced. Removed ART line and removed R neck mac introducer. On 2L O2 oxi mask. Casiano removed at 1200, no void, bladder scan for 80mL at 1700. Pt swallows meds whole. Blisters on chest and knees, thigh abrasions from PACU. Ketamine d/abdulaziz, morphine for pain control.  Plan: Continue w/ pain management. Orders to transfer to [facility] [place], 8th floor.  For vital signs and complete assessments, please see documentation flowsheets.    Problem: Adult Inpatient Plan of Care  Goal: Optimal Comfort and Wellbeing  Outcome: Improving  Intervention: Provide Person-Centered Care  Recent Flowsheet Documentation  Taken 6/22/2021 1600 by Alejandrina Cheung RN  Trust Relationship/Rapport:   care explained   choices provided  Taken 6/22/2021 1200 by Alejandrina Cheung RN  Trust Relationship/Rapport:   care explained   choices provided  Taken 6/22/2021 0800 by Alejandrina Cheung RN  Trust Relationship/Rapport:   care explained   choices provided     Problem: Pain Acute  Goal: Acceptable Pain Control and Functional Ability  Outcome: Improving  Intervention: Develop Pain Management Plan  Recent Flowsheet Documentation  Taken 6/22/2021 1600 by Alejandrina Cheung RN  Pain Management Interventions: medication (see MAR)     Problem: Functional Ability Impaired (Spinal Surgery)  Goal: Optimal Functional Ability  Outcome: Improving  Intervention: Optimize Functional Status  Recent Flowsheet Documentation  Taken 6/22/2021 1600 by Alejandrina Cheung RN  Activity Management: activity adjusted per tolerance  Positioning/Transfer Devices:   pillows   in use  Taken 6/22/2021 1200 by Alejandrina Cheung RN  Activity Management: activity adjusted per tolerance  Positioning/Transfer Devices:   lift device utilized   in use   pillows  Taken 6/22/2021 0800 by Alejandrina Cheung RN  Activity Management: activity adjusted per tolerance     Problem: Pain Acute  Goal: Acceptable Pain Control and Functional Ability  Outcome:  Improving  Intervention: Develop Pain Management Plan  Recent Flowsheet Documentation  Taken 6/22/2021 1600 by Alejandrina Cheung RN  Pain Management Interventions: medication (see MAR)     Problem: Functional Ability Impaired (Spinal Surgery)  Goal: Optimal Functional Ability  Outcome: Improving  Intervention: Optimize Functional Status  Recent Flowsheet Documentation  Taken 6/22/2021 1600 by Alejandrina Cheung RN  Activity Management: activity adjusted per tolerance  Positioning/Transfer Devices:   pillows   in use  Taken 6/22/2021 1200 by Alejandrina Cheung RN  Activity Management: activity adjusted per tolerance  Positioning/Transfer Devices:   lift device utilized   in use   pillows  Taken 6/22/2021 0800 by Alejandrina Cheung RN  Activity Management: activity adjusted per tolerance

## 2021-06-22 NOTE — H&P
STAFF NOTE:  70M HLD, HTN, CAD s/p MI/ remote CABG, ischemic cardiomyopathy s/p ICD, afib/flutter (not anticoagulated).  Remote thoracolumbar injury (former paratrooper); s/p prior fusion with transient quadriplegia; recently ambulatory (walks on treadmill for 30-45min daily).  Purportedly some baseline R foot weakness with dorsiflexion.  Purportedly hearing difficulties at baseline.  Pre-op cardiac Lexiscan showed multuiple areas of nontransmural infarction in all 3 vessel territories, but no ischemia   Computer says that he has hx MRSA    Today s/p T7-S1 PSF with instrumentation / fusion by Lay Akins (ortho) & Herb (neurosurgery).  Intra-op dural tear repaired primarily  Intubated with flexible bronchoscope  Estimated blood loss:  4320cc + 1630cc returned via Cell[employer]    Hx paradoxical reaction to lorazepam, but tolerates diazepam.  A few other minor allergies, including nightmares with hydroxyzine.    Exam either zonked out sleeping or writhing and yelling  Does briskly follow commands by squeezing my hand  Wiggles toes briskly on left; on right he doesn't follow commands.  His right knee is flexed; I did not see him spontaneously move the right leg  Has R internal jugular introducer + arterial line  Some edema to eyes having been prone for so long  ICD is palpable  Telemetry looks like paced rhytthm at 80 with occasional PVCs  Slightly low respirations    POCUS very poor views, although appeared to have slightly decreased LV function (EPSS technically normal at ~1.5) based on a parasternal short and apical views, witih areas of probable hypokinesis.  No overt signs of volume depletion (PPV wnl at 10% - not intubated; IVC non-collapsible and >2cm; non-hyperdynamic LV as above).  Unable to measure TAPSE, but RV function qualitatively normal.    Labs notable for lactate 2.5  ABG unremarkable  Hgb 12.7; platelet count down to 67 post-op; INR 1.99  BG within goal range  Baseline Cr 0.6    Troponin wnl at  0.033    EKG vent paced with PVCs    No post-op imaging yet (CXR for line pending)      This is a 70M HLD, HTN, CAD s/p MI/ remote CABG, ischemic cardiomyopathy s/p ICD, afib/flutter (not anticoagulated) now s/p T7-S1 redo PSF with significant instrumentation / fusion.     He needs to have his ICD reset overnight; likely needs additional volume resuscitation, and will need continued neuro checks as he wakes up more post-operatively.  Will also rule out concurrent cardiac ischemia with his hypotension, although initial troponin was reassuring, and POCUS was not too concerning - just some probably decreased LV function (baseline EF% 54%; I estimate him only slightly less than that currently based on poor views).    METABOLIC ENCEPHALOPATHY / DELIRIUM:  -due to prolonged surgery, difficulties hearing, pain /analgesics; monitoring with clinical exam  -melatonin for sleep; nothing for sedation but does have analgesic ketamine as below  -using non-pharmacologic methods as much as possilble    FLAT BACK SYNDROME:  -s/p T7-S1 PSF with instrumentation / fusion.  Ppx ancef to continue for 2 doses post-op (computer lists MRSA contact precautions, but I am presuming that he had pre-op testing given that just on ancef ppx - will follow up on this)  -has a history of chronic narcotic use (morphine IR 15-30mg q3h); acute post-op analgesia with scheduled tylenol + dilaudid PCA.  Ketamine infusion can be 7.5mg/h; also has lidocaine infusion at 1mg/kg/h (although consider holding this with confusion / delirium given inability to assess for toxicity symptoms).  Valium available for muscle spasm + breakthrough fentanyl / dilaudid  -although mental status precluded full participation in a LE neuro exam, it is encouraging that he at least briskly wiggles toes on the left to command.  Will continue to monitor proximal muscles and right side as he wakes up more    HLD:  -home lipitor 80    CAD:  -home aspirin is being held by surgical  service; will likely start tomorrow    HYPOTENSION:  -presumed slightly hypovolemic at this point, but given history of CAD, will screen for concurrent cardiac ischemia with troponin & bedside echo.  -norepinephrine ok while resuscitating    Hx AFIB/A FLUTTER:  -not currently anticoagulated  -home metoprolol dose is 12.5mg at bedtime; will start this tomorrow as pressor requirements decline    Hx ICD:  -[employer] CRT-D; baseline is VVI at 80bpm; underlying rhythm is  at 30bpm with occasional PVCs.  Reprogrammed to VOO intraop with tachytherapies disabled.  Apparently still needs to be programmed back to baseline settings to avoid theoretical risk of R-on-T phenomenon.    ACUTE BLOOD LOSS ANEMIA:  -admit Hgb 14, now 12.7 after transfusions.  monitoring with serial Hgb.  No evidence of ongoing active hemorrhage, so will use a transfusion trigger of Hgb <7 going forward.  I suspect that he is probably still volume deplete at this time.    THROMBOCYTOPENIA:  -risk of major bleeding may be as high as 20% as per PROTECT trial (Ying, CHEST 2011), although at this point nothing specific to do.  No plans to transfuse unless platelet count <30k  -presumed etiology is consumptive / dilutional coagulopathy, although noted to be thrombocytopenic on admission.  Decreased production unlikely (no recent cytotoxic chemo or prexisting marrow disease).  Splenic sequestration in the context of liver disease with portal hypertension also unlikely in this patient.  -by 4T score, patient does not meet criteria to evaluate for heparin-Pf4 antibodies (HIT).  beta-lactam-induced drug-immune thrombocytopenia possible, I suppose.  -will monitor for thrombin-mediated platelet activation / DIC with serial INR & fibrinogen levels.    MISC:  -Code status is full code  -family updated by primary team  -ok to continue home Vit A, Vit E, zinc, Vit D3 & Vit C; multivitamin   -SQH to be held while thrombocytopenic; PPI not necessary  -lines: R  internal jugular introducer + arterial line; can probably remove CVC in the morning if comes off pressors  -peng overnight for close I/Os, can probably be removed in the morning  -anticipate discharge to home in ~1 week after transitioned fully to oral analgesics and meeting PT goals    Billing statement: 61min of critical care time; spent in an initial review of imaging, labs, physical exam, and discussion of the patient with my own team and the extended care team including the primary service; and including time that was spent on active bedside management for such things as ultrasound exam as described above.   Based on this patient's presentation / recent intervention and my bedside assessment, I felt there was or is a reasonably high probability of imminent or life-threatening deterioration today or tonight for hemodynamic reasons.   My overall critical care time, as described in detail above, includes such things as coordination of care, arrhythmia and hemodynamics management with infusions of medicines, respiratory management, fluid therapy including fluid boluses, and pain and sedation therapy. This time excludes time I spent personally performing or supervising procedures for this patient.    DYLAN Griffin MD  Clinical   Anesthesia / Critical Care  *[phone]

## 2021-06-22 NOTE — PROGRESS NOTES
"   06/22/21 1307   Quick Adds   Type of Visit Initial Occupational Therapy Evaluation   Living Environment   People in home spouse   Current Living Arrangements house   Transportation Anticipated car, drives self   Living Environment Comments Lives in split level entry, bedroom and tub shower are downstairs   Self-Care   Usual Activity Tolerance moderate   Current Activity Tolerance poor   Regular Exercise Yes   Equipment Currently Used at Home grab bar, toilet;grab bar, tub/shower;shower chair;walker, rolling  (reacher, stair lift)   Activity/Exercise/Self-Care Comment Pt uses walker for mobility, completes bathing, toileting, and dressing with I or mod I. Has stair lift which he uses occasionally. Has assist for cleaning and cooking. He drives and attends PT 3x/week where he can walk 30 min in a zero gravity treadmill. Also has a home gym. Has been walking in some capacity since his surgery in 2018.    Disability/Function   Fall history within last six months no   Change in Functional Status Since Onset of Current Illness/Injury yes   General Information   Referring Physician Joseph Maravilla MD   Patient/Family Therapy Goal Statement (OT) \"Walking\"   Additional Occupational Profile Info/Pertinent History of Current Problem 70 year old male with history of hyperlipidemia, hypertension, CAD with MI s/p CABG x4 (1986), ischemic cardiomyopathy s/p ICD placement, PAD s/p left SFA balloon angioplasty to assist a latissimus dorsi flap to the left calf, atrial fibrillation/flutter (no longer on anticoagulation). He underwent T7-S1 posterior spinal fusion with pelvic fixation and osteotomies of T12-L1 and L5 on 6/21/21    Existing Precautions/Restrictions fall;spinal  (complex spine)   Cognitive Status Examination   Cognitive Status Comments Reports no issues at baseline, groggy and tangential on eval   Visual Perception   Visual Acuity Wears reading glasses   Range of Motion Comprehensive   Comment, General Range of " Motion WFL B UEs   Strength Comprehensive (MMT)   Comment, General Manual Muscle Testing (MMT) Assessment WFL B hands   Clinical Impression   Criteria for Skilled Therapeutic Interventions Met (OT) yes   OT Diagnosis Decreased I in ADLs due to deconditioning   Assessment of Occupational Performance 3-5 Performance Deficits   Identified Performance Deficits dressing, bathing, toileting, functional endurance, cognition   Clinical Decision Making Complexity (OT) moderate complexity   Therapy Frequency (OT) 5x/week   Predicted Duration of Therapy 2 weeks   Risk & Benefits of therapy have been explained patient;spouse/significant other   Total Evaluation Time (Minutes)   Total Evaluation Time (Minutes) 10

## 2021-06-22 NOTE — PROGRESS NOTES
"Orthopaedic Surgery Progress Note:  06/22/2021     Subjective:   Surgery yesterday. Significant EBL. Required pressors overnight until 2AM today for BP support. Low UOP, multiple fluid bolus given. Slow to wake up, intermittently following commands. Has either had significant amounts of pain or been sleeping. Describes a radiating pain from hips to anterior thigh bilaterally with associated decreased sensation. Pain is primarily in lower back.    Objective:   /83   Pulse 79   Temp 99.4  F (37.4  C) (Axillary)   Resp 11   Ht 1.854 m (6' 1\")   Wt 107.5 kg (236 lb 14.4 oz)   SpO2 97%   BMI 31.26 kg/m    I/O this shift:  In: 1788.66 [I.V.:788.66; IV Piggyback:1000]  Out: 225 [Urine:225]  Gen: NAD. Uncomfortable appearing.  Resp: Breathing comfortably on NC.  Drain:   Superficial drain output of 0/0.  Musculoskeletal: dressing c/d/i.  Sensation from C4-L1 is preserved.    Lower extremities:  Motor Strength Right Left   Hip flexion: L1, L2, L3 5/5 5/5   Knee flexion: S1 4/5 5/5   Knee extension: L3, L4 4/5 5/5   Ankle dosiflexion: L4, L5 * 5/5   EHL: L5 4/5 5/5   Ankle plantarflexion: S1 * 5/5   * Patient is not moving at right ankle. Has baseline reduced ROM at the right ankle.    Endorses decreased sensation along anterior thigh to knee bilaterally.    Labs:  Lab Results   Component Value Date    WBC 13.8 (H) 06/22/2021    HGB 11.0 (L) 06/22/2021    PLT 40 (LL) 06/22/2021    INR 1.62 (H) 06/22/2021        Assessment & Plan:   Darleen Simmons is a 70 year old male with PMH including chronic opioid use, former smoker, HTN, HLP, CAD c/b MI, ischemic cardiomyopathy s/p ICD placement, PAD s/p left SFA POBA for latissimus dorsi flap to left calf, atrial fibrillation/flutter, acute blood loss anemia, flat back syndrome now s/p T7-pelvis revision PSIF with T12-L1 3CO and L5 PSO on 6/21/21 with Jazmyne Akins/Herb. Significant pain out of proportion could represent nerve root retraction and normal postop pain in the " setting of a chronic opioid user. Given the symptoms in his legs are at the PSO level, this could also represent neural foraminal stenosis.    Goals for today:  - Lumbar CT  - Medical stability; recommend platelets >50k given exposed neural elements in the subfascial space  - Ongoing pain management; can consider precedex gtt    Orthopedics Primary  Activity: Up with assist until independent. No excessive bending or twisting. No lifting >10 lbs x 6 weeks. Renny lift approved for transfers. Keep back straight if using lift.  Weight bearing status: WBAT.  Pain management: Transition from IV to PO as tolerated. No NSAIDs.   Antibiotics: Ancef x24 hrs postoperatively.  Diet: Per SICU.   DVT prophylaxis: SCDs only. No chemical DVT ppx needed.  Imaging: XR Upright PTDC - ordered. Obtain after HV removed.  Labs: labs PRN; ongoing close monitoring of hematologic status  Bracing/Splinting: None.  Dressings: Keep dressing c/d/i x 7 days.  Drains: Document output per shift, will be discontinued at Orthopedic Surgery discretion.  Casiano catheter: Per SICU.   Physical Therapy/Occupational Therapy: Eval and treat.  Consults: SICU.  Follow-up: Clinic with Dr. Akins in 6 weeks with repeat x-rays.   Disposition: Pending progress with therapies, pain control on orals, post-op imaging, and drain removal.    Orthopaedics surgery staff for this patient is Dr. Akins.    ------------------------------------------------------------------------------------------    [   ] Drains removed.  [   ] Post xrays done.  [   ] Discharge orders done.  [   ] Discharge summary started.    Jospeh Maravilla MD  [id number]  Pager: [phone]     Please page me directly with any questions/concerns during regular weekday hours before 5pm.     If there is no response, if it is a weekend, or if it is during evening hours then please page the orthopaedic surgery resident on call as listed on [facility] call schedule under the orthopaedics tab.        FOLLOWUP:    Future  Appointments   Date Time Provider Department Center   8/5/2021 12:30 PM Akhil Akins MD Count includes the [facility]   9/9/2021 11:00 AM Akhil Akins MD Count includes the [facility]

## 2021-06-22 NOTE — ANESTHESIA CARE TRANSFER NOTE
Patient: Darleen Simmons    Procedure(s):  o-arm/stealth assisted Posterior spinal fusion Thoracic 7 to Sacral 1 with segmental instrumentation Thoracic 7 to 8; reinsertion of instrumentation Thoracic 9 to Sacral 1; pelvic fixation  Posterior 3 column osteotomy T12-L1; pedicle subtraction osteotomy Lumbar 5    Diagnosis: Flatback syndrome [M40.30]  H/O spinal fusion [Z98.1]  H/O spinal cord injury [Z87.828]  Diagnosis Additional Information: No value filed.    Anesthesia Type:   General     Note:    Oropharynx: oropharynx clear of all foreign objects  Level of Consciousness: awake  Oxygen Supplementation: face mask        Vital Signs Stable: post-procedure vital signs reviewed and stable  Report to RN Given: handoff report given  Patient transferred to: PACU    Handoff Report: Identifed the Patient, Identified the Reponsible Provider, Reviewed the pertinent medical history, Discussed the surgical course, Reviewed Intra-OP anesthesia mangement and issues during anesthesia, Set expectations for post-procedure period and Allowed opportunity for questions and acknowledgement of understanding      Vitals: (Last set prior to Anesthesia Care Transfer)  CRNA VITALS  6/21/2021 1845 - 6/21/2021 1928      6/21/2021             Resp Rate (observed):  (!) 1        Electronically Signed By: BEBETO Ibarra CRNA  June 21, 2021  7:28 PM

## 2021-06-22 NOTE — ANESTHESIA POSTPROCEDURE EVALUATION
Patient: Darleen Simmons    Procedure(s):  o-arm/stealth assisted Posterior spinal fusion Thoracic 7 to Sacral 1 with segmental instrumentation Thoracic 7 to 8; reinsertion of instrumentation Thoracic 9 to Sacral 1; pelvic fixation  Posterior 3 column osteotomy T12-L1; pedicle subtraction osteotomy Lumbar 5    Diagnosis:Flatback syndrome [M40.30]  H/O spinal fusion [Z98.1]  H/O spinal cord injury [Z87.828]  Diagnosis Additional Information: No value filed.    Anesthesia Type:  General    Note:  Disposition: ICU            ICU Sign Out: Anesthesiologist/ICU physician sign out WAS performed   Postop Pain Control: Uneventful            Sign Out: Well controlled pain   PONV: No   Neuro/Psych: Uneventful            Sign Out: Acceptable/Baseline neuro status   Airway/Respiratory: Uneventful            Sign Out: Acceptable/Baseline resp. status   CV/Hemodynamics: Uneventful            Sign Out: Acceptable CV status   Other NRE: NONE   DID A NON-ROUTINE EVENT OCCUR? No           Last vitals:  Vitals:    06/21/21 2000 06/21/21 2015 06/21/21 2030   BP: 137/74 101/70 104/83   Pulse: 80 80 80   Resp: 12 14 14   Temp:      SpO2: 100% 94% 100%       Last vitals prior to Anesthesia Care Transfer:  CRNA VITALS  6/21/2021 1845 - 6/21/2021 1945      6/21/2021             Resp Rate (observed):  (!) 1          Electronically Signed By: Christopher J. Behrens, MD  June 21, 2021  8:33 PM

## 2021-06-22 NOTE — PLAN OF CARE
Patients had lots of issues with pain overnight, currently on ketamine and lidocaine and PRN dilaudid for pain. Patient is either sleeping or screaming out in pain. Patient follows commands inconsistently. Patients VS stable this shift but required levophed to support BP post surgery. Off levophed this AM. Patient urine output dropped overnight and 1L total bolus LR given. See flow sheet for output. Patient on 2L of NC this morning and tolerating. Patient is able to moves all four extremities weakly. Hemovac drain in place with no notable drainage. Patient had multiple skin issues upon arrival to unit and was noted in admission note. Platelets low this morning and SICU resident notified.

## 2021-06-22 NOTE — PHARMACY-ADMISSION MEDICATION HISTORY
Admission Medication History Completed by Pharmacy    See [facility] Admission Navigator for allergy information, preferred outpatient pharmacy, prior to admission medications and immunization status.     Medication History Sources:     Medication history completed in preoperative assessment center on 5/25, Dispense report    Changes made to PTA medication list (reason):    Added: None    Deleted: None    Changed: None    Additional Information:    None    Prior to Admission medications    Medication Sig Last Dose Taking? Auth Provider   acetaminophen (TYLENOL) 500 MG tablet Take 500-1,000 mg by mouth every 8 hours as needed for mild pain 6/20/2021 at 2100 Yes Unknown, Entered By History   aspirin 81 MG EC tablet Take 81 mg by mouth daily Past Week at Unknown time Yes Unknown, Entered By History   atorvastatin (LIPITOR) 80 MG tablet Take 80 mg by mouth At Bedtime  6/20/2021 at 2200 Yes Reported, Patient   D3-50 1.25 MG (43086 UT) capsule Take 1,250 mcg by mouth every 7 days Takes on Saturdays. 6/20/2021 at 0800 Yes Reported, Patient   diazepam (VALIUM) 2 MG tablet Take 2-4 mg by mouth nightly as needed for muscle spasms  6/20/2021 at 2100 Yes Reported, Patient   diclofenac (VOLTAREN) 1 % topical gel Apply topically as needed (shoulder pain)  6/20/2021 at Unknown time Yes Reported, Patient   docusate sodium (DSS) 100 MG capsule Take 100 mg by mouth 2 times daily 6/20/2021 at 2000 Yes Reported, Patient   fluticasone (FLONASE) 50 MCG/ACT nasal spray Spray 2 sprays into both nostrils daily as needed  6/20/2021 at Unknown time Yes Reported, Patient   hydrocortisone (CORTAID) 1 % external cream Apply topically 2 times daily as needed 6/20/2021 at Unknown time Yes Unknown, Entered By History   metoprolol succinate ER (TOPROL-XL) 25 MG 24 hr tablet Take 12.5 mg by mouth At Bedtime  6/20/2021 at 2200 Yes Reported, Patient   morphine (MSIR) 15 MG IR tablet Take 15-30 mg by mouth every 3 hours as needed Every 3 hrs as needed  6/21/2021 at 0400 Yes Reported, Patient   multivitamin, therapeutic with minerals (THERA-VIT-M) TABS tablet Take 1 tablet by mouth daily 6/20/2021 at 0800time Yes Reported, Patient   polyethylene glycol (MIRALAX) 17 GM/Dose powder Take 17 g by mouth daily as needed  6/20/2021 at 2000 Yes Reported, Patient   simethicone (MYLICON) 125 MG chewable tablet Take 375 mg by mouth as needed for intestinal gas 6/20/2021 at 2000 Yes Unknown, Entered By History   EPINEPHrine (ANY BX GENERIC EQUIV) 0.3 MG/0.3ML injection 2-pack Inject 0.3 mg into the muscle as needed for anaphylaxis   Unknown, Entered By History   naloxone (NARCAN) 4 MG/0.1ML nasal spray Spray 4 mg into one nostril alternating nostrils once as needed for opioid reversal every 2-3 minutes until assistance arrives More than a month at Unknown time  Unknown, Entered By History   nitroGLYcerin (NITROSTAT) 0.4 MG sublingual tablet Place 0.4 mg under the tongue as needed More than a month at Unknown time  Reported, Patient   NONFORMULARY Uriva Rx - supplement for bladder tone More than a month at Unknown time  Unknown, Entered By History   NONFORMULARY Follinex supplement - herbal supplement for hair growth. More than a month at Unknown time  Unknown, Entered By History       Date completed: 06/22/21    Medication history completed by: Hiren Parish [facility]

## 2021-06-22 NOTE — PROGRESS NOTES
Brief Cardiology Note    Device reprogrammed s/p OR:    Per EP device nurse:  Reset from VOO -> VVIR with LRL 80 bpm  VF zones turned back ON. VT zones left OFF.    Device check ordered for AM.    Coty Whitfield MD  Cardiology Fellow

## 2021-06-22 NOTE — PROGRESS NOTES
Admitted/transferred from: PACU  Reason for admission/transfer: spinal surgery  2 RN skin assessment: completed by Solo and Nereyda  Result of skin assessment and interventions/actions: spinal incision with lumbar drain, blisters on chest, blanchable reddness on knees bilateral, scabs on shins, facial edema, denuded areas on both thighs.  Height, weight, drug calc weight: Done  Patient belongings (see Flowsheet)  MDRO education added to care plan N/A  ?

## 2021-06-22 NOTE — CONSULTS
[facility] Nurse Inpatient Pressure Injury Assessment   Reason for consultation: Evaluate and treat Skin blister      ASSESSMENT  Pressure Injury: on mid chest , hospital acquired ,   Pressure Injury is Stage 2   Contributing factor of the pressure injury: pressure and immobility  Status: initial assessment  Recommend provider order: None, at this time     Pressure Injury: on Right Knee , hospital acquired ,   Pressure Injury is Stage 2   Contributing factor of the pressure injury: pressure and immobility  Status: initial assessment  Recommend provider order: None, at this time     BL anterior thighs- healing superficial skin erosions- suspect from tape removal     TREATMENT PLAN  Mid chest and Knee wound: Daily    Monitor closely and cover with Mepilex if blister erupts. Otherwise ok to leave it HUA.     POC for BL thigh daily- Cleanse and apply no sting film barrier.  Orders Written  WO Nurse follow-up plan:weekly  Nursing to notify the Provider(s) and re-consult the WO Nurse if wound(s) deteriorates or new skin concern.    Patient History  According to provider note(s): Darleen Simmons is a 70 year old male with PMH including chronic opioid use, former smoker, HTN, HLP, CAD c/b MI, ischemic cardiomyopathy s/p ICD placement, PAD s/p left SFA POBA for latissimus dorsi flap to left calf, atrial fibrillation/flutter, acute blood loss anemia, flat back syndrome now s/p T7-pelvis revision PSIF with T12-L1 3CO and L5 PSO on 6/21/21 with Jazmyne Akins/Herb. Significant pain out of proportion could represent nerve root retraction and normal postop pain in the setting of a chronic opioid user. Given the symptoms in his legs are at the PSO level, this could also represent neural foraminal stenosis.    Objective Data  Containment of urine/stool: Indwelling catheter    Current Diet/ Nutrition:  Orders Placed This Encounter      Advance Diet as Tolerated: Regular Diet Adult      Output:   I/O last 3 completed shifts:  In: 31155.97  [I.V.:40120.66; Other:1535; IV Piggyback:1000]  Out: 5470 [Urine:1150; Blood:4320]    Risk Assessment:   Sensory Perception: 4-->no impairment  Moisture: 4-->rarely moist  Activity: 1-->bedfast  Mobility: 2-->very limited  Nutrition: 2-->probably inadequate  Friction and Shear: 1-->problem  Omkar Score: 14      Labs:   Recent Labs   Lab 06/22/21  0922 06/22/21  0450 06/21/21 2150 06/21/21 2150   ALBUMIN  --   --   --  2.6*   HGB  --  11.0*   < >  --    INR  --  1.62*  --   --    WBC  --  13.8*   < >  --    A1C 6.0*  --   --   --     < > = values in this interval not displayed.       Physical Exam  Skin inspection: focused chest, arm, thighs, and legs    Wound Location:  Chest           Date of last Photo 6/22  Wound History: Long OR procedure on 6/21 and was proned.  Measurements (length x width x depth, in cm) 4 small serous filled blisters, largest one measured at  1 cm x 2 cm  x  0.0 cm   Wound Base:  100 % blister and serous filled  Tunneling N/A  Undermining N/A  Palpation of the wound bed: normal   Periwound skin: intact and erythema- blanchable  Color: pink  Temperature: normal   Drainage:, none  Description of drainage: none  Odor: none  Pain: denies ,       2. R) Knee        Date of last Photo 6/22  Wound History: Long OR procedure on 6/21 and was proned.  Measurements (length x width x depth, in cm) 1 cm x 0.8 cm  x  0.0 cm   Wound Base:  100 % blister and serous filled  Tunneling N/A  Undermining N/A  Palpation of the wound bed: normal   Periwound skin: intact and erythema- blanchable  Color: pink  Temperature: normal   Drainage:, none  Description of drainage: none  Odor: none  Pain: denies ,     Interventions  Current support surface: Standard  Low air loss mattress  Current off-loading measures: Foam padding and Pillows  Repositioning aid: Pillows  Visual inspection of wound(s) completed   Tube Securement: Flexitrac  Wound Care: was done per plan of care.  Supplies: discussed with RN  Educated  provided: importance of repositioning, plan of care, wound progress and Off-loading pressure  Education provided to: nurse  Discussed importance of:repositioning every 2 hours and off-loading pressure to wound  Discussed plan of care with Nurse    Esther Deal RN

## 2021-06-22 NOTE — OP NOTE
Procedure Date: 06/21/2021    PREOPERATIVE DIAGNOSES:   1.  Flat back syndrome.  2.  History of thoracolumbar fracture.  3.  Previous spinal fusion T9 to pelvis.    POSTOPERATIVE DIAGNOSES:     1.  Flat back syndrome.  2.  History of thoracolumbar fracture.  3.  Previous spinal fusion T9 to pelvis.    PROCEDURE:   1.  Posterior spinal fusion, T7-T9.  2.  Three-column osteotomy, T12-L1.  3.  L5 pedicle subtraction osteotomy 22 modifier.  4.  Segmental instrumentation, T7-8.  5.  Reinsertion instrumentation, T9-S1.  6.  Pelvic fixation 22 modifier.  7. Paraspinal muscle mobilization 42 cm  8.  Image-guided surgery.     SURGEON:   Akhil Akins Jr., MD    ATTENDING SURGEON:  Sara Estevez MD, who was necessary because of the complexity and severity of the operation.    ASSISTANT:  Dr. Joseph Maravilla MD, resident.    INDICATIONS FOR PROCEDURE:  Mr. Simmons is a 70-year-old male who sustained a thoracolumbar injury that was initially treated surgically in 2009.  He describes a history of neurologic deficits that he was slowly able to regain his ability to ambulate in 2018.  He underwent posterior spinal fusion from T9 to the pelvis at [facility] by Dr. Wallace Montana with the patient's ability to regain his ability to ambulate the position that had been fused and was intended to facilitate wheelchair ambulation and he has a significant flat back syndrome.  He ambulates with a walker and is limited because of the flat back position, requiring hip and knee flexion in order to stand.  He has significant kyphosis at the thoracolumbar junction and substantial pelvic incidence lumbar lordosis mismatch and a T1 pelvic angle of approximately 34 degrees.  All of this is indicative of a severe flat back syndrome.    He underwent extensive imaging including CT scan showing significant disk degeneration and spondylosis at 2 levels above his prior fusion and significant calcification and anticipated scarring around his neural  axis.    He was extensively counseled on several occasions and underwent an extensive workup by our preanesthesia clinic including the new cardiac evaluation and the interpretation by our anesthesia team was that he needed his surgery done on our [facility] where cardiology was immediately available.    Extensive preparations with the OR team were made through extensive preoperative coordination. In the morning of surgery, I met with the patient and his wife.  We reviewed the plan.  Once again, they were in agreement with proceeding.    The OR team was briefed about the plan.    DESCRIPTION OF PROCEDURE:  The patient was brought to the operating room and underwent induction of adequate general anesthesia had appropriate lines placed.  He was then turned and positioned prone on the ProAxis table.  Of note is the table had to be flexed in order to accommodate his significant flat back syndrome.  He was then prepared and draped in the usual sterile fashion.  A timeout was done confirming the type and site of surgery.    We were unable to get baseline neuromonitoring tracings.  Prior to incision, because the neuromuscular blockade from the initial reduction lineup had not worn off yet as he only had one twitch at that point, we made the decision to proceed with the exposure, which we did and then over time, the neuromuscular blockade wore off and we were able to get baseline tracings.    The spine was exposed and of note is that there was bone growth over the rods requiring removal with rongeurs and chisels.  The set plugs were removed, and the rods removed, and then we progressively replaced the screws.  The screws were Medtronic Solera screws and these were generally upsized to improve the purchase.  The screws were replaced in the following fashion.  At T9, 7.5 x 65 bilaterally.  All of these screws were from the Medtronic 5.5/6 Solera system.  These were titanium screws with cobalt chrome heads at T10, 7.5 x  60 bilaterally, T11, 7.5 x 60 left, 7.5 x 55 right, T12, 7.5 x 60 left, 7.5 x 55 right, L1, 7.5 x 60 left.  No screw on the right. L2, 7.5 x 60 left, 8.5 x 55 right, L3, 7.5 x 60 left, 8.5 x 65 right, L4 in anticipation of the pedicle subtraction osteotomy 8.5 x 55 dual wilfredo multiaxial screws bilaterally. L5 screws were left out for the pedicle subtraction osteotomy.  These screws were placed and the prior tracts having seen on the CT scan and the ability to upsize them general in length.  A reference frame was then attached to these existing screws and L1 brought in and intraoperative 2D imaging obtained and then image-guided navigation was used to place pedicle screws at T8 and T7.  At T8 this was 6.5 x 55 bilaterally and T7, 5.5 x 55 bilaterally. 2D and 3D imaging confirmed appropriate positioning.    Next, 3D imaging of the pelvis was performed.  Of note is that we had to remove significant bone over top of the pelvic screws in order to access them and then we removed the prior pelvic screws and needed to find new sites for the pelvic fixation.  Of note is that the pelvic fixation is usually coded for placement of 2 implants and we placed a total of 4 implants. On the right side and the caudal aspect we placed a 10.5 x 100 mm Medtronic ballast screw.  This was done by utilizing navigated awl to find the start point with a navigated drill, then navigated taps cephalad to that.  Also in the S2AI type trajectory we placed a 10.5 x 100 mm screw.  Next, attention was addressed to the left side and here we placed a 10.5 x 110 caudally and then 10.5 x 100 in the cephalad portion of S1.  Screws were then placed on the left.  This was an 8.5 x 55 and prior tracking on the right we had to create a new tract and this was a 7.5 x 55. 2D and 3D imaging confirmed the appropriate positioning of the pelvic fixation.    Next, we addressed attention to the T12-L1 level where through a significant fusion mass we performed initially  a posterior column osteotomy; however, there was anterior ankylosis as well.  We resected the bone dorsally with osteotomes and rongeurs and then entered into the disc space bilaterally with osteotomes and then cut through the anterior cortical margin.  This then allowed us eventually to mobilize this enough to close down this 4 cm gap an placed a temporary wilfredo.  2D imaging showed that we had significant angular realignment with that osteotomy on intraoperative imaging it did not appear that this was adequate correction for his overall realignment and we then proceeded to the L5-S1 level.  At L5 we found dense adherent scar, which required significant mobilization effort to take this down again this merits a 22 modifier, as the dense adherent scar required significant manipulation in order to gain access initially to the dorsal cortical bone.  We resected the bone progressively and then found the undersurface of the lamina where we were able to progressively mobilize the dura.  It is unclear whether there was no dural present on the left side or whether a dural tear occurred in the scar mobilization, but it required a complex dural repair with a patch, which was performed by Dr. Estevez.  We then progressively were able to resect the pedicle down to the back of the body of note is the vertebral body had a significant triangular shape and getting a lateral required significant additional effort in order to get beyond the fusion mass in the lateral wall.  We then utilized osteotomes to progressively resect the vertebral body in the form of a pedicle subtraction osteotomy and then progressively close this down with a target of approximately 20 degrees of improvement in the alignment.  Intraoperative imaging was used to confirm this realignment.  Appropriate length rods were cut and contoured and progressively seated into place complex for augments were required and we placed the 2 lateral row rods first followed by the 2  medial row rods. Intraoperative 2D stitch long films were obtained, which showed significant improvement in the alignment.  Neurologic monitoring had experienced some global fade, but was still valuable and indicated that the patient had intact motor and sensory responses.  The wound was then irrigated out multiple times.  The set plugs were broken off in accordance with 's specification.  The osteotomies had resulted in approximately 100 mL of morselized autograft bone.  This was used to perform fusion across the T12-L1 level from T7-T9 and across the pedicle subtraction osteotomy site.  Wound was now closed in layers.  Of note is that this was a complex wound closure.  The wound was 42 cm long required paraspinal muscle mobilization along that 42 cm length in order to be able to bring the fascia of note for the instrumentation and then it required mobilization above the fascia in order to bring the skin closed with minimal tension in the midline.     The patient's estimated blood volume of 6888 mL. He loss 4320 mL, received 1630 mL back via Cell Saver return, 3 units of packed red blood cells and 2 units of cryoprecipitate profile 402 mL.    Upon completion of the case, I called and spoke to the patient's wife Joe and reviewed with her the results of the surgery.    I was present in the operating room from 6:15 a.m. until 6:30 p.m.   PREOPERATIVE DIAGNOSES:   1.  Flat back syndrome.  2.  History of thoracolumbar fracture.  3.  Previous spinal fusion T9 to pelvis.    POSTOPERATIVE DIAGNOSES:     1.  Flat back syndrome.  2.  History of thoracolumbar fracture.  3.  Previous spinal fusion T9 to pelvis.    PROCEDURE:   1.  Posterior spinal fusion, T7-T9.  2.  Three-column osteotomy, T12-L1.  3.  L5 pedicle subtraction osteotomy 22 modifier.  4.  Segmental instrumentation, T7-8.  5.  Reinsertion instrumentation, T9-S1.  6.  Pelvic fixation 22 modifier.  7.  Image-guided surgery.     SURGEON:   Akhil RICHARDSON  Jr. Akins MD    ATTENDING SURGEON:  Sara Estevez MD, who was necessary because of the complexity and severity of the operation.    ASSISTANT:  Dr. Joseph Maravilla MD, resident.    INDICATIONS FOR PROCEDURE:  Mr. Simmons is a 70-year-old male who sustained a thoracolumbar injury that was initially treated surgically in 2009.  He describes a history of neurologic deficits that he was slowly able to regain his ability to ambulate in 2018.  He underwent posterior spinal fusion from T9 to the pelvis at [facility] by Dr. Wallace Montana with the patient's ability to regain his ability to ambulate the position that had been fused and was intended to facilitate wheelchair ambulation and he has a significant flat back syndrome.  He ambulates with a walker and is limited because of the flat back position, requiring hip and knee flexion in order to stand.  He has significant kyphosis at the thoracolumbar junction and substantial pelvic incidence lumbar lordosis mismatch and a T1 pelvic angle of approximately 34 degrees.  All of this is indicative of a severe flat back syndrome.    He underwent extensive imaging including CT scan showing significant disk degeneration and spondylosis at 2 levels above his prior fusion and significant calcification and anticipated scarring around his neural axis.    He was extensively counseled on several occasions and underwent an extensive workup by our preanesthesia clinic including the new cardiac evaluation and the interpretation by our anesthesia team was that he needed his surgery done on our [facility] where cardiology was immediately available.    Extensive preparations with the OR team were made through extensive preoperative coordination. In the morning of surgery, I met with the patient and his wife.  We reviewed the plan.  Once again, they were in agreement with proceeding.    The OR team was briefed about the plan.    DESCRIPTION OF PROCEDURE:  The patient was brought to  the operating room and underwent induction of adequate general anesthesia had appropriate lines placed.  He was then turned and positioned prone on the ProAxis table.  Of note is the table had to be flexed in order to accommodate his significant flat back syndrome.  He was then prepared and draped in the usual sterile fashion.  A timeout was done confirming the type and site of surgery.    We were unable to get baseline neuromonitoring tracings.  Prior to incision, because the neuromuscular blockade from the initial reduction lineup had not worn off yet as he only had one twitch at that point, we made the decision to proceed with the exposure, which we did and then over time, the neuromuscular blockade wore off and we were able to get baseline tracings.    The spine was exposed and of note is that there was bone growth over the rods requiring removal with rongeurs and chisels.  The set plugs were removed, and the rods removed, and then we progressively replaced the screws.  The screws were Medtronic Solera screws and these were generally upsized to improve the purchase.  The screws were replaced in the following fashion.  At T9, 7.5 x 65 bilaterally.  All of these screws were from the Medtronic 5.5/6 Solera system.  These were titanium screws with cobalt chrome heads at T10, 7.5 x 60 bilaterally, T11, 7.5 x 60 left, 7.5 x 55 right, T12, 7.5 x 60 left, 7.5 x 55 right, L1, 7.5 x 60 left.  No screw on the right. L2, 7.5 x 60 left, 8.5 x 55 right, L3, 7.5 x 60 left, 8.5 x 65 right, L4 in anticipation of the pedicle subtraction osteotomy 8.5 x 55 dual wilfredo multiaxial screws bilaterally. L5 screws were left out for the pedicle subtraction osteotomy.  These screws were placed and the prior tracts having seen on the CT scan and the ability to upsize them general in length.  A reference frame was then attached to these existing screws and L1 brought in and intraoperative 2D imaging obtained and then image-guided navigation  was used to place pedicle screws at T8 and T7.  At T8 this was 6.5 x 55 bilaterally and T7, 5.5 x 55 bilaterally. 2D and 3D imaging confirmed appropriate positioning.    Next, 3D imaging of the pelvis was performed.  Of note is that we had to remove significant bone over top of the pelvic screws in order to access them and then we removed the prior pelvic screws and needed to find new sites for the pelvic fixation.  Of note is that the pelvic fixation is usually coded for placement of 2 implants and we placed a total of 4 implants. On the right side and the caudal aspect we placed a 10.5 x 100 mm Medtronic ballast screw.  This was done by utilizing navigated awl to find the start point with a navigated drill, then navigated taps cephalad to that.  Also in the S2AI type trajectory we placed a 10.5 x 100 mm screw.  Next, attention was addressed to the left side and here we placed a 10.5 x 110 caudally and then 10.5 x 100 in the cephalad portion of S1.  Screws were then placed on the left.  This was an 8.5 x 55 and prior tracking on the right we had to create a new tract and this was a 7.5 x 55. 2D and 3D imaging confirmed the appropriate positioning of the pelvic fixation.    Next, we addressed attention to the T12-L1 level where through a significant fusion mass we performed initially a posterior column osteotomy; however, there was anterior ankylosis as well.  We resected the bone dorsally with osteotomes and rongeurs and then entered into the disc space bilaterally with osteotomes and then cut through the anterior cortical margin.  This then allowed us eventually to mobilize this enough to close down this 4 cm gap an placed a temporary wilfredo.  2D imaging showed that we had significant angular realignment with that osteotomy on intraoperative imaging it did not appear that this was adequate correction for his overall realignment and we then proceeded to the L5-S1 level.  At L5 we found dense adherent scar, which  required significant mobilization effort to take this down again this merits a 22 modifier, as the dense adherent scar required significant manipulation in order to gain access initially to the dorsal cortical bone.  We resected the bone progressively and then found the undersurface of the lamina where we were able to progressively mobilize the dura.  It is unclear whether there was no dural present on the left side or whether a dural tear occurred in the scar mobilization, but it required a complex dural repair with a patch, which was performed by Dr. Estevez.  We then progressively were able to resect the pedicle down to the back of the body of note is the vertebral body had a significant triangular shape and getting a lateral required significant additional effort in order to get beyond the fusion mass in the lateral wall.  We then utilized osteotomes to progressively resect the vertebral body in the form of a pedicle subtraction osteotomy and then progressively close this down with a target of approximately 20 degrees of improvement in the alignment.  Intraoperative imaging was used to confirm this realignment.  Appropriate length rods were cut and contoured and progressively seated into place complex for augments were required and we placed the 2 lateral row rods first followed by the 2 medial row rods. Intraoperative 2D stitch long films were obtained, which showed significant improvement in the alignment.  Neurologic monitoring had experienced some global fade, but was still valuable and indicated that the patient had intact motor and sensory responses.  The wound was then irrigated out multiple times.  The set plugs were broken off in accordance with 's specification.  The osteotomies had resulted in approximately 100 mL of morselized autograft bone.  This was used to perform fusion across the T12-L1 level from T7-T9 and across the pedicle subtraction osteotomy site.  Wound was now closed in  layers.  Of note is that this was a complex wound closure.  The wound was 42 cm long required paraspinal muscle mobilization along that 42 cm length in order to be able to bring the fascia of note for the instrumentation and then it required mobilization above the fascia in order to bring the skin closed with minimal tension in the midline.     The patient's estimated blood volume of 6888 mL. He loss 4320 mL, received 1630 mL back via Cell Saver return, 3 units of packed red blood cells and 2 units of cryoprecipitate profile 402 mL.    Upon completion of the case, I called and spoke to the patient's wife Joe and reviewed with her the results of the surgery.    I was present in the operating room from 6:15 a.m. until 6:30 p.m.     Akhil Akins Jr, MD        D: 2021   T: 2021   MT: davis    Name:     RUFINA BLAS  MRN:      [id number]        Account:        [id number]   :      1950           Procedure Date: 2021     Document: [id number]    cc:  Walalce Montana MD   Copy For Patient

## 2021-06-22 NOTE — OR NURSING
Patient arrived in PACU from OR with oropharyngeal airway in place with simple mask @ 8L O2, skin assessment/handoff completed with circulating RN, blanchable redness noted to sternal area and BL knee caps, sternal area with small blistering noted, blisters are intact, to BL upper, anterior thigh area several areas of denuding noted, no bleeding, no redness.

## 2021-06-23 ENCOUNTER — TELEPHONE (OUTPATIENT)
Dept: ORTHOPEDICS | Facility: CLINIC | Age: 71
End: 2021-06-23

## 2021-06-23 LAB
ABO + RH BLD: NORMAL
ABO + RH BLD: NORMAL
ALBUMIN UR-MCNC: NEGATIVE MG/DL
ANION GAP SERPL CALCULATED.3IONS-SCNC: 5 MMOL/L (ref 3–14)
APPEARANCE UR: CLEAR
BACTERIA #/AREA URNS HPF: ABNORMAL /HPF
BILIRUB UR QL STRIP: NEGATIVE
BLD GP AB SCN SERPL QL: NORMAL
BLD PROD TYP BPU: NORMAL
BLD UNIT ID BPU: 0
BLD UNIT ID BPU: 0
BLOOD BANK CMNT PATIENT-IMP: NORMAL
BLOOD PRODUCT CODE: NORMAL
BLOOD PRODUCT CODE: NORMAL
BPU ID: NORMAL
BPU ID: NORMAL
BUN SERPL-MCNC: 17 MG/DL (ref 7–30)
CALCIUM SERPL-MCNC: 8.1 MG/DL (ref 8.5–10.1)
CHLORIDE SERPL-SCNC: 103 MMOL/L (ref 94–109)
CO2 SERPL-SCNC: 29 MMOL/L (ref 20–32)
COLOR UR AUTO: ABNORMAL
COPATH REPORT: NORMAL
CREAT SERPL-MCNC: 0.69 MG/DL (ref 0.66–1.25)
ERYTHROCYTE [DISTWIDTH] IN BLOOD BY AUTOMATED COUNT: 13.8 % (ref 10–15)
ERYTHROCYTE [DISTWIDTH] IN BLOOD BY AUTOMATED COUNT: 14 % (ref 10–15)
FIBRINOGEN PPP-MCNC: 431 MG/DL (ref 200–420)
FIBRINOGEN PPP-MCNC: 454 MG/DL (ref 200–420)
GFR SERPL CREATININE-BSD FRML MDRD: >90 ML/MIN/{1.73_M2}
GLUCOSE BLDC GLUCOMTR-MCNC: 104 MG/DL (ref 70–99)
GLUCOSE BLDC GLUCOMTR-MCNC: 110 MG/DL (ref 70–99)
GLUCOSE BLDC GLUCOMTR-MCNC: 116 MG/DL (ref 70–99)
GLUCOSE BLDC GLUCOMTR-MCNC: 116 MG/DL (ref 70–99)
GLUCOSE SERPL-MCNC: 116 MG/DL (ref 70–99)
GLUCOSE UR STRIP-MCNC: NEGATIVE MG/DL
HCT VFR BLD AUTO: 24.2 % (ref 40–53)
HCT VFR BLD AUTO: 24.8 % (ref 40–53)
HGB BLD-MCNC: 7.6 G/DL (ref 13.3–17.7)
HGB BLD-MCNC: 8.1 G/DL (ref 13.3–17.7)
HGB UR QL STRIP: ABNORMAL
INR PPP: 1.56 (ref 0.86–1.14)
INR PPP: 1.66 (ref 0.86–1.14)
KETONES UR STRIP-MCNC: NEGATIVE MG/DL
LEUKOCYTE ESTERASE UR QL STRIP: ABNORMAL
MAGNESIUM SERPL-MCNC: 1.9 MG/DL (ref 1.6–2.3)
MCH RBC QN AUTO: 30.9 PG (ref 26.5–33)
MCH RBC QN AUTO: 31.6 PG (ref 26.5–33)
MCHC RBC AUTO-ENTMCNC: 30.6 G/DL (ref 31.5–36.5)
MCHC RBC AUTO-ENTMCNC: 33.5 G/DL (ref 31.5–36.5)
MCV RBC AUTO: 101 FL (ref 78–100)
MCV RBC AUTO: 95 FL (ref 78–100)
NITRATE UR QL: NEGATIVE
NUM BPU REQUESTED: 1
NUM BPU REQUESTED: 1
PH UR STRIP: 6 PH (ref 5–7)
PLATELET # BLD AUTO: 60 10E9/L (ref 150–450)
PLATELET # BLD AUTO: 66 10E9/L (ref 150–450)
POTASSIUM SERPL-SCNC: 4.7 MMOL/L (ref 3.4–5.3)
RBC # BLD AUTO: 2.46 10E12/L (ref 4.4–5.9)
RBC # BLD AUTO: 2.56 10E12/L (ref 4.4–5.9)
RBC #/AREA URNS AUTO: 7 /HPF (ref 0–2)
SODIUM SERPL-SCNC: 137 MMOL/L (ref 133–144)
SOURCE: ABNORMAL
SP GR UR STRIP: 1.01 (ref 1–1.03)
SPECIMEN EXP DATE BLD: NORMAL
SQUAMOUS #/AREA URNS AUTO: 0 /HPF (ref 0–1)
TRANS CELLS #/AREA URNS HPF: <1 /HPF (ref 0–1)
TRANSFUSION STATUS PATIENT QL: NORMAL
UROBILINOGEN UR STRIP-MCNC: NORMAL MG/DL (ref 0–2)
WBC # BLD AUTO: 12.4 10E9/L (ref 4–11)
WBC # BLD AUTO: 8.3 10E9/L (ref 4–11)
WBC #/AREA URNS AUTO: 3 /HPF (ref 0–5)

## 2021-06-23 PROCEDURE — 250N000013 HC RX MED GY IP 250 OP 250 PS 637: Performed by: STUDENT IN AN ORGANIZED HEALTH CARE EDUCATION/TRAINING PROGRAM

## 2021-06-23 PROCEDURE — 86900 BLOOD TYPING SEROLOGIC ABO: CPT | Performed by: ORTHOPAEDIC SURGERY

## 2021-06-23 PROCEDURE — 250N000011 HC RX IP 250 OP 636: Performed by: STUDENT IN AN ORGANIZED HEALTH CARE EDUCATION/TRAINING PROGRAM

## 2021-06-23 PROCEDURE — 81001 URINALYSIS AUTO W/SCOPE: CPT | Performed by: HOSPITALIST

## 2021-06-23 PROCEDURE — 86901 BLOOD TYPING SEROLOGIC RH(D): CPT | Performed by: ORTHOPAEDIC SURGERY

## 2021-06-23 PROCEDURE — 86923 COMPATIBILITY TEST ELECTRIC: CPT | Performed by: ORTHOPAEDIC SURGERY

## 2021-06-23 PROCEDURE — 99207 PR CONSULT E&M CHANGED TO INITIAL LEVEL: CPT | Performed by: PHYSICIAN ASSISTANT

## 2021-06-23 PROCEDURE — 250N000011 HC RX IP 250 OP 636: Performed by: INTERNAL MEDICINE

## 2021-06-23 PROCEDURE — 250N000011 HC RX IP 250 OP 636: Performed by: CLINICAL NURSE SPECIALIST

## 2021-06-23 PROCEDURE — 85610 PROTHROMBIN TIME: CPT | Performed by: STUDENT IN AN ORGANIZED HEALTH CARE EDUCATION/TRAINING PROGRAM

## 2021-06-23 PROCEDURE — 86850 RBC ANTIBODY SCREEN: CPT | Performed by: ORTHOPAEDIC SURGERY

## 2021-06-23 PROCEDURE — P9016 RBC LEUKOCYTES REDUCED: HCPCS | Performed by: ORTHOPAEDIC SURGERY

## 2021-06-23 PROCEDURE — 250N000013 HC RX MED GY IP 250 OP 250 PS 637: Performed by: HOSPITALIST

## 2021-06-23 PROCEDURE — 85384 FIBRINOGEN ACTIVITY: CPT | Performed by: PHYSICIAN ASSISTANT

## 2021-06-23 PROCEDURE — 120N000002 HC R&B MED SURG/OB UMMC

## 2021-06-23 PROCEDURE — 85027 COMPLETE CBC AUTOMATED: CPT | Performed by: PHYSICIAN ASSISTANT

## 2021-06-23 PROCEDURE — 250N000009 HC RX 250: Performed by: CLINICAL NURSE SPECIALIST

## 2021-06-23 PROCEDURE — 83735 ASSAY OF MAGNESIUM: CPT | Performed by: ORTHOPAEDIC SURGERY

## 2021-06-23 PROCEDURE — 999N001017 HC STATISTIC GLUCOSE BY METER IP

## 2021-06-23 PROCEDURE — 85027 COMPLETE CBC AUTOMATED: CPT | Performed by: STUDENT IN AN ORGANIZED HEALTH CARE EDUCATION/TRAINING PROGRAM

## 2021-06-23 PROCEDURE — P9037 PLATE PHERES LEUKOREDU IRRAD: HCPCS | Performed by: HOSPITALIST

## 2021-06-23 PROCEDURE — 36415 COLL VENOUS BLD VENIPUNCTURE: CPT | Performed by: ORTHOPAEDIC SURGERY

## 2021-06-23 PROCEDURE — 80048 BASIC METABOLIC PNL TOTAL CA: CPT | Performed by: ORTHOPAEDIC SURGERY

## 2021-06-23 PROCEDURE — 250N000013 HC RX MED GY IP 250 OP 250 PS 637: Performed by: CLINICAL NURSE SPECIALIST

## 2021-06-23 PROCEDURE — 85610 PROTHROMBIN TIME: CPT | Performed by: PHYSICIAN ASSISTANT

## 2021-06-23 PROCEDURE — 36415 COLL VENOUS BLD VENIPUNCTURE: CPT | Performed by: PHYSICIAN ASSISTANT

## 2021-06-23 PROCEDURE — 258N000003 HC RX IP 258 OP 636

## 2021-06-23 PROCEDURE — 85384 FIBRINOGEN ACTIVITY: CPT | Performed by: STUDENT IN AN ORGANIZED HEALTH CARE EDUCATION/TRAINING PROGRAM

## 2021-06-23 PROCEDURE — 36415 COLL VENOUS BLD VENIPUNCTURE: CPT | Performed by: STUDENT IN AN ORGANIZED HEALTH CARE EDUCATION/TRAINING PROGRAM

## 2021-06-23 PROCEDURE — 258N000003 HC RX IP 258 OP 636: Performed by: STUDENT IN AN ORGANIZED HEALTH CARE EDUCATION/TRAINING PROGRAM

## 2021-06-23 PROCEDURE — 99223 1ST HOSP IP/OBS HIGH 75: CPT | Performed by: PHYSICIAN ASSISTANT

## 2021-06-23 PROCEDURE — 99222 1ST HOSP IP/OBS MODERATE 55: CPT | Performed by: PHYSICIAN ASSISTANT

## 2021-06-23 RX ORDER — MORPHINE SULFATE 2 MG/ML
2-4 INJECTION, SOLUTION INTRAMUSCULAR; INTRAVENOUS
Status: DISCONTINUED | OUTPATIENT
Start: 2021-06-23 | End: 2021-06-23

## 2021-06-23 RX ORDER — ANALGESIC BALM 1.74; 4.06 G/29G; G/29G
OINTMENT TOPICAL EVERY 8 HOURS PRN
Status: DISCONTINUED | OUTPATIENT
Start: 2021-06-23 | End: 2021-06-23

## 2021-06-23 RX ORDER — SODIUM CHLORIDE 9 MG/ML
INJECTION, SOLUTION INTRAVENOUS
Status: COMPLETED
Start: 2021-06-23 | End: 2021-06-23

## 2021-06-23 RX ORDER — MORPHINE SULFATE 15 MG/1
15-30 TABLET ORAL
Status: DISCONTINUED | OUTPATIENT
Start: 2021-06-23 | End: 2021-07-07 | Stop reason: HOSPADM

## 2021-06-23 RX ORDER — LIDOCAINE 50 MG/G
OINTMENT TOPICAL EVERY 4 HOURS PRN
Status: DISCONTINUED | OUTPATIENT
Start: 2021-06-23 | End: 2021-07-07 | Stop reason: HOSPADM

## 2021-06-23 RX ORDER — HYDROMORPHONE HYDROCHLORIDE 1 MG/ML
0.5 INJECTION, SOLUTION INTRAMUSCULAR; INTRAVENOUS; SUBCUTANEOUS EVERY 4 HOURS PRN
Status: DISCONTINUED | OUTPATIENT
Start: 2021-06-23 | End: 2021-06-24

## 2021-06-23 RX ORDER — SODIUM CHLORIDE 9 MG/ML
INJECTION, SOLUTION INTRAVENOUS
Status: DISPENSED
Start: 2021-06-23 | End: 2021-06-24

## 2021-06-23 RX ADMIN — MORPHINE SULFATE 4 MG: 2 INJECTION, SOLUTION INTRAMUSCULAR; INTRAVENOUS at 19:51

## 2021-06-23 RX ADMIN — ZINC SULFATE 220 MG (50 MG) CAPSULE 220 MG: CAPSULE at 08:27

## 2021-06-23 RX ADMIN — MORPHINE SULFATE 30 MG: 15 TABLET ORAL at 21:24

## 2021-06-23 RX ADMIN — METHOCARBAMOL 500 MG: 500 TABLET, FILM COATED ORAL at 08:27

## 2021-06-23 RX ADMIN — LIDOCAINE: 50 OINTMENT TOPICAL at 21:24

## 2021-06-23 RX ADMIN — MORPHINE SULFATE 10 MG: 10 INJECTION INTRAVENOUS at 05:39

## 2021-06-23 RX ADMIN — Medication 6.25 MG: at 19:50

## 2021-06-23 RX ADMIN — ATORVASTATIN CALCIUM 80 MG: 40 TABLET, FILM COATED ORAL at 21:24

## 2021-06-23 RX ADMIN — Medication 200 UNITS: at 08:27

## 2021-06-23 RX ADMIN — MELATONIN TAB 3 MG 6 MG: 3 TAB at 21:24

## 2021-06-23 RX ADMIN — MORPHINE SULFATE 4 MG: 2 INJECTION, SOLUTION INTRAMUSCULAR; INTRAVENOUS at 17:49

## 2021-06-23 RX ADMIN — Medication 250 MG: at 08:27

## 2021-06-23 RX ADMIN — MORPHINE SULFATE 7 MG: 10 INJECTION INTRAVENOUS at 12:04

## 2021-06-23 RX ADMIN — POLYETHYLENE GLYCOL 3350 17 G: 17 POWDER, FOR SOLUTION ORAL at 08:27

## 2021-06-23 RX ADMIN — Medication 6.25 MG: at 12:18

## 2021-06-23 RX ADMIN — Medication 2 TABLET: at 08:27

## 2021-06-23 RX ADMIN — SODIUM CHLORIDE, POTASSIUM CHLORIDE, SODIUM LACTATE AND CALCIUM CHLORIDE: 600; 310; 30; 20 INJECTION, SOLUTION INTRAVENOUS at 14:06

## 2021-06-23 RX ADMIN — HYDROMORPHONE HYDROCHLORIDE 0.5 MG: 1 INJECTION, SOLUTION INTRAMUSCULAR; INTRAVENOUS; SUBCUTANEOUS at 22:59

## 2021-06-23 RX ADMIN — Medication 2 TABLET: at 19:50

## 2021-06-23 RX ADMIN — Medication 20000 UNITS: at 08:27

## 2021-06-23 RX ADMIN — Medication 500 MG: at 14:06

## 2021-06-23 RX ADMIN — MORPHINE SULFATE 4 MG: 2 INJECTION, SOLUTION INTRAMUSCULAR; INTRAVENOUS at 15:30

## 2021-06-23 RX ADMIN — SODIUM CHLORIDE, POTASSIUM CHLORIDE, SODIUM LACTATE AND CALCIUM CHLORIDE: 600; 310; 30; 20 INJECTION, SOLUTION INTRAVENOUS at 03:31

## 2021-06-23 RX ADMIN — THERA TABS 1 TABLET: TAB at 08:27

## 2021-06-23 RX ADMIN — LIDOCAINE HYDROCHLORIDE 1 MG/KG/HR: 8 INJECTION, SOLUTION INTRAVENOUS at 08:14

## 2021-06-23 RX ADMIN — Medication 500 MG: at 05:40

## 2021-06-23 RX ADMIN — LIDOCAINE HYDROCHLORIDE 1 MG/KG/HR: 8 INJECTION, SOLUTION INTRAVENOUS at 03:32

## 2021-06-23 RX ADMIN — SODIUM CHLORIDE: 9 INJECTION, SOLUTION INTRAVENOUS at 23:30

## 2021-06-23 RX ADMIN — POLYETHYLENE GLYCOL 3350 17 G: 17 POWDER, FOR SOLUTION ORAL at 19:50

## 2021-06-23 RX ADMIN — Medication 500 MG: at 21:25

## 2021-06-23 RX ADMIN — MORPHINE SULFATE 30 MG: 15 TABLET ORAL at 14:11

## 2021-06-23 ASSESSMENT — ACTIVITIES OF DAILY LIVING (ADL): ADLS_ACUITY_SCORE: 23

## 2021-06-23 NOTE — CONSULTS
Consult Date: 06/21/2021    HISTORY OF PRESENT ILLNESS:  The patient is a pleasant 70-year-old gentleman with history of chronic back pain, status post several back surgeries in the past, most recently at [facility] in 2018 in which he underwent a posterior decompression thoracolumbar fusion, admitted to station 8A for ongoing cares after initially admitted to the ICU after surgery on 06/21/2021.  The patient is now status post complex thoracic to sacral spinal surgery including posterior T7-S1 fusion secondary to flat back syndrome.  Estimated blood loss was approximately 4.3 liters, but the patient did receive approximately 1.6 liters returned via Cell Saver.  Also received 3 units of packed red blood cells as well as 1 unit of platelets on 06/21/2021.  An Internal medicine consultation was ordered by Dr. Akins's team to continue to follow this patient medically postoperatively and to assess multiple medical problems including postoperative urinary retention, acute blood loss anemia and thrombocytopenia as well as complex cardiac history.  At this time, Darleen Recinos is currently sleeping of which I did not wake him.  Per 's wife, who is at bedside, the patient had a Casiano removed last night and subsequently had to be straight cathed twice and has not urinated by himself since.  Most recent bladder scan this morning was 250 mL.  Also having suprapubic abdominal pain.  The patient also postop due to the significant blood loss, had a hemoglobin this morning of 8.1 and a platelet count that went down to 40 yesterday, but improved to 60 this a.m.  INR is also elevated at 1.66, but modestly improved from a peak of 1.99, night of surgery.  The patient's last bowel movement apparently prior to surgery.  The patient's wife noted that the patient was not complaining of any chest pain, shortness of breath or nausea.  The patient has had little p.o. intake since surgery.    PAST MEDICAL HISTORY:    1.  History of  chronic back pain, status post 2 prior spinal surgeries initially in 2009, followed by posterior decompression thoracolumbar fusion at [place] in 2018.  Prior to surgery, chronically managed on oral morphine, most recently 15-30 mg q 3 hours p.r.n. pain.  2.  Hypertension.  3.  Hyperlipidemia.  4.  History of coronary artery disease, status post MI and CABG x4 in 1986.  5.  Ischemic cardiomyopathy, status post ICD placement initially in 2006, followed by replacement in 2016.  Most recent echo revealed mildly reduced EF of 45-50%. Evaluated by cardiology prior to surgery on 05/04/2021, of which it was noted the patient denied recent history of chest discomfort, dyspnea, PND, orthopnea or pedal edema.  Based on echo findings noted RCRI score of 2, which correlates to a 10.1% risk of 30-day MI, death and cardiac arrest.  6.  Chronic atrial fibrillation, chronically managed on anticoagulation with Xarelto and rate control with low dose Toprol XL.  7.  History of peripheral arterial disease, status post left superficial femoral artery balloon angioplasty earlier this year.  8.  Obstructive sleep apnea risk factors.  The patient does snore, but apparently per wife has had a sleep study in the past, of which he did not need to use noninvasive ventilation thereafter.  9.  Allergic rhinitis.    Past Surgical History:   Procedure Laterality Date     BYPASS GRAFT ARTERY CORONARY  1986     Decompression posterior lumbar and instrumented fusion  2018     Endovascular femoral and/or popliteal and/or tibial artery angioplasty/stent       Hardware removal tib/fib       I and D left extremity wound       Knee hardware removal       KNEE SURGERY       OPTICAL TRACKING SYSTEM FUSION POSTERIOR SPINE THORACIC THREE+ LEVELS N/A 6/21/2021    Procedure: o-arm/stealth assisted Posterior spinal fusion Thoracic 7 to Sacral 1 with segmental instrumentation Thoracic 7 to 8; reinsertion of instrumentation Thoracic 9 to Sacral 1; pelvic fixation;   Surgeon: Akhil Akins MD;  Location: [facility] OR     OPTICAL TRACKING SYSTEM FUSION SPINE POSTERIOR LUMBAR THREE+ LEVELS N/A 6/21/2021    Procedure: Posterior 3 column osteotomy T12-L1; pedicle subtraction osteotomy Lumbar 5;  Surgeon: Akhil Akins MD;  Location: [facility] OR     STSG left extremity wound       TONSILLECTOMY          ADMISSION MEDICATIONS:    1.  Tylenol 500, 100 mg p.o. q.8 hours p.r.n. pain.  2.  Aspirin 81 mg p.o. every day.  3.  Atorvastatin 80 mg p.o. at bedtime.  4.  Valium 2-4 mg p.o. at bedtime p.r.n. muscle spasms.  5.  Voltaren 1% topical gel applied to shoulder as needed for pain.  6.  Colace 100 mg p.o. b.i.d.  7.  Flonase 2 sprays each naris daily as needed for allergies.  8.  Toprol-XL 12.5 mg p.o. at bedtime.  9.  Morphine (MSIR) 15-45 mg p.o. every 3 hours p.r.n. pain.  10.  Multivitamin 1 tab p.o. every day.  11.  Narcan.  12.  Nitroglycerin 0.4 mg sublingual as needed, chest pain.  13.  MiraLax 17 grams p.o. every day p.r.n. constipation.  14.  Xarelto 2.5 mg p.o. q.p.m.  15.  Simethicone 375 mg p.o. p.r.n. intestinal gas.    ALLERGIES/ADVERSE EFFECTS:  Multiple, include LORAZEPAM, INFLUENZA VACCINES, HYDROXYZINE, GELFOAM, ADHESIVE TAPE and WASP VENOM.    SOCIAL HISTORY:   and lives in [place].  Former smoker; however, currently denies smoking cigarette.    Family History   Problem Relation Age of Onset     Coronary Artery Disease Father      Cancer Mother      Heart Disease Sister      Suicide Brother      Heart Disease Sister         REVIEW OF SYSTEMS:  A 10-point review of systems is negative except as stated above in history of present illness.    PHYSICAL EXAMINATION:    GENERAL:  Sleepy, otherwise, nontoxic, appearing gentleman in no acute distress.  VITAL SIGNS:  Temperature afebrile, pulse in the 80s, blood pressure 97/41, oxygen saturation 99% on 2 liters of oxygen via OxyPlus mask.  LUNGS:  Clear.  CARDIOVASCULAR:  Appears to be irregular with intermittent  ectopic beats.  ABDOMEN:  Soft with positive bowel sounds.  EXTREMITIES:  Bilateral lower extremities with PCDS on and no ankle edema.  SKIN:  No acute rash noted on exposed areas.  NEUROLOGIC:  He is quite sleepy at this time.  He does awake and is able to converse coherently.  No tremor noted.  Gait deferred.    LABORATORY DATA:  Preoperative hemoglobin 14.4.  EBL 4.3 liters +1600 mL of red blood cells returned via Cell Saver.  Labs this morning reveal a white blood cell count of 12.4, hemoglobin 8.1 and a platelet count of 60.  BMP unremarkable.  Magnesium level within normal limits.  Recent troponin level elevated to 0.075 on downward trend as of yesterday to 0.056.  INR 1.66 down from a peak of 1.99 of 2 days ago.    IMPRESSION:    1.  Status post complex spinal surgery including posterior T7-S1 fusion secondary to flat back syndrome by Dr. Akins's team, patient is doing relatively okay on postop day #2.  2.  Acute urinary retention with suprapubic abdominal pain.  Bladder scanned within the past half hour and postvoid residual was 250 mL. Casiano removed yesterday.  3.  Acute blood loss anemia and marked thrombocytopenia, status post surgery with concern for the development of DIC given elevated INR 1.66, however, most recent fibrinogen yesterday morning was 306.  4.  Hypertension with recent significant hypotension postop and assessing the need for vasopressors.  However, now off with most recent blood pressure 97/41.  5.  History of coronary artery disease, status post CABG in 1986, as well as ischemic cardiomyopathy, status post ICD placement, most recently replaced in 2016.  Most recent EKG with paced rhythm and frequent ectopic beats with recent mildly elevated troponin now on downward trend.  The patient currently denies chest pain.  Evaluated by cardiology prior to admission on 05/04/2021 with RCRI score of 2, which translates to a 10.1% risk of 30 day MI, death and cardiac arrest.  6.  Chronic atrial  fibrillation, chronically managed on Xarelto prior to surgery, held for approximately 48 hours.  7.  Postop intermittent sedation, likely due to polypharmacy from methocarbamol, Valium and lidocaine infusion postop.      PLAN:  Per ortho, platelet count goal is over 100, so we will give the patient 1 unit of platelets now.  Goal to transfuse hemoglobin less than 8.  Repeat CBC with fibrinogen and INR tonight at 8:00 p.m.  Give pt cryo if fibrinogen falls below 150 per ortho. Placed methocarbamol and Valium on hold given his current sedation; however, per ortho, continue with lidocaine infusion.  Continue to monitor bladder scans closely and replace Casiano catheter if a postvoid residual is greater than 400.  Metoprolol will be continued; however, hold for a systolic blood pressure less than 100 or heart rate less than 55.  Continue with maintenance IV fluids for now given lack of p.o. intake.  DVT prophylaxis per Dr. Akins's protocol.  Also, pain management and activity levels per Dr. Akins's team.  No further medical recommendations at this time.  The patient is medically stable and we will continue to follow with you for all intercurrent medical issues.    Thank you for this consultation.    Domenic Mcdonald PA-C        D: 2021   T: 2021   MT: [id number]    Name:     RUFINA BLAS  MRN:      [id number]        Account:      [id number]   :      1950           Consult Date: 2021     Document: [id number]

## 2021-06-23 NOTE — PROGRESS NOTES
Nursing  S- Spent time w wife Ayah today 2 -3 separate times reviewing her concerns.  Nutrtion  informed of lack of dinner tray- wife also talked w nutrtion manager.  Nutrtion consult requested per wife request  Reviewed WOC suggestions for skin cares  8A NM informed of  Wife's concerns, she will meet with her  Pt relations number was given to wife today.   Other staff have met w pt and wife today.  Pt is comfortable at this time and wife seems satisfied at this time

## 2021-06-23 NOTE — PLAN OF CARE
Attempted PT evaluation, but unable to stay awake this AM in order to participate in PT session.  PT evaluation cancelled, will check back as schedule allows.

## 2021-06-23 NOTE — TELEPHONE ENCOUNTER
Writer called and talked with Pt's wife. Pt's wife is concerned that pt's pain medication was changed and now they are in extreme pain. Writer stated that due to pt being in the hospital pt has to work with the staff there. The clinic is unable to assist with pain medication while patient is in hospital. Writer gave wife number to the nurse's station, but wife states that she can just walk to the nurse station. Writer stated that is who they will need to talk to for assistance.     Magnolia Vela LPN

## 2021-06-23 NOTE — CONSULTS
Inpatient Pain Management Service Consultation Note  06/23/21        ADMIT DATE: 6/21/2021 2 Days Post-Op   DATE OF CONSULT: June 23, 2021  Consult ordered by: Naty Stewart CNP  Consult performed by: Aleida Pederson PA-C  Visit/Communication style: [place], in person.    REASON FOR PAIN CONSULTATION:  Darleen Simmons is a 70 year old male I am seeing in consultation for evaluation and recommendations regarding his acute post operative lower back pain.    CHIEF PAIN COMPLAINT: lower back pain    ASSESSMENT:     1. Acute post operative lower back pain s/p T7-S1 fusion.  2. Chronic opioid pain management for lower back pain since 2018.  PTA, was taking MSIR 30mg, 15mg, 15mg, 30mg and 45mg (total 135mg/day) specifically on 6/20/21 however states that his use of MSIR varies day by day depending on the pain level that day.  Spouse states that he is allowed to have MSIR 15mg up to 12 tabs per day.    -- PDMP reviewed. Requirements prior to admission: one 6/20/21, 135mg/day of MSIR.    6/18/21 MSIR 15mg tabs #180   5/25/21 diazepam 2mg #90   5/20/21 MSIR 15mg #180     Primary Care Provider: Dunphy, Tyler J  Chronic Pain Provider: PCP as above.    TREATMENT RECOMMENDATIONS/PLAN:   1. Start MSIR 15-30mg PO Q 3 hours PRN. Please use oral opioid first for longer lasting relief which was reviewed with patient and his spouse. Hold or reduce if s/s sedation or respiratory depression.  Also discussed that this will possibly reduce the need for IV morphine dosing.  2. Decrease morphine IV to 2-4mg IV Q 2 hours PRN if oral morphine is not providing relief and no s/s sedation or respiratory depression.  3. Bowel regimen to prevent opioid induced constipation. Last BM on 6/21/21.  States has bowel regimen at baseline.  4. Caution with acetaminophen (now at 500mg Q 8 hours) due to elevated AST.  5. Robaxin 500mg 4x/day is being held due to sedation concerns.  6. Consider lidocaine ointment and menthol ointment-pt listed adhesive  "allergies.    Pain Service will Continue to follow at this time.     Recommendations were discussed with ortho team  Plan was reviewed by the Pain Service staff anesthesiologist Dr. Price.    Thank you for consulting the Inpatient Pain Management Service.  The above recommendations are to be acted upon at the primary team s discretion.    To reach us:  Mon - Friday 8 AM - 3 PM: Pager [phone] (Text Page)  After hours, weekends and holidays: Primary service should call [phone] the answering service for the on-call pain specialist    HISTORY OF PRESENT ILLNESS: Darleen Simmons is a 70 year old male with history of \"chronic opioid use, former smoker, HTN, HLP, CAD c/b MI, ischemic cardiomyopathy s/p ICD placement, PAD s/p left SFA POBA for latissimus dorsi flap to left calf, atrial fibrillation/flutter, acute blood loss anemia, flat back syndrome\" admitted on 6/21/21 for  T7-pelvis revision PSIF with T12-L1 3CO and L5 PSO on 6/21/21 with Dr. Akins and Dr. Estevez.  Was admitted to SICU post operatively for management and onto the floors on 6/22/21.  His pain has been challenging to manage, post operative Lumbar CT obtained \"did not demonstrate overt compression.\"  Pain service was consulted to assist with pain management on 6/23/21.    Current analgesic use: on 6/22/21: IV morphine 10mg x 6, IV dilaudid 1mg x 2, Dilaudid 0.5mg x2, MSIR 15mg PO x 3.    \"Jorje\" was evaluated today with his wife at bedside who also helped with history.  Jorje has hearing aids in place but still having difficulty hearing.  Jorje states his pain is in the lower back with pain level 9/10 .  PTA pain level was 8/10. States that morphine 10mg IV is the \"only thing that helps\" and states he could use it more often.  He states the oral morphine pills \"don't work.\"  It was somewhat challenging to obtain his baseline PTA opioid use because he states his use varies from day to day and couldn't really remember on average what he uses.  " Jorje intermittently yells during the visit for more IV morphine and was not very cooperative.    I explained that my goal is to understand his baseline opioid use to determine his opioid tolerance to come up with a pain medication regimen that would provide him relief while minimizing potential side effects.  Jorje at times says his current regimen is working and other times states it's not enough.  I discussed that the overall goal is to manage his pain and would recommend using oral morphine then IV morphine. Also note that RN reports that patient was very sleepy this morning after IV morphine 10mg and spouse reports concerns with this.          REVIEW OF 10 BODY SYSTEMS: 10 point ROS of systems including Constitutional, Eyes, Respiratory, Cardiovascular, Gastroenterology, Genitourinary, Integumentary, Musculoskeletal, Psychiatric were all negative except for pertinent positives noted in my HPI.     INPATIENT MEDICATIONS PERTINENT TO PAIN CONSULT:   Medications related to Pain Management (From now, onward)    Start     Dose/Rate Route Frequency Ordered Stop    06/22/21 2000  senna-docusate (SENOKOT-S/PERICOLACE) 8.6-50 MG per tablet 2 tablet - PATIENT SUPPLIED MEDICAITON      2 tablet Oral 2 TIMES DAILY 06/22/21 1514      06/22/21 1600  [Held by provider]  methocarbamol (ROBAXIN) tablet 500 mg     (Held by provider since Wed 6/23/2021 at 1025 by Erika Enrique MD.Hold Reason: Other.)    500 mg Oral 4 TIMES DAILY 06/22/21 1346      06/22/21 1444  simethicone (MYLICON) chewable tablet 125 mg - PATIENTS OWN SUPPLY      125 mg Oral 3 TIMES DAILY PRN 06/22/21 1444      06/22/21 1430  acetaminophen (TYLENOL) rapid release gels 500 mg -- PATIENT SUPPLIED MEDICATION      500 mg Oral EVERY 8 HOURS PRN 06/22/21 1430      06/22/21 1411  diclofenac (VOLTAREN) 1 % topical gel 2-4 g - PATIENTS OWN SUPPLY      2-4 g Topical 4 TIMES DAILY PRN 06/22/21 1341      06/22/21 1400  acetaminophen (TYLENOL) rapid release gels 500 mg  -- PATIENT SUPPLIED MEDICATION      500 mg Oral EVERY 8 HOURS SCHEDULED 06/22/21 1347      06/22/21 1351  diazepam (VALIUM) tablet 2-4 mg      2-4 mg Oral AT BEDTIME PRN 06/22/21 1351      06/22/21 0954  Morphine Sulfate (PF) injection 5-10 mg      5-10 mg Intravenous EVERY 1 HOUR PRN 06/22/21 0954      06/22/21 0853  morphine (MSIR) IR tablet 15 mg      15 mg Oral EVERY 3 HOURS PRN 06/22/21 0857      06/22/21 0800  polyethylene glycol (MIRALAX) Packet 17 g      17 g Oral 2 TIMES DAILY 06/21/21 2112      06/21/21 2111  magnesium hydroxide (MILK OF MAGNESIA) suspension 30 mL      30 mL Oral DAILY PRN 06/21/21 2112      06/21/21 2111  bisacodyl (DULCOLAX) Suppository 10 mg      10 mg Rectal DAILY PRN 06/21/21 2112      06/21/21 2111  sodium phosphate (FLEET ENEMA) 1 enema      1 enema Rectal ONCE PRN 06/21/21 2112      06/21/21 2111  lidocaine 1 % 0.1-1 mL      0.1-1 mL Other EVERY 1 HOUR PRN 06/21/21 2111      06/21/21 2111  lidocaine (LMX4) cream       Topical EVERY 1 HOUR PRN 06/21/21 2111            CURRENT MEDICATIONS:   Current Facility-Administered Medications Ordered in Epic   Medication Dose Route Frequency Provider Last Rate Last Admin     sodium chloride 0.9 % infusion              acetaminophen (TYLENOL) rapid release gels 500 mg -- PATIENT SUPPLIED MEDICATION  500 mg Oral Q8H [facility] Brenton Hamm MD   500 mg at 06/23/21 0540     acetaminophen (TYLENOL) rapid release gels 500 mg -- PATIENT SUPPLIED MEDICATION  500 mg Oral Q8H PRN Akhil Akins MD         ascorbic acid (vitamin C) chewable tablet 250 mg  250 mg Oral Daily Joseph Maravilla MD   250 mg at 06/23/21 0827     atorvastatin (LIPITOR) tablet 80 mg  80 mg Oral At Bedtime Joseph Maravilla MD   80 mg at 06/22/21 2130     benzocaine-menthol (CEPACOL) 15-3.6 MG lozenge 1 lozenge  1 lozenge Buccal Q1H PRN Joseph Maravilla MD         bisacodyl (DULCOLAX) Suppository 10 mg  10 mg Rectal Daily PRN Joseph Maravilla MD         [START ON  6/26/2021] cholecalciferol (VITAMIN D3) 1250 mcg (06466 units) capsule 1,250 mcg  1,250 mcg Oral Q7 Days Joseph Maravilla MD         glucose gel 15-30 g  15-30 g Oral Q15 Min PRN Cely Flores PA-C        Or     dextrose 50 % injection 25-50 mL  25-50 mL Intravenous Q15 Min PRN Cely Flores PA-C        Or     glucagon injection 1 mg  1 mg Subcutaneous Q15 Min PRN Cely Flores PA-C         diazepam (VALIUM) tablet 2-4 mg  2-4 mg Oral At Bedtime PRN Brenton Hamm MD         diclofenac (VOLTAREN) 1 % topical gel 2-4 g - PATIENTS OWN SUPPLY  2-4 g Topical 4x Daily PRN Brenton Hamm MD         fluticasone (FLONASE) 50 MCG/ACT spray 2 spray  2 spray Both Nostrils Daily PRN Joseph Maravilla MD         insulin aspart (NovoLOG) injection (RAPID ACTING)  1-7 Units Subcutaneous TID AC Cely Flores PA-C   1 Units at 06/22/21 1034     insulin aspart (NovoLOG) injection (RAPID ACTING)  1-5 Units Subcutaneous At Bedtime Cely Flores PA-C         lactated ringers infusion   Intravenous Continuous Brenton Hamm  mL/hr at 06/23/21 0331 New Bag at 06/23/21 0331     lidocaine (LMX4) cream   Topical Q1H PRN Joseph Maravilla MD         lidocaine 1 % 0.1-1 mL  0.1-1 mL Other Q1H PRN Joseph Maravilla MD         magnesium hydroxide (MILK OF MAGNESIA) suspension 30 mL  30 mL Oral Daily PRN Joseph Maravilla MD         melatonin tablet 6 mg  6 mg Oral At Bedtime Domonique Juarez MD   6 mg at 06/22/21 2130     [Held by provider] methocarbamol (ROBAXIN) tablet 500 mg  500 mg Oral 4x Daily Brenton Hamm MD   500 mg at 06/23/21 0827     metoprolol tartrate (LOPRESSOR) quarter-tab 6.25 mg  6.25 mg Oral BID Erika Enrique MD   6.25 mg at 06/23/21 1218     morphine (MSIR) IR tablet 15 mg  15 mg Oral Q3H PRN Cely Flores PA-C   15 mg at 06/22/21 1955     Morphine Sulfate (PF) injection 5-10 mg  5-10 mg Intravenous Q1H PRN Brenton Hamm MD   7 mg at  06/23/21 1204     multivitamin, therapeutic (THERA-VIT) tablet 1 tablet  1 tablet Oral Daily Joseph Maravilla MD   1 tablet at 06/23/21 0827     naloxone (NARCAN) injection 0.2 mg  0.2 mg Intravenous Q2 Min PRN Catalino Arreaga MD         naloxone (NARCAN) injection 0.2 mg  0.2 mg Intramuscular Q2 Min PRN Catalino Arreaga MD         naloxone (NARCAN) injection 0.4 mg  0.4 mg Intravenous Q2 Min PRN Catalino Arreaga MD         naloxone (NARCAN) injection 0.4 mg  0.4 mg Intramuscular Q2 Min PRN Catalino Arreaga MD         nitroGLYcerin (NITROSTAT) sublingual tablet 0.4 mg  0.4 mg Sublingual Q5 Min PRN oJseph Maravilla MD         ondansetron (ZOFRAN-ODT) ODT tab 4-8 mg  4-8 mg Oral Q6H PRN Joseph Maravilla MD        Or     ondansetron (ZOFRAN) injection 4-8 mg  4-8 mg Intravenous Q6H PRN Joseph Maravilla MD         polyethylene glycol (MIRALAX) Packet 17 g  17 g Oral BID Taiwo, Joseph Sheth MD   17 g at 06/23/21 0827     prochlorperazine (COMPAZINE) injection 5 mg  5 mg Intravenous Q6H PRN Joseph Maravilla MD        Or     prochlorperazine (COMPAZINE) tablet 5 mg  5 mg Oral Q6H PRN Joseph Maravilla MD         senna-docusate (SENOKOT-S/PERICOLACE) 8.6-50 MG per tablet 2 tablet - PATIENT SUPPLIED MEDICAITON  2 tablet Oral BID Akhil Akins MD   2 tablet at 06/23/21 0827     simethicone (MYLICON) chewable tablet 125 mg - PATIENTS OWN SUPPLY  125 mg Oral TID PRN Akhil Akins MD         sodium chloride (PF) 0.9% PF flush 10 mL  10 mL Intravenous Once Dakota Estrada MD         sodium chloride (PF) 0.9% PF flush 3 mL  3 mL Intracatheter Q8H Joseph Maravilla MD   3 mL at 06/23/21 0548     sodium chloride (PF) 0.9% PF flush 3 mL  3 mL Intracatheter q1 min prn Joseph Maravilla MD         sodium phosphate (FLEET ENEMA) 1 enema  1 enema Rectal Once PRN Joseph Maravilla MD         vitamin A capsule 20,000 Units  20,000 Units Oral Daily Joseph Maravilla MD   20,000  Units at 06/23/21 0827     vitamin E (TOCOPHEROL) 100 units (45 mg) capsule 200 Units  200 Units Oral Daily Joseph Maravilla MD   200 Units at 06/23/21 0827     zinc sulfate (ZINCATE) capsule 220 mg  220 mg Oral Daily Joseph Maravilla MD   220 mg at 06/23/21 0827     No current Epic-ordered outpatient medications on file.       HOME/PREVIOUS MEDICATIONS:   Prior to Admission medications    Medication Sig Start Date End Date Taking? Authorizing Provider   acetaminophen (TYLENOL) 500 MG tablet Take 500-1,000 mg by mouth every 8 hours as needed for mild pain   Yes Unknown, Entered By History   aspirin 81 MG EC tablet Take 81 mg by mouth daily   Yes Unknown, Entered By History   atorvastatin (LIPITOR) 80 MG tablet Take 80 mg by mouth At Bedtime  3/2/21  Yes Reported, Patient   D3-50 1.25 MG (95803 UT) capsule Take 1,250 mcg by mouth every 7 days Takes on Saturdays. 4/22/21  Yes Reported, Patient   diazepam (VALIUM) 2 MG tablet Take 2-4 mg by mouth nightly as needed for muscle spasms  9/3/20  Yes Reported, Patient   diclofenac (VOLTAREN) 1 % topical gel Apply topically as needed (shoulder pain)    Yes Reported, Patient   fluticasone (FLONASE) 50 MCG/ACT nasal spray Spray 2 sprays into both nostrils daily as needed    Yes Reported, Patient   hydrocortisone (CORTAID) 1 % external cream Apply topically 2 times daily as needed   Yes Unknown, Entered By History   metoprolol succinate ER (TOPROL-XL) 25 MG 24 hr tablet Take 12.5 mg by mouth At Bedtime  7/20/20  Yes Reported, Patient   morphine (MSIR) 15 MG IR tablet Take 15-30 mg by mouth every 3 hours as needed Every 3 hrs as needed 2/22/21  Yes Reported, Patient   multivitamin, therapeutic with minerals (THERA-VIT-M) TABS tablet Take 1 tablet by mouth daily   Yes Reported, Patient   polyethylene glycol (MIRALAX) 17 GM/Dose powder Take 17 g by mouth daily as needed  11/9/20  Yes Reported, Patient   senna-docusate (SENOKOT-S/PERICOLACE) 8.6-50 MG tablet Take 1 tablet by  "mouth 2 times daily   Yes Unknown, Entered By History   simethicone (MYLICON) 125 MG chewable tablet Take 375 mg by mouth as needed for intestinal gas   Yes Unknown, Entered By History   EPINEPHrine (ANY BX GENERIC EQUIV) 0.3 MG/0.3ML injection 2-pack Inject 0.3 mg into the muscle as needed for anaphylaxis    Unknown, Entered By History   naloxone (NARCAN) 4 MG/0.1ML nasal spray Spray 4 mg into one nostril alternating nostrils once as needed for opioid reversal every 2-3 minutes until assistance arrives    Unknown, Entered By History   nitroGLYcerin (NITROSTAT) 0.4 MG sublingual tablet Place 0.4 mg under the tongue as needed 1/3/21   Reported, Patient   NONFORMULARY Uriva Rx - supplement for bladder tone    Unknown, Entered By History   NONFORMULARY Follinex supplement - herbal supplement for hair growth.    Unknown, Entered By History       PAST PAIN TREATMENT: chronic opioid management    ALLERGIES:    Allergies   Allergen Reactions     Wasp Venom Protein Shortness Of Breath and Swelling     sob  swelling  Other reaction(s): Swelling  sob  \"black wasps\"     Adhesive Tape      Paper tape - rash     Gelfoam [Gelatin]      rash     Hydroxyzine Other (See Comments)     Nightmares     Influenza Vaccines Other (See Comments)     Avoid influenza vaccine, history of Guillain [place]      Lorazepam Anxiety     Other reaction(s): Irritability, Mental Status Change, Other (see comments)  Pt states he becomes very angry and mean after taking at [facility].    Tolerates diazepam        PAST MEDICAL AND PSYCHIATRIC HISTORY:    Past Medical History:   Diagnosis Date     Acute osteomyelitis of left lower leg (H) 2017     Acute superficial venous thrombosis of lower extremity, right 2018     Atrial flutter (H)      Automatic implantable cardioverter-defibrillator in situ      CAD (coronary artery disease)      Depression      Flatback syndrome      History of acute inferior wall MI      HTN (hypertension)      Hyperlipidemia LDL " goal <100      Ischemic cardiomyopathy      Kyphosis (acquired) (postural)      JOANNA (obstructive sleep apnea)      PAD (peripheral artery disease) (H)      Paroxysmal atrial fibrillation (H)      PVD (peripheral vascular disease) (H)        PAST SURGICAL HISTORY:   Past Surgical History:   Procedure Laterality Date     BYPASS GRAFT ARTERY CORONARY  1986     Decompression posterior lumbar and instrumented fusion  2018     Endovascular femoral and/or popliteal and/or tibial artery angioplasty/stent       Hardware removal tib/fib       I and D left extremity wound       Knee hardware removal       KNEE SURGERY       OPTICAL TRACKING SYSTEM FUSION POSTERIOR SPINE THORACIC THREE+ LEVELS N/A 6/21/2021    Procedure: o-arm/stealth assisted Posterior spinal fusion Thoracic 7 to Sacral 1 with segmental instrumentation Thoracic 7 to 8; reinsertion of instrumentation Thoracic 9 to Sacral 1; pelvic fixation;  Surgeon: Akhil Akins MD;  Location: [facility] OR     OPTICAL TRACKING SYSTEM FUSION SPINE POSTERIOR LUMBAR THREE+ LEVELS N/A 6/21/2021    Procedure: Posterior 3 column osteotomy T12-L1; pedicle subtraction osteotomy Lumbar 5;  Surgeon: Akhil Akins MD;  Location: [facility] OR     STSG left extremity wound       TONSILLECTOMY         FAMILY HISTORY: family history includes Cancer in his mother; Coronary Artery Disease in his father; Heart Disease in his sister and sister; Suicide in his brother.    HEALTH & LIFESTYLE PRACTICES:   Tobacco:  reports that he quit smoking about 33 years ago. His smoking use included cigarettes. He has quit using smokeless tobacco.  Alcohol:  reports previous alcohol use.  Illicit drugs:  has no history on file for drug.    SOCIAL HISTORY NARRATIVE:   Social History     Socioeconomic History     Marital status:      Spouse name: Not on file     Number of children: Not on file     Years of education: Not on file     Highest education level: Not on file   Occupational History     Not on  file   Social Needs     Financial resource strain: Not on file     Food insecurity     Worry: Not on file     Inability: Not on file     Transportation needs     Medical: Not on file     Non-medical: Not on file   Tobacco Use     Smoking status: Former Smoker     Types: Cigarettes     Quit date: 1988     Years since quittin.4     Smokeless tobacco: Former User   Substance and Sexual Activity     Alcohol use: Not Currently     Drug use: Not on file     Sexual activity: Not on file   Lifestyle     Physical activity     Days per week: Not on file     Minutes per session: Not on file     Stress: Not on file   Relationships     Social connections     Talks on phone: Not on file     Gets together: Not on file     Attends Quaker service: Not on file     Active member of club or organization: Not on file     Attends meetings of clubs or organizations: Not on file     Relationship status: Not on file     Intimate partner violence     Fear of current or ex partner: Not on file     Emotionally abused: Not on file     Physically abused: Not on file     Forced sexual activity: Not on file   Other Topics Concern     Not on file   Social History Narrative     Not on file       LABORATORY VALUES:   Last Basic Metabolic Panel:  Lab Results   Component Value Date     2021      Lab Results   Component Value Date    POTASSIUM 4.7 2021     Lab Results   Component Value Date    CHLORIDE 103 2021     Lab Results   Component Value Date    TRENTON 8.1 2021     Lab Results   Component Value Date    CO2 29 2021     Lab Results   Component Value Date    BUN 17 2021     Lab Results   Component Value Date    CR 0.69 2021     Lab Results   Component Value Date     2021       CBC results:  Lab Results   Component Value Date    WBC 12.4 2021     Lab Results   Component Value Date    HGB 8.1 2021     Lab Results   Component Value Date    HCT 24.2 2021     Lab  "Results   Component Value Date    PLT 60 06/23/2021       DIAGNOSTIC TESTS:     Labs above reviewed as well as additional relevant diagnostic studies from the EPIC record.     PHYSICAL EXAMINATION:  /49 (BP Location: Right arm)   Pulse 80   Temp 98.1  F (36.7  C) (Axillary)   Resp 20   Ht 1.854 m (6' 1\")   Wt 107.5 kg (236 lb 14.4 oz)   SpO2 99%   BMI 31.26 kg/m       EXAM:  CONSTITUTIONAL/GENERAL APPEARANCE: lying flat in bed. Conversant.  EYES: EOMI, sclerae clear  ENT/NECK: neck is supple  RESPIRATORY: on oxymask  CARDIOVASCULAR: HR within normal limits  GI:round, soft,   MUSCULOSKELETAL/BACK/SPINE/EXTREMITIES: moving UE and LE. Drain present.     NEURO:  Awake and alert  SKIN/VASCULAR EXAM:  Dry and warm.  PSYCHIATRIC/BEHAVIORAL/OBSERVATIONS:  Vocalizes pain   Judgment/Insight -fair   Orientation - x3   Memory -fair   Mood and affect - irritable          TIME SPENT: 70  minutes including 35 minutes face-to-face time counseling his  about his diagnosis and treatment options, and coordinating care with the primary team.  DECISION-MAKING: The level of decision-making in this case is high/complex due to the complexity of medical problems, acute/chronic pain, opioid analgesia issues, and behavioral factors.    Aleida Pederson PA-C  June 23, 2021, 12:42 PM  Inpatient Pain Management Service          "

## 2021-06-23 NOTE — PLAN OF CARE
Pt arrived to unit around 0300 from [facility] status d/t 4.5 L EBL. Transferred to bed via hover mat x3 assist. A&Ox4. VSS, on 2 LPM O2 via oxi plus mask. Tele, 100% paced w/ occasional PVCs. CMS intact, denies N/T. Strengths weaker in RLE. LS clear but diminished in lower lobes. Regular diet; pt states that he does not have an appetite. Tolerating water w/ meds, swallows without difficulty. R PIV SL, L PIV infusing LR @ 100ml/hr and lidocaine gtt - no evidence of lidocaine toxicity. Skin intact ex spinal incision, abrasions to bilateral thighs and chin, blisters to chest and knees, and scabs to bilateral shins. Pocket of fluid to L lateral ankle, baseline. Pain controlled w/ lido gtt, prn IV morphine 5-10mg q1hr, and PO morphine 15mg q3hr. Pt has been unable to void since peng was removed last night; bladder scanned @ 0500 for 605mL, straight cathed for 700mL. Call light within reach, able to make needs known. Continue to monitor and follow plan of care.

## 2021-06-23 NOTE — TELEPHONE ENCOUNTER
[facility] Call Center    Phone Message    May a detailed message be left on voicemail: yes     Reason for Call: Other: Patient saw Dr. Akins's nurse this morning when she changed his pain medication to morphine.      Now patient is having more severe pain and feels worse.    Please call spouse, Saadia to discuss.  They were hoping they could have this resolved before Dr. Akins leaves for the day.    Action Taken: Message routed to:  [facility] ([facility] ortho    Travel Screening: Not Applicable

## 2021-06-23 NOTE — DISCHARGE SUMMARY
[facility]  Orthopaedic Discharge Summary    Patient: Darleen Simmons MRN# [id number]   Age/Sex: 70 year old male YOB: 1950      Date of Admission:  6/21/2021  Date of Discharge:  7/2/2021  Admitting Physician:  Akhil Akins MD  Discharge Physician:  Gregory Vincent MD  Primary Care Provider:  Dunphy, Tyler J  Discharge location         ARU    DISCHARGE DIAGNOSIS:    Flatback syndrome [M40.30]  H/O spinal fusion [Z98.1]  H/O spinal cord injury [Z87.828]    PROCEDURE(S):   6/21/2021 Procedure(s):  o-arm/stealth assisted Posterior spinal fusion Thoracic 7 to Sacral 1 with segmental instrumentation Thoracic 7 to 8; reinsertion of instrumentation Thoracic 9 to Sacral 1; pelvic fixation  Posterior 3 column osteotomy T12-L1; pedicle subtraction osteotomy Lumbar 5     BRIEF HISTORY: (copied from Dr. Akins's note on 6/22/21)  Mr. Simmons is a 70-year-old male who sustained a thoracolumbar injury that was initially treated surgically in 2009.  He describes a history of neurologic deficits that he was slowly able to regain his ability to ambulate in 2018.  He underwent posterior spinal fusion from T9 to the pelvis at [facility] by Dr. Wallace Montana with the patient's ability to regain his ability to ambulate the position that had been fused and was intended to facilitate wheelchair ambulation and he has a significant flat back syndrome.  He ambulates with a walker and is limited because of the flat back position, requiring hip and knee flexion in order to stand.  He has significant kyphosis at the thoracolumbar junction and substantial pelvic incidence lumbar lordosis mismatch and a T1 pelvic angle of approximately 34 degrees.  All of this is indicative of a severe flat back syndrome.     He underwent extensive imaging including CT scan showing significant disk degeneration and spondylosis at 2 levels above his prior fusion and significant calcification  and anticipated scarring around his neural axis.     He was extensively counseled on several occasions and underwent an extensive workup by our preanesthesia clinic including the new cardiac evaluation and the interpretation by our anesthesia team was that he needed his surgery done on our [facility] where cardiology was immediately available.      HOSPITAL COURSE:    Surgery was uncomplicated. There was significant blood loss (~4L) and a dural tear that was repaired primarily. Postoperatively the patient was admitted to the SICU.  Intravenous antibiotics were provided for 24 hours after the surgery.  There were hematologic dyscrasias postoperatively that were followed closely. He received 1U plts postoperatively. Postoperatively pain was a significant problem initially in the SICU. He was receiving significant amounts of IV dilaudid and on a ketamine gtt and lidocaine gtt. He was switched to morphine (a PTA medication) and his pain levels improved. The patient complained of a radicular pain and numbness in the a bilateral L4 distribution on POD#1.  A lumbar CT was obtained that did not demonstrate overt compression. As his mental status improved with pain control, these symptoms seemed to resolve. He had ongoing severe right gluteal pain, which was localized to the insertion of the piriformis muscle. This significantly improved with stretching. Overnight POD#1, the patient was transferred from the SICU to the floor. Medicine was consulted to help with management of medical comorbidities. Patient received routine nursing cares on the floor.  A clear liquid diet was started and subsequently advance to regular, which the patient tolerated without issue.  Stool softeners were administered while taking pain medications to prevent constipation.  POD#2, the patient described return of a new radicular pain involving his right leg. This was more in a L5 distribution. He had absent dorsiflexion and EHL movement. Given  "that the imaging the day prior was reassuring, these symptoms were thought to be due to nerve root retraction and watchful waiting was employed.    The  patient was able to void independently .  Post operative xrays were obtained. Physical and occupational therapy were initiated for safety training, ADLs, mobility, and ROM exercises. Surgical drains were removed, after output was minimal.  After demonstrating the ability to tolerate a diet, pain control on oral pain medications, and evaluation by physical and occupational therapy, the patient was deemed medically safe for discharge to ARU.    FOLLOW UP:      Future Appointments   Date Time Provider Department Center   6/23/2021  8:30 AM URXR3 URXRAY [place]   6/23/2021  9:15 AM Orquidea Jensen Pt, PT URPT [place]   8/5/2021 12:30 PM Akhil Akins MD [facility]   9/9/2021 11:00 AM Akhil Akins MD [facility] ORTHOPEDICS - Located 4th Floor (4D ) in the Clinics and Surgery Center at [address] [place] [address].     Department Address: [address] [place] [id number]     [facility] Orthopedic Scheduling contact number: [phone]    Call if you haven't heard regarding these appointments within 7 days of discharge.      PHYSICAL EXAMINATION:  /63 (BP Location: Right arm)   Pulse 80   Temp 97.4  F (36.3  C) (Oral)   Resp 14   Ht 1.854 m (6' 1\")   Wt 107.5 kg (236 lb 14.4 oz)   SpO2 93%   BMI 31.26 kg/m    Gen: NAD. Comfortable appearing.  Resp: Breathing comfortably on RA.  Musculoskeletal: dressing c/d/i.  Sensation from C4-L1 is preserved.     Lower extremities:  Motor Strength Right Left   Hip flexion: L1, L2, L3 5/5 5/5   Knee flexion: S1 5/5 5/5   Knee extension: L3, L4 5/5 5/5   Ankle dosiflexion: L4, L5 0/5 5/5   EHL: L5 0/5 5/5   Ankle plantarflexion: S1 5/5 5/5   Has baseline reduced ROM at the right ankle.     Denies decreased sensation in legs, bilaterally.     " "      PLANNED DISCHARGE ORDERS:          Discharge Procedure Orders   General info for SNF   Order Comments: Length of Stay Estimate: Short Term Care: Estimated # of Days <30 Condition at Discharge: Improving Level of care:skilled  Rehabilitation Potential: Good Admission H&P remains valid and up-to-date: Yes Recent Chemotherapy: N/A Use Nursing Home Standing Orders: Yes     Mantoux Instructions   Order Comments: Give two-step Mantoux (PPD) Per Facility Policy {.:[id number]     Incentive Spirometry   Order Comments: Incentive Spirometry 10 times per hour, 4 times per day.     Reason for your hospital stay   Order Comments: You were admitted following your spine procedure.     When to call - Contact Surgeon Team   Order Comments: You may experience symptoms that require follow-up before your scheduled appointment. Refer to the \"Stoplight Tool\" for instructions on when to contact your Surgeon Team if you are concerned about pain control, blood clots, constipation, or if you are unable to urinate.     When to call - Reach out to Urgent Care   Order Comments: If you are not able to reach your Surgeon Team and you need immediate care, go to the Orthopedic Walk-in Clinic or Urgent Care at your Surgeon's office.  Do NOT go to the Emergency Room unless you have shortness of breath, chest pain, or other signs of a medical emergency.     When to call - Reasons to Call 911   Order Comments: Call 911 immediately if you experience sudden-onset chest pain, arm weakness/numbness, slurred speech, or shortness of breath     Symptoms - Fever Management   Order Comments: A low grade fever can be expected after surgery.  Use acetaminophen (TYLENOL) as needed for fever management.  Contact your Surgeon Team if you have a fever greater than 101.5 F, chills, and/or night sweats.     Symptoms - Constipation management   Order Comments: Constipation (hard, dry bowel movements) is expected after surgery due to the combination of being less " active, the anesthetic, and the opioid pain medication.  You can do the following to help reduce constipation:  ~  FLUIDS:  Drink clear liquids (water or Gatorade), or juice (apple/prune).  ~  DIET:  Eat a fiber rich diet.    ~  ACTIVITY:  Get up and move around several times a day.  Increase your activity as you are able.  MEDICATIONS:  Reduce the risk of constipation by starting medications before you are constipated.  You can take Miralax   (1 packet as directed) and/or a stool softener (Senokot 1-2 tablets 1-2 times a day).  If you already have constipation and these medications are not working, you can get magnesium citrate and use as directed.  If you continue to have constipation you can try an over the counter suppository or enema.  Call your Surgeon Team if it has been greater than 3 days since your last bowel movement.     Symptoms - Reduced Urine Output   Order Comments: Changes in the amount of fluids you drank before and after surgery may result in problems urinating.  It is important to stay well-hydrated after surgery and drink plenty of water. If it has been greater than 8 hours since you have urinated despite drinking plenty of water, call your Surgeon Team.     Comfort and Pain Management - Pain after Surgery   Order Comments: Pain after surgery is normal and expected.  You will have some amount of pain for several weeks after surgery.  Your pain will improve with time.  There are several things you can do to help reduce your pain including: rest, ice, elevation, and using pain medications as needed. Contact your Surgeon Team if you have pain that persists or worsens after surgery despite rest, ice, elevation, and taking your medication(s) as prescribed. Contact your Surgeon Team if you have new numbness, tingling, or weakness in your operative extremity.     Comfort and Pain Management - Swelling after Surgery   Order Comments: Swelling and/or bruising of the surgical extremity is common and may  persist for several months after surgery. In addition to frequent icing and elevation, gentle compressive support with an ACE wrap or tubigrip may help with swelling. Apply compression regularly, removing at least twice daily to perform skin checks. Contact your Surgeon Team if your swelling increases and is NOT associated with an increase in your activity level, or if your swelling increases and is associated with redness and pain.     Comfort and Pain Management - Cold therapy   Order Comments: Ice can be used to control swelling and discomfort after surgery. Place a thin towel over your operative site and apply the ice pack overtop. Leave ice pack in place for 20 minutes, then remove for 20 minutes. Repeat this 20 minutes on/20 minutes off routine as often as tolerated.     Medication Instructions - Acetaminophen (TYLENOL) Instructions   Order Comments: You were discharged with acetaminophen (TYLENOL) for pain management after surgery. Acetaminophen most effectively manages pain symptoms when it is taken on a schedule without missing doses (every four, six, or eight hours). Your Provider will prescribe a safe daily dose between 3000 - 4000 mg.  Do NOT exceed this daily dose. Most patients use acetaminophen for pain control for the first four weeks after surgery.  You can wean from this medication as your pain decreases.     Medication Instructions - Opioids - Tapering Instructions   Order Comments: In the first three days following surgery, your symptoms may warrant use of the narcotic pain medication every four to six hours as prescribed. This is normal. As your pain symptoms improve, focus your efforts on decreasing (tapering) use of narcotic medications. The most successful tapering strategy is to first, decrease the number of tablets you take every 4-6 hours to the minimum prescribed. Then, increase the amount of time between doses.  For example:  First, taper to   or 1 tablet every 4-6 hours.  Then, taper to    or 1 tablet every 6-8 hours.  Then, taper to   or 1 tablet every 8-10 hours.  Then, taper to   or 1 tablet every 10-12 hours.  Then, taper to   or 1 tablet at bedtime.  The bedtime dose can help with comfort during sleep and is typically the last dose to be discontinued after surgery.     Follow Up Care   Order Comments: Follow-up with your Surgeon Team at 6 weeks post op.     Shower with wound/dressing NOT covered   Order Comments: You do not need to cover your dressing or incision in the shower, you may allow water and soap to run over top of the surgical dressing or incision. You may shower 1 days after surgery.  You are strictly prohibited from soaking or submerging the surgical wound underwater.     Medication Instructions - Opioid Instructions (Greater than or equal to 65 years)   Order Comments: You were discharged with an opioid medication (hydromorphone, oxycodone, hydrocodone, or tramadol). This medication should only be taken for breakthrough pain that is not controlled with acetaminophen (TYLENOL). If you rate your pain less than 3 you do not need this medication.  Pain rating 0-3:  You do not need this medication  Pain rating 4-6:  Take 1/2 tablet every 4-6 hours as needed  Pain rating 7-10:  Take 1 tablet every 4-6 hours as needed  Do not exceed 4 tablets per day     Activity - Up with assistive device   Order Comments: Activity: Up with assist until independent. No excessive bending or twisting. No lifting >10 lbs x 6 weeks. Renny lift approved for transfers. Keep back straight if using lift.  Weight bearing status: WBAT.     Order Specific Question Answer Comments   Is discharge order? Yes      Diet   Order Comments: Follow this diet upon discharge: Orders Placed This Encounter      Calorie Counts      Snacks/Supplements Adult: Ensure Clear; With Meals      Snacks/Supplements Adult: Other; PM snack: 2 hard boiled eggs with salt and pepper, strawberry yogurt + splenda; Between Meals      Regular  Diet Adult     Order Specific Question Answer Comments   Is discharge order? Yes      Cardiac Device Check - Inpatient   Standing Status: Future Number of Occurrences: 1 Standing Exp. Date: 06/21/23     Cardiac Device Check - Inpatient   Standing Status: Future Number of Occurrences: 1 Standing Exp. Date: 06/21/23     Assign Questionnaire Series to Patient         Discharge Medication List as of 7/7/2021  1:06 PM      START taking these medications    Details   ascorbic acid 250 MG CHEW Take 250 mg by mouth daily, Transitional      methocarbamol (ROBAXIN) 500 MG tablet Take 1 tablet (500 mg) by mouth 4 times daily, Transitional      vitamin E (TOCOPHEROL) 200 units (90 mg) capsule Take 1 capsule (200 Units) by mouth daily, Transitional      zinc sulfate (ZINCATE) 220 (50 Zn) MG capsule Take 1 capsule (220 mg) by mouth daily, Transitional         CONTINUE these medications which have CHANGED    Details   acetaminophen (TYLENOL) 500 MG tablet Take 1 tablet (500 mg) by mouth 2 times daily as needed for mild pain or fever, Disp-60 tablet, R-0, Transitional      aspirin 81 MG EC tablet Take 1 tablet (81 mg) by mouth daily, Transitional      diazepam (VALIUM) 5 MG tablet Take 1-2 tablets (5-10 mg) by mouth every 6 hours as needed for muscle spasms, Disp-30 tablet, Transitional      metoprolol succinate ER (TOPROL-XL) 25 MG 24 hr tablet Take 0.5 tablets (12.5 mg) by mouth At Bedtime, Transitional      morphine (MSIR) 15 MG IR tablet Take 1-2 tablets (15-30 mg) by mouth every 4 hours as needed Every 3 hrs as needed, Disp-40 tablet, R-0, Local Print      polyethylene glycol (MIRALAX) 17 GM/Dose powder Take 17 g by mouth 2 times daily, Transitional      senna-docusate (SENOKOT-S/PERICOLACE) 8.6-50 MG tablet Take 2 tablets by mouth 2 times daily, Disp-30 tablet, Transitional         CONTINUE these medications which have NOT CHANGED    Details   atorvastatin (LIPITOR) 80 MG tablet Take 80 mg by mouth At Bedtime , Historical       D3-50 1.25 MG (29146 UT) capsule Take 1,250 mcg by mouth every 7 days Takes on Saturdays., FAHAD, Historical      diclofenac (VOLTAREN) 1 % topical gel Apply topically as needed (shoulder pain) , Historical      fluticasone (FLONASE) 50 MCG/ACT nasal spray Spray 2 sprays into both nostrils daily as needed , Historical      hydrocortisone (CORTAID) 1 % external cream Apply topically 2 times daily as neededHistorical      multivitamin, therapeutic with minerals (THERA-VIT-M) TABS tablet Take 1 tablet by mouth daily, Historical      simethicone (MYLICON) 125 MG chewable tablet Take 375 mg by mouth as needed for intestinal gas, Historical      EPINEPHrine (ANY BX GENERIC EQUIV) 0.3 MG/0.3ML injection 2-pack Inject 0.3 mg into the muscle as needed for anaphylaxis, Historical      naloxone (NARCAN) 4 MG/0.1ML nasal spray Spray 4 mg into one nostril alternating nostrils once as needed for opioid reversal every 2-3 minutes until assistance arrives, Historical      nitroGLYcerin (NITROSTAT) 0.4 MG sublingual tablet Place 0.4 mg under the tongue as needed, Historical      !! NONFORMULARY Uriva Rx - supplement for bladder tone, Historical      !! NONFORMULARY Follinex supplement - herbal supplement for hair growth., Historical       !! - Potential duplicate medications found. Please discuss with provider.

## 2021-06-23 NOTE — PROGRESS NOTES
Report called to Vanessa/Nurse on [facility].  Pt is being transferred to room 815.  Waiting for transport at this time.

## 2021-06-23 NOTE — PROGRESS NOTES
Transferred to: Unit 8A at 0155  Belongings: 4 white hospital bags and black tote bag.   Casiano removed? Casiano removed 6/22/21  Central line removed? NA  Chart and medications sent with patient Yes  Family notified: Yes

## 2021-06-23 NOTE — PROGRESS NOTES
"Orthopaedic Surgery Progress Note:  06/24/2021     Subjective:   Concerns about pain control this am - but laying comfortable in bed and sleepy. Reports R lower leg pain this am, cramping pain. Too sleepy yesterday to work with therapies.    Objective:   /57   Pulse 80   Temp 98.2  F (36.8  C) (Oral)   Resp 14   Ht 1.854 m (6' 1\")   Wt 107.5 kg (236 lb 14.4 oz)   SpO2 97%   BMI 31.26 kg/m    No intake/output data recorded.  Gen: NAD. Uncomfortable appearing.  Resp: Breathing comfortably on NC.  Drain:   Superficial drain output of 50/pending this am.  Musculoskeletal: dressing c/d/i.  Sensation from L2-S1 is preserved.    Lower extremities:  Motor Strength Right Left   Hip flexion: L1, L2, L3 5/5 5/5   Knee flexion: S1 5/5 5/5   Knee extension: L3, L4 5/5 5/5   Ankle dosiflexion: L4, L5 0/5 5/5   EHL: L5 0/5 5/5   Ankle plantarflexion: S1 5/5 5/5   Has baseline reduced ROM at the right ankle.    Denies decreased sensation in legs, bilaterally.    Labs:  Lab Results   Component Value Date    WBC 8.3 06/23/2021    HGB 7.6 (L) 06/23/2021    PLT 66 (L) 06/23/2021    INR 1.56 (H) 06/23/2021        Assessment & Plan:   Darleen Simmons is a 70 year old male with PMH including chronic opioid use, former smoker, HTN, HLP, CAD c/b MI, ischemic cardiomyopathy s/p ICD placement, PAD s/p left SFA POBA for latissimus dorsi flap to left calf, atrial fibrillation/flutter, acute blood loss anemia, flat back syndrome now s/p T7-pelvis revision PSIF with T12-L1 3CO and L5 PSO on 6/21/21 with Jazmyne Akins/Herb. Significant pain out of proportion could represent nerve root retraction and normal postop pain in the setting of a chronic opioid user. Given the symptoms in his legs are at the PSO level, this could also represent neural foraminal stenosis. The improving symptoms and imaging do not indicate iatrogenic neural foraminal stenosis.    Goals for today:  - PT/OT  - Replace Casiano if still unable to void  - US of LEs  - " post-op XR pending    Orthopedics Primary  Activity: Up with assist until independent. No excessive bending or twisting. No lifting >10 lbs x 6 weeks. Renny lift approved for transfers. Keep back straight if using lift.  Weight bearing status: WBAT.  Pain management: Transition from IV to PO as tolerated. No NSAIDs.   Antibiotics: Ancef x24 hrs postoperatively - completed.  Diet: ADAT.   DVT prophylaxis: SCDs only. No chemical DVT ppx needed.  Imaging: XR Upright PTDC - ordered. Obtain after HV removed.  Labs: labs PRN; ongoing close monitoring of hematologic status  Bracing/Splinting: None.  Dressings: Keep dressing c/d/i x 7 days.  Drains: Document output per shift, will be discontinued at Orthopedic Surgery discretion.  Casiano catheter: Removed 6/22. Replace if unable to void.  Physical Therapy/Occupational Therapy: Eval and treat.  Consults: Hospitalist.  Follow-up: Clinic with Dr. Akins in 6 weeks with repeat x-rays.   Disposition: Pending progress with therapies, pain control on orals, post-op imaging, and drain removal.    Orthopaedics surgery staff for this patient is Dr. Akins.    ------------------------------------------------------------------------------------------    [   ] Drains removed.  [   ] Post xrays done.  [   ] Discharge orders done.  [x] Discharge summary started.    Gregory Vincent MD  Orthopaedic Surgery Resident, PGY4  Pager: [phone]     Please page me directly with any questions/concerns during regular weekday hours before 5pm.     If there is no response, if it is a weekend, or if it is during evening hours then please page the orthopaedic surgery resident on call as listed on [facility] call schedule under the orthopaedics tab.    FOLLOWUP:    Future Appointments   Date Time Provider Department [place]   8/5/2021 12:30 PM [facility]   9/9/2021 11:00 AM Akhil Akins MD [facility]

## 2021-06-23 NOTE — PROGRESS NOTES
Wife - Joe called and updated regarding transfer to [facility] - room 815.  Wife also given nurse phone number/okay'd with MEGAN Mendez.

## 2021-06-23 NOTE — CONSULTS
Care Management Initial Consult    General Information  Assessment completed with: Patient, Spouse or significant other, VM-chart review  Type of CM/SW Visit: Initial Assessment    Primary Care Provider verified and updated as needed: Yes   Readmission within the last 30 days: no previous admission in last 30 days         Advance Care Planning: Advance Care Planning Reviewed: present on chart        Communication Assessment  Patient's communication style: spoken language (English or Bilingual)    Hearing Difficulty or Deaf: yes   Wear Glasses or Blind: yes    Cognitive  Cognitive/Neuro/Behavioral: .WDL except, speech  Level of Consciousness: alert  Arousal Level: opens eyes spontaneously  Orientation: oriented x 4  Mood/Behavior: calm, cooperative  Best Language: 0 - No aphasia  Speech: slow, hoarse    Living Environment:   People in home: spouse  Ayah spouse  Current living Arrangements: house      Able to return to prior arrangements: yes  Living Arrangement Comments: Pt plans to return home after ARU stay    Family/Social Support:  Care provided by: self, spouse/significant other  Provides care for: no one  Marital Status:   Wife          Description of Support System: Supportive, Involved    Support Assessment: Adequate family and caregiver support    Current Resources:   Patient receiving home care services: No  Community Resources: : Pt has a care coordinator through his [facility] plan, Rebecca Cook  Ph: [phone]  Equipment currently used at home: lift device - pt uses chair lift at home.   Supplies currently used at home: None    Employment/Financial:  Employment Status: retired     Financial Concerns: No concerns identified     Lifestyle & Psychosocial Needs:        Socioeconomic History     Marital status:      Spouse name: Not on file     Number of children: Not on file     Years of education: Not on file     Highest education level: Not on file     Tobacco Use     Smoking  "status: Former Smoker     Types: Cigarettes     Quit date: 1988     Years since quittin.4     Smokeless tobacco: Former User   Substance and Sexual Activity     Alcohol use: Not Currently       Functional Status:  Prior to admission patient needed assistance: Pt was using a chair lift at home and receiving support from his wife Ayah.      Mental Health Status:  Mental Health Status: No Current Concerns       Chemical Dependency Status:  Chemical Dependency Status: No Current Concerns           Values/Beliefs:  Spiritual, Cultural Beliefs, Restorationist Practices, Values that affect care: no  Description of Beliefs that Will Affect Care:             Additional Information:  Per team rounds pt will likely be medically ready for discharge in 2 days.  PT has recommended ARU for the pt at discharge.     SW met with the pt and his wife at bedside.  The pt was mostly sleeping during the assessment, so SW gathered information from his wife Ayah.  SW introduced herself and explained the role of SW in discharge planning.  Ayah stated that she and the pt are agreeable to a referral to the  ARU for on-going therapies at discharge.     Ayah shared with ROSE that she and the pt have been through a lot - she reported that the pt had a failed surgery at the [facility] a few years back and it has been very detrimental to his life. Ayah also relayed that she was frustrated with how the pt's transfer from the  was handled - she reported that the pt was transferred late last night and \"just dumped\" in the room and it did not seem like he rec'd good care overnight.  Ayah also relayed that she's been working with  Accommodations and they have been using lodging benefits over at the [facility], but that there were issues with getting meal reimbursement at the cafeteria.  ROSE provided empathic listening and support.  ROSE provided the patient relations phone number (ph: [phone]) to the pt's wife Ayah in case she wanted to " file a compliant.  Ayah thanked ROSE for the information.    ROSE made a referral to  ARU via [facility].     PENDING REFERRAL   ARU  [id number]    [facility]  5 Ortho & 8A   Ph: [phone]  Pager: [phone]

## 2021-06-23 NOTE — PROGRESS NOTES
"Orthopaedic Surgery Progress Note:  06/23/2021     Subjective:   Mental status improved throughout the day yesterday. Improved pain control with morphine use. Ketamine gtt off. States lower back pain is still significant, but improved. Was having right gluteal muscle spasms yesterday that were excruciating. Piriformis stretching helped relieve these. Has not his ability to move the right ankle is not what it was before surgery. Possible radicular pain down right lateral leg to foot. Casiano was removed yesterday. No reports that he has voided since. Was straight cathed overnight.    Objective:   BP 98/45 (BP Location: Right arm)   Pulse 80   Temp 98.1  F (36.7  C) (Axillary)   Resp 20   Ht 1.854 m (6' 1\")   Wt 107.5 kg (236 lb 14.4 oz)   SpO2 99%   BMI 31.26 kg/m    No intake/output data recorded.  Gen: NAD. Uncomfortable appearing.  Resp: Breathing comfortably on NC.  Drain:   Superficial drain output of 0/63.  Musculoskeletal: dressing c/d/i.  Sensation from C4-L1 is preserved.    Lower extremities:  Motor Strength Right Left   Hip flexion: L1, L2, L3 5/5 5/5   Knee flexion: S1 5/5 5/5   Knee extension: L3, L4 5/5 5/5   Ankle dosiflexion: L4, L5 0/5 5/5   EHL: L5 0/5 5/5   Ankle plantarflexion: S1 5/5 5/5   Has baseline reduced ROM at the right ankle.    Denies decreased sensation in legs, bilaterally.    Labs:  Lab Results   Component Value Date    WBC 12.4 (H) 06/23/2021    HGB 8.1 (L) 06/23/2021    PLT 60 (L) 06/23/2021    INR 1.66 (H) 06/23/2021        Assessment & Plan:   Darleen Simmons is a 70 year old male with PMH including chronic opioid use, former smoker, HTN, HLP, CAD c/b MI, ischemic cardiomyopathy s/p ICD placement, PAD s/p left SFA POBA for latissimus dorsi flap to left calf, atrial fibrillation/flutter, acute blood loss anemia, flat back syndrome now s/p T7-pelvis revision PSIF with T12-L1 3CO and L5 PSO on 6/21/21 with Jazmyne Akins/Herb. Significant pain out of proportion could represent nerve " root retraction and normal postop pain in the setting of a chronic opioid user. Given the symptoms in his legs are at the PSO level, this could also represent neural foraminal stenosis. The improving symptoms and imaging do not indicate iatrogenic neural foraminal stenosis.    Goals for today:  - PT/OT  - Replace Casiano if still unable to void    Orthopedics Primary  Activity: Up with assist until independent. No excessive bending or twisting. No lifting >10 lbs x 6 weeks. Renny lift approved for transfers. Keep back straight if using lift.  Weight bearing status: WBAT.  Pain management: Transition from IV to PO as tolerated. No NSAIDs.   Antibiotics: Ancef x24 hrs postoperatively - completed.  Diet: ADAT.   DVT prophylaxis: SCDs only. No chemical DVT ppx needed.  Imaging: XR Upright PTDC - ordered. Obtain after HV removed.  Labs: labs PRN; ongoing close monitoring of hematologic status  Bracing/Splinting: None.  Dressings: Keep dressing c/d/i x 7 days.  Drains: Document output per shift, will be discontinued at Orthopedic Surgery discretion.  Casiano catheter: Removed 6/22. Replace if unable to void.  Physical Therapy/Occupational Therapy: Eval and treat.  Consults: Hospitalist.  Follow-up: Clinic with Dr. Akins in 6 weeks with repeat x-rays.   Disposition: Pending progress with therapies, pain control on orals, post-op imaging, and drain removal.    Orthopaedics surgery staff for this patient is Dr. Akins.    ------------------------------------------------------------------------------------------    [   ] Drains removed.  [   ] Post xrays done.  [   ] Discharge orders done.  [   ] Discharge summary started.    Joseph Maravilla MD  [id number]  Pager: [phone]     Please page me directly with any questions/concerns during regular weekday hours before 5pm.     If there is no response, if it is a weekend, or if it is during evening hours then please page the orthopaedic surgery resident on call as listed on [employer] call  schedule under the orthopaedics tab.        FOLLOWUP:    Future Appointments   Date Time Provider Department Center   8/5/2021 12:30 PM Akhil Akins MD [facility]   9/9/2021 11:00 AM Akhil Akins MD [facility]

## 2021-06-23 NOTE — PROGRESS NOTES
Major shift events:  Pt A/O x 4.  Remains on oxymask 2L O2.  100% paced with occasional PVC's.  Afebrile. Rates 70/80s.  Continues with lidocaine gtt running at 1 mg/kg/hr.  PRN morphine given at 2000 (15 mg IR tab and injection 10 mg) and 2149 (injection 10 mg only).  Pt has been resting comfortably since last PRN given.  Pt has not voided since peng removed at noon.  Bladder scan completed at 2200 with 370 scanner reading; 350 ml via straight cath; 1 ml PVR.  Pt being transferred to [address] - room 815.  Report given within last 30 minutes.   Waiting for transport at this time.

## 2021-06-24 ENCOUNTER — APPOINTMENT (OUTPATIENT)
Dept: ULTRASOUND IMAGING | Facility: CLINIC | Age: 71
DRG: 456 | End: 2021-06-24
Attending: CLINICAL NURSE SPECIALIST
Payer: COMMERCIAL

## 2021-06-24 LAB
ANION GAP SERPL CALCULATED.3IONS-SCNC: 2 MMOL/L (ref 3–14)
BLD PROD TYP BPU: NORMAL
BLD UNIT ID BPU: 0
BLD UNIT ID BPU: 0
BLOOD PRODUCT CODE: NORMAL
BLOOD PRODUCT CODE: NORMAL
BPU ID: NORMAL
BPU ID: NORMAL
BUN SERPL-MCNC: 10 MG/DL (ref 7–30)
CALCIUM SERPL-MCNC: 8.1 MG/DL (ref 8.5–10.1)
CHLORIDE SERPL-SCNC: 107 MMOL/L (ref 94–109)
CO2 SERPL-SCNC: 31 MMOL/L (ref 20–32)
CREAT SERPL-MCNC: 0.54 MG/DL (ref 0.66–1.25)
ERYTHROCYTE [DISTWIDTH] IN BLOOD BY AUTOMATED COUNT: 13.9 % (ref 10–15)
ERYTHROCYTE [DISTWIDTH] IN BLOOD BY AUTOMATED COUNT: 14.1 % (ref 10–15)
GFR SERPL CREATININE-BSD FRML MDRD: >90 ML/MIN/{1.73_M2}
GLUCOSE BLDC GLUCOMTR-MCNC: 103 MG/DL (ref 70–99)
GLUCOSE BLDC GLUCOMTR-MCNC: 110 MG/DL (ref 70–99)
GLUCOSE BLDC GLUCOMTR-MCNC: 113 MG/DL (ref 70–99)
GLUCOSE SERPL-MCNC: 102 MG/DL (ref 70–99)
HCT VFR BLD AUTO: 25.1 % (ref 40–53)
HCT VFR BLD AUTO: 25.6 % (ref 40–53)
HGB BLD-MCNC: 8.2 G/DL (ref 13.3–17.7)
HGB BLD-MCNC: 8.2 G/DL (ref 13.3–17.7)
MAGNESIUM SERPL-MCNC: 1.7 MG/DL (ref 1.6–2.3)
MCH RBC QN AUTO: 31.4 PG (ref 26.5–33)
MCH RBC QN AUTO: 31.4 PG (ref 26.5–33)
MCHC RBC AUTO-ENTMCNC: 32 G/DL (ref 31.5–36.5)
MCHC RBC AUTO-ENTMCNC: 32.7 G/DL (ref 31.5–36.5)
MCV RBC AUTO: 96 FL (ref 78–100)
MCV RBC AUTO: 98 FL (ref 78–100)
MDC_IDC_EPISODE_DTM: NORMAL
MDC_IDC_EPISODE_DURATION: 10 S
MDC_IDC_EPISODE_DURATION: 12 S
MDC_IDC_EPISODE_DURATION: 4 S
MDC_IDC_EPISODE_DURATION: 6 S
MDC_IDC_EPISODE_DURATION: 74 S
MDC_IDC_EPISODE_DURATION: 8 S
MDC_IDC_EPISODE_DURATION: 8 S
MDC_IDC_EPISODE_ID: NORMAL
MDC_IDC_EPISODE_TYPE: NORMAL
MDC_IDC_LEAD_IMPLANT_DT: NORMAL
MDC_IDC_LEAD_LOCATION: NORMAL
MDC_IDC_LEAD_LOCATION_DETAIL_1: NORMAL
MDC_IDC_LEAD_MFG: NORMAL
MDC_IDC_LEAD_MODEL: NORMAL
MDC_IDC_LEAD_POLARITY_TYPE: NORMAL
MDC_IDC_LEAD_SERIAL: NORMAL
MDC_IDC_MSMT_BATTERY_DTM: NORMAL
MDC_IDC_MSMT_BATTERY_REMAINING_LONGEVITY: 18 MO
MDC_IDC_MSMT_BATTERY_RRT_TRIGGER: 2.73
MDC_IDC_MSMT_BATTERY_STATUS: NORMAL
MDC_IDC_MSMT_BATTERY_VOLTAGE: 2.86 V
MDC_IDC_MSMT_CAP_CHARGE_DTM: NORMAL
MDC_IDC_MSMT_CAP_CHARGE_ENERGY: 18 J
MDC_IDC_MSMT_CAP_CHARGE_TIME: 4.39
MDC_IDC_MSMT_CAP_CHARGE_TYPE: NORMAL
MDC_IDC_MSMT_LEADCHNL_LV_IMPEDANCE_VALUE: 361 OHM
MDC_IDC_MSMT_LEADCHNL_LV_IMPEDANCE_VALUE: 513 OHM
MDC_IDC_MSMT_LEADCHNL_LV_IMPEDANCE_VALUE: 722 OHM
MDC_IDC_MSMT_LEADCHNL_LV_PACING_THRESHOLD_AMPLITUDE: 0.88 V
MDC_IDC_MSMT_LEADCHNL_LV_PACING_THRESHOLD_AMPLITUDE: 1 V
MDC_IDC_MSMT_LEADCHNL_LV_PACING_THRESHOLD_PULSEWIDTH: 0.4 MS
MDC_IDC_MSMT_LEADCHNL_LV_PACING_THRESHOLD_PULSEWIDTH: 0.5 MS
MDC_IDC_MSMT_LEADCHNL_RA_IMPEDANCE_VALUE: 399 OHM
MDC_IDC_MSMT_LEADCHNL_RV_IMPEDANCE_VALUE: 342 OHM
MDC_IDC_MSMT_LEADCHNL_RV_IMPEDANCE_VALUE: 456 OHM
MDC_IDC_MSMT_LEADCHNL_RV_PACING_THRESHOLD_AMPLITUDE: 0.75 V
MDC_IDC_MSMT_LEADCHNL_RV_PACING_THRESHOLD_AMPLITUDE: 1 V
MDC_IDC_MSMT_LEADCHNL_RV_PACING_THRESHOLD_PULSEWIDTH: 0.4 MS
MDC_IDC_MSMT_LEADCHNL_RV_PACING_THRESHOLD_PULSEWIDTH: 0.4 MS
MDC_IDC_PG_IMPLANT_DTM: NORMAL
MDC_IDC_PG_MFG: NORMAL
MDC_IDC_PG_MODEL: NORMAL
MDC_IDC_PG_SERIAL: NORMAL
MDC_IDC_PG_TYPE: NORMAL
MDC_IDC_SESS_CLINIC_NAME: NORMAL
MDC_IDC_SESS_DTM: NORMAL
MDC_IDC_SESS_TYPE: NORMAL
MDC_IDC_SET_BRADY_LOWRATE: 80 {BEATS}/MIN
MDC_IDC_SET_BRADY_MODE: NORMAL
MDC_IDC_SET_CRT_LVRV_DELAY: 0 MS
MDC_IDC_SET_CRT_PACED_CHAMBERS: NORMAL
MDC_IDC_SET_LEADCHNL_LV_PACING_AMPLITUDE: 1.5 V
MDC_IDC_SET_LEADCHNL_LV_PACING_ANODE_ELECTRODE_1: NORMAL
MDC_IDC_SET_LEADCHNL_LV_PACING_ANODE_LOCATION_1: NORMAL
MDC_IDC_SET_LEADCHNL_LV_PACING_CATHODE_ELECTRODE_1: NORMAL
MDC_IDC_SET_LEADCHNL_LV_PACING_CATHODE_LOCATION_1: NORMAL
MDC_IDC_SET_LEADCHNL_LV_PACING_POLARITY: NORMAL
MDC_IDC_SET_LEADCHNL_LV_PACING_PULSEWIDTH: 0.5 MS
MDC_IDC_SET_LEADCHNL_RA_PACING_ANODE_ELECTRODE_1: NORMAL
MDC_IDC_SET_LEADCHNL_RA_PACING_ANODE_LOCATION_1: NORMAL
MDC_IDC_SET_LEADCHNL_RA_PACING_CAPTURE_MODE: NORMAL
MDC_IDC_SET_LEADCHNL_RA_PACING_CATHODE_ELECTRODE_1: NORMAL
MDC_IDC_SET_LEADCHNL_RA_PACING_CATHODE_LOCATION_1: NORMAL
MDC_IDC_SET_LEADCHNL_RA_PACING_POLARITY: NORMAL
MDC_IDC_SET_LEADCHNL_RA_SENSING_ANODE_ELECTRODE_1: NORMAL
MDC_IDC_SET_LEADCHNL_RA_SENSING_ANODE_LOCATION_1: NORMAL
MDC_IDC_SET_LEADCHNL_RA_SENSING_CATHODE_ELECTRODE_1: NORMAL
MDC_IDC_SET_LEADCHNL_RA_SENSING_CATHODE_LOCATION_1: NORMAL
MDC_IDC_SET_LEADCHNL_RA_SENSING_POLARITY: NORMAL
MDC_IDC_SET_LEADCHNL_RA_SENSING_SENSITIVITY: 4 MV
MDC_IDC_SET_LEADCHNL_RV_PACING_AMPLITUDE: 2 V
MDC_IDC_SET_LEADCHNL_RV_PACING_ANODE_ELECTRODE_1: NORMAL
MDC_IDC_SET_LEADCHNL_RV_PACING_ANODE_LOCATION_1: NORMAL
MDC_IDC_SET_LEADCHNL_RV_PACING_CATHODE_ELECTRODE_1: NORMAL
MDC_IDC_SET_LEADCHNL_RV_PACING_CATHODE_LOCATION_1: NORMAL
MDC_IDC_SET_LEADCHNL_RV_PACING_POLARITY: NORMAL
MDC_IDC_SET_LEADCHNL_RV_PACING_PULSEWIDTH: 0.4 MS
MDC_IDC_SET_LEADCHNL_RV_SENSING_ANODE_ELECTRODE_1: NORMAL
MDC_IDC_SET_LEADCHNL_RV_SENSING_ANODE_LOCATION_1: NORMAL
MDC_IDC_SET_LEADCHNL_RV_SENSING_CATHODE_ELECTRODE_1: NORMAL
MDC_IDC_SET_LEADCHNL_RV_SENSING_CATHODE_LOCATION_1: NORMAL
MDC_IDC_SET_LEADCHNL_RV_SENSING_POLARITY: NORMAL
MDC_IDC_SET_ZONE_DETECTION_BEATS_DENOMINATOR: 32 {BEATS}
MDC_IDC_SET_ZONE_DETECTION_BEATS_NUMERATOR: 24 {BEATS}
MDC_IDC_SET_ZONE_DETECTION_INTERVAL: 320 MS
MDC_IDC_SET_ZONE_DETECTION_INTERVAL: 350 MS
MDC_IDC_SET_ZONE_DETECTION_INTERVAL: 360 MS
MDC_IDC_SET_ZONE_DETECTION_INTERVAL: 400 MS
MDC_IDC_SET_ZONE_DETECTION_INTERVAL: NORMAL
MDC_IDC_SET_ZONE_TYPE: NORMAL
MDC_IDC_STAT_AT_BURDEN_PERCENT: 0 %
MDC_IDC_STAT_AT_DTM_END: NORMAL
MDC_IDC_STAT_AT_DTM_START: NORMAL
MDC_IDC_STAT_BRADY_AP_VP_PERCENT: 0 %
MDC_IDC_STAT_BRADY_AP_VS_PERCENT: 0 %
MDC_IDC_STAT_BRADY_AS_VP_PERCENT: 97.18 %
MDC_IDC_STAT_BRADY_AS_VS_PERCENT: 2.82 %
MDC_IDC_STAT_BRADY_DTM_END: NORMAL
MDC_IDC_STAT_BRADY_DTM_START: NORMAL
MDC_IDC_STAT_BRADY_RA_PERCENT_PACED: 0 %
MDC_IDC_STAT_BRADY_RV_PERCENT_PACED: 97.66 %
MDC_IDC_STAT_CRT_DTM_END: NORMAL
MDC_IDC_STAT_CRT_DTM_START: NORMAL
MDC_IDC_STAT_CRT_LV_PERCENT_PACED: 97.63 %
MDC_IDC_STAT_CRT_PERCENT_PACED: 97.63 %
MDC_IDC_STAT_EPISODE_RECENT_COUNT: 0
MDC_IDC_STAT_EPISODE_RECENT_COUNT_DTM_END: NORMAL
MDC_IDC_STAT_EPISODE_RECENT_COUNT_DTM_START: NORMAL
MDC_IDC_STAT_EPISODE_TOTAL_COUNT: 0
MDC_IDC_STAT_EPISODE_TOTAL_COUNT: 25
MDC_IDC_STAT_EPISODE_TOTAL_COUNT_DTM_END: NORMAL
MDC_IDC_STAT_EPISODE_TOTAL_COUNT_DTM_START: NORMAL
MDC_IDC_STAT_EPISODE_TYPE: NORMAL
MDC_IDC_STAT_TACHYTHERAPY_ATP_DELIVERED_RECENT: 0
MDC_IDC_STAT_TACHYTHERAPY_ATP_DELIVERED_TOTAL: 0
MDC_IDC_STAT_TACHYTHERAPY_RECENT_DTM_END: NORMAL
MDC_IDC_STAT_TACHYTHERAPY_RECENT_DTM_START: NORMAL
MDC_IDC_STAT_TACHYTHERAPY_SHOCKS_ABORTED_RECENT: 0
MDC_IDC_STAT_TACHYTHERAPY_SHOCKS_ABORTED_TOTAL: 0
MDC_IDC_STAT_TACHYTHERAPY_SHOCKS_DELIVERED_RECENT: 0
MDC_IDC_STAT_TACHYTHERAPY_SHOCKS_DELIVERED_TOTAL: 0
MDC_IDC_STAT_TACHYTHERAPY_TOTAL_DTM_END: NORMAL
MDC_IDC_STAT_TACHYTHERAPY_TOTAL_DTM_START: NORMAL
NUM BPU REQUESTED: 1
NUM BPU REQUESTED: 1
PLATELET # BLD AUTO: 81 10E9/L (ref 150–450)
PLATELET # BLD AUTO: 89 10E9/L (ref 150–450)
POTASSIUM SERPL-SCNC: 4.7 MMOL/L (ref 3.4–5.3)
RBC # BLD AUTO: 2.61 10E12/L (ref 4.4–5.9)
RBC # BLD AUTO: 2.61 10E12/L (ref 4.4–5.9)
SODIUM SERPL-SCNC: 140 MMOL/L (ref 133–144)
TRANSFUSION STATUS PATIENT QL: NORMAL
WBC # BLD AUTO: 8.4 10E9/L (ref 4–11)
WBC # BLD AUTO: 9 10E9/L (ref 4–11)

## 2021-06-24 PROCEDURE — 36415 COLL VENOUS BLD VENIPUNCTURE: CPT | Performed by: HOSPITALIST

## 2021-06-24 PROCEDURE — 99233 SBSQ HOSP IP/OBS HIGH 50: CPT | Performed by: HOSPITALIST

## 2021-06-24 PROCEDURE — 250N000013 HC RX MED GY IP 250 OP 250 PS 637: Performed by: STUDENT IN AN ORGANIZED HEALTH CARE EDUCATION/TRAINING PROGRAM

## 2021-06-24 PROCEDURE — 36416 COLLJ CAPILLARY BLOOD SPEC: CPT | Performed by: ORTHOPAEDIC SURGERY

## 2021-06-24 PROCEDURE — 250N000013 HC RX MED GY IP 250 OP 250 PS 637: Performed by: HOSPITALIST

## 2021-06-24 PROCEDURE — 250N000011 HC RX IP 250 OP 636: Performed by: INTERNAL MEDICINE

## 2021-06-24 PROCEDURE — 85027 COMPLETE CBC AUTOMATED: CPT | Performed by: HOSPITALIST

## 2021-06-24 PROCEDURE — 250N000013 HC RX MED GY IP 250 OP 250 PS 637: Performed by: CLINICAL NURSE SPECIALIST

## 2021-06-24 PROCEDURE — P9037 PLATE PHERES LEUKOREDU IRRAD: HCPCS | Performed by: STUDENT IN AN ORGANIZED HEALTH CARE EDUCATION/TRAINING PROGRAM

## 2021-06-24 PROCEDURE — 93970 EXTREMITY STUDY: CPT

## 2021-06-24 PROCEDURE — 258N000003 HC RX IP 258 OP 636: Performed by: STUDENT IN AN ORGANIZED HEALTH CARE EDUCATION/TRAINING PROGRAM

## 2021-06-24 PROCEDURE — 250N000011 HC RX IP 250 OP 636: Performed by: HOSPITALIST

## 2021-06-24 PROCEDURE — 250N000009 HC RX 250: Performed by: CLINICAL NURSE SPECIALIST

## 2021-06-24 PROCEDURE — 120N000002 HC R&B MED SURG/OB UMMC

## 2021-06-24 PROCEDURE — 85027 COMPLETE CBC AUTOMATED: CPT | Performed by: ORTHOPAEDIC SURGERY

## 2021-06-24 PROCEDURE — 99233 SBSQ HOSP IP/OBS HIGH 50: CPT | Performed by: NURSE PRACTITIONER

## 2021-06-24 PROCEDURE — 93970 EXTREMITY STUDY: CPT | Mod: 26 | Performed by: RADIOLOGY

## 2021-06-24 PROCEDURE — 80048 BASIC METABOLIC PNL TOTAL CA: CPT | Performed by: HOSPITALIST

## 2021-06-24 PROCEDURE — 999N001017 HC STATISTIC GLUCOSE BY METER IP

## 2021-06-24 PROCEDURE — 83735 ASSAY OF MAGNESIUM: CPT | Performed by: HOSPITALIST

## 2021-06-24 PROCEDURE — 82947 ASSAY GLUCOSE BLOOD QUANT: CPT | Performed by: ORTHOPAEDIC SURGERY

## 2021-06-24 PROCEDURE — P9037 PLATE PHERES LEUKOREDU IRRAD: HCPCS | Performed by: HOSPITALIST

## 2021-06-24 PROCEDURE — 250N000011 HC RX IP 250 OP 636: Performed by: CLINICAL NURSE SPECIALIST

## 2021-06-24 RX ORDER — HYDROMORPHONE HYDROCHLORIDE 1 MG/ML
0.5 INJECTION, SOLUTION INTRAMUSCULAR; INTRAVENOUS; SUBCUTANEOUS
Status: DISCONTINUED | OUTPATIENT
Start: 2021-06-24 | End: 2021-06-27

## 2021-06-24 RX ORDER — ACETAMINOPHEN 500 MG
500 TABLET ORAL 2 TIMES DAILY
Status: DISCONTINUED | OUTPATIENT
Start: 2021-06-25 | End: 2021-06-28

## 2021-06-24 RX ORDER — MAGNESIUM SULFATE HEPTAHYDRATE 40 MG/ML
2 INJECTION, SOLUTION INTRAVENOUS ONCE
Status: COMPLETED | OUTPATIENT
Start: 2021-06-24 | End: 2021-06-24

## 2021-06-24 RX ORDER — ACETAMINOPHEN 500 MG
1000 TABLET ORAL AT BEDTIME
Status: DISCONTINUED | OUTPATIENT
Start: 2021-06-24 | End: 2021-06-28

## 2021-06-24 RX ADMIN — Medication 250 MG: at 08:12

## 2021-06-24 RX ADMIN — MORPHINE SULFATE 30 MG: 15 TABLET ORAL at 10:35

## 2021-06-24 RX ADMIN — ATORVASTATIN CALCIUM 80 MG: 40 TABLET, FILM COATED ORAL at 21:53

## 2021-06-24 RX ADMIN — Medication 500 MG: at 04:28

## 2021-06-24 RX ADMIN — HYDROMORPHONE HYDROCHLORIDE 0.5 MG: 1 INJECTION, SOLUTION INTRAMUSCULAR; INTRAVENOUS; SUBCUTANEOUS at 16:13

## 2021-06-24 RX ADMIN — Medication 6.25 MG: at 20:31

## 2021-06-24 RX ADMIN — MORPHINE SULFATE 30 MG: 15 TABLET ORAL at 07:44

## 2021-06-24 RX ADMIN — MORPHINE SULFATE 30 MG: 15 TABLET ORAL at 20:31

## 2021-06-24 RX ADMIN — MORPHINE SULFATE 30 MG: 15 TABLET ORAL at 04:27

## 2021-06-24 RX ADMIN — HYDROMORPHONE HYDROCHLORIDE 0.5 MG: 1 INJECTION, SOLUTION INTRAMUSCULAR; INTRAVENOUS; SUBCUTANEOUS at 18:46

## 2021-06-24 RX ADMIN — MORPHINE SULFATE 30 MG: 15 TABLET ORAL at 13:20

## 2021-06-24 RX ADMIN — MORPHINE SULFATE 30 MG: 15 TABLET ORAL at 23:33

## 2021-06-24 RX ADMIN — Medication 20000 UNITS: at 08:12

## 2021-06-24 RX ADMIN — Medication 2 TABLET: at 20:31

## 2021-06-24 RX ADMIN — ZINC SULFATE 220 MG (50 MG) CAPSULE 220 MG: CAPSULE at 08:12

## 2021-06-24 RX ADMIN — HYDROMORPHONE HYDROCHLORIDE 0.5 MG: 1 INJECTION, SOLUTION INTRAMUSCULAR; INTRAVENOUS; SUBCUTANEOUS at 22:10

## 2021-06-24 RX ADMIN — SODIUM CHLORIDE, POTASSIUM CHLORIDE, SODIUM LACTATE AND CALCIUM CHLORIDE: 600; 310; 30; 20 INJECTION, SOLUTION INTRAVENOUS at 01:17

## 2021-06-24 RX ADMIN — MORPHINE SULFATE 30 MG: 15 TABLET ORAL at 17:12

## 2021-06-24 RX ADMIN — DIAZEPAM 4 MG: 2 TABLET ORAL at 23:33

## 2021-06-24 RX ADMIN — HYDROMORPHONE HYDROCHLORIDE 0.5 MG: 1 INJECTION, SOLUTION INTRAMUSCULAR; INTRAVENOUS; SUBCUTANEOUS at 03:02

## 2021-06-24 RX ADMIN — THERA TABS 1 TABLET: TAB at 08:12

## 2021-06-24 RX ADMIN — Medication 500 MG: at 11:29

## 2021-06-24 RX ADMIN — Medication 125 MG: at 11:36

## 2021-06-24 RX ADMIN — MELATONIN TAB 3 MG 6 MG: 3 TAB at 21:53

## 2021-06-24 RX ADMIN — MAGNESIUM SULFATE 2 G: 2 INJECTION INTRAVENOUS at 17:12

## 2021-06-24 RX ADMIN — Medication 500 MG: at 13:20

## 2021-06-24 RX ADMIN — POLYETHYLENE GLYCOL 3350 17 G: 17 POWDER, FOR SOLUTION ORAL at 20:31

## 2021-06-24 RX ADMIN — LIDOCAINE: 50 OINTMENT TOPICAL at 08:07

## 2021-06-24 RX ADMIN — Medication 500 MG: at 17:12

## 2021-06-24 RX ADMIN — SODIUM CHLORIDE, POTASSIUM CHLORIDE, SODIUM LACTATE AND CALCIUM CHLORIDE: 600; 310; 30; 20 INJECTION, SOLUTION INTRAVENOUS at 11:30

## 2021-06-24 RX ADMIN — Medication 200 UNITS: at 08:12

## 2021-06-24 RX ADMIN — HYDROMORPHONE HYDROCHLORIDE 0.5 MG: 1 INJECTION, SOLUTION INTRAMUSCULAR; INTRAVENOUS; SUBCUTANEOUS at 11:24

## 2021-06-24 RX ADMIN — MORPHINE SULFATE 15 MG: 15 TABLET ORAL at 01:12

## 2021-06-24 RX ADMIN — HYDROMORPHONE HYDROCHLORIDE 0.5 MG: 1 INJECTION, SOLUTION INTRAMUSCULAR; INTRAVENOUS; SUBCUTANEOUS at 07:03

## 2021-06-24 RX ADMIN — Medication 6.25 MG: at 08:12

## 2021-06-24 NOTE — PROGRESS NOTES
Cross coverage note    New CBC showed Hgb 7.8 and Plat 66.   Per Ortho recommendations Platelet goal is over 100 k and HGB 8.   Transfuse 1 unit of Platelet and 1 unit RBC's. I'll new CBC after transfusions.   Patient also having severe pain in the right calf, no swelling or erythema, try one dose of dilaudid. PO morphine not effective. Continue monitoring.     Lab Results   Component Value Date    WBC 8.3 06/23/2021     Lab Results   Component Value Date    RBC 2.46 06/23/2021     Lab Results   Component Value Date    HGB 7.6 06/23/2021     Lab Results   Component Value Date    HCT 24.8 06/23/2021     Lab Results   Component Value Date     06/23/2021     Lab Results   Component Value Date    MCH 30.9 06/23/2021     Lab Results   Component Value Date    MCHC 30.6 06/23/2021     Lab Results   Component Value Date    RDW 13.8 06/23/2021     Lab Results   Component Value Date    PLT 66 06/23/2021

## 2021-06-24 NOTE — OP NOTE
Procedure Date: 06/21/2021    PREOPERATIVE DIAGNOSES:  1.  History of prior spinal fusion T9 to pelvis.  2.  History of prior quadriplegia due to traumatic accident.  3.  Flat back syndrome.  4.  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    POSTOPERATIVE DIAGNOSES:   1.  History of prior spinal fusion T9 to pelvis.  2.  History of prior quadriplegia due to traumatic accident.  3.  Flat back syndrome.  4.  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    PROCEDURE PERFORMED:    1.  O-arm Stealth guided T7 through sacrum posterior segmental spinal instrumentation.  2.  T7 through sacrum posterior spinal fusion with local harvest autograft.  3.  Pedicle subtraction osteotomy L5, 22 modifier  4.  Three-column osteotomy, T12-L1.  5.  Removal and reinsertion of spinal instrumentation T9 through pelvis.  6.  De martin pelvic fixation, 22 modifier  7.  De matrin pedicle screw fixation T7-T8 and De martin redirection of pedicle screws T9.  8.  Mobilization of paraspinous muscle space, greater than 40 cm (42cm)  9.  Primary dural repair, necessitating additional removal of bone.     SURGEON:  Akhil Akins MD    COSURGEON:  Megan Estevez MD  dual surgeon approach was justified in order to minimize operative time and blood loss in this highly complex spinal deformity requiring 2 separate 3-column osteotomies for rigid sagittal malalignment correction.    INDICATIONS FOR SURGERY:  Darleen Simmons is a 70-year-old gentleman with a history of a previous T9 through sacrum instrumented posterior fusion at [facility].  At the time of his surgery, it was thought that he would not regain the ability to ambulate after a traumatic spinal cord injury and the patient was fused in sitting balance.  He did regain the ability to ambulate and found himself severely limited by the lack of lumbar lordosis and by his thoracolumbar junctional kyphosis.  This was rigid malalignment because of the previous arthrodesis.  Dr. Yen at [facility] sent the patient  here for surgical intervention.    I met with the patient prior to surgery and reviewed his neurologic history.  He asked me if we would be able to improve the shaking sensation that he felt from all muscles from his waist down.  I answered honestly that I did not know.  I answered that if this was due to previous spinal cord injury then no surgical intervention would be effective for that.  I answered that if this was due to the rigid flat sagittal malalignment that he had then yes this would slowly improve over time with improved lordotic curvature in the lumbar spine and reduction of thoracolumbar junctional kyphosis.  After discussion of risks, benefits and alternatives, the patient elected to proceed with surgery.  Both Dr. Akins and myself were quite clear with the patient that this surgery was extremely risky given his cardiac status.  The patient underwent an elevated cardiac workup and his surgery was recommended to be performed at the [facility] because of accelerated cardiac risk issues, which we arranged.    OPERATIVE COURSE DESCRIPTION OF PROCEDURE:  The patient was identified and informed consent was verified.  He was taken to [facility] OR #9 where general endotracheal anesthesia was induced.  Casiano catheter was placed after the induction of anesthesia, perioperative antibiotics were administered within 1 hour of skin incision, tranexamic acid bolus and infusion and a lidocaine infusion was begun prior to skin incision.  An arterial line and a central line were placed.  The patient was positioned prone on the ProAxis table, which was flexed to accommodate his rigid sagittal deformity.  All extremity points were carefully padded.  His thoracolumbar spine was prepped and draped in standard sterile fashion and an intraoperative timeout was performed.  His preexisting midline skin incision was opened using a #10 blade scalpel and extended in a cephalad and slightly caudad manner.  Subperiosteal dissection  was carried out to expose the instrumentation from T9 through the pelvis with note made that there were offset connectors used to connect to the sacroiliac screws bilaterally.  We also performed exposure of the posterior elements up to T7 to accommodate the extension of instrumentation.  The existing rods and set plugs were removed bilaterally.  The screws were then evaluated for looseness and it was felt that many of the screws could be upsized to improve bony purchase because of the short length and/or combination of small width.  We used the screw map, we had made prior to surgery to assist with this.  We placed De martin instrumentation at T7 and T8 under O-arm Stealth guidance and revised the pelvic instrumentation under O-arm Stealth guidance.  The O-arm was brought into the field in standard sterile fashion and a screw base reference frame was used for pelvic fixation.  The spinous processes remaining in the thoracic spine were used for Stealth fixation for the upper thoracic instrumentation.  Stealth was utilized to find a starting point, which was verified against known anatomic landmarks followed by drilling the intended screw tract followed by under tapping, followed by placement of the screws.  The screw sizes were as follows:  Bilateral T7, 5.5 x 55 mm; bilateral T8, 6.5 x 55 mm; bilateral T9, 7.5 x 65 mm; bilateral T10, 7.5 x 60 mm, left T11, 7.5 x 60 mm right T11, 7.5 x 55 mm; left T12, 7.5 x 60 mm right T12, 7.5 x 55 mm; left L1, 7.5 x 60 mm right L1 had been uncannulated in the original surgery and was left uncannulated; left T2, 7.5 x 60 mm right L2, 8.5 x 55 mm; left L3, 8.5 x 55 mm, right L3, 8.5 x 65 mm; L4, 8.5 x 55 mm, bilateral dual headed screws of L5 was left uncannulated in the pedicle because this was a pedicle subtraction osteotomy level; S1, 8.5 x 55 mm on the left and right, 7.5 x 55 mm; pelvic fixation on the left S2 alar iliac screws, 1 of 10.5 x 100 mm and 1 of 10.5 x 110 mm; on the  right S2 alar iliac screws 10.5 x 100 mm and a second right pelvic fixation screw 10.5 x 110 mm. Pelvic fixation merits a 22 modifier because we had to remove prior sacroiliac screws placed at prior surgery and redirect with new paths to achieve 4 points of pelvic fixation.  All instrumentation was checked via CT spin and deemed to be in the appropriate condition.  Please note that we had to redirect the trajectories of the T9 pedicle screws because of the proximal junctional kyphosis and protrusion of the screws through the superior endplate of T9.  We redirected the screws with a more anatomic trajectory and were able to sustain good pedicle fixation at that level.    We then turned our attention towards attempting to correct the thoracolumbar junctional kyphosis.  We initially performed a Kurtz-Childs osteotomy at T12-L1, but then because of the rigid interbody fusion anteriorly, we were unable to get adequate movement across the T12-L1 Kurtz-Childs osteotomy leaving us to extend the T12-L1 osteotomy into a 3-column osteotomy.  We resected the posterior spinal elements, which had been fused using a Leksell rongeur as well as Lambotte osteotomes to resect the lateral and posterior fusion mass and expose the posterior surface of the dura.  We then used Kerrison rongeurs to complete bilateral facetectomies of the posterolateral fusion mass at T12 and L1.  We entered the disk space at T12 and L1, first on the left and then from the right using fluoroscopic visualization because of the lack of adequate movement to assess the trajectory of the disk.  We placed a sharp tip osteotomes under fluoroscopic visualization progressively anteriorly and attempted to mobilize the disk space as we went.  We eventually had to crack the anterior osteophyte at the T12-L1 disk, which is adjacent to the anterior longitudinal ligament.  This constitutes a 3-column osteotomy for full mobilization of all 3 columns of the spine at  T12-L1.   We were able to safely and successfully mobilized the T12-L1 level and achieved a significant amount of kyphotic deformity correction across this level.  We placed a temporary stabilizing wilfredo at this level for this reason and then we proceeded with the planned lumbar pedicle subtraction osteotomy, which was our second intended 3-column osteotomy for the patient.  We began by resecting the posterior fusion mass across the previous decompression level at L5.  We resected this with Lambotte osteotomes to save the bone for local harvest autograft as well as using Leksell rongeurs.  We performed complete decompression of the posterior aspect of the thecal sac and then proceeded to resect the posterolateral fusion masses flush with the pedicle.  We truncated the transverse processes.  Of note, the L5 vertebral body was extremely triangulated so we had to reach quite lateral in order to do this.  The pedicle subtraction osteotomy at L5 therefore, took about twice normal operative length and difficulty and merits a modifier 22 as well.  We then very carefully dissected lateral to the L5 vertebral body because the course of the L4 exiting nerve roots was in proximity to this and we did not want to place any undue pressure on those nerve roots because of the triangulated vertebral body shape.  We encountered a dural tear in the left lateral recess at L5 and I repaired this in primary fashion using 6-0 Prolene suture and Durepair suturable graft.  This was also treated after the 3-column osteotomy to reinforce it with a secondary repair mechanism, which was a Durepair sling in 360-degree fashion around the dura secured posteriorly with silk suture to create a wrap around the dura and overlaid with Evicel to reinforce the Durepair.    We then resected the pedicles at L5 using graded osteotomes to create wedges of approximately 20-25 degrees and protected the exiting L4 and L5 nerve roots.  This was performed and saved  the bone for local harvest autograft.  We then progressively extended the ProAxis table and slowly closed the pedicle subtraction osteotomy across L5 until we had achieved what we deemed to be adequate sagittal correction.  We analyzed this with AP and lateral x-rays.  We performed the dural sling as a secondary repair mechanism and then provisionally fastened and custom cut and contoured rods.  We took another round of sagittal long stitch imaging to ensure that we were satisfied with the final sagittal and coronal planes.  At this point, the patient had had multiple hours of operative intervention and the estimated blood loss was approximately 4320 mL. We returned 1630 of this via Cell Saver and the patient additionally received 3 units of packed red cells and 402 mL of cryoprecipitate for coagulopathy on thromboelastogram intraoperatively as well as elevated INR and decreased fibrinogen.  We had initially considered placing a vertebral body tether but decided that at this point with the length of the operative duration that was required to address this patient's sagittal malalignment that it would benefit him to close as quickly as possible and not delay with any further time on the operative table.  We custom cut and contoured cobalt chrome rods and seated these with a total of four rods spanning the 3-column osteotomy site to the pelvic fixation for confirming additional stability across the 3-column osteotomy site at L5.  We then irrigated the incision copiously and decorticated the remaining posterior elements to achieve arthrodesis from T7-T9, from T12-L1 and then from L4 to the sacrum.  We used local harvest autograft to perform arthrodesis posteriorly.  We then closed the incision in multiple layers.  In order to successfully close the patient's incision, which had had a previous significant amount of fascial dehiscence, we had to undermine the paraspinous muscles along nearly the entire length of the  incision, which was greater than 40 cm.  We performed this using monopolar electrocautery lateral to the posterior elements to undermine the paraspinous muscles and allow for release of these for successful closure of midline.  We performed a meticulous multilayer closure using interrupted sutures in the fascia, followed by running suture in the fascia, followed by a suprafascial Hemovac drain tunneled to exit through a separate skin incision and secured to the skin with a drain stitch, Tegaderm and sterile dressing.  We proceeded to close the subcutaneous layers with multiple levels of interrupted and running sutures and then closed the skin with a subcuticular absorbable suture followed by benzoin, Steri-Strips and a sterile dressing.    There were no monitoring changes throughout surgery.  There were no intraoperative complications.    The patient was taken intubated to the surgical intensive care unit for further cares.  I was present for the entire surgery and scrubbed from skin incision through dressing placement and I performed all key portions of the procedure alongside Dr. Akins.     Sara Estevez MD        D: 2021   T: 2021   MT: [id number]    Name:     RUFINA BLASAlma  MRN:      [id number]        Account:        [id number]   :      1950           Procedure Date: 2021     Document: [id number]

## 2021-06-24 NOTE — PROGRESS NOTES
Inpatient Pain Service: Follow Up Note  06/24/21    Patient: Darleen Simmons  Admission Date:6/21/2021    3 Days Post-Op     Visit/Communication Style  Visit/Communication Style   Virtual (Video) communication was used to evaluate Darleen.  Darleen consented to the use of video communication.  Video START time: 1020 am, 6/24/2021  Video STOP time: 1118 am, 6/24/2021   Patient's location: [facility]   Provider's location during the visit: [facility]        Chief Pain Endorsement: back and right leg pain    Recommendations were discussed and relayed to FATOUMATA Stewart CNP (Orthopedic Service0  Plan was reviewed by the Inpatient Pain Service, staff attending Dr. Price      Darleen Simmons is a 70 year old male with history of several previous spine surgeries, chronic pain. The patient is now postop day #3 s/p complex T7 to S1 spinal fusion with Dr Akins and Dr Estevez on 6/21.    # Musculoskeletal acute on chronic back pain with new onset (today) nonradicular right leg pain  -- [place]  reviewed.  Morphine 15 mg tab, total of 180 tabs every 30 days for the past 2 months, last fill date 6/18. Consistent prescribing by PCP.  Patient denies concerns with prescriber    # History of chronic pain, on chronic opioid analgesic therapy (COAT)    # At risk of drug induced constipation managed on current bowel regimen, last BM yesterday evening      Primary Care Provider: Dunphy, Tyler J      1. Continue Morphine-immediate release 15-30 mg PO Q 3 hr PRN pain.     This allows for max dose of 240 mg, which is a 150 mg increase from his PTA dosing.  Patient is opioid tolerant on morphine 15 mg tab # 6 tabs/day over the past 2-3 months, and based on  review (for past one year) has tolerated morphine with highest dose (15 mg tab, max 10 tabs/day) prescribed through September 2020. Doses were tapered down over the next few months October 2020 to January 2021 to 15 mg tab max of 6-7  tabs/day    2. Prior to discharge space out dose to Q 4 hrs PRN pain and decrease dosing with healing until back to his baseline dose of MSIR 15 mg PO, max of 6 tabs/day  3.   4. Patient should follow up with his PCP for further prescribing of opioid analgesia, MSIR last filled 6/18    5. Change Dilaudid 0.5 mg IV to Q 2 hr PRN pain. Patient states he gets 40-50% improvement, but it only lasts 1.5-2 hours    6. If patient desires starting Lyrica, start with 75 mg PO BID and continue for 1 week post discharge    7. Bowel regimen per primary team to manage side effect/prevent opioid induced constipation.    Pain Service will Sign Off at this time    Thank you for consulting the Inpatient Pain Management Service. The above recommendations are to be acted upon at the primary team s discretion.     To reach us:  Mon - Friday 8 AM - 3 PM: Pager [phone] (Text Page)   After hours, weekends and holidays: Primary service should call [phone] The answering service for the on-call pain specialist    PAIN MEDICATION SAFE USE:   Prior to discharge instruct patient on the following in addition to the medication fact sheet:    Caution: these medications can cause sedation    Take prescription medicine only if it has been prescribed by your doctor    Do not take more medicine or take it more often than instructed     Call your doctor if pain gets worse    Never mix pain medicine with alcohol, sleeping pills, or any illicit drugs    Do not operate heavy machinery, including vehicles, when initiation opioid therapy or increasing dosage    Store prescription opioids in a locked container, whenever possible     Dispose of unused opioids appropriately     Do not stop abruptly once at higher doses.  These medications must be tapered off.    Opioid pain medications do carry the risk for physical dependence and addiction and patients should be counseled about this.     Interval History:  Darleen SINGH Troy was seen today (06/24/21)  "and he reports that his pain got ahead of him with medications he has been taking so he had poor sleep.  Jorje is left side-lying in bed, grimacing and moaning at times during interview. He was sleepy with eyes closed at the beginning of the interview, but more awake as interview progressed.  His speech remained slurred and sometimes garbled making it difficult to understand him at times. His wife Saadia is present throughout interview, calm, helpful to interpret and repeat what patient is saying, and asking her own questions.  Patient denies nausea, states he had a BM last evening.      In terms of pain management:  Jorje states he has severe, constant, sharp back pain with spasms in his back and he is also experiencing right leg pain since this morning and was not present prior to surgery. While he is unable to describe distribution/location of pain in his leg, he does states it is not radiating, and is not from the back.   Patient states he is upset that he didn't have his \"normal medications\" (morphine), stating he normally takes anywhere from 15 mg to 45 mg at a time.  Denies overuse.  We reviewed PTA analgesia and according to the  discussed that he has single provider with controlled substance agreement and it appears he was on higher doses of morphine last year (up to 150 mg/day or 15 mg, max dose 10 tabs/day), and prescriptions since then reveal opioid taper with the last 2 months receiving morphine 15 mg, max dose of 6 tabs per day.  Also reviewed that current order for PRN PO morphine allows for max of 240 mg/day which is 150 mg over his usual daily dose.    Patient reports 40-50% improvement in comfort when he receives Dilaudid IV, but it only lasts about 1.5-2 hrs. We discussed potential adjuvant nonopioid analgesia for acute postop pain and surgery involving spine and around spinal nerves that may likely produce nerve irritation/inflammation.  Patient and his wife state he's tried gabapentin for " "chronic pain in the past without benefit, and was on pregabalin (Lyrica) for awhile and didn't notice any difference in his chronic pain when he went off of it.  At this time he declined starting Lyrica stating he wants to think about it.   His wife is wondering if his Tylenol dose can be increased from 500 mg Q 8 hrs to 1000 mg Q 8 hrs. We discussed his AST is elevated and for now it's best to continue current dose and reeval LFTs prior to dose increase.    Current analgesia 24 hr total: morphine-IR  mg, morphine IV 4 mg x 3, Dilaudid IV 0.5mg x 3, Tylenol 500 mg Q 8 hrs, methocarbamol \"HELD by provider\"  FUNCTIONAL STATUS:  Change:      unchanged  Oral intake:     Regular  Activity level:     In bed, repositioning with assist  Mood:      labile and sleepy, poor energy     -- Inpatient Medications Related to Pain Management:   Medications related to Pain Management (From now, onward)    Start     Dose/Rate Route Frequency Ordered Stop    06/23/21 2207  HYDROmorphone (PF) (DILAUDID) injection 0.5 mg      0.5 mg Intravenous EVERY 4 HOURS PRN 06/23/21 2207      06/23/21 1358  lidocaine (XYLOCAINE) 5 % ointment       Topical EVERY 4 HOURS PRN 06/23/21 1400      06/23/21 1357  morphine (MSIR) IR tablet 15-30 mg      15-30 mg Oral EVERY 3 HOURS PRN 06/23/21 1400      06/22/21 2000  senna-docusate (SENOKOT-S/PERICOLACE) 8.6-50 MG per tablet 2 tablet - PATIENT SUPPLIED MEDICAITON      2 tablet Oral 2 TIMES DAILY 06/22/21 1514      06/22/21 1600  [Held by provider]  methocarbamol (ROBAXIN) tablet 500 mg     (Held by provider since Wed 6/23/2021 at 1025 by Erika Enrique MD.Hold Reason: Other.)    500 mg Oral 4 TIMES DAILY 06/22/21 1346      06/22/21 1444  simethicone (MYLICON) chewable tablet 125 mg - PATIENTS OWN SUPPLY      125 mg Oral 3 TIMES DAILY PRN 06/22/21 1444      06/22/21 1430  acetaminophen (TYLENOL) rapid release gels 500 mg -- PATIENT SUPPLIED MEDICATION      500 mg Oral EVERY 8 HOURS PRN 06/22/21 3640 " "     06/22/21 1411  diclofenac (VOLTAREN) 1 % topical gel 2-4 g - PATIENTS OWN SUPPLY      2-4 g Topical 4 TIMES DAILY PRN 06/22/21 1341      06/22/21 1400  acetaminophen (TYLENOL) rapid release gels 500 mg -- PATIENT SUPPLIED MEDICATION      500 mg Oral EVERY 8 HOURS SCHEDULED 06/22/21 1347      06/22/21 1351  diazepam (VALIUM) tablet 2-4 mg      2-4 mg Oral AT BEDTIME PRN 06/22/21 1351      06/22/21 0800  polyethylene glycol (MIRALAX) Packet 17 g      17 g Oral 2 TIMES DAILY 06/21/21 2112      06/21/21 2111  magnesium hydroxide (MILK OF MAGNESIA) suspension 30 mL      30 mL Oral DAILY PRN 06/21/21 2112      06/21/21 2111  bisacodyl (DULCOLAX) Suppository 10 mg      10 mg Rectal DAILY PRN 06/21/21 2112      06/21/21 2111  sodium phosphate (FLEET ENEMA) 1 enema      1 enema Rectal ONCE PRN 06/21/21 2112      06/21/21 2111  lidocaine 1 % 0.1-1 mL      0.1-1 mL Other EVERY 1 HOUR PRN 06/21/21 2111 06/21/21 2111  lidocaine (LMX4) cream       Topical EVERY 1 HOUR PRN 06/21/21 2111            LAB DATA:  Recent Labs   Lab 06/24/21  0850 06/23/21 2022 06/23/21  0632 06/22/21  0450 06/21/21  2150 06/21/21 2150   CR  --   --  0.69 0.70  --  0.68   WBC 9.0 8.3 12.4* 13.8*   < >  --    HGB 8.2* 7.6* 8.1* 11.0*   < >  --    AST  --   --   --   --   --  58*   ALT  --   --   --   --   --  22    < > = values in this interval not displayed.       /57   Pulse 80   Temp 98.2  F (36.8  C) (Oral)   Resp 14   Ht 1.854 m (6' 1\")   Wt 107.5 kg (236 lb 14.4 oz)   SpO2 97%   BMI 31.26 kg/m     EXAM:  CONSTITUTIONAL/GENERAL APPEARANCE:  Sleepy, slurring and garbled speech at times  EYES: EOMI  ENT/NECK: atraumatic, lips and oral mucous membranes moist  RESPIRATORY: non-labored breathing on room air. No cough, wheeze  MUSCULOSKELETAL/BACK/SPINE/EXTREMITIES: moving upper extremities in bed, remained left side-lying throughout interview  NEURO:  [place]q, sleepy  PSYCHIATRIC/BEHAVIORAL/OBSERVATIONS: grimacing and pain observed " during our interview.       ----------------------------------------------------------------------------------  BEBETO [facility]  Inpatient Pain Service     Helpful Resources:  Getting Rid of Unwanted Medications (printable PDF for patients)   Opioid Overdose Prevention Toolkit (printable PDF for patients)   Prescription Opioids: What You Need To Know (printable PDF for patients)

## 2021-06-24 NOTE — PLAN OF CARE
"      VS: /57   Pulse 80   Temp 98.2  F (36.8  C) (Oral)   Resp 14   Ht 1.854 m (6' 1\")   Wt 107.5 kg (236 lb 14.4 oz)   SpO2 97%   BMI 31.26 kg/m      Cont pulse ox  Tele - 100% paced     Output: Peng placed yesterday due to retention; peng patent draining clear sky urine. LBM 6/24 - loose. BS active x4, passing flatus.      Lungs: LS clear/diminished. Denies SOB, chest pain, cough.     Activity: Not OOB this shift; turns/repositioning x2 assist q2hr and at pt's request.     Skin: Skin pale; intact ex spinal incision, abrasions to face and bilateral upper thighs, blisters to R knee and chest, and scabs to bilateral shins - pt followed by WOCN. Fliud-filled sac to L lateral ankle, baseline.      Pain:   Pain regimen not effective, per pt report. Receiving PO morphine 30mg q3hr, IV dilaudid 0.5 mg q4hr, and scheduled/prn tylenol. C/O of unbearable R calf pain - moonlighter paged, switched from IV morphine to IV dilaudid; per MD, no concerns of DVT (no redness, swelling, warmth to area). Pt also has prn lidocaine ointment q4hr - applied to R calf and R foot w/ no relief.     Neuro/CMS:   A&Ox4, CMS intact, denies N/T   Dressing(s):   Aquacel spinal dressing CDI    Diet:   Regular diet, tolerating sips of soda w/ meds throughout shift.   LDA:   R PIV infusing LR @ 75mL/hr  L wrist PIV SL  Hemovac  Peng     Equipment:   IV pole, cont pulse ox, tele   Plan:   Monitor pain control. Monitor hgb and platelets. Continue to follow plan of care.     Additional Info:   Hgb 7.6 -- 1 U RBCs given  Platelets 66 -- 1 U platelets given  Recheck ordered for 0800         "

## 2021-06-24 NOTE — PROGRESS NOTES
[facility], [place], [place]  Internal Medicine Progress Note      Assessment and Plans:     Jorje Simmons is a 70 year old male with history of hyperlipidemia, hypertension, CAD with MI s/p CABG x4 (1986), ischemic cardiomyopathy s/p ICD placement, PAD s/p left SFA balloon angioplasty to assist a latissimus dorsi flap to the left calf, atrial fibrillation/flutter (no longer on anticoagulation). He underwent T7-S1 posterior spinal fusion with pelvic fixation and osteotomies of T12-L1 and L5 on 6/21/21 with Dr. Akins. Intraoperatively significant blood loss (4.3 L). Patient admitted to the SICU hypotensive, requiring NE.    # Status post complex spinal surgery including posterior T7-S1 fusion secondary to flat back syndrome by Dr. Akins's team  # Acute urinary retention with suprapubic abdominal pain.   # Acute blood loss anemia and marked thrombocytopenia, status post surgery with concern for the development of DIC given elevated INR 1.66, however, most recent fibrinogen yesterday morning was 306.  # Hypertension with recent significant hypotension postop and assessing the need for vasopressors.  However, now off with most recent blood pressure 97/41.  # History of coronary artery disease, status post CABG in 1986, as well as ischemic cardiomyopathy, status post ICD placement, most recently replaced in 2016.  Most recent EKG with paced rhythm and frequent ectopic beats with recent mildly elevated troponin now on downward trend.  The patient currently denies chest pain.  Evaluated by cardiology prior to admission on 05/04/2021 with RCRI score of 2, which translates to a 10.1% risk of 30 day MI, death and cardiac arrest.  # Chronic atrial fibrillation, chronically managed on Xarelto prior to surgery, held for approximately 48 hours.  # Postop intermittent sedation, likely due to polypharmacy from methocarbamol, Valium and lidocaine infusion  postop.       He is doing much better now. Pain is controlled. He is a little sleepy, but not confused right now. Mentation is okay. Schedule tylenol 1 gm at HS. 500 mg BID during the day time. He can have additional tylenol in day time if needed. His LFTs are not bad. He does not like dietary supplements, so we will stop them. HR is good.  Platelet count is improving. Give one more bag if spine team wants it. Stop insulin as BG are good.         DVT Prophylaxis: SCDs  Code Status: Full Code  Disposition: TBD      Erika Enrique MD, MPH.  Internal Medicine Attending  [place], [place]    Click on the link below to page me.   Text Page  (7am - 5pm, M-F)    Subjective:        Feels better. Pain is improved.   No chest pain or sob.     -Data reviewed today: I reviewed all new labs and imaging results over the last 24 hours.     Exam:         Temp: 97.9  F (36.6  C) Temp src: Oral BP: 112/52 Pulse: 80   Resp: 16 SpO2: 91 % O2 Device: None (Room air) Oxygen Delivery: 2 LPM  Vitals:    06/21/21 0617 06/22/21 0630   Weight: 98.4 kg (216 lb 14.9 oz) 107.5 kg (236 lb 14.4 oz)     Vital Signs with Ranges  Temp:  [97.7  F (36.5  C)-98.8  F (37.1  C)] 97.9  F (36.6  C)  Pulse:  [79-82] 80  Resp:  [12-16] 16  BP: (106-131)/(52-66) 112/52  SpO2:  [91 %-99 %] 91 %  I/O last 3 completed shifts:  In: 893.33 [P.O.:240]  Out: 2235 [Urine:2000; Drains:235]    Constitutional: No distress noted, Alert, Answering questions appropriately  Respiratory: AE is good on both sides, no wheezing onr crackles  Cardiovascular: S1S2 normal, no new murmur  GI: Soft, non tender  Skin/Integumen: No rash      Medications     lactated ringers 100 mL/hr at 06/24/21 1130       acetaminophen  1,000 mg Oral At Bedtime     [START ON 6/25/2021] acetaminophen  500 mg Oral BID     ascorbic acid  250 mg Oral Daily     atorvastatin  80 mg Oral At Bedtime     [START ON 6/26/2021] cholecalciferol  1,250 mcg Oral Q7 Days      magnesium sulfate  2 g Intravenous Once     melatonin  6 mg Oral At Bedtime     [Held by provider] methocarbamol  500 mg Oral 4x Daily     metoprolol tartrate  6.25 mg Oral BID     multivitamin, therapeutic  1 tablet Oral Daily     polyethylene glycol  17 g Oral BID     senna-docusate  2 tablet Oral BID     sodium chloride (PF)  10 mL Intravenous Once     sodium chloride (PF)  3 mL Intracatheter Q8H     vitamin A  20,000 Units Oral Daily     vitamin E  200 Units Oral Daily     zinc sulfate  220 mg Oral Daily       Data   Recent Labs   Lab 06/24/21  1538 06/24/21  0850 06/23/21 2022 06/23/21  0632 06/22/21 2001 06/22/21  0922 06/22/21  0450 06/21/21 2150 06/21/21 2150   WBC 8.4 9.0 8.3 12.4*  --   --  13.8*   < >  --    HGB 8.2* 8.2* 7.6* 8.1*  --   --  11.0*   < >  --    MCV 98 96 101* 95  --   --  93   < >  --    PLT 89* 81* 66* 60*  --   --  40*   < >  --    INR  --   --  1.56* 1.66*  --   --  1.62*  --   --      --   --  137  --   --  137  --  140   POTASSIUM 4.7  --   --  4.7  --   --  4.0  --  4.0   CHLORIDE 107  --   --  103  --   --  108  --  114*   CO2 31  --   --  29  --   --  23  --  20   BUN 10  --   --  17  --   --  15  --  13   CR 0.54*  --   --  0.69  --   --  0.70  --  0.68   ANIONGAP 2*  --   --  5  --   --  6  --  6   TRENTON 8.1*  --   --  8.1*  --   --  8.3*  --  7.8*   *  --   --  116*  --   --  181*  --  166*   ALBUMIN  --   --   --   --   --   --   --   --  2.6*   PROTTOTAL  --   --   --   --   --   --   --   --  3.9*   BILITOTAL  --   --   --   --   --   --   --   --  1.8*   ALKPHOS  --   --   --   --   --   --   --   --  30*   ALT  --   --   --   --   --   --   --   --  22   AST  --   --   --   --   --   --   --   --  58*   TROPI  --   --   --   --  0.056* 0.075* 0.070*  --  0.033    < > = values in this interval not displayed.       Recent Results (from the past 24 hour(s))   US Lower Extremity Venous Duplex Bilateral    Narrative    EXAMINATION: US LOWER EXTREMITY VENOUS DUPLEX  BILATERAL  6/24/2021  2:04 PM      CLINICAL HISTORY: Status post fusion surgery, bilateral calf pain.    COMPARISON: None        PROCEDURE COMMENTS: Ultrasound was performed of the deep venous system  of the right and left lower extremity using grayscale, color, and  spectral Doppler.    FINDINGS:  The common femoral, greater saphenous origin, femoral, popliteal, and  deep calf veins are visualized and are patent. Venous waveforms are  normal. There is normal response to compression. Incidentally noted  atherosclerotic calcifications bilaterally.      Impression    IMPRESSION:  No deep vein thrombosis in the right or left lower extremity.    I have personally reviewed the examination and initial interpretation  and I agree with the findings.    ELIO PIERRE MD

## 2021-06-24 NOTE — PLAN OF CARE
Pt will be placed on hold for PT at this time due to pt having limited tolerance for therapy.  Pt will continue to be seen by OT and PT will initiate once pt is able to consistently participate in OT sessions.

## 2021-06-24 NOTE — PLAN OF CARE
"/57   Pulse 80   Temp 98.2  F (36.8  C) (Oral)   Resp 14   Ht 1.854 m (6' 1\")   Wt 107.5 kg (236 lb 14.4 oz)   SpO2 97%   BMI 31.26 kg/m    Pt is drowsy, awakens when spoken to, O x4, speech is mostly clear, sometimes garbled. Pain management has been a challenge, discussed with pain service and ortho. See orders. To continue po morphine q3h. IV dilaudid interval changed to q2h prn. Pt c/o severe R LE calf pain/ thigh pain, some incisional pain. Denies tingling or numbness. LS clear, using IS with encouragement. Pt is [place] and sometimes resistent to cares, he doesn't always hear what is said: explain cares carefully to assure pt understands. Dsg CDI, hemovac draining bloody drainage. Blister to mid chest intact. Multiple areas of scabs bilateral thighs, face, R knee, all areas HUA. Bulge present L ankle area from old surgery. Pt able to assist with repositioning in bed, repositioned q2h and prn with AO2. Moderate incontinent loose stool x 1. Pt removes oxyplus mask, did not want to wear it, pt desats to 88% on RA, 95% on 2L oxiplus. Reasoning explained, pt agreeable to keeping it on.   Pt to US for joshua LE because of calf pain.   "

## 2021-06-25 ENCOUNTER — APPOINTMENT (OUTPATIENT)
Dept: OCCUPATIONAL THERAPY | Facility: CLINIC | Age: 71
DRG: 456 | End: 2021-06-25
Attending: ORTHOPAEDIC SURGERY
Payer: COMMERCIAL

## 2021-06-25 ENCOUNTER — APPOINTMENT (OUTPATIENT)
Dept: GENERAL RADIOLOGY | Facility: CLINIC | Age: 71
DRG: 456 | End: 2021-06-25
Attending: HOSPITALIST
Payer: COMMERCIAL

## 2021-06-25 LAB
ANION GAP SERPL CALCULATED.3IONS-SCNC: 4 MMOL/L (ref 3–14)
BUN SERPL-MCNC: 10 MG/DL (ref 7–30)
CALCIUM SERPL-MCNC: 8.6 MG/DL (ref 8.5–10.1)
CHLORIDE SERPL-SCNC: 104 MMOL/L (ref 94–109)
CO2 SERPL-SCNC: 31 MMOL/L (ref 20–32)
CREAT SERPL-MCNC: 0.54 MG/DL (ref 0.66–1.25)
ERYTHROCYTE [DISTWIDTH] IN BLOOD BY AUTOMATED COUNT: 13.9 % (ref 10–15)
GFR SERPL CREATININE-BSD FRML MDRD: >90 ML/MIN/{1.73_M2}
GLUCOSE SERPL-MCNC: 101 MG/DL (ref 70–99)
HCT VFR BLD AUTO: 28.1 % (ref 40–53)
HGB BLD-MCNC: 9.1 G/DL (ref 13.3–17.7)
MAGNESIUM SERPL-MCNC: 1.8 MG/DL (ref 1.6–2.3)
MCH RBC QN AUTO: 31.4 PG (ref 26.5–33)
MCHC RBC AUTO-ENTMCNC: 32.4 G/DL (ref 31.5–36.5)
MCV RBC AUTO: 97 FL (ref 78–100)
PLATELET # BLD AUTO: 127 10E9/L (ref 150–450)
POTASSIUM SERPL-SCNC: 3.7 MMOL/L (ref 3.4–5.3)
RBC # BLD AUTO: 2.9 10E12/L (ref 4.4–5.9)
SODIUM SERPL-SCNC: 139 MMOL/L (ref 133–144)
WBC # BLD AUTO: 8.3 10E9/L (ref 4–11)

## 2021-06-25 PROCEDURE — 120N000002 HC R&B MED SURG/OB UMMC

## 2021-06-25 PROCEDURE — 80048 BASIC METABOLIC PNL TOTAL CA: CPT | Performed by: HOSPITALIST

## 2021-06-25 PROCEDURE — 97530 THERAPEUTIC ACTIVITIES: CPT | Mod: GO | Performed by: OCCUPATIONAL THERAPIST

## 2021-06-25 PROCEDURE — 85027 COMPLETE CBC AUTOMATED: CPT | Performed by: HOSPITALIST

## 2021-06-25 PROCEDURE — 250N000013 HC RX MED GY IP 250 OP 250 PS 637: Performed by: CLINICAL NURSE SPECIALIST

## 2021-06-25 PROCEDURE — 250N000011 HC RX IP 250 OP 636: Performed by: CLINICAL NURSE SPECIALIST

## 2021-06-25 PROCEDURE — 83735 ASSAY OF MAGNESIUM: CPT | Performed by: HOSPITALIST

## 2021-06-25 PROCEDURE — 258N000003 HC RX IP 258 OP 636: Performed by: STUDENT IN AN ORGANIZED HEALTH CARE EDUCATION/TRAINING PROGRAM

## 2021-06-25 PROCEDURE — 250N000013 HC RX MED GY IP 250 OP 250 PS 637: Performed by: HOSPITALIST

## 2021-06-25 PROCEDURE — 74018 RADEX ABDOMEN 1 VIEW: CPT | Mod: 26 | Performed by: RADIOLOGY

## 2021-06-25 PROCEDURE — 250N000013 HC RX MED GY IP 250 OP 250 PS 637: Performed by: STUDENT IN AN ORGANIZED HEALTH CARE EDUCATION/TRAINING PROGRAM

## 2021-06-25 PROCEDURE — 99232 SBSQ HOSP IP/OBS MODERATE 35: CPT | Performed by: HOSPITALIST

## 2021-06-25 PROCEDURE — 36415 COLL VENOUS BLD VENIPUNCTURE: CPT | Performed by: HOSPITALIST

## 2021-06-25 PROCEDURE — 74018 RADEX ABDOMEN 1 VIEW: CPT

## 2021-06-25 RX ORDER — POLYETHYLENE GLYCOL 3350 17 G/17G
17 POWDER, FOR SOLUTION ORAL 2 TIMES DAILY
Status: DISCONTINUED | OUTPATIENT
Start: 2021-06-25 | End: 2021-06-25

## 2021-06-25 RX ORDER — BISACODYL 10 MG
10 SUPPOSITORY, RECTAL RECTAL DAILY PRN
Status: DISCONTINUED | OUTPATIENT
Start: 2021-06-25 | End: 2021-07-07 | Stop reason: HOSPADM

## 2021-06-25 RX ORDER — POLYETHYLENE GLYCOL 3350 17 G/17G
17 POWDER, FOR SOLUTION ORAL 2 TIMES DAILY
Status: DISCONTINUED | OUTPATIENT
Start: 2021-06-25 | End: 2021-07-07 | Stop reason: HOSPADM

## 2021-06-25 RX ORDER — NALOXONE HYDROCHLORIDE 1 MG/ML
2 INJECTION PARENTERAL 3 TIMES DAILY
Status: DISCONTINUED | OUTPATIENT
Start: 2021-06-25 | End: 2021-06-26

## 2021-06-25 RX ADMIN — LIDOCAINE: 50 OINTMENT TOPICAL at 17:15

## 2021-06-25 RX ADMIN — NALOXONE HYDROCHLORIDE 2 MG: 1 INJECTION PARENTERAL at 22:32

## 2021-06-25 RX ADMIN — Medication 6.25 MG: at 07:45

## 2021-06-25 RX ADMIN — HYDROMORPHONE HYDROCHLORIDE 0.5 MG: 1 INJECTION, SOLUTION INTRAMUSCULAR; INTRAVENOUS; SUBCUTANEOUS at 18:53

## 2021-06-25 RX ADMIN — ACETAMINOPHEN 1000 MG: 500 TABLET, FILM COATED ORAL at 01:09

## 2021-06-25 RX ADMIN — Medication 250 MG: at 07:45

## 2021-06-25 RX ADMIN — MORPHINE SULFATE 30 MG: 15 TABLET ORAL at 05:18

## 2021-06-25 RX ADMIN — ATORVASTATIN CALCIUM 80 MG: 40 TABLET, FILM COATED ORAL at 22:33

## 2021-06-25 RX ADMIN — Medication 500 MG: at 07:44

## 2021-06-25 RX ADMIN — Medication 500 MG: at 17:15

## 2021-06-25 RX ADMIN — ACETAMINOPHEN 1000 MG: 500 TABLET, FILM COATED ORAL at 22:33

## 2021-06-25 RX ADMIN — MORPHINE SULFATE 30 MG: 15 TABLET ORAL at 17:14

## 2021-06-25 RX ADMIN — THERA TABS 1 TABLET: TAB at 07:45

## 2021-06-25 RX ADMIN — Medication 200 UNITS: at 07:44

## 2021-06-25 RX ADMIN — Medication 125 MG: at 20:34

## 2021-06-25 RX ADMIN — HYDROMORPHONE HYDROCHLORIDE 0.5 MG: 1 INJECTION, SOLUTION INTRAMUSCULAR; INTRAVENOUS; SUBCUTANEOUS at 23:33

## 2021-06-25 RX ADMIN — HYDROMORPHONE HYDROCHLORIDE 0.5 MG: 1 INJECTION, SOLUTION INTRAMUSCULAR; INTRAVENOUS; SUBCUTANEOUS at 11:56

## 2021-06-25 RX ADMIN — POLYETHYLENE GLYCOL 3350 17 G: 17 POWDER, FOR SOLUTION ORAL at 13:27

## 2021-06-25 RX ADMIN — HYDROMORPHONE HYDROCHLORIDE 0.5 MG: 1 INJECTION, SOLUTION INTRAMUSCULAR; INTRAVENOUS; SUBCUTANEOUS at 06:42

## 2021-06-25 RX ADMIN — MORPHINE SULFATE 30 MG: 15 TABLET ORAL at 20:49

## 2021-06-25 RX ADMIN — HYDROMORPHONE HYDROCHLORIDE 0.5 MG: 1 INJECTION, SOLUTION INTRAMUSCULAR; INTRAVENOUS; SUBCUTANEOUS at 15:06

## 2021-06-25 RX ADMIN — Medication 2 TABLET: at 20:32

## 2021-06-25 RX ADMIN — Medication 2 TABLET: at 07:45

## 2021-06-25 RX ADMIN — MORPHINE SULFATE 30 MG: 15 TABLET ORAL at 09:43

## 2021-06-25 RX ADMIN — Medication 6.25 MG: at 20:33

## 2021-06-25 RX ADMIN — SODIUM CHLORIDE, POTASSIUM CHLORIDE, SODIUM LACTATE AND CALCIUM CHLORIDE: 600; 310; 30; 20 INJECTION, SOLUTION INTRAVENOUS at 11:25

## 2021-06-25 RX ADMIN — Medication 20000 UNITS: at 07:45

## 2021-06-25 RX ADMIN — POLYETHYLENE GLYCOL 3350 17 G: 17 POWDER, FOR SOLUTION ORAL at 22:35

## 2021-06-25 RX ADMIN — BISACODYL 10 MG: 10 SUPPOSITORY RECTAL at 20:33

## 2021-06-25 RX ADMIN — ZINC SULFATE 220 MG (50 MG) CAPSULE 220 MG: CAPSULE at 07:44

## 2021-06-25 RX ADMIN — SODIUM CHLORIDE, POTASSIUM CHLORIDE, SODIUM LACTATE AND CALCIUM CHLORIDE: 600; 310; 30; 20 INJECTION, SOLUTION INTRAVENOUS at 01:07

## 2021-06-25 RX ADMIN — Medication 125 MG: at 13:30

## 2021-06-25 NOTE — PROGRESS NOTES
"Orthopaedic Surgery Progress Note:  06/25/2021     Subjective:   Concerns about pain control this am - but laying comfortable in bed and sleepy. Too sleepy yesterday to work with therapies. Casiano in place.    Objective:   BP (!) 129/115   Pulse 81   Temp 97.5  F (36.4  C) (Oral)   Resp 16   Ht 1.854 m (6' 1\")   Wt 107.5 kg (236 lb 14.4 oz)   SpO2 100%   BMI 31.26 kg/m     No intake/output data recorded.     Gen: NAD. Uncomfortable appearing.  Resp: Breathing comfortably on NC.  Drain:   Superficial drain output of 90/40 in last 2 shifts.  Musculoskeletal: dressing c/d/i.  Sensation from L2-S1 is preserved.    Lower extremities:  Motor Strength Right Left   Hip flexion: L1, L2, L3 5/5 5/5   Knee flexion: S1 5/5 5/5   Knee extension: L3, L4 5/5 5/5   Ankle dosiflexion: L4, L5 0/5 5/5   EHL: L5 0/5 5/5   Ankle plantarflexion: S1 5/5 5/5   Has baseline reduced ROM at the right ankle.    Denies decreased sensation in legs, bilaterally.    Labs:  Lab Results   Component Value Date    WBC 8.4 06/24/2021    HGB 8.2 (L) 06/24/2021    PLT 89 (L) 06/24/2021    INR 1.56 (H) 06/23/2021        Assessment & Plan:   Darleen Simmons is a 70 year old male with PMH including chronic opioid use, former smoker, HTN, HLP, CAD c/b MI, ischemic cardiomyopathy s/p ICD placement, PAD s/p left SFA POBA for latissimus dorsi flap to left calf, atrial fibrillation/flutter, acute blood loss anemia, flat back syndrome now s/p T7-pelvis revision PSIF with T12-L1 3CO and L5 PSO on 6/21/21 with Jazmyne Akins/Herb. Significant pain out of proportion could represent nerve root retraction and normal postop pain in the setting of a chronic opioid user. Given the symptoms in his legs are at the PSO level, this could also represent neural foraminal stenosis. The improving symptoms and imaging do not indicate iatrogenic neural foraminal stenosis.    Goals for today:  - PT/OT  - post-op XR pending  -monitor drain for possible removal    Orthopedics " Primary  Activity: Up with assist until independent. No excessive bending or twisting. No lifting >10 lbs x 6 weeks. Renny lift approved for transfers. Keep back straight if using lift.  Weight bearing status: WBAT.  Pain management: Transition from IV to PO as tolerated. No NSAIDs.   Antibiotics: Ancef x24 hrs postoperatively - completed.  Diet: ADAT.   DVT prophylaxis: SCDs only. No chemical DVT ppx needed.  Imaging: XR Upright PTDC - ordered. Obtain after HV removed.  Labs: labs PRN; ongoing close monitoring of hematologic status  Bracing/Splinting: None.  Dressings: Keep dressing c/d/i x 7 days.  Drains: Document output per shift, will be discontinued at Orthopedic Surgery discretion.  Casiano catheter: Removed 6/22. Replace if unable to void.  Physical Therapy/Occupational Therapy: Eval and treat.  Consults: Hospitalist.  Follow-up: Clinic with Dr. Akins in 6 weeks with repeat x-rays.   Disposition: Pending progress with therapies, pain control on orals, post-op imaging, and drain removal.    Orthopaedics surgery staff for this patient is Dr. Akins.    ------------------------------------------------------------------------------------------    [   ] Drains removed.  [   ] Post xrays done.  [   ] Discharge orders done.  [x] Discharge summary started.    Gregory Vincent MD  Orthopaedic Surgery Resident, PGY4  Pager: [phone]     Please page me directly with any questions/concerns during regular weekday hours before 5pm.     If there is no response, if it is a weekend, or if it is during evening hours then please page the orthopaedic surgery resident on call as listed on [facility] call schedule under the orthopaedics tab.    FOLLOWUP:    Future Appointments   Date Time Provider Department [place]   8/5/2021 12:30 PM Akhil Akins MD [facility]   9/9/2021 11:00 AM Akhil Akins MD [facility]

## 2021-06-25 NOTE — PROGRESS NOTES
"CLINICAL NUTRITION SERVICES - ASSESSMENT NOTE     Nutrition Prescription    RECOMMENDATIONS FOR MDs/PROVIDERS TO ORDER:  Can change diet back to regular if pt dislikes dental soft consistency (downgraded per pt request d/t poor fitting dentures)     Malnutrition Status:    Maintenance or Per provider pending fluid status    Recommendations already ordered by Registered Dietitian (RD):  Calorie count  Ensure clear q meal  Mechanical/dental soft diet  PM snack: hard boiled egg    Future/Additional Recommendations:  If unable to meet needs orally pending calorie count consider need for nutrition support if within POC           Osmolite 1.5 @ 60 mL/hr x 24 hrs providing 1440 mL, 2160 kcal (24 kcal/kg), 91 g protein (1 g/kg), 294 g CHO, 0 g fiber, and 1097 mL free water per DW of 89.6 kg.  Water Flushes: 200 mL q 4 hrs (TF + Flushes = 2297 mL water; meeting 100% minimumhydration needs).            1 packet of prosource TID, 33g protein, 120kcal (2280kcal/25kcal/kg and 124g protein/1.4g/kg)  Adjust snacks/supplements per pt preference       REASON FOR ASSESSMENT  Darleen Simmons is a 70 year old male assessed by the dietitian for Patient/Family Request-Poor eating post op, Hx constipation post op in past;  Has trouble with his dentures also.  Wife requests conuslt and pressure injuries  PMH significant for hyperlipidemia, hypertension, CAD with MI s/p CABG x4 (1986), ischemic cardiomyopathy s/p ICD placement, PAD s/p left SFA balloon angioplasty to assist a latissimus dorsi flap to the left calf, atrial fibrillation/flutter (no longer on anticoagulation). He underwent T7-S1 posterior spinal fusion with pelvic fixation and osteotomies of T12-L1 and L5 on 6/21/21 with Dr. Akins. Intraoperatively significant blood loss (4.3 L).   NUTRITION HISTORY  Pt reports a very good appetite and intakes at home PTA.Stated he is hungry all the time at home and has to \"what his diet like a hawk\". Has upper and lower dentures (will be " "getting implants at some point) however they do not fit well and slide around when eating. Pt is unsure of UBW and any wt changes.    CURRENT NUTRITION ORDERS  Diet: Regular  Intake/Tolerance: Appetite has been poor since surgery. Stated he has been having difficulty chewing d/t a sore mouth. He has been trying to choose softer foods on the menu but stated eggs are the only soft item he has noticed. Discussed changing diet to dental soft which he is agreeable to.Discussed trying supplements. He has tried ensure in the past and disliked it. Recently has tried magic cup and also disliked it (stated he would eat it if he had to but would prefer a different option). Discussed trying ensure clear which he is agreeable to. Pt stated he really enjoys eggs (both hard boiled and scrambled), will send for PM snack. Per wife pt has had issues with constipation post op in the past. Currently appears to be stooling (LBM 6/24), has order for scheduled miralax and senokot as well as PRN bowel meds.    LABS  Labs reviewed    MEDICATIONS  Medications reviewed:  Vit C  thera-vit  miralax  Senokot  Vit A  Vit E  Zinc sulfate  LR @ 100ml/hr  PRN: dulcolax, MOM, zofran, compazine, fleet enema    ANTHROPOMETRICS  Height: 185.4 cm (6' 1\")  Most Recent Weight: 107.5 kg (236 lb 14.4 oz)    IBW: 83.6 kg  BMI: 31.26 kg/m^2, Obesity Grade I BMI 30-34.9  Weight History: 15# wt gain in 1 month, 18# gain in 3 months, and 20# gain in 6 months  Wt Readings from Last 10 Encounters:   06/22/21 107.5 kg (236 lb 14.4 oz)   05/27/21 100.2 kg (221 lb)   05/04/21 101.6 kg (224 lb)   03/22/21 99.2 kg (218 lb 11.1 oz)   03/04/21 90.7 kg (200 lb)   12/18/20 98.3 kg (216 lb 11.4 oz)     Dosing Weight: 89.6 kg (adjusted using current wt of 107.5 kg and ideal wt of 83.6 kg)    ASSESSED NUTRITION NEEDS  Estimated Energy Needs: 5083-4918 kcals/day (25 - 30 kcals/kg)  Justification: Wound healing  Estimated Protein Needs: 116-134 grams protein/day (1.3 - 1.5 grams " of pro/kg)  Justification: Wound healing  Estimated Fluid Needs: 2899-0420 mL/day (1 mL/kcal)   Justification: Maintenance or Per provider pending fluid status    PHYSICAL FINDINGS  See malnutrition section below.  6/22 WO RN note:   Pressure Injury: on mid chest , hospital acquired ,   Pressure Injury is Stage 2   Contributing factor of the pressure injury: pressure and immobility  Status: initial assessment  Recommend provider order: None, at this time      Pressure Injury: on Right Knee , hospital acquired ,   Pressure Injury is Stage 2   Contributing factor of the pressure injury: pressure and immobility  Status: initial assessment  Recommend provider order: None, at this time      BL anterior thighs- healing superficial skin erosions- suspect from tape removal    MALNUTRITION  % Intake: < 75% for > 7 days (non-severe)  % Weight Loss: None noted  Subcutaneous Fat Loss: None observed  Muscle Loss: None observed  Fluid Accumulation/Edema:3+ R hand, 2+ L ankle, 1+ R ankle, L/R feet  Malnutrition Diagnosis: Patient does not meet two of the established criteria necessary for diagnosing malnutrition    NUTRITION DIAGNOSIS  Inadequate oral intake related to decreased appetite and difficulty chewing as evidenced by documented intakes since admit and increased needs for wound healing      INTERVENTIONS  Implementation  Nutrition Education: Discussed menu ordering and available snacks/supplements. Discussed softer diet and available soft snacks for pt to try. Encouraged intakes of frequent meals/snacks and higher calorie/protein items. Dicussed increased needs for wound healing.    Calorie count  Ensure clear q meal  Mechanical/dental soft diet  PM snack: hard boiled egg    Goals  Patient to consume % of nutritionally adequate meal trays TID, or the equivalent with supplements/snacks.     Monitoring/Evaluation  Progress toward goals will be monitored and evaluated per protocol.    Alejandrina Yang MS, RD, LDN  Unit  Pager [phone]

## 2021-06-25 NOTE — PLAN OF CARE
"BP (!) 129/115   Pulse 81   Temp 97.5  F (36.4  C) (Oral)   Resp 16   Ht 1.854 m (6' 1\")   Wt 107.5 kg (236 lb 14.4 oz)   SpO2 100%   BMI 31.26 kg/m   Pts pain management has been a challenge today, and after pain medications Pt is drowsy.  A&Ox4, denies numbness or tingling.  Pts speech is mostly clear, sometimes garbled. Casiano output has had adequate amount of urine. Last BM 6/25. LS clear and IS encouraged. Pt is still having severe R LE calf pain and thigh pain. Pt has some incisional pain. Bluge in L ankle present from old surgery. Pt needed assistance with repositioning throughout the day and preferred to lay on left side to avoid pain. Pt desats to 89% on RA, and had 95% on 2L oxiplus.   "

## 2021-06-25 NOTE — PROGRESS NOTES
[facility], [place], [place]  Internal Medicine Progress Note      Assessment and Plans:     Jorje Simmons is a 70 year old male with history of hyperlipidemia, hypertension, CAD with MI s/p CABG x4 (1986), ischemic cardiomyopathy s/p ICD placement, PAD s/p left SFA balloon angioplasty to assist a latissimus dorsi flap to the left calf, atrial fibrillation/flutter (no longer on anticoagulation). He underwent T7-S1 posterior spinal fusion with pelvic fixation and osteotomies of T12-L1 and L5 on 6/21/21 with Dr. Akins. Intraoperatively significant blood loss (4.3 L). Patient admitted to the SICU hypotensive, requiring NE.    # Status post complex spinal surgery including posterior T7-S1 fusion secondary to flat back syndrome by Dr. Akins's team  # Acute urinary retention with suprapubic abdominal pain.   # Acute blood loss anemia and marked thrombocytopenia, status post surgery with concern for the development of DIC given elevated INR 1.66, however, most recent fibrinogen yesterday morning was 306.  # Hypertension with recent significant hypotension postop and assessing the need for vasopressors.  However, now off with most recent blood pressure 97/41.  # History of coronary artery disease, status post CABG in 1986, as well as ischemic cardiomyopathy, status post ICD placement, most recently replaced in 2016.  Most recent EKG with paced rhythm and frequent ectopic beats with recent mildly elevated troponin now on downward trend.  The patient currently denies chest pain.  Evaluated by cardiology prior to admission on 05/04/2021 with RCRI score of 2, which translates to a 10.1% risk of 30 day MI, death and cardiac arrest.  # Chronic atrial fibrillation, chronically managed on Xarelto prior to surgery, held for approximately 48 hours.  # Postop intermittent sedation, likely due to polypharmacy from methocarbamol, Valium and lidocaine infusion  postop.       He is doing much better now. Pain is controlled. He is a little sleepy, but not confused right now. Mentation is okay. Schedule tylenol 1 gm at HS. 500 mg BID during the day time. He can have additional tylenol in day time if needed. His LFTs are not bad. He does not like dietary supplements, so we will stop them. HR is good.  Platelet count is improving. Give one more bag if spine team wants it. Stop insulin as BG are good.       DVT Prophylaxis: SCDs  Code Status: Full Code  Disposition: TBD      Erika Enrique MD, MPH.  Internal Medicine Attending  [place], [place]    Click on the link below to page me.   Text Page  (7am - 5pm, M-F)    Subjective:      He is slowly improving.    No new issues noted.      -Data reviewed today: I reviewed all new labs and imaging results over the last 24 hours.     Exam:         Temp: 97.5  F (36.4  C) Temp src: Oral BP: (!) 129/115 Pulse: 81   Resp: 16 SpO2: 100 % O2 Device: Nasal cannula Oxygen Delivery: 2 LPM  Vitals:    06/21/21 0617 06/22/21 0630   Weight: 98.4 kg (216 lb 14.9 oz) 107.5 kg (236 lb 14.4 oz)     Vital Signs with Ranges  Temp:  [97.3  F (36.3  C)-98.1  F (36.7  C)] 97.5  F (36.4  C)  Pulse:  [73-81] 81  Resp:  [12-16] 16  BP: (112-129)/() 129/115  SpO2:  [91 %-100 %] 100 %  I/O last 3 completed shifts:  In: 222   Out: 1450 [Urine:1400; Drains:50]    Constitutional: No distress noted, Alert, Answering questions appropriately  Respiratory: AE is good on both sides, no wheezing onr crackles  Cardiovascular: S1S2 normal, no new murmur  GI: Soft, non tender  Skin/Integumen: No rash      Medications     lactated ringers 100 mL/hr at 06/25/21 1125       acetaminophen  1,000 mg Oral At Bedtime     acetaminophen  500 mg Oral BID     ascorbic acid  250 mg Oral Daily     atorvastatin  80 mg Oral At Bedtime     [START ON 6/26/2021] cholecalciferol  1,250 mcg Oral Q7 Days     melatonin  6 mg Oral At Bedtime     [Held by  provider] methocarbamol  500 mg Oral 4x Daily     metoprolol tartrate  6.25 mg Oral BID     multivitamin, therapeutic  1 tablet Oral Daily     polyethylene glycol  17 g Oral BID     senna-docusate  2 tablet Oral BID     sodium chloride (PF)  10 mL Intravenous Once     sodium chloride (PF)  3 mL Intracatheter Q8H     vitamin A  20,000 Units Oral Daily     vitamin E  200 Units Oral Daily     zinc sulfate  220 mg Oral Daily       Data   Recent Labs   Lab 06/25/21  0830 06/24/21  1538 06/24/21  0850 06/23/21 2022 06/23/21  0632 06/22/21 2001 06/22/21  0922 06/22/21  0450 06/21/21 2150 06/21/21 2150   WBC 8.3 8.4 9.0 8.3 12.4*  --   --  13.8*   < >  --    HGB 9.1* 8.2* 8.2* 7.6* 8.1*  --   --  11.0*   < >  --    MCV 97 98 96 101* 95  --   --  93   < >  --    * 89* 81* 66* 60*  --   --  40*   < >  --    INR  --   --   --  1.56* 1.66*  --   --  1.62*  --   --     140  --   --  137  --   --  137  --  140   POTASSIUM 3.7 4.7  --   --  4.7  --   --  4.0  --  4.0   CHLORIDE 104 107  --   --  103  --   --  108  --  114*   CO2 31 31  --   --  29  --   --  23  --  20   BUN 10 10  --   --  17  --   --  15  --  13   CR 0.54* 0.54*  --   --  0.69  --   --  0.70  --  0.68   ANIONGAP 4 2*  --   --  5  --   --  6  --  6   TRENTON 8.6 8.1*  --   --  8.1*  --   --  8.3*  --  7.8*   * 102*  --   --  116*  --   --  181*  --  166*   ALBUMIN  --   --   --   --   --   --   --   --   --  2.6*   PROTTOTAL  --   --   --   --   --   --   --   --   --  3.9*   BILITOTAL  --   --   --   --   --   --   --   --   --  1.8*   ALKPHOS  --   --   --   --   --   --   --   --   --  30*   ALT  --   --   --   --   --   --   --   --   --  22   AST  --   --   --   --   --   --   --   --   --  58*   TROPI  --   --   --   --   --  0.056* 0.075* 0.070*  --  0.033    < > = values in this interval not displayed.       No results found for this or any previous visit (from the past 24 hour(s)).

## 2021-06-25 NOTE — PLAN OF CARE
"      VS: /65   Pulse 77   Temp 97.5  F (36.4  C) (Oral)   Resp 16   Ht 1.854 m (6' 1\")   Wt 107.5 kg (236 lb 14.4 oz)   SpO2 100%   BMI 31.26 kg/m    2L O2 NC   Output: Casiano putting out adequate amount of urine. LBM 6/24  hemovac   Lungs: CTA   Activity: Not out of bed this shift. Repositioned in bed on back and L side   Skin: Intact ex incisions and abrasions on face, knee and thigh and chest from positioning in surgery   Pain:   Tolerable with discomfort. Requests Q3 morphine given when available and IV dilaudid given in between. Sleeping between cares  Pt and wife adamant about keeping up with pain medication to keep pain under control!   Neuro/CMS:   Lethargic, but arouses to voice and is oriented x4, denies numbness and tingling   Dressing(s):   CDI   Diet:   Regular- tolerating   LDA:   R PIV infusing, L PIV SL, hemo   Equipment:   IV pole, O2 NC   Plan:   Continue to monitor and follow plan of care. Able to make needs known and call light within reach.   Additional Info:   Pt received platelets tonight. Tolerating well.        "

## 2021-06-26 ENCOUNTER — APPOINTMENT (OUTPATIENT)
Dept: PHYSICAL THERAPY | Facility: CLINIC | Age: 71
DRG: 456 | End: 2021-06-26
Attending: ORTHOPAEDIC SURGERY
Payer: COMMERCIAL

## 2021-06-26 LAB
MAGNESIUM SERPL-MCNC: 1.5 MG/DL (ref 1.6–2.3)
PHOSPHATE SERPL-MCNC: 2.7 MG/DL (ref 2.5–4.5)
POTASSIUM SERPL-SCNC: 3.7 MMOL/L (ref 3.4–5.3)

## 2021-06-26 PROCEDURE — 97530 THERAPEUTIC ACTIVITIES: CPT | Mod: GP | Performed by: CLINIC/CENTER

## 2021-06-26 PROCEDURE — 84100 ASSAY OF PHOSPHORUS: CPT | Performed by: HOSPITALIST

## 2021-06-26 PROCEDURE — 250N000013 HC RX MED GY IP 250 OP 250 PS 637: Performed by: STUDENT IN AN ORGANIZED HEALTH CARE EDUCATION/TRAINING PROGRAM

## 2021-06-26 PROCEDURE — 120N000002 HC R&B MED SURG/OB UMMC

## 2021-06-26 PROCEDURE — 250N000011 HC RX IP 250 OP 636: Performed by: CLINICAL NURSE SPECIALIST

## 2021-06-26 PROCEDURE — 97162 PT EVAL MOD COMPLEX 30 MIN: CPT | Mod: GP | Performed by: CLINIC/CENTER

## 2021-06-26 PROCEDURE — 250N000011 HC RX IP 250 OP 636: Performed by: INTERNAL MEDICINE

## 2021-06-26 PROCEDURE — 99232 SBSQ HOSP IP/OBS MODERATE 35: CPT | Performed by: HOSPITALIST

## 2021-06-26 PROCEDURE — 36415 COLL VENOUS BLD VENIPUNCTURE: CPT | Performed by: HOSPITALIST

## 2021-06-26 PROCEDURE — 84132 ASSAY OF SERUM POTASSIUM: CPT | Performed by: HOSPITALIST

## 2021-06-26 PROCEDURE — 250N000013 HC RX MED GY IP 250 OP 250 PS 637: Performed by: HOSPITALIST

## 2021-06-26 PROCEDURE — 250N000013 HC RX MED GY IP 250 OP 250 PS 637: Performed by: CLINICAL NURSE SPECIALIST

## 2021-06-26 PROCEDURE — 99207 PR CDG-MDM COMPONENT: MEETS MODERATE - UP CODED: CPT | Performed by: HOSPITALIST

## 2021-06-26 PROCEDURE — 83735 ASSAY OF MAGNESIUM: CPT | Performed by: HOSPITALIST

## 2021-06-26 RX ORDER — BISACODYL 10 MG
10 SUPPOSITORY, RECTAL RECTAL ONCE
Status: COMPLETED | OUTPATIENT
Start: 2021-06-26 | End: 2021-06-26

## 2021-06-26 RX ORDER — MAGNESIUM SULFATE HEPTAHYDRATE 40 MG/ML
4 INJECTION, SOLUTION INTRAVENOUS ONCE
Status: COMPLETED | OUTPATIENT
Start: 2021-06-26 | End: 2021-06-26

## 2021-06-26 RX ORDER — SODIUM CHLORIDE 9 MG/ML
INJECTION, SOLUTION INTRAVENOUS
Status: DISPENSED
Start: 2021-06-26 | End: 2021-06-27

## 2021-06-26 RX ADMIN — ZINC SULFATE 220 MG (50 MG) CAPSULE 220 MG: CAPSULE at 08:08

## 2021-06-26 RX ADMIN — CHOLECALCIFEROL CAP 1.25 MG (50000 UNIT) 1250 MCG: 1.25 CAP at 18:27

## 2021-06-26 RX ADMIN — THERA TABS 1 TABLET: TAB at 08:09

## 2021-06-26 RX ADMIN — MORPHINE SULFATE 30 MG: 15 TABLET ORAL at 22:12

## 2021-06-26 RX ADMIN — MORPHINE SULFATE 30 MG: 15 TABLET ORAL at 16:08

## 2021-06-26 RX ADMIN — DIAZEPAM 4 MG: 2 TABLET ORAL at 00:09

## 2021-06-26 RX ADMIN — Medication 20000 UNITS: at 08:08

## 2021-06-26 RX ADMIN — MORPHINE SULFATE 30 MG: 15 TABLET ORAL at 13:07

## 2021-06-26 RX ADMIN — MORPHINE SULFATE 30 MG: 15 TABLET ORAL at 00:08

## 2021-06-26 RX ADMIN — Medication 250 MG: at 08:08

## 2021-06-26 RX ADMIN — MELATONIN TAB 3 MG 6 MG: 3 TAB at 00:09

## 2021-06-26 RX ADMIN — MAGNESIUM SULFATE IN WATER 4 G: 40 INJECTION, SOLUTION INTRAVENOUS at 16:47

## 2021-06-26 RX ADMIN — Medication 500 MG: at 16:08

## 2021-06-26 RX ADMIN — HYDROMORPHONE HYDROCHLORIDE 0.5 MG: 1 INJECTION, SOLUTION INTRAMUSCULAR; INTRAVENOUS; SUBCUTANEOUS at 11:47

## 2021-06-26 RX ADMIN — MORPHINE SULFATE 30 MG: 15 TABLET ORAL at 19:20

## 2021-06-26 RX ADMIN — DIAZEPAM 4 MG: 2 TABLET ORAL at 22:12

## 2021-06-26 RX ADMIN — MORPHINE SULFATE 30 MG: 15 TABLET ORAL at 03:07

## 2021-06-26 RX ADMIN — MORPHINE SULFATE 30 MG: 15 TABLET ORAL at 06:27

## 2021-06-26 RX ADMIN — MELATONIN TAB 3 MG 6 MG: 3 TAB at 22:12

## 2021-06-26 RX ADMIN — Medication 500 MG: at 13:07

## 2021-06-26 RX ADMIN — Medication 500 MG: at 22:12

## 2021-06-26 RX ADMIN — BISACODYL 10 MG: 10 SUPPOSITORY RECTAL at 17:16

## 2021-06-26 RX ADMIN — Medication 6.25 MG: at 08:08

## 2021-06-26 RX ADMIN — POLYETHYLENE GLYCOL 3350 17 G: 17 POWDER, FOR SOLUTION ORAL at 14:02

## 2021-06-26 RX ADMIN — Medication 200 UNITS: at 08:08

## 2021-06-26 RX ADMIN — Medication 125 MG: at 22:18

## 2021-06-26 NOTE — PROGRESS NOTES
06/26/21 0950   Quick Adds   Type of Visit Initial PT Evaluation       Present no   Language English   Living Environment   People in home spouse   Current Living Arrangements house   Home Accessibility stairs within home   Number of Stairs, Within Home, Primary 9  (one flight up and down)   Stair Railings, Within Home, Primary railings on both sides of stairs   Transportation Anticipated family or friend will provide   Living Environment Comments Pt lives in split level home, has wheelchair ramp to enter and stair lift within.   Self-Care   Usual Activity Tolerance moderate   Current Activity Tolerance poor   Regular Exercise Yes   Activity/Exercise Type walking  (PT 3x/week)   Exercise Amount/Frequency 3-5 times/wk   Equipment Currently Used at Home cane, straight;walker, standard;crutches;cane, quad;grab bar, toilet;grab bar, tub/shower;shower chair;lift device   Activity/Exercise/Self-Care Comment Pt reports he was independent with all mobility with use of FWW prior to surgery, states he can navigate stairs but often uses the lift   Disability/Function   Hearing Difficulty or Deaf yes   Patient's preferred means of communication verbal   Describe hearing loss bilateral hearing loss   Use of hearing assistive devices bilateral hearing aids   Wear Glasses or Blind yes   Vision Management glasses   Concentrating, Remembering or Making Decisions Difficulty no   Difficulty Communicating no   Difficulty Eating/Swallowing no   Walking or Climbing Stairs Difficulty yes   Walking or Climbing Stairs ambulation difficulty, requires equipment;stair climbing difficulty, requires equipment;transferring difficulty, requires equipment   Mobility Management FWW   Dressing/Bathing Difficulty yes   Dressing/Bathing bathing difficulty, assistance 1 person;dressing difficulty, assistance 1 person   Dressing/Bathing Management spouse assist   Toileting issues no   Doing Errands Independently Difficulty (such  as shopping) yes   Errands Management spouse assists   Fall history within last six months no   General Information   Onset of Illness/Injury or Date of Surgery 06/21/21   Referring Physician Akhil Akins MD   Patient/Family Therapy Goals Statement (PT) to be able to walk   Pertinent History of Current Problem (include personal factors and/or comorbidities that impact the POC) Pt is a 70 year old male with history of hyperlipidemia, hypertension, CAD with MI s/p CABG x4 (1986), ischemic cardiomyopathy s/p ICD placement, PAD s/p left SFA balloon angioplasty to assist a latissimus dorsi flap to the left calf, atrial fibrillation/flutter (no longer on anticoagulation). He underwent T7-S1 posterior spinal fusion with pelvic fixation and osteotomies of T12-L1 and L5 on 6/21/21    Existing Precautions/Restrictions spinal   Weight-Bearing Status - LUE partial weight-bearing (% in comments)  (10 lbs)   Weight-Bearing Status - RUE partial weight-bearing (% in comments)  (10 lbs)   Weight-Bearing Status - LLE weight-bearing as tolerated   Weight-Bearing Status - RLE weight-bearing as tolerated   General Observations peng, drain, 2L O2   Cognition   Orientation Status (Cognition) oriented x 4   Affect/Mental Status (Cognition) WFL   Follows Commands (Cognition) WFL   Pain Assessment   Patient Currently in Pain Yes, see Vital Sign flowsheet   Integumentary/Edema   Integumentary/Edema Comments incision not observed due to dressing in place, scabs on B LE   Range of Motion (ROM)   ROM Comment not formally assessed, ROM appears functional with bed mobility but limited by pain   Strength   Strength Comments not formally assessed, pt strength limited by severe pain requiring significant assist with all mobility    Bed Mobility   Comment (Bed Mobility) pt completes log roll with modA then transfers from R sidelying to sit with HOB elevated and maxA at trunk and LEs   Transfers   Transfer Safety Comments pt unable to complete  sit<>stand transfers due to significant pain   Gait/Stairs (Locomotion)   Comment (Gait/Stairs) unable to assess due to pain   Balance   Balance Comments pt demonstrates fair sitting balance EOB with CGA-Agatha   Sensory Examination   Sensory Perception patient reports no sensory changes   Sensory Perception Comments light touch sensation intact   Clinical Impression   Criteria for Skilled Therapeutic Intervention yes, treatment indicated   PT Diagnosis (PT) impaired functional mobility   Influenced by the following impairments s/p T7-S1 posterior spinal fusion with pelvic fixation and osteotomies of T12-L1 and L5, significant pain, weakness, post op precautions   Functional limitations due to impairments impaired bed mobility, transfers and gait   Clinical Presentation Evolving/Changing   Clinical Presentation Rationale pt mobility limited by significant pain after complex spinal surgery   Clinical Decision Making (Complexity) moderate complexity   Therapy Frequency (PT) Daily   Predicted Duration of Therapy Intervention (days/wks) 1 week   Planned Therapy Interventions (PT) balance training;bed mobility training;gait training;home exercise program;patient/family education;ROM (range of motion);stair training;strengthening;stretching;transfer training   Anticipated Equipment Needs at Discharge (PT)   (pt owns FWW)   Risk & Benefits of therapy have been explained evaluation/treatment results reviewed;care plan/treatment goals reviewed;risks/benefits reviewed;current/potential barriers reviewed;participants voiced agreement with care plan   PT Discharge Planning    PT Discharge Recommendation (DC Rec) Transitional Care Facility   PT Rationale for DC Rec pt below baseline and all mobility significantly limited by pain, pt will benefit from continued skilled therapy for progression of strength and independence with all mobility prior to returning home   PT Brief overview of current status  maxA bed mobility   Total  Evaluation Time   Total Evaluation Time (Minutes) 8

## 2021-06-26 NOTE — PLAN OF CARE
OT:  Patient declined OT due to pain/fatigue; very groggy and reports long night with GI issues.  Patient upset he can not participate right now.

## 2021-06-26 NOTE — PROGRESS NOTES
[facility], [place], [place]  Internal Medicine Progress Note      Assessment and Plans:     Jorje Simmons is a 70 year old male with history of hyperlipidemia, hypertension, CAD with MI s/p CABG x4 (1986), ischemic cardiomyopathy s/p ICD placement, PAD s/p left SFA balloon angioplasty to assist a latissimus dorsi flap to the left calf, atrial fibrillation/flutter (no longer on anticoagulation). He underwent T7-S1 posterior spinal fusion with pelvic fixation and osteotomies of T12-L1 and L5 on 6/21/21 with Dr. Akins. Intraoperatively significant blood loss (4.3 L). Patient admitted to the SICU hypotensive, requiring NE.    # Status post complex spinal surgery including posterior T7-S1 fusion secondary to flat back syndrome by Dr. Akins's team  # Acute urinary retention with suprapubic abdominal pain.   # Acute blood loss anemia and marked thrombocytopenia, status post surgery with concern for the development of DIC given elevated INR 1.66, however, most recent fibrinogen yesterday morning was 306.  # Hypertension with recent significant hypotension postop and assessing the need for vasopressors.  However, now off with most recent blood pressure 97/41.  # History of coronary artery disease, status post CABG in 1986, as well as ischemic cardiomyopathy, status post ICD placement, most recently replaced in 2016.  Most recent EKG with paced rhythm and frequent ectopic beats with recent mildly elevated troponin now on downward trend.  The patient currently denies chest pain.  Evaluated by cardiology prior to admission on 05/04/2021 with RCRI score of 2, which translates to a 10.1% risk of 30 day MI, death and cardiac arrest.  # Chronic atrial fibrillation, chronically managed on Xarelto prior to surgery, held for approximately 48 hours.  # Postop intermittent sedation, likely due to polypharmacy from methocarbamol, Valium and lidocaine infusion  postop.       He is doing much better now. Pain is controlled. HE is less confused right now. Mentation is okay. Schedule tylenol 1 gm at HS. 500 mg BID during the day time. He can have additional tylenol in day time if needed. His LFTs are not bad. He does not like dietary supplements, so we will stop them. HR is good.  Platelet count is improving.     Working on bowels. Ct oral naloxone and bowel regimen.       DVT Prophylaxis: SCDs  Code Status: Full Code  Disposition: TBD      Erika Enrique MD, MPH.  Internal Medicine Attending  [place], [place]    Click on the link below to page me.   Text Page  (7am - 5pm, M-F)    Subjective:      He is slowly improving.    Now moving bowels.    NO vomiting.    Abdominal distention is better.    No chest pain or sob    -Data reviewed today: I reviewed all new labs and imaging results over the last 24 hours.     Exam:         Temp: 97.5  F (36.4  C) Temp src: Oral BP: 134/70 Pulse: 80   Resp: 16 SpO2: 100 % O2 Device: Nasal cannula Oxygen Delivery: 2 LPM  Vitals:    06/21/21 0617 06/22/21 0630   Weight: 98.4 kg (216 lb 14.9 oz) 107.5 kg (236 lb 14.4 oz)     Vital Signs with Ranges  Temp:  [97.5  F (36.4  C)-98.7  F (37.1  C)] 97.5  F (36.4  C)  Pulse:  [80-86] 80  Resp:  [16] 16  BP: (116-145)/(58-70) 134/70  SpO2:  [98 %-100 %] 100 %  I/O last 3 completed shifts:  In: -   Out: 1950 [Urine:1900; Drains:50]    Constitutional: No distress noted, Alert, Answering questions appropriately  Respiratory: AE is good on both sides, no wheezing onr crackles  Cardiovascular: S1S2 normal, no new murmur  GI: Soft, non tender  Skin/Integumen: No rash      Medications       acetaminophen  1,000 mg Oral At Bedtime     acetaminophen  500 mg Oral BID     ascorbic acid  250 mg Oral Daily     atorvastatin  80 mg Oral At Bedtime     cholecalciferol  1,250 mcg Oral Q7 Days     melatonin  6 mg Oral At Bedtime     [Held by provider] methocarbamol  500 mg Oral 4x  Daily     metoprolol tartrate  6.25 mg Oral BID     multivitamin, therapeutic  1 tablet Oral Daily     naloxone  2 mg Oral TID     polyethylene glycol  17 g Oral BID     senna-docusate  2 tablet Oral BID     sodium chloride (PF)  10 mL Intravenous Once     sodium chloride (PF)  3 mL Intracatheter Q8H     vitamin E  200 Units Oral Daily     zinc sulfate  220 mg Oral Daily       Data   Recent Labs   Lab 06/25/21  0830 06/24/21  1538 06/24/21  0850 06/23/21 2022 06/23/21  0632 06/22/21 2001 06/22/21 0922 06/22/21 0450 06/21/21 2150 06/21/21 2150   WBC 8.3 8.4 9.0 8.3 12.4*  --   --  13.8*   < >  --    HGB 9.1* 8.2* 8.2* 7.6* 8.1*  --   --  11.0*   < >  --    MCV 97 98 96 101* 95  --   --  93   < >  --    * 89* 81* 66* 60*  --   --  40*   < >  --    INR  --   --   --  1.56* 1.66*  --   --  1.62*  --   --     140  --   --  137  --   --  137  --  140   POTASSIUM 3.7 4.7  --   --  4.7  --   --  4.0  --  4.0   CHLORIDE 104 107  --   --  103  --   --  108  --  114*   CO2 31 31  --   --  29  --   --  23  --  20   BUN 10 10  --   --  17  --   --  15  --  13   CR 0.54* 0.54*  --   --  0.69  --   --  0.70  --  0.68   ANIONGAP 4 2*  --   --  5  --   --  6  --  6   TRENTON 8.6 8.1*  --   --  8.1*  --   --  8.3*  --  7.8*   * 102*  --   --  116*  --   --  181*  --  166*   ALBUMIN  --   --   --   --   --   --   --   --   --  2.6*   PROTTOTAL  --   --   --   --   --   --   --   --   --  3.9*   BILITOTAL  --   --   --   --   --   --   --   --   --  1.8*   ALKPHOS  --   --   --   --   --   --   --   --   --  30*   ALT  --   --   --   --   --   --   --   --   --  22   AST  --   --   --   --   --   --   --   --   --  58*   TROPI  --   --   --   --   --  0.056* 0.075* 0.070*  --  0.033    < > = values in this interval not displayed.       Recent Results (from the past 24 hour(s))   XR Abdomen 1 View    Narrative    Examination:  XR ABDOMEN 1 VIEW 6/25/2021 6:33 PM     Comparison: None.    History:  distention    Findings: Thoracolumbar fusion hardware. Partially visualized  implantable cardiac defibrillator leads. The visualized small  intestine and colon are not distended. There are no abnormal  calcifications. There are no abnormal soft tissue densities. No  evidence of pneumatosis. No portal venous gas.       Impression    Impression: Nonobstructive bowel gas pattern    I have personally reviewed the examination and initial interpretation  and I agree with the findings.    JOSE MARTIN SILVERMAN, DO

## 2021-06-26 NOTE — PLAN OF CARE
"  VS: /62 (BP Location: Right arm)   Pulse 83   Temp 98.7  F (37.1  C) (Oral)   Resp 16   Ht 1.854 m (6' 1\")   Wt 107.5 kg (236 lb 14.4 oz)   SpO2 98%   BMI 31.26 kg/m      O2: 2 LPM via NC   Output: Peng, incont/cont of stool   Last BM: 6/25/21   Activity: Assist of 2-3, lift    Skin: Back incision   Pain: Managed w/ PRN Morphine and Dilaudid   CMS: Intact   Dressing: Back incision CDI   Diet: Mechanical dental soft, thin. BG WNL   LDA: L PIV saline locked, R PIV running IV fluids   Equipment: IV pole, peng bag, personal belongings, call light within reach   Plan: Continue w/ plan of care   Additional Info: Pt complaining of abdominal pain, abd x-ray obtained. Results in chart. Due to abd pain and small loose stools provider is suggesting a supp and possibly enema. Pt requested that supp be around 2000.        "

## 2021-06-26 NOTE — PLAN OF CARE
"VS: BP (!) 145/69 (BP Location: Right arm)   Pulse 82   Temp 97.5  F (36.4  C) (Oral)   Resp 16   Ht 1.854 m (6' 1\")   Wt 107.5 kg (236 lb 14.4 oz)   SpO2 100%   BMI 31.26 kg/m      LS clear. 2L NC. Denies CP and SOB.    Output: Casiano putting out adequate amount of urine.   LBM 6/26 - Pt having multiple frequent loose stool overnight.      Activity: Not out of bed this shift. Repositioned in bed on back and L side Q2.      Skin: Intact ex spine incisions and abrasions on face, knee and thigh and chest from positioning in surgery. Significant scabbing to joshua upper thighs.      Pain:    Pt frequently in discomfort overnight. Requested morphine Q3 with home tylenol. Pt stated the dilaudid \"does nothing for him\" and declined it when offered.     Pt also reported abdominal discomfort which is improved as of this morning.     Neuro/CMS:    A&O x4, denies numbness and tingling   Dressing(s):    Spine - CDI   Diet:    Regular- tolerating   LDA:    R PIV infusing  L PIV SL  HV   Equipment:    IV pole, O2 NC   Plan:    Continue to monitor and follow plan of care. Able to make needs known and call light within reach.     Additional Info:    Pt having frequent loose stools and gas and as a result pt got very little sleep. Is lethargic this morning as a result.   Pt refused BG checks.          "

## 2021-06-26 NOTE — PROGRESS NOTES
"Orthopaedic Surgery Progress Note:  06/26/2021     Subjective:   Patient very comfortable this am and sleeping. Casiano in place.    Objective:   BP (!) 145/69 (BP Location: Right arm)   Pulse 82   Temp 97.5  F (36.4  C) (Oral)   Resp 16   Ht 1.854 m (6' 1\")   Wt 107.5 kg (236 lb 14.4 oz)   SpO2 100%   BMI 31.26 kg/m     I/O this shift:  In: -   Out: 1115 [Urine:1100; Drains:15]     Gen: NAD.   Resp: Breathing comfortably on NC.  Drain:   Superficial drain output of 10/35 in last 2 shifts.  Musculoskeletal: dressing c/d/i.  Sensation from L2-S1 is preserved.    Lower extremities:  Motor Strength Right Left   Hip flexion: L1, L2, L3 4/5 4/5   Knee flexion: S1 5/5 5/5   Knee extension: L3, L4 5/5 5/5   Ankle dosiflexion: L4, L5 0/5 5/5   EHL: L5 1/5 5/5   Ankle plantarflexion: S1 5/5 5/5   Has baseline reduced ROM at the right ankle.    Denies decreased sensation in legs, bilaterally.    Labs:  Lab Results   Component Value Date    WBC 8.3 06/25/2021    HGB 9.1 (L) 06/25/2021     (L) 06/25/2021    INR 1.56 (H) 06/23/2021        Assessment & Plan:   Darleen Simmons is a 70 year old male with PMH including chronic opioid use, former smoker, HTN, HLP, CAD c/b MI, ischemic cardiomyopathy s/p ICD placement, PAD s/p left SFA POBA for latissimus dorsi flap to left calf, atrial fibrillation/flutter, acute blood loss anemia, flat back syndrome now s/p T7-pelvis revision PSIF with T12-L1 3CO and L5 PSO on 6/21/21 with Jazmyne Akins/Herb. Significant pain out of proportion could represent nerve root retraction and normal postop pain in the setting of a chronic opioid user. Given the symptoms in his legs are at the PSO level, this could also represent neural foraminal stenosis. The improving symptoms and imaging do not indicate iatrogenic neural foraminal stenosis.    Goals for today:  - PT/OT  - post-op XR pending  -drain will be removed this am.    Orthopedics Primary  Activity: Up with assist until independent. No " excessive bending or twisting. No lifting >10 lbs x 6 weeks. Renny lift approved for transfers. Keep back straight if using lift.  Weight bearing status: WBAT.  Pain management: Transition from IV to PO as tolerated. No NSAIDs.   Antibiotics: Ancef x24 hrs postoperatively - completed.  Diet: ADAT.   DVT prophylaxis: SCDs only. No chemical DVT ppx needed.  Imaging: XR Upright PTDC - ordered. Obtain after HV removed.  Labs: labs PRN; ongoing close monitoring of hematologic status  Bracing/Splinting: None.  Dressings: Keep dressing c/d/i x 7 days.  Drains: Document output per shift, will be discontinued at Orthopedic Surgery discretion.  Casiano catheter: Removed 6/22. Replace if unable to void.  Physical Therapy/Occupational Therapy: Eval and treat.  Consults: Hospitalist.  Follow-up: Clinic with Dr. Akins in 6 weeks with repeat x-rays.   Disposition: Pending progress with therapies, pain control on orals, post-op imaging, and drain removal.    Orthopaedics surgery staff for this patient is Dr. Akins.    ------------------------------------------------------------------------------------------    [ x] Drains removed.  [   ] Post xrays done.  [   ] Discharge orders done.  [x] Discharge summary started.    Gregory Vincent MD  Orthopaedic Surgery Resident, PGY4  Pager: [phone]     Please page me directly with any questions/concerns during regular weekday hours before 5pm.     If there is no response, if it is a weekend, or if it is during evening hours then please page the orthopaedic surgery resident on call as listed on [facility] call schedule under the orthopaedics tab.    FOLLOWUP:    Future Appointments   Date Time Provider Department [place]   8/5/2021 12:30 PM Akhil Akins MD [facility]   9/9/2021 11:00 AM Akhil Akins MD [facility]

## 2021-06-26 NOTE — PROGRESS NOTES
Care Management Follow Up    Length of Stay (days): 5    Expected Discharge Date: 06/27/21     Concerns to be Addressed: discharge planning       Anticipated Discharge Disposition: Acute Rehab     Anticipated Discharge Services: None  Anticipated Discharge DME: None    Patient/family educated on Medicare website which has current facility and service quality ratings: yes  Education Provided on the Discharge Plan:    Patient/Family in Agreement with the Plan: yes    Referrals Placed by CM/SW: Post Acute Facilities  Private pay costs discussed: Not applicable    Additional Information:   spoke with [facility] Admissions: NO BEDS for today. Pt seems appropriate for ARU, but needs to do better tolerating PT/OT therapies, have better pain control, and off IV Dilaudid before accepted.     [employer] prior auth needs to be submitted once Patient has better tolerance for PT/OT therapies.     CLEMENTE Adams  [facility] Saturday     Text paging available through sendwithus on [employer] - search SOCIAL WORK    ON CALL PAGER 0800 - 1600 ON SATURDAYS: [phone]  ON CALL COVERAGE AFTER 1600 AND ON SUNDAYS: [phone]

## 2021-06-26 NOTE — PLAN OF CARE
"/70 (BP Location: Right arm)   Pulse 80   Temp 97.5  F (36.4  C) (Oral)   Resp 16   Ht 1.854 m (6' 1\")   Wt 107.5 kg (236 lb 14.4 oz)   SpO2 100%   BMI 31.26 kg/m   Pts pain management has been a challenge today.  Pt was irritable after home medications were taken out of his room. Pt did not have loose stools during day shift. Last BM 6/26 overnight. A&Ox4. Denies numbness or tingling.  Pts speech is mostly clear. Casiano output has had adequate amount of urine. Pt SpO2 95% on RA. LS clear and IS encouraged. Pt is still having R LE calf pain and thigh pain. Pt has some incisional pain. HV removed. Bluge in L ankle present from old surgery. Pt needed assistance with repositioning throughout the day and preferred to lay on left side to avoid pain.  "

## 2021-06-27 ENCOUNTER — APPOINTMENT (OUTPATIENT)
Dept: GENERAL RADIOLOGY | Facility: CLINIC | Age: 71
DRG: 456 | End: 2021-06-27
Attending: ORTHOPAEDIC SURGERY
Payer: COMMERCIAL

## 2021-06-27 ENCOUNTER — APPOINTMENT (OUTPATIENT)
Dept: GENERAL RADIOLOGY | Facility: CLINIC | Age: 71
DRG: 456 | End: 2021-06-27
Attending: STUDENT IN AN ORGANIZED HEALTH CARE EDUCATION/TRAINING PROGRAM
Payer: COMMERCIAL

## 2021-06-27 ENCOUNTER — APPOINTMENT (OUTPATIENT)
Dept: PHYSICAL THERAPY | Facility: CLINIC | Age: 71
DRG: 456 | End: 2021-06-27
Attending: ORTHOPAEDIC SURGERY
Payer: COMMERCIAL

## 2021-06-27 LAB
MAGNESIUM SERPL-MCNC: 1.9 MG/DL (ref 1.6–2.3)
POTASSIUM SERPL-SCNC: 3.6 MMOL/L (ref 3.4–5.3)

## 2021-06-27 PROCEDURE — 120N000002 HC R&B MED SURG/OB UMMC

## 2021-06-27 PROCEDURE — 250N000011 HC RX IP 250 OP 636: Performed by: CLINICAL NURSE SPECIALIST

## 2021-06-27 PROCEDURE — 72170 X-RAY EXAM OF PELVIS: CPT

## 2021-06-27 PROCEDURE — 72082 X-RAY EXAM ENTIRE SPI 2/3 VW: CPT | Mod: 26 | Performed by: STUDENT IN AN ORGANIZED HEALTH CARE EDUCATION/TRAINING PROGRAM

## 2021-06-27 PROCEDURE — 250N000013 HC RX MED GY IP 250 OP 250 PS 637: Performed by: HOSPITALIST

## 2021-06-27 PROCEDURE — 97530 THERAPEUTIC ACTIVITIES: CPT | Mod: GP | Performed by: CLINIC/CENTER

## 2021-06-27 PROCEDURE — 36415 COLL VENOUS BLD VENIPUNCTURE: CPT | Performed by: HOSPITALIST

## 2021-06-27 PROCEDURE — 99232 SBSQ HOSP IP/OBS MODERATE 35: CPT | Performed by: HOSPITALIST

## 2021-06-27 PROCEDURE — 250N000013 HC RX MED GY IP 250 OP 250 PS 637: Performed by: STUDENT IN AN ORGANIZED HEALTH CARE EDUCATION/TRAINING PROGRAM

## 2021-06-27 PROCEDURE — 250N000013 HC RX MED GY IP 250 OP 250 PS 637: Performed by: CLINICAL NURSE SPECIALIST

## 2021-06-27 PROCEDURE — 84132 ASSAY OF SERUM POTASSIUM: CPT | Performed by: HOSPITALIST

## 2021-06-27 PROCEDURE — 99207 PR CDG-MDM COMPONENT: MEETS MODERATE - UP CODED: CPT | Performed by: HOSPITALIST

## 2021-06-27 PROCEDURE — 72082 X-RAY EXAM ENTIRE SPI 2/3 VW: CPT

## 2021-06-27 PROCEDURE — 83735 ASSAY OF MAGNESIUM: CPT | Performed by: HOSPITALIST

## 2021-06-27 PROCEDURE — 250N000011 HC RX IP 250 OP 636: Performed by: HOSPITALIST

## 2021-06-27 PROCEDURE — 97110 THERAPEUTIC EXERCISES: CPT | Mod: GP | Performed by: CLINIC/CENTER

## 2021-06-27 RX ORDER — POTASSIUM CHLORIDE 750 MG/1
20 TABLET, EXTENDED RELEASE ORAL ONCE
Status: COMPLETED | OUTPATIENT
Start: 2021-06-27 | End: 2021-06-27

## 2021-06-27 RX ORDER — MAGNESIUM SULFATE HEPTAHYDRATE 40 MG/ML
2 INJECTION, SOLUTION INTRAVENOUS ONCE
Status: COMPLETED | OUTPATIENT
Start: 2021-06-27 | End: 2021-06-27

## 2021-06-27 RX ADMIN — THERA TABS 1 TABLET: TAB at 08:32

## 2021-06-27 RX ADMIN — DIAZEPAM 4 MG: 2 TABLET ORAL at 22:12

## 2021-06-27 RX ADMIN — MORPHINE SULFATE 30 MG: 15 TABLET ORAL at 08:11

## 2021-06-27 RX ADMIN — METHOCARBAMOL 500 MG: 500 TABLET, FILM COATED ORAL at 22:13

## 2021-06-27 RX ADMIN — MORPHINE SULFATE 30 MG: 15 TABLET ORAL at 04:45

## 2021-06-27 RX ADMIN — Medication 2 TABLET: at 22:12

## 2021-06-27 RX ADMIN — Medication 2 TABLET: at 08:23

## 2021-06-27 RX ADMIN — MORPHINE SULFATE 30 MG: 15 TABLET ORAL at 16:56

## 2021-06-27 RX ADMIN — MORPHINE SULFATE 30 MG: 15 TABLET ORAL at 22:12

## 2021-06-27 RX ADMIN — Medication 500 MG: at 14:15

## 2021-06-27 RX ADMIN — Medication 200 UNITS: at 08:22

## 2021-06-27 RX ADMIN — HYDROMORPHONE HYDROCHLORIDE 0.5 MG: 1 INJECTION, SOLUTION INTRAMUSCULAR; INTRAVENOUS; SUBCUTANEOUS at 00:14

## 2021-06-27 RX ADMIN — MAGNESIUM SULFATE 2 G: 2 INJECTION INTRAVENOUS at 08:41

## 2021-06-27 RX ADMIN — Medication 500 MG: at 11:16

## 2021-06-27 RX ADMIN — MORPHINE SULFATE 30 MG: 15 TABLET ORAL at 14:14

## 2021-06-27 RX ADMIN — POTASSIUM CHLORIDE 20 MEQ: 750 TABLET, EXTENDED RELEASE ORAL at 08:28

## 2021-06-27 RX ADMIN — Medication 250 MG: at 08:27

## 2021-06-27 RX ADMIN — ZINC SULFATE 220 MG (50 MG) CAPSULE 220 MG: CAPSULE at 08:27

## 2021-06-27 RX ADMIN — CHOLECALCIFEROL CAP 1.25 MG (50000 UNIT) 1250 MCG: 1.25 CAP at 08:31

## 2021-06-27 RX ADMIN — Medication 500 MG: at 04:45

## 2021-06-27 RX ADMIN — MORPHINE SULFATE 30 MG: 15 TABLET ORAL at 19:48

## 2021-06-27 RX ADMIN — Medication 6.25 MG: at 08:23

## 2021-06-27 RX ADMIN — ACETAMINOPHEN 1000 MG: 500 TABLET, FILM COATED ORAL at 22:27

## 2021-06-27 RX ADMIN — ATORVASTATIN CALCIUM 80 MG: 40 TABLET, FILM COATED ORAL at 08:29

## 2021-06-27 RX ADMIN — Medication 6.25 MG: at 22:12

## 2021-06-27 RX ADMIN — POLYETHYLENE GLYCOL 3350 17 G: 17 POWDER, FOR SOLUTION ORAL at 10:15

## 2021-06-27 RX ADMIN — METHOCARBAMOL 500 MG: 500 TABLET, FILM COATED ORAL at 16:56

## 2021-06-27 RX ADMIN — MELATONIN TAB 3 MG 6 MG: 3 TAB at 22:12

## 2021-06-27 RX ADMIN — MORPHINE SULFATE 30 MG: 15 TABLET ORAL at 11:13

## 2021-06-27 NOTE — PLAN OF CARE
Pt's biggest issue is pain, pt said pain is keeping him from doing anything. Rated pain severe, 9, gave pain meds as ordered. An hour later pt said pain had not changed. Pt wanted more tylenol, however pt had received 2 doses of 500 mg. Call placed to moonlighter because pt said he usually takes more tylenol. Will leave note for hospitalists in am to look at order & either make tylenol prn or scheduled.

## 2021-06-27 NOTE — PLAN OF CARE
"BP (!) 155/72 (BP Location: Right arm)   Pulse 78   Temp 98  F (36.7  C) (Oral)   Resp 16   Ht 1.854 m (6' 1\")   Wt 107.5 kg (236 lb 14.4 oz)   SpO2 96%   BMI 31.26 kg/m   Pts pain management has slightly improved today still rating it at 8/10 and 9/10. Pt had one loose stool in AM. Pt stated his stomach was feeling better today but wants another enema later. Pt struggled during PT and was only able to stand 2 times with walker before sitting back down on bed. Pt had Pelvis XR and Spine XR. A&Ox4. Denies numbness or tingling. Pts speech is mostly clear. Urine output has been adequate. Pt SpO2 96% on RA. LS clear and IS encouraged. Pt is still having R LE calf and thigh pain. Pt still needs assistance and encouragement with repositioning throughout the day but improving.  "

## 2021-06-27 NOTE — PLAN OF CARE
Pt had med very loose stool after supp. Incision CDI. Pt turned positioned, prefers to be on left side. Casiano removed 18:00. Pain manageable. Pt is very [place].

## 2021-06-27 NOTE — PLAN OF CARE
OT: Per PT, cancel OT today. Pt going to x-ray, not appropriate for therapy this PM. Pt/spouse states he will not do OT today. Agitated during PT session. Will re-schedule for tomorrow.

## 2021-06-27 NOTE — PROGRESS NOTES
"Orthopaedic Surgery Progress Note:  06/27/2021     Subjective:   Patient very comfortable this am yet says he is in considerable pain.    Objective:   BP (!) 155/72 (BP Location: Right arm)   Pulse 78   Temp 98  F (36.7  C) (Oral)   Resp 16   Ht 1.854 m (6' 1\")   Wt 107.5 kg (236 lb 14.4 oz)   SpO2 96%   BMI 31.26 kg/m     No intake/output data recorded.     Gen: NAD.   Resp: Breathing comfortably on NC.  Drain:   Musculoskeletal: dressing c/d/i.  Sensation from L2-S1 is preserved.    Lower extremities:  Motor Strength Right Left   Hip flexion: L1, L2, L3 4/5 4/5   Knee flexion: S1 5/5 5/5   Knee extension: L3, L4 5/5 5/5   Ankle dosiflexion: L4, L5 0/5 5/5   EHL: L5 1/5 5/5   Ankle plantarflexion: S1 5/5 5/5   Has baseline reduced ROM at the right ankle.    Denies decreased sensation in legs, bilaterally.    Labs:  Lab Results   Component Value Date    WBC 8.3 06/25/2021    HGB 9.1 (L) 06/25/2021     (L) 06/25/2021    INR 1.56 (H) 06/23/2021        Assessment & Plan:   Darleen Simmons is a 70 year old male with PMH including chronic opioid use, former smoker, HTN, HLP, CAD c/b MI, ischemic cardiomyopathy s/p ICD placement, PAD s/p left SFA POBA for latissimus dorsi flap to left calf, atrial fibrillation/flutter, acute blood loss anemia, flat back syndrome now s/p T7-pelvis revision PSIF with T12-L1 3CO and L5 PSO on 6/21/21 with Jazmyne Akins/Herb. Significant pain out of proportion could represent nerve root retraction and normal postop pain in the setting of a chronic opioid user. Given the symptoms in his legs are at the PSO level, this could also represent neural foraminal stenosis. The improving symptoms and imaging do not indicate iatrogenic neural foraminal stenosis.    Goals for today:  - PT/OT  - post-op XR pending - can done sitting down  - remove peng today  - IV pain medications discontinued    Orthopedics Primary  Activity: Up with assist until independent. No excessive bending or twisting. " No lifting >10 lbs x 6 weeks. Renny lift approved for transfers. Keep back straight if using lift.  Weight bearing status: WBAT.  Pain management: Transition from IV to PO as tolerated. No NSAIDs.   Antibiotics: Ancef x24 hrs postoperatively - completed.  Diet: ADAT.   DVT prophylaxis: SCDs only. No chemical DVT ppx needed.  Imaging: XR Upright PTDC - ordered. Obtain after HV removed.  Labs: labs PRN; ongoing close monitoring of hematologic status  Bracing/Splinting: None.  Dressings: Keep dressing c/d/i x 7 days.  Drains: Document output per shift, will be discontinued at Orthopedic Surgery discretion.  Casiano catheter: Removed 6/22. Replace if unable to void.  Physical Therapy/Occupational Therapy: Eval and treat.  Consults: Hospitalist.  Follow-up: Clinic with Dr. Akins in 6 weeks with repeat x-rays.   Disposition: Pending progress with therapies, pain control on orals, post-op imaging, and drain removal.    Orthopaedics surgery staff for this patient is Dr. Akins.    ------------------------------------------------------------------------------------------    [ x] Drains removed.  [   ] Post xrays done.  [   ] Discharge orders done.  [x] Discharge summary started.    Gregory Vincent MD  Orthopaedic Surgery Resident, PGY4  Pager: [phone]     Please page me directly with any questions/concerns during regular weekday hours before 5pm.     If there is no response, if it is a weekend, or if it is during evening hours then please page the orthopaedic surgery resident on call as listed on [facility] call schedule under the orthopaedics tab.    FOLLOWUP:    Future Appointments   Date Time Provider Department [place]   8/5/2021 12:30 PM Akhil Akins MD [facility]   9/9/2021 11:00 AM Akhil Akins MD [facility]

## 2021-06-27 NOTE — PROGRESS NOTES
[facility], [place], [place]  Internal Medicine Progress Note      Assessment and Plans:     Jorje Simmons is a 70 year old male with history of hyperlipidemia, hypertension, CAD with MI s/p CABG x4 (1986), ischemic cardiomyopathy s/p ICD placement, PAD s/p left SFA balloon angioplasty to assist a latissimus dorsi flap to the left calf, atrial fibrillation/flutter (no longer on anticoagulation). He underwent T7-S1 posterior spinal fusion with pelvic fixation and osteotomies of T12-L1 and L5 on 6/21/21 with Dr. Akins. Intraoperatively significant blood loss (4.3 L). Patient admitted to the SICU hypotensive, requiring NE.    # Status post complex spinal surgery including posterior T7-S1 fusion secondary to flat back syndrome by Dr. Akins's team  # Acute urinary retention with suprapubic abdominal pain.   # Acute blood loss anemia and marked thrombocytopenia, status post surgery with concern for the development of DIC given elevated INR 1.66, however, most recent fibrinogen yesterday morning was 306.  # Hypertension with recent significant hypotension postop and assessing the need for vasopressors.  However, now off with most recent blood pressure 97/41.  # History of coronary artery disease, status post CABG in 1986, as well as ischemic cardiomyopathy, status post ICD placement, most recently replaced in 2016.  Most recent EKG with paced rhythm and frequent ectopic beats with recent mildly elevated troponin now on downward trend.  The patient currently denies chest pain.  Evaluated by cardiology prior to admission on 05/04/2021 with RCRI score of 2, which translates to a 10.1% risk of 30 day MI, death and cardiac arrest.  # Chronic atrial fibrillation, chronically managed on Xarelto prior to surgery, held for approximately 48 hours.  # Postop intermittent sedation, likely due to polypharmacy from methocarbamol, Valium and lidocaine infusion  postop.       He is doing much better now. Pain is controlled. HE is less confused right now. Mentation is okay. Schedule tylenol 1 gm at HS. 500 mg BID during the day time. He can have additional tylenol in day time if needed. His LFTs are not bad. He does not like dietary supplements, so we will stop them. HR is good.  Platelet count is improving.     Working on bowels. Ct oral naloxone and bowel regimen.     DVT Prophylaxis: SCDs  Code Status: Full Code  Disposition: TBD      Erika Enrique MD, MPH.  Internal Medicine Attending  [place], [place]    Click on the link below to page me.   Text Page  (7am - 5pm, M-F)    Subjective:       Wants to take simethicone.    No new issues   He is slowly improving.    Now moving bowels.    NO vomiting.    Abdominal distention is better.    No chest pain or sob    -Data reviewed today: I reviewed all new labs and imaging results over the last 24 hours.     Exam:         Temp: 98  F (36.7  C) Temp src: Oral BP: (!) 155/72 Pulse: 78   Resp: 16 SpO2: 96 % O2 Device: None (Room air)    Vitals:    06/21/21 0617 06/22/21 0630   Weight: 98.4 kg (216 lb 14.9 oz) 107.5 kg (236 lb 14.4 oz)     Vital Signs with Ranges  Temp:  [98  F (36.7  C)-98.3  F (36.8  C)] 98  F (36.7  C)  Pulse:  [75-80] 78  Resp:  [16-18] 16  BP: ()/(55-72) 155/72  SpO2:  [91 %-96 %] 96 %  I/O last 3 completed shifts:  In: -   Out: 400 [Urine:400]    Constitutional: No distress noted, Alert, Answering questions appropriately  Respiratory: AE is good on both sides, no wheezing onr crackles  Cardiovascular: S1S2 normal, no new murmur  GI: Soft, non tender  Skin/Integumen: No rash      Medications       acetaminophen  1,000 mg Oral At Bedtime     acetaminophen  500 mg Oral BID     ascorbic acid  250 mg Oral Daily     atorvastatin  80 mg Oral At Bedtime     cholecalciferol  1,250 mcg Oral Q7 Days     melatonin  6 mg Oral At Bedtime     [Held by provider] methocarbamol  500 mg  Oral 4x Daily     metoprolol tartrate  6.25 mg Oral BID     multivitamin, therapeutic  1 tablet Oral Daily     polyethylene glycol  17 g Oral BID     senna-docusate  2 tablet Oral BID     sodium chloride (PF)  3 mL Intracatheter Q8H     Urivarx Capsules (patient supplied supplement)  2 capsule Oral or Feeding Tube Daily     vitamin E  200 Units Oral Daily     zinc sulfate  220 mg Oral Daily       Data   Recent Labs   Lab 06/27/21  0602 06/26/21  1446 06/25/21  0830 06/24/21  1538 06/24/21  0850 06/23/21 2022 06/23/21  0632 06/22/21 2001 06/22/21  0922 06/22/21  0450 06/21/21 2150 06/21/21 2150   WBC  --   --  8.3 8.4 9.0 8.3 12.4*  --   --  13.8*   < >  --    HGB  --   --  9.1* 8.2* 8.2* 7.6* 8.1*  --   --  11.0*   < >  --    MCV  --   --  97 98 96 101* 95  --   --  93   < >  --    PLT  --   --  127* 89* 81* 66* 60*  --   --  40*   < >  --    INR  --   --   --   --   --  1.56* 1.66*  --   --  1.62*  --   --    NA  --   --  139 140  --   --  137  --   --  137  --  140   POTASSIUM 3.6 3.7 3.7 4.7  --   --  4.7  --   --  4.0  --  4.0   CHLORIDE  --   --  104 107  --   --  103  --   --  108  --  114*   CO2  --   --  31 31  --   --  29  --   --  23  --  20   BUN  --   --  10 10  --   --  17  --   --  15  --  13   CR  --   --  0.54* 0.54*  --   --  0.69  --   --  0.70  --  0.68   ANIONGAP  --   --  4 2*  --   --  5  --   --  6  --  6   TRENTON  --   --  8.6 8.1*  --   --  8.1*  --   --  8.3*  --  7.8*   GLC  --   --  101* 102*  --   --  116*  --   --  181*  --  166*   ALBUMIN  --   --   --   --   --   --   --   --   --   --   --  2.6*   PROTTOTAL  --   --   --   --   --   --   --   --   --   --   --  3.9*   BILITOTAL  --   --   --   --   --   --   --   --   --   --   --  1.8*   ALKPHOS  --   --   --   --   --   --   --   --   --   --   --  30*   ALT  --   --   --   --   --   --   --   --   --   --   --  22   AST  --   --   --   --   --   --   --   --   --   --   --  58*   TROPI  --   --   --   --   --   --   --  0.056*  0.075* 0.070*  --  0.033    < > = values in this interval not displayed.       No results found for this or any previous visit (from the past 24 hour(s)).

## 2021-06-27 NOTE — PLAN OF CARE
"      VS:   BP (!) 144/69 (BP Location: Left arm)   Pulse 76   Temp 98  F (36.7  C) (Oral)   Resp 16   Ht 1.854 m (6' 1\")   Wt 107.5 kg (236 lb 14.4 oz)   SpO2 95%   BMI 31.26 kg/m       Output:   Voiding adequately. No loose BM per shift. Bowel sounds active.    Activity:   Assist of 2 when turning in bed.    Skin: Abrasion on thigh and incisions.     Pain:   Incisional pain radiating to ankle per patient. IV dilaudid given 1x on shift. Per patient medication decreased pain but causes patient to have nightmares. PRN morphine given per MAR.   Neuro/CMS:   A&Ox4.    Dressing(s):   CDI    Diet:   Mechanical/dental soft diet.    LDA:   Right and left PIV SL   Equipment:   IV pole   Plan:   Continue to monitor care of plan.    Additional Info:   Patient able to make needs known, call light within reach.        "

## 2021-06-28 ENCOUNTER — APPOINTMENT (OUTPATIENT)
Dept: PHYSICAL THERAPY | Facility: CLINIC | Age: 71
DRG: 456 | End: 2021-06-28
Attending: ORTHOPAEDIC SURGERY
Payer: COMMERCIAL

## 2021-06-28 ENCOUNTER — APPOINTMENT (OUTPATIENT)
Dept: OCCUPATIONAL THERAPY | Facility: CLINIC | Age: 71
DRG: 456 | End: 2021-06-28
Attending: ORTHOPAEDIC SURGERY
Payer: COMMERCIAL

## 2021-06-28 LAB
MAGNESIUM SERPL-MCNC: 2 MG/DL (ref 1.6–2.3)
POTASSIUM SERPL-SCNC: 3.6 MMOL/L (ref 3.4–5.3)

## 2021-06-28 PROCEDURE — 36415 COLL VENOUS BLD VENIPUNCTURE: CPT | Performed by: HOSPITALIST

## 2021-06-28 PROCEDURE — 82374 ASSAY BLOOD CARBON DIOXIDE: CPT | Performed by: HOSPITALIST

## 2021-06-28 PROCEDURE — 84520 ASSAY OF UREA NITROGEN: CPT | Performed by: HOSPITALIST

## 2021-06-28 PROCEDURE — 250N000013 HC RX MED GY IP 250 OP 250 PS 637: Performed by: HOSPITALIST

## 2021-06-28 PROCEDURE — 82565 ASSAY OF CREATININE: CPT | Performed by: HOSPITALIST

## 2021-06-28 PROCEDURE — 84295 ASSAY OF SERUM SODIUM: CPT | Performed by: HOSPITALIST

## 2021-06-28 PROCEDURE — 120N000002 HC R&B MED SURG/OB UMMC

## 2021-06-28 PROCEDURE — 250N000013 HC RX MED GY IP 250 OP 250 PS 637: Performed by: STUDENT IN AN ORGANIZED HEALTH CARE EDUCATION/TRAINING PROGRAM

## 2021-06-28 PROCEDURE — 82310 ASSAY OF CALCIUM: CPT | Performed by: HOSPITALIST

## 2021-06-28 PROCEDURE — 99233 SBSQ HOSP IP/OBS HIGH 50: CPT | Performed by: HOSPITALIST

## 2021-06-28 PROCEDURE — 82947 ASSAY GLUCOSE BLOOD QUANT: CPT | Performed by: HOSPITALIST

## 2021-06-28 PROCEDURE — 99223 1ST HOSP IP/OBS HIGH 75: CPT | Performed by: PHYSICAL MEDICINE & REHABILITATION

## 2021-06-28 PROCEDURE — 82435 ASSAY OF BLOOD CHLORIDE: CPT | Performed by: HOSPITALIST

## 2021-06-28 PROCEDURE — 83735 ASSAY OF MAGNESIUM: CPT | Performed by: HOSPITALIST

## 2021-06-28 PROCEDURE — 250N000013 HC RX MED GY IP 250 OP 250 PS 637: Performed by: CLINICAL NURSE SPECIALIST

## 2021-06-28 PROCEDURE — 97530 THERAPEUTIC ACTIVITIES: CPT | Mod: GO | Performed by: OCCUPATIONAL THERAPIST

## 2021-06-28 PROCEDURE — 99207 PR NO CHARGE LOS: CPT | Performed by: STUDENT IN AN ORGANIZED HEALTH CARE EDUCATION/TRAINING PROGRAM

## 2021-06-28 PROCEDURE — 84132 ASSAY OF SERUM POTASSIUM: CPT | Performed by: HOSPITALIST

## 2021-06-28 PROCEDURE — 97530 THERAPEUTIC ACTIVITIES: CPT | Mod: GP | Performed by: REHABILITATION PRACTITIONER

## 2021-06-28 PROCEDURE — 97535 SELF CARE MNGMENT TRAINING: CPT | Mod: GO | Performed by: OCCUPATIONAL THERAPIST

## 2021-06-28 RX ORDER — DIAZEPAM 5 MG
5-10 TABLET ORAL EVERY 6 HOURS PRN
Status: DISCONTINUED | OUTPATIENT
Start: 2021-06-28 | End: 2021-07-07 | Stop reason: HOSPADM

## 2021-06-28 RX ORDER — DIAZEPAM 2 MG
4 TABLET ORAL EVERY 6 HOURS PRN
Status: COMPLETED | OUTPATIENT
Start: 2021-06-28 | End: 2021-06-28

## 2021-06-28 RX ORDER — ACETAMINOPHEN 500 MG
500 TABLET ORAL 2 TIMES DAILY PRN
Status: DISCONTINUED | OUTPATIENT
Start: 2021-06-28 | End: 2021-07-07 | Stop reason: HOSPADM

## 2021-06-28 RX ORDER — ACETAMINOPHEN 500 MG
1000 TABLET ORAL AT BEDTIME
Status: DISCONTINUED | OUTPATIENT
Start: 2021-06-28 | End: 2021-07-07 | Stop reason: HOSPADM

## 2021-06-28 RX ADMIN — METHOCARBAMOL 500 MG: 500 TABLET, FILM COATED ORAL at 19:25

## 2021-06-28 RX ADMIN — Medication 6.25 MG: at 19:25

## 2021-06-28 RX ADMIN — Medication 500 MG: at 09:18

## 2021-06-28 RX ADMIN — MORPHINE SULFATE 30 MG: 15 TABLET ORAL at 12:41

## 2021-06-28 RX ADMIN — Medication 200 UNITS: at 09:13

## 2021-06-28 RX ADMIN — DIAZEPAM 5 MG: 5 TABLET ORAL at 13:55

## 2021-06-28 RX ADMIN — THERA TABS 1 TABLET: TAB at 09:12

## 2021-06-28 RX ADMIN — Medication 125 MG: at 11:12

## 2021-06-28 RX ADMIN — DIAZEPAM 4 MG: 2 TABLET ORAL at 05:12

## 2021-06-28 RX ADMIN — ACETAMINOPHEN 1000 MG: 500 TABLET, FILM COATED ORAL at 22:06

## 2021-06-28 RX ADMIN — ZINC SULFATE 220 MG (50 MG) CAPSULE 220 MG: CAPSULE at 09:13

## 2021-06-28 RX ADMIN — MORPHINE SULFATE 30 MG: 15 TABLET ORAL at 16:09

## 2021-06-28 RX ADMIN — MORPHINE SULFATE 30 MG: 15 TABLET ORAL at 19:25

## 2021-06-28 RX ADMIN — ACETAMINOPHEN 500 MG: 500 TABLET, FILM COATED ORAL at 16:09

## 2021-06-28 RX ADMIN — METHOCARBAMOL 500 MG: 500 TABLET, FILM COATED ORAL at 09:13

## 2021-06-28 RX ADMIN — LIDOCAINE: 50 OINTMENT TOPICAL at 12:44

## 2021-06-28 RX ADMIN — METHOCARBAMOL 500 MG: 500 TABLET, FILM COATED ORAL at 12:42

## 2021-06-28 RX ADMIN — Medication 6.25 MG: at 09:12

## 2021-06-28 RX ADMIN — DIAZEPAM 5 MG: 5 TABLET ORAL at 21:45

## 2021-06-28 RX ADMIN — MELATONIN TAB 3 MG 6 MG: 3 TAB at 21:45

## 2021-06-28 RX ADMIN — METHOCARBAMOL 500 MG: 500 TABLET, FILM COATED ORAL at 16:09

## 2021-06-28 RX ADMIN — MORPHINE SULFATE 30 MG: 15 TABLET ORAL at 04:00

## 2021-06-28 RX ADMIN — Medication 250 MG: at 09:13

## 2021-06-28 RX ADMIN — ATORVASTATIN CALCIUM 80 MG: 40 TABLET, FILM COATED ORAL at 21:44

## 2021-06-28 RX ADMIN — MORPHINE SULFATE 30 MG: 15 TABLET ORAL at 09:11

## 2021-06-28 NOTE — PROGRESS NOTES
Calorie Counts    6/25: -- kcal and -- g protein (no calorie counts saved)  6/26: -- kcal and -- g protein (no calorie counts saved)  6/27: 270 kcal and 4 g protein (breakfast only - no other calorie counts saved)    Unable to evaluate intakes d/t no documentation regarding PO intakes.       Hailee Hardin RD, JELLY  TCU/8A Pager (M-F): [phone]  Weekend Pager ([place]): [phone]

## 2021-06-28 NOTE — PROGRESS NOTES
Care Management Follow Up    Length of Stay (days): 7  Expected Discharge Date: 06/29/21 (When placement is found)     Concerns to be Addressed: Discharge planning     Patient plan of care discussed at interdisciplinary rounds: Yes    Anticipated Discharge Disposition: Transitional Care     Anticipated Discharge Services: None  Anticipated Discharge DME: None    Patient/family educated on Medicare website which has current facility and service quality ratings: yes  Education Provided on the Discharge Plan:  yes  Patient/Family in Agreement with the Plan: yes    Referrals Placed by CM/SW:   [facility]  -ROSE spoke with Marie (Liaison), pt no longer meets ARU criteria due to limited tolerance for therapies. They have a long wait list for their TCU so she suggested making community referrals.    Private pay costs discussed: Not applicable    Additional Information:  Chart reviewed and discussed during MDRs.  Pt is medically ready to discharge when placement is found.  ARU was being pursued however, per PT's note on 6/27 pt is not appropriate for ARU due to limited tolerance for therapy.  Anticipate  TCU at discharge.   Will need [employer] authorization.    ROSE spoke with Saadia (Spouse) and pt via the telephone and provided education surrouding TCU vs. ARU and criteria  For both.  SW explained that at this time pt does not meet ARU criteria due to limited activity tolerance.  Pt and his spouse expressed their disagreement with this recommendation.  SW suggested having PM&R consulted to make a final determination, pt/ spouse were in support of this.      SW then discussed back-up discharge plans should PM&R not feel pt meets ARU criteria.  Family inquired about [facility], SW informed them that they are full at this time and there is a long waitlist so they advised SW to make community referrals.  Pt/ spouse expressed frustration as they report they were promised by the attending MD that pt could go to [facility]/  "TCU and would not have to go to a \"nusing home\". SW validated their feelings and discussed that this is largely dependent upon insurance criteria and census, and unfortunately cannot be guaranteed ahead of time.  They would like to discuss their concerns with the attending team.  Pt reports that if he cannot go to [facility]/ [facility]U, he is then wants to go home with [facility] and declined a list of TCUs.      ROSE spoke with the attending team, will place PM&R consult and have Dr. Akins meet with family to address concerns.    Addendum:  SW heard from Saadia (Spouse) that she was informed by PM&R that pt does not meet ARU criteria and is more appropriate for TCU, spouse is understanding of this.  She continues to want pt to go to TCU (ROSE reiterated that SW will make a referral but there is a long waiting list), but was agreeable to ROSE emailing ([email or web address]) her a list of TCUs near the hospital to review for preferences. ROSE educated her on the discharge appeal process per her request.  She reports that she has done this in the past and wants to ensure she has the opportunity to this again should she feel it's necessary.     ROMEO Cabello, [facility]  Phone:  [phone]   Pager:  [phone]        "

## 2021-06-28 NOTE — PROVIDER NOTIFICATION
Patient in lots of pain from his spine muscle spasm. Pt would like another dose of Valium. Patient states, he usually takes valium 3x per day. MD paged

## 2021-06-28 NOTE — CONSULTS
[facility]   PM&R CONSULT    Consulting Provider: Marcellus Stewart  Reason for Consult: Assessment of rehabilitation   Location of Patient:  0815  Date of Encounter: 6/28/2021   Date of Admission: 6/21/2021      ASSESSMENT/PLAN:    Mr. Darleen Simmons is a 70 year old yo male who presents with RLE weakness, post surgical pain following an extensive surgery with goal of walking.   I had a long discussion with him and Tati his wife today, he is very motivated to therapy and has an immense desire to be able to walk.   He was walking short distances prior to this surgery. He was independent with his adl's.   We had a candid discussion about his participation in therapies, his agitation and irate behavior. He acknowledges and reassures me to improve.   I did let him know that I a=would be observing his participation from here on, and indeed he has done well in therapies today.   He will benefit immensely from the coordinated and multidisciplinary team approach that ARU offers, and hence would strongly recommend ARU.   ELOS is 2 weeks   Supportive  wife Ayah at bedside   Home is accessible   They are agreeable to the plan of care       Thank you for consulting the PM&R Department.   For any questions, please feel free to page me      Sofi Maldonado MD   Department of PM&R           HPI:    Darleen Simmons is a 70 year old male who presented to the 6/21/2021 with a history of a previous T9 through sacrum instrumented posterior fusion at [facility].  At the time of his surgery, it was thought that he would not regain the ability to ambulate after a traumatic spinal cord injury and the patient was fused in sitting balance.      He did regain the ability to ambulate and found himself severely limited by the lack of lumbar lordosis and by his thoracolumbar junctional kyphosis.  This was rigid malalignment because of the previous arthrodesis.      PMH includes chronic opioid use, former  Neurosurgery Discharge Coordination Note     Attending physician: Dr. Carolina  Surgery performed: DBS battery placement  Date of Discharge: 1/20/17  Discharge to: Home     Current status: Patient states he had some pain along his neck and at the incision site after surgery, but it is less intense now. Has only taken one oxycodone today with adequate relief. Denies redness, swelling, increased tenderness, incisions opening, drainage or elevated temp. Reports Incision CDI without signs of infection.  Denies current bowel or bladder issues    Discharge instructions and medications reviewed with patient. Pt indicates that Dr. Carolina told him he could lift up to 25 lb now and does not have to wait 2 weeks to do so. I will f/u with Dr. Carolina to verify. Pt also indicates he is anxious to return to boxing (non-contact) but can discuss this with Dr. Carolina at his f/u appt.   Follow up appointments/imaging/tests needed: wound check with Dr. Carolina on 2/6 at 10:30 a.m.    RN triage/on call number given: [phone]/ [phone]       smoker, HTN, HLP, CAD c/b MI, ischemic cardiomyopathy s/p ICD placement, PAD s/p left SFA POBA for latissimus dorsi flap to left calf, atrial fibrillation/flutter, acute blood loss anemia    He was admitted and underwent a T7-pelvis revision PSIF with T12-L1 3CO and L5 PSO on 6/21/21 with Jazmyne Akins/Herb.  Formal procedure performed included the following:  PROCEDURE PERFORMED:    1.  O-arm Stealth guided T7 through sacrum posterior segmental spinal instrumentation.  2.  T7 through sacrum posterior spinal fusion with local harvest autograft.  3.  Pedicle subtraction osteotomy L5, 22 modifier  4.  Three-column osteotomy, T12-L1.  5.  Removal and reinsertion of spinal instrumentation T9 through pelvis.  6.  De martin pelvic fixation, 22 modifier  7.  De martin pedicle screw fixation T7-T8 and De martin redirection of pedicle screws T9.  8.  Mobilization of paraspinous muscle space, greater than 40 cm (42cm)  9.  Primary dural repair, necessitating additional removal of bone.     Intra-op dural tear repaired primarily  Intubated with flexible bronchoscope  Estimated blood loss:  4320cc + 1630cc returned via [employer]     Post operatively, he had significant pain out of proportion thought to represent nerve root retraction and normal postop pain in the setting of a chronic opioid user.   Given the symptoms in his legs are at the PSO level, this was thought to represent neural foraminal stenosis, however as the symptoms were improving, further work up and imaging was not indicated.    Other issues include the following    Acute urinary retention with suprapubic abdominal pain.   # Acute blood loss anemia and marked thrombocytopenia, status post surgery with concern for the development of DIC given elevated INR 1.66, however, most recent fibrinogen yesterday morning was 306.  # Hypertension with recent significant hypotension postop and assessing the need for vasopressors.  However, now off with most recent blood pressure  97/41.  # History of coronary artery disease, status post CABG in 1986, as well as ischemic cardiomyopathy, status post ICD placement, most recently replaced in 2016.  Most recent EKG with paced rhythm and frequent ectopic beats with recent mildly elevated troponin now on downward trend.  The patient currently denies chest pain.  Evaluated by cardiology prior to admission on 05/04/2021 with RCRI score of 2, which translates to a 10.1% risk of 30 day MI, death and cardiac arrest.  # Chronic atrial fibrillation, chronically managed on Xarelto prior to surgery, held for approximately 48 hours.  # Postop intermittent sedation, likely due to polypharmacy from methocarbamol, Valium and lidocaine infusion postop.          Activity: Up with assist until independent. No excessive bending or twisting. No lifting >10 lbs x 6 weeks. Renny lift approved for transfers. Keep back straight if using lift.  Weight bearing status: WBAT.        PREVIOUS LEVEL OF FUNCTION   Prior to this admit he was independent with all aspects of mobility and transfers using a wheeled walker prior to surgery.  He was able to navigate the stairs.    CURRENT FUNCTION   Currently he becomes agitated with frequent swearing at the therapist during sessions.  He is unable to recall spinal precautions.  Transfers from supine to sit are max assist of 1-2.  He is unable to come to full upright standing position with therapist.    LIVING SITUATION/SUPPORT  Lives with his spouse  Lives in a split-level house with 9 steps to enter and a flight to the upper level  The house is a wheelchair ramp.  There is a stair lift with him.  Support system includes wife  Retired       Past Medical History:  Past Medical History:   Diagnosis Date     Acute osteomyelitis of left lower leg (H) 2017     Acute superficial venous thrombosis of lower extremity, right 2018     Atrial flutter (H)      Automatic implantable cardioverter-defibrillator in situ      CAD (coronary artery  disease)      Depression      Flatback syndrome      History of acute inferior wall MI      HTN (hypertension)      Hyperlipidemia LDL goal <100      Ischemic cardiomyopathy      Kyphosis (acquired) (postural)      JOANNA (obstructive sleep apnea)      PAD (peripheral artery disease) (H)      Paroxysmal atrial fibrillation (H)      PVD (peripheral vascular disease) (H)            Current Medications:  Current Facility-Administered Medications   Medication     acetaminophen (TYLENOL) rapid release gels 500 mg -- PATIENT SUPPLIED MEDICATION     acetaminophen (TYLENOL) tablet 1,000 mg     acetaminophen (TYLENOL) tablet 500 mg     ascorbic acid (vitamin C) chewable tablet 250 mg     atorvastatin (LIPITOR) tablet 80 mg     benzocaine-menthol (CEPACOL) 15-3.6 MG lozenge 1 lozenge     bisacodyl (DULCOLAX) Suppository 10 mg     bisacodyl (DULCOLAX) Suppository 10 mg     cholecalciferol (VITAMIN D3) 1250 mcg (03717 units) capsule 1,250 mcg     glucose gel 15-30 g    Or     dextrose 50 % injection 25-50 mL    Or     glucagon injection 1 mg     diazepam (VALIUM) tablet 5-10 mg     diclofenac (VOLTAREN) 1 % topical gel 2-4 g - PATIENTS OWN SUPPLY     fluticasone (FLONASE) 50 MCG/ACT spray 2 spray     lidocaine (LMX4) cream     lidocaine (XYLOCAINE) 5 % ointment     lidocaine 1 % 0.1-1 mL     magnesium hydroxide (MILK OF MAGNESIA) suspension 30 mL     melatonin tablet 6 mg     menthol (Topical Analgesic) 2.5% (BENGAY VANISHING SCENT) 2.5 % topical gel     methocarbamol (ROBAXIN) tablet 500 mg     metoprolol tartrate (LOPRESSOR) quarter-tab 6.25 mg     morphine (MSIR) IR tablet 15-30 mg     multivitamin, therapeutic (THERA-VIT) tablet 1 tablet     naloxone (NARCAN) injection 0.2 mg     naloxone (NARCAN) injection 0.2 mg     naloxone (NARCAN) injection 0.4 mg     naloxone (NARCAN) injection 0.4 mg     nitroGLYcerin (NITROSTAT) sublingual tablet 0.4 mg     ondansetron (ZOFRAN-ODT) ODT tab 4-8 mg    Or     ondansetron (ZOFRAN)  "injection 4-8 mg     polyethylene glycol (MIRALAX) powder 17 g PATIENT SUPPLIED     prochlorperazine (COMPAZINE) injection 5 mg    Or     prochlorperazine (COMPAZINE) tablet 5 mg     senna-docusate (SENOKOT-S/PERICOLACE) 8.6-50 MG per tablet 2 tablet - PATIENT SUPPLIED MEDICAITON     simethicone (MYLICON) chewable tablet 125 mg     sodium chloride (PF) 0.9% PF flush 3 mL     sodium chloride (PF) 0.9% PF flush 3 mL     sodium phosphate (FLEET ENEMA) 1 enema     Urivarx Capsules (patient supplied supplement)     vitamin E (TOCOPHEROL) 100 units (45 mg) capsule 200 Units     zinc sulfate (ZINCATE) capsule 220 mg                 Labs   Lab Results   Component Value Date    WBC 8.3 06/25/2021    HGB 9.1 (L) 06/25/2021    HCT 28.1 (L) 06/25/2021    MCV 97 06/25/2021     (L) 06/25/2021     Lab Results   Component Value Date     06/25/2021    POTASSIUM 3.6 06/28/2021    CHLORIDE 104 06/25/2021    CO2 31 06/25/2021     (H) 06/25/2021     Lab Results   Component Value Date    GFRESTIMATED >90 06/25/2021    GFRESTBLACK >90 06/25/2021     Lab Results   Component Value Date    AST 58 (H) 06/21/2021    ALT 22 06/21/2021    ALKPHOS 30 (L) 06/21/2021    BILITOTAL 1.8 (H) 06/21/2021     Lab Results   Component Value Date    INR 1.56 (H) 06/23/2021     Lab Results   Component Value Date    BUN 10 06/25/2021    CR 0.54 (L) 06/25/2021         ON EXAMINATION:  Vitals:    06/27/21 0854 06/27/21 1646 06/27/21 2259 06/28/21 0923   BP: (!) 155/72 128/54 137/61 (!) 167/82   BP Location: Right arm Right arm Right arm Right arm   Pulse: 78 72 76 85   Resp: 16 17 18 20   Temp: 98  F (36.7  C) 98  F (36.7  C) 99.4  F (37.4  C) 98  F (36.7  C)   TempSrc: Oral Oral Oral Oral   SpO2: 96% 90% 90% 100%   Weight:       Height:           Physical Exam:  Blood pressure (!) 167/82, pulse 85, temperature 98  F (36.7  C), temperature source Oral, resp. rate 20, height 1.854 m (6' 1\"), weight 107.5 kg (236 lb 14.4 oz), SpO2 100 " %.    GEN: NAD,lying comfortably in bed  He is alert, appropriate, cooperative  Speech is clear  Comprehension is intact but tends to be tangential but redirectable   HEENT: NCAT  MSK:    muscle atrophy noted  ABD: soft, non tender, pos BS  NEURO:   CRANIAL NERVES: Discs flat. Pupils equal, round and reactive to light. Extraocular movements full. Visual fields full. Face moves symmetrically. Tongue midline. Hearing intact to finger rubbing.    Strength: UE 5/5    RLE weakness proximally and in DF    Cognition: fund of knowledge and train of thought appropriate    SKIN: no rashes or lesions noted.   EXT: no edema bilaterally, chronic stasis changes, no ulcers.         Thank you for the consult. Please call for any questions. Pager number [phone]   Total of 70 min spent in this encounter with more than 50% in counseling and coordination.   Sofi Maldonado   Department of Rehabilitation Medicine

## 2021-06-28 NOTE — PROGRESS NOTES
"  Orthopaedic Surgery Progress Note:  06/28/2021     Subjective:   Patient very comfortable this am yet says he is in considerable pain. Patient's IV pain medications discontinue yesterday, required one dose of diazepam yesterday.     Objective:   /61 (BP Location: Right arm)   Pulse 76   Temp 99.4  F (37.4  C) (Oral)   Resp 18   Ht 1.854 m (6' 1\")   Wt 107.5 kg (236 lb 14.4 oz)   SpO2 90%   BMI 31.26 kg/m     No intake/output data recorded.     Gen: NAD.   Resp: Breathing comfortably .  Drain:   Musculoskeletal: dressing c/d/i.  Sensation from L2-S1 is preserved.    Lower extremities:  Motor Strength Right Left   Hip flexion: L1, L2, L3 4/5 4/5   Knee flexion: S1 5/5 5/5   Knee extension: L3, L4 5/5 5/5   Ankle dosiflexion: L4, L5 1/5 5/5   EHL: L5 1/5 5/5   Ankle plantarflexion: S1 5/5 5/5   Has baseline reduced ROM at the right ankle.    Denies decreased sensation in legs, bilaterally.    Labs:  Lab Results   Component Value Date    WBC 8.3 06/25/2021    HGB 9.1 (L) 06/25/2021     (L) 06/25/2021    INR 1.56 (H) 06/23/2021        Assessment & Plan:   Darleen Simmons is a 70 year old male with PMH including chronic opioid use, former smoker, HTN, HLP, CAD c/b MI, ischemic cardiomyopathy s/p ICD placement, PAD s/p left SFA POBA for latissimus dorsi flap to left calf, atrial fibrillation/flutter, acute blood loss anemia, flat back syndrome now s/p T7-pelvis revision PSIF with T12-L1 3CO and L5 PSO on 6/21/21 with Jazmyne Akins/Herb. Significant pain out of proportion could represent nerve root retraction and normal postop pain in the setting of a chronic opioid user. Given the symptoms in his legs are at the PSO level, this could also represent neural foraminal stenosis. The improving symptoms and imaging do not indicate iatrogenic neural foraminal stenosis.    Goals for today:  - PT/OT  - post-op XR done - to be reviewed w/ Dr. Mable  - discharge today if pain controlled, PT completed and cleared by " medicine    Orthopedics Primary  Activity: Up with assist until independent. No excessive bending or twisting. No lifting >10 lbs x 6 weeks. Renny lift approved for transfers. Keep back straight if using lift.  Weight bearing status: WBAT.  Pain management: Transition from IV to PO as tolerated. No NSAIDs.   Antibiotics: Ancef x24 hrs postoperatively - completed.  Diet: ADAT.   DVT prophylaxis: SCDs only. No chemical DVT ppx needed.  Imaging: XR Upright PTDC - ordered. Obtain after HV removed.  Labs: labs PRN; ongoing close monitoring of hematologic status  Bracing/Splinting: None.  Dressings: Keep dressing c/d/i x 7 days.  Drains: Document output per shift, will be discontinued at Orthopedic Surgery discretion.  Casiano catheter: Removed 6/22. Replace if unable to void.  Physical Therapy/Occupational Therapy: Eval and treat.  Consults: Hospitalist.  Follow-up: Clinic with Dr. Akins in 6 weeks with repeat x-rays.   Disposition: Pending progress with therapies, pain control on orals, post-op imaging, and drain removal.    Orthopaedics surgery staff for this patient is Dr. Akins.    ------------------------------------------------------------------------------------------    [ x] Drains removed.  [ x] Post xrays done.  [   ] Discharge orders done.  [x] Discharge summary started.    Gregory Vincent MD  Orthopaedic Surgery Resident, PGY4  Pager: [phone]     Please page me directly with any questions/concerns during regular weekday hours before 5pm.     If there is no response, if it is a weekend, or if it is during evening hours then please page the orthopaedic surgery resident on call as listed on [facility] call schedule under the orthopaedics tab.    FOLLOWUP:    Future Appointments   Date Time Provider Department [place]   8/5/2021 12:30 PM Akhil Akins MD [facility]   9/9/2021 11:00 AM Akhil Akins MD [facility]

## 2021-06-28 NOTE — PROGRESS NOTES
[facility], [place], [place]  Internal Medicine Progress Note    Assessment and Plans:     Jorje Simmons is a 70 year old male with history of hyperlipidemia, hypertension, CAD with MI s/p CABG x4 (1986), ischemic cardiomyopathy s/p ICD placement, PAD s/p left SFA balloon angioplasty to assist a latissimus dorsi flap to the left calf, atrial fibrillation/flutter (no longer on anticoagulation). He underwent T7-S1 posterior spinal fusion with pelvic fixation and osteotomies of T12-L1 and L5 on 6/21/21 with Dr. Akins. Intraoperatively significant blood loss (4.3 L). Patient admitted to the SICU hypotensive, requiring NE. Now on medical floor. Doing well except pain issues.     # Status post complex spinal surgery including posterior T7-S1 fusion secondary to flat back syndrome by Dr. Akins's team    -per primary team  -hydrate well  -good IS  -ankle pumps  -pain and DVT prophylaxis management per orthopedics  -Pain management per orthopedics team.     # Acute urinary retention with suprapubic abdominal pain.   -required peng catheter. Now removed.   -Takes supplements for bladder, have been ordered. Pharmacy has reviewed, it does not cause any bleeding or significant interactions.     # Early DIC, Acute blood loss anemia and marked thrombocytopenia, status post surgery with concern for the development of DIC given elevated INR 1.66, however, most recent fibrinogen yesterday morning was 306.  -He required platelet transfusion to keep Platelet count above 100 K.   -Now better.   -CBC in AM.   -Coagulopathy has improved.     # Hx Hypertension. Had post op hypotension requiring pressors. Has resolved.   # History of coronary artery disease, status post CABG in 1986, as well as  # Ischemic cardiomyopathy,   # SP ICD placement, most recently replaced in 2016.  Most recent EKG with paced rhythm and frequent ectopic beats with recent mildly elevated  troponin now on downward trend.  The patient currently denies chest pain.  Evaluated by cardiology prior to admission on 05/04/2021 with RCRI score of 2, which translates to a 10.1% risk of 30 day MI, death and cardiac arrest.  # Chronic atrial fibrillation, chronically managed on Xarelto prior to surgery, held for approximately 48 hours.    -HR controlled on metoprolol 6.25 mb BID. PTA on 12.5 mg every day.   -BP on high side. But likely from pain. Pt does not wan the metoprolol to be changed. We will watch BP.   -Anticoagulation on hold.   -Pt and wife report that PCP and [facility] have evaluated the patient on the need for anticoagulation and they did not recommend anticoagulation in the future.   -He is supposed to be on ASA for stroke prevention.   -When its safe from orthopedics standpoint, please let us know and we will resume ASA     # Postop intermittent sedation. From too much narcotics and Valium and lidocaine infusion postop.  has now improved.   -management per orthopedics team.   -Be judicious with narcotics use.   -He is doing much better now. Pain is controlled. He is less confused right now. Mentation is okay. Schedule tylenol 1 gm at HS. 500 mg BID during the day time. He can have additional tylenol in day time if needed. His LFTs are not bad. He does not like dietary supplements, so we will stop them. HR is good.  Platelet count is improving.     # Abdominal distention: Has resolved with oral narcan which causes abdominal crams, so stopped. He got dulcolax and robust bowel regimen.       DVT Prophylaxis: SCDs  Code Status: Full Code  Disposition: TBD      Erika Enrique MD, MPH.  Internal Medicine Attending  [place], [place]    Click on the link below to page me.   Text Page  (7am - 5pm, M-F)    Subjective:       He is slow to progress.     Reports pain issues. Wants valium which was adjusted by orthopedics team.    NO vomiting.    Has been moving bowels.     Abdominal distention is better.    No chest pain or sob.    -Data reviewed today: I reviewed all new labs and imaging results over the last 24 hours.     Exam:       Temp: 97.9  F (36.6  C) Temp src: Oral BP: (!) 153/73 Pulse: 86   Resp: 18 SpO2: 96 % O2 Device: None (Room air)    Vitals:    06/21/21 0617 06/22/21 0630   Weight: 98.4 kg (216 lb 14.9 oz) 107.5 kg (236 lb 14.4 oz)     Vital Signs with Ranges  Temp:  [97.9  F (36.6  C)-99.4  F (37.4  C)] 97.9  F (36.6  C)  Pulse:  [76-86] 86  Resp:  [18-20] 18  BP: (137-167)/(61-82) 153/73  SpO2:  [90 %-100 %] 96 %  I/O last 3 completed shifts:  In: -   Out: 825 [Urine:825]    Constitutional: No distress noted, Alert, Answering questions appropriately  Respiratory: AE is good on both sides, no wheezing onr crackles  Cardiovascular: S1S2 normal, no new murmur  GI: Soft, non tender  Skin/Integumen: No rash    Medications       acetaminophen  1,000 mg Oral At Bedtime     ascorbic acid  250 mg Oral Daily     atorvastatin  80 mg Oral At Bedtime     cholecalciferol  1,250 mcg Oral Q7 Days     melatonin  6 mg Oral At Bedtime     methocarbamol  500 mg Oral 4x Daily     metoprolol tartrate  6.25 mg Oral BID     multivitamin, therapeutic  1 tablet Oral Daily     polyethylene glycol  17 g Oral BID     senna-docusate  2 tablet Oral BID     sodium chloride (PF)  3 mL Intracatheter Q8H     Urivarx Capsules (patient supplied supplement)  2 capsule Oral or Feeding Tube Daily     vitamin E  200 Units Oral Daily     zinc sulfate  220 mg Oral Daily     Data      Recent Labs   Lab 06/28/21  0826 06/27/21  0602 06/26/21  1446 06/25/21  0830 06/24/21  1538 06/24/21  0850 06/23/21  2022 06/23/21  0632 06/22/21  2001 06/22/21  0922 06/22/21  0450 06/21/21  2150 06/21/21  2150   WBC  --   --   --  8.3 8.4 9.0 8.3 12.4*  --   --  13.8*   < >  --    HGB  --   --   --  9.1* 8.2* 8.2* 7.6* 8.1*  --   --  11.0*   < >  --    MCV  --   --   --  97 98 96 101* 95  --   --  93   < >  --    PLT  --    --   --  127* 89* 81* 66* 60*  --   --  40*   < >  --    INR  --   --   --   --   --   --  1.56* 1.66*  --   --  1.62*  --   --    NA  --   --   --  139 140  --   --  137  --   --  137  --  140   POTASSIUM 3.6 3.6 3.7 3.7 4.7  --   --  4.7  --   --  4.0  --  4.0   CHLORIDE  --   --   --  104 107  --   --  103  --   --  108  --  114*   CO2  --   --   --  31 31  --   --  29  --   --  23  --  20   BUN  --   --   --  10 10  --   --  17  --   --  15  --  13   CR  --   --   --  0.54* 0.54*  --   --  0.69  --   --  0.70  --  0.68   ANIONGAP  --   --   --  4 2*  --   --  5  --   --  6  --  6   TRENTON  --   --   --  8.6 8.1*  --   --  8.1*  --   --  8.3*  --  7.8*   GLC  --   --   --  101* 102*  --   --  116*  --   --  181*  --  166*   ALBUMIN  --   --   --   --   --   --   --   --   --   --   --   --  2.6*   PROTTOTAL  --   --   --   --   --   --   --   --   --   --   --   --  3.9*   BILITOTAL  --   --   --   --   --   --   --   --   --   --   --   --  1.8*   ALKPHOS  --   --   --   --   --   --   --   --   --   --   --   --  30*   ALT  --   --   --   --   --   --   --   --   --   --   --   --  22   AST  --   --   --   --   --   --   --   --   --   --   --   --  58*   TROPI  --   --   --   --   --   --   --   --  0.056* 0.075* 0.070*  --  0.033    < > = values in this interval not displayed.       No results found for this or any previous visit (from the past 24 hour(s)).

## 2021-06-28 NOTE — PLAN OF CARE
"  /61 (BP Location: Right arm)   Pulse 76   Temp 99.4  F (37.4  C) (Oral)   Resp 18   Ht 1.854 m (6' 1\")   Wt 107.5 kg (236 lb 14.4 oz)   SpO2 90%   BMI 31.26 kg/m       Output:    Voiding adequately. loose BM per shift. BM incontinent.      Activity:    Assist of 2 when turning in bed.    Skin: Abrasion on thigh and incisions.     Pain:     Incisional pain radiating to ankle per patient. PRN morphine given per MAR.   Neuro/CMS:    A&Ox4.    Dressing(s):    CDI    Diet:    Mechanical Soft diet - Yogurt with peaches with every meal   LDA:    Right and left PIV SL   Equipment:    IV pole   Plan:    Continue to monitor pain, encouraged patient to ambulate, and care of plan.    Additional Info:    Per pt he take valium 3 times a day regularly. Would like his orders to be change to what he usually take at home.        "

## 2021-06-28 NOTE — PLAN OF CARE
"  VS: BP (!) 153/73 (BP Location: Right arm)   Pulse 86   Temp 97.9  F (36.6  C) (Oral)   Resp 18   Ht 1.854 m (6' 1\")   Wt 107.5 kg (236 lb 14.4 oz)   SpO2 96%   BMI 31.26 kg/m      Output: Voids spontaneously without difficulty using urinal  LBM today - loose BM - bowel meds held   Lungs: CTA   Activity: Stood at bedside x3 with PT today  Assist of 2 with walker and gait belt  Repositioning in bed   Has flat back syndrome - PT reported patient bends over to walk   Skin: Scabbing bilateral upper thighs from surgery - pink/red in color - continue to monitor  Scab left knee  Incision  L ankle graph lump WDL   Pain:   Robaxin scheduled  Morphine Q3H - patient reported he likes tylenol with this  Tylenol order changed to PRN and scheduled at bedtime  Valium available    Neuro/CMS:   A&Ox4  CMS intact    Dressing(s):   Aquacel CDI   Diet:   Regular    LDA:   L PIV  R PIV   Equipment:   Walker, gait belt, call light, IV pole   Plan:   Will need COVID test if going to TCU, Continue pain management and therapies, continue plan of care   Additional Info:   Had repeat sitting xrays today, Patient has home meds in bin and prefers own meds -pharmacy has labeled       "

## 2021-06-28 NOTE — PROGRESS NOTES
Brief crosscover note. Patient not seen,discussed over the phone.     I was paged that patient is requesting more diazepam for muscle spasm and anxiety, stating he gets more outpatient prior to surgery.     Chart review: 6/21 spinal fusion with Dr. Akins.     In care everywhere, he has a visit from 6/18 with a Dr. Tyler Dunphy for management of his chronic opiates, benzos, and other pain meds. In that very thorough note, it seems that his diazepam prescription (different from noted in [facility] EMR) is 2mg tablet with sig: Take 1-3 tablets by mouth every six hours as needed for up to 4 doses in 24 hours).     Given that he has a very complex chronic pain history including chronic use of opiates (360 oral morphine equivalents per day) and benzos with additional recent trigger of spinal fusion, I felt comfortable prescribing him a one time dose of diazepam 4mg prn now, with day team to assess further prescriptions.     Joanna Currie MD

## 2021-06-29 ENCOUNTER — APPOINTMENT (OUTPATIENT)
Dept: OCCUPATIONAL THERAPY | Facility: CLINIC | Age: 71
DRG: 456 | End: 2021-06-29
Attending: ORTHOPAEDIC SURGERY
Payer: COMMERCIAL

## 2021-06-29 ENCOUNTER — APPOINTMENT (OUTPATIENT)
Dept: PHYSICAL THERAPY | Facility: CLINIC | Age: 71
DRG: 456 | End: 2021-06-29
Attending: ORTHOPAEDIC SURGERY
Payer: COMMERCIAL

## 2021-06-29 LAB
BUN SERPL-MCNC: 9 MG/DL (ref 7–30)
CALCIUM SERPL-MCNC: 8.6 MG/DL (ref 8.5–10.1)
CHLORIDE SERPL-SCNC: 105 MMOL/L (ref 94–109)
CO2 SERPL-SCNC: 30 MMOL/L (ref 20–32)
CREAT SERPL-MCNC: 0.54 MG/DL (ref 0.66–1.25)
ERYTHROCYTE [DISTWIDTH] IN BLOOD BY AUTOMATED COUNT: 13.6 % (ref 10–15)
GFR SERPL CREATININE-BSD FRML MDRD: >90 ML/MIN/{1.73_M2}
GLUCOSE SERPL-MCNC: 89 MG/DL (ref 70–99)
HCT VFR BLD AUTO: 30.9 % (ref 40–53)
HGB BLD-MCNC: 9.8 G/DL (ref 13.3–17.7)
MCH RBC QN AUTO: 30.9 PG (ref 26.5–33)
MCHC RBC AUTO-ENTMCNC: 31.7 G/DL (ref 31.5–36.5)
MCV RBC AUTO: 98 FL (ref 78–100)
PLATELET # BLD AUTO: 189 10E9/L (ref 150–450)
RBC # BLD AUTO: 3.17 10E12/L (ref 4.4–5.9)
SODIUM SERPL-SCNC: 143 MMOL/L (ref 133–144)
WBC # BLD AUTO: 9.7 10E9/L (ref 4–11)

## 2021-06-29 PROCEDURE — 97535 SELF CARE MNGMENT TRAINING: CPT | Mod: GO | Performed by: OCCUPATIONAL THERAPIST

## 2021-06-29 PROCEDURE — 120N000002 HC R&B MED SURG/OB UMMC

## 2021-06-29 PROCEDURE — G0463 HOSPITAL OUTPT CLINIC VISIT: HCPCS

## 2021-06-29 PROCEDURE — 36415 COLL VENOUS BLD VENIPUNCTURE: CPT | Performed by: HOSPITALIST

## 2021-06-29 PROCEDURE — 97530 THERAPEUTIC ACTIVITIES: CPT | Mod: GP | Performed by: REHABILITATION PRACTITIONER

## 2021-06-29 PROCEDURE — 250N000013 HC RX MED GY IP 250 OP 250 PS 637: Performed by: HOSPITALIST

## 2021-06-29 PROCEDURE — 250N000013 HC RX MED GY IP 250 OP 250 PS 637: Performed by: STUDENT IN AN ORGANIZED HEALTH CARE EDUCATION/TRAINING PROGRAM

## 2021-06-29 PROCEDURE — 85027 COMPLETE CBC AUTOMATED: CPT | Performed by: HOSPITALIST

## 2021-06-29 PROCEDURE — 250N000013 HC RX MED GY IP 250 OP 250 PS 637: Performed by: CLINICAL NURSE SPECIALIST

## 2021-06-29 PROCEDURE — 99232 SBSQ HOSP IP/OBS MODERATE 35: CPT | Performed by: INTERNAL MEDICINE

## 2021-06-29 RX ADMIN — METHOCARBAMOL 500 MG: 500 TABLET, FILM COATED ORAL at 21:17

## 2021-06-29 RX ADMIN — MORPHINE SULFATE 30 MG: 15 TABLET ORAL at 17:55

## 2021-06-29 RX ADMIN — METHOCARBAMOL 500 MG: 500 TABLET, FILM COATED ORAL at 17:55

## 2021-06-29 RX ADMIN — Medication 125 MG: at 06:49

## 2021-06-29 RX ADMIN — Medication 200 UNITS: at 11:03

## 2021-06-29 RX ADMIN — ACETAMINOPHEN 1000 MG: 500 TABLET, FILM COATED ORAL at 21:17

## 2021-06-29 RX ADMIN — Medication 6.25 MG: at 21:17

## 2021-06-29 RX ADMIN — ATORVASTATIN CALCIUM 80 MG: 40 TABLET, FILM COATED ORAL at 21:17

## 2021-06-29 RX ADMIN — DIAZEPAM 5 MG: 5 TABLET ORAL at 16:17

## 2021-06-29 RX ADMIN — Medication 250 MG: at 11:01

## 2021-06-29 RX ADMIN — METHOCARBAMOL 500 MG: 500 TABLET, FILM COATED ORAL at 11:02

## 2021-06-29 RX ADMIN — DIAZEPAM 5 MG: 5 TABLET ORAL at 22:12

## 2021-06-29 RX ADMIN — MORPHINE SULFATE 15 MG: 15 TABLET ORAL at 03:40

## 2021-06-29 RX ADMIN — DIAZEPAM 5 MG: 5 TABLET ORAL at 06:48

## 2021-06-29 RX ADMIN — THERA TABS 1 TABLET: TAB at 11:04

## 2021-06-29 RX ADMIN — Medication 125 MG: at 06:01

## 2021-06-29 RX ADMIN — MORPHINE SULFATE 30 MG: 15 TABLET ORAL at 07:51

## 2021-06-29 RX ADMIN — MORPHINE SULFATE 30 MG: 15 TABLET ORAL at 11:06

## 2021-06-29 RX ADMIN — Medication 6.25 MG: at 11:02

## 2021-06-29 RX ADMIN — MORPHINE SULFATE 30 MG: 15 TABLET ORAL at 21:17

## 2021-06-29 RX ADMIN — MORPHINE SULFATE 30 MG: 15 TABLET ORAL at 00:11

## 2021-06-29 RX ADMIN — ZINC SULFATE 220 MG (50 MG) CAPSULE 220 MG: CAPSULE at 11:04

## 2021-06-29 RX ADMIN — METHOCARBAMOL 500 MG: 500 TABLET, FILM COATED ORAL at 13:17

## 2021-06-29 RX ADMIN — MELATONIN TAB 3 MG 6 MG: 3 TAB at 21:17

## 2021-06-29 RX ADMIN — Medication 2 TABLET: at 11:00

## 2021-06-29 RX ADMIN — MORPHINE SULFATE 30 MG: 15 TABLET ORAL at 14:36

## 2021-06-29 NOTE — PROGRESS NOTES
[facility] Nurse Inpatient Pressure Injury Assessment   Reason for consultation: Evaluate and treat Skin blister      ASSESSMENT  Pressure Injury: on mid chest, hospital acquired   Pressure Injury is Stage 2   Contributing factor of the pressure injury: pressure and immobility  Status: healing    Pressure Injury: on Right Knee, hospital acquired   Pressure Injury is Stage 2   Contributing factor of the pressure injury: pressure and immobility  Status: healing    Wound Location: Right upper thigh, suspect from tape removal  Status: evolving     TREATMENT PLAN  Mid chest and Knee wound: Daily : Monitor closely and cover with Mepilex if blister erupts. Otherwise ok to leave it UHA.    Right upper thigh: Wash daily with wound cleanser and gauze. Apply Triple Antibiotic Ointment twice daily.    Orders Updated  WO Nurse follow-up plan:weekly  Nursing to notify the Provider(s) and re-consult the WO Nurse if wound(s) deteriorates or new skin concern.    Patient History  According to provider note(s): Darleen Simmons is a 70 year old male with PMH including chronic opioid use, former smoker, HTN, HLP, CAD c/b MI, ischemic cardiomyopathy s/p ICD placement, PAD s/p left SFA POBA for latissimus dorsi flap to left calf, atrial fibrillation/flutter, acute blood loss anemia, flat back syndrome now s/p T7-pelvis revision PSIF with T12-L1 3CO and L5 PSO on 6/21/21 with Jazmyne Akins/Herb. Significant pain out of proportion could represent nerve root retraction and normal postop pain in the setting of a chronic opioid user. Given the symptoms in his legs are at the PSO level, this could also represent neural foraminal stenosis.    Objective Data  Containment of urine/stool: Indwelling catheter    Current Diet/ Nutrition:  Orders Placed This Encounter      Regular Diet Adult      Output:   I/O last 3 completed shifts:  In: 120 [P.O.:120]  Out: 1925 [Urine:1925]    Risk Assessment:   Sensory Perception: 4-->no impairment  Moisture:  3-->occasionally moist  Activity: 1-->bedfast  Mobility: 2-->very limited  Nutrition: 3-->adequate  Friction and Shear: 2-->potential problem  Omkar Score: 15      Labs:   Recent Labs   Lab 06/25/21  0830 06/23/21 2022 06/23/21 2022   HGB 9.1*   < > 7.6*   INR  --   --  1.56*   WBC 8.3   < > 8.3    < > = values in this interval not displayed.       Physical Exam  Skin inspection: focused chest, arm, thighs, and legs    Wound Location:  Chest           Date of last Photo 6/22  Wound History: Long OR procedure on 6/21 and was proned.  Measurements (length x width x depth, in cm) 4 small serous filled blisters, largest one measured at  1 cm x 2 cm  x  0.0 cm   Decompressed now, healing      2. R) Knee        Date of last Photo 6/22  Wound History: Long OR procedure on 6/21 and was proned.  Measurements (length x width x depth, in cm) 1 cm x 0.8 cm  x  0.0 cm   Decompressed now, healing    Wound Location: Right upper thigh    6/29 Right upper thigh    Serous scabbing with dry dermis. Patient complains of itching at site.       Interventions  Current support surface: Standard  Low air loss mattress  Current off-loading measures: Foam padding and Pillows  Repositioning aid: Pillows  Visual inspection of wound(s) completed   Tube Securement: Flexitrac  Wound Care: was done per plan of care.  Supplies: discussed with RN  Educated provided: importance of repositioning, plan of care, wound progress and Off-loading pressure  Education provided to: nurse  Discussed importance of:repositioning every 2 hours and off-loading pressure to wound  Discussed plan of care with Nurse    Celia Franklin RN, CWOCN

## 2021-06-29 NOTE — PROGRESS NOTES
Dangled at bedside today, washed own hair see O.T.  note, continues to need pain medications every 3-4 hours  See MAR, continue plan of care.

## 2021-06-29 NOTE — PLAN OF CARE
A&O x4. Pt continues to have high levels of pain. Pain managed overnight with PRN Morphine and repositioning. Pt denied any nausea, SOB or any new concerns. Dressing to back is clean dry and intact. Pt sleeping on and off this shift. Call light within reach and pt able to make needs known.

## 2021-06-29 NOTE — PROGRESS NOTES
"  Orthopaedic Surgery Progress Note:  06/29/2021     Subjective:   Continued pain this morning. From right lower back and radiating down the right leg. Had loose stools yesterday. VS stable.    Objective:   /69 (BP Location: Right arm)   Pulse 80   Temp 97.1  F (36.2  C) (Oral)   Resp 16   Ht 1.854 m (6' 1\")   Wt 107.5 kg (236 lb 14.4 oz)   SpO2 92%   BMI 31.26 kg/m     No intake/output data recorded.     Gen: NAD.   Resp: Breathing comfortably .  Drain:   Musculoskeletal: dressing c/d/i.  Sensation from L2-S1 is preserved.    Lower extremities:  Motor Strength Right Left   Hip flexion: L1, L2, L3 4/5 4/5   Knee flexion: S1 5/5 5/5   Knee extension: L3, L4 5/5 5/5   Ankle dosiflexion: L4, L5 1/5 5/5   EHL: L5 1/5 5/5   Ankle plantarflexion: S1 5/5 5/5       Denies decreased sensation in legs, bilaterally.    Labs:  Lab Results   Component Value Date    WBC 8.3 06/25/2021    HGB 9.1 (L) 06/25/2021     (L) 06/25/2021    INR 1.56 (H) 06/23/2021        Assessment & Plan:   Darleen Simmons is a 70 year old male with PMH including chronic opioid use, former smoker, HTN, HLP, CAD c/b MI, ischemic cardiomyopathy s/p ICD placement, PAD s/p left SFA POBA for latissimus dorsi flap to left calf, atrial fibrillation/flutter, acute blood loss anemia, flat back syndrome now s/p T7-pelvis revision PSIF with T12-L1 3CO and L5 PSO on 6/21/21 with Jazmyne Akins/Herb. Significant pain out of proportion could represent nerve root retraction and normal postop pain in the setting of a chronic opioid user. Given the symptoms in his legs are at the PSO level, this could also represent neural foraminal stenosis. The improving symptoms and imaging do not indicate iatrogenic neural foraminal stenosis.    Lateral spine XR inadequate, need to be repeated, order placed. Continued pain control concerns. Working with SW to figure out best discharge plan    Goals for today:  - post-op XR pending - to be reviewed w/ Dr. Akins  - " discharge today if pain controlled, PT completed and cleared by medicine    Orthopedics Primary  Activity: Up with assist until independent. No excessive bending or twisting. No lifting >10 lbs x 6 weeks. Rneny lift approved for transfers. Keep back straight if using lift.  Weight bearing status: WBAT.  Pain management: Transition from IV to PO as tolerated. No NSAIDs.   Antibiotics: Ancef x24 hrs postoperatively - completed.  Diet: ADAT.   DVT prophylaxis: SCDs only. No chemical DVT ppx needed.  Imaging: XR Upright PTDC - ordered. Obtain after HV removed.  Labs: labs PRN; ongoing close monitoring of hematologic status  Bracing/Splinting: None.  Dressings: Keep dressing c/d/i x 7 days.  Drains: Document output per shift, will be discontinued at Orthopedic Surgery discretion.  Casiano catheter: Removed 6/22. Replace if unable to void.  Physical Therapy/Occupational Therapy: Eval and treat.  Consults: Hospitalist.  Follow-up: Clinic with Dr. Akins in 6 weeks with repeat x-rays.   Disposition: Pending progress with therapies, pain control on orals, post-op imaging, and drain removal.    Orthopaedics surgery staff for this patient is Dr. Akins.    ------------------------------------------------------------------------------------------    [ x] Drains removed.  [ x] Post xrays done.  [   ] Discharge orders done.  [x] Discharge summary started.    Gregory Vincent MD  Orthopaedic Surgery Resident, PGY4  Pager: [phone]     Please page me directly with any questions/concerns during regular weekday hours before 5pm.     If there is no response, if it is a weekend, or if it is during evening hours then please page the orthopaedic surgery resident on call as listed on [facility] call schedule under the orthopaedics tab.    FOLLOWUP:    Future Appointments   Date Time [facility] Department [facility]   8/5/2021 12:30 PM Akhil Akins MD [facility]   9/9/2021 11:00 AM Akhil Akins MD [facility]

## 2021-06-29 NOTE — PROGRESS NOTES
[facility], [place], [place]  Internal Medicine Progress Note    Assessment and Plans:     Jorje Simmons is a 70 year old male with history of hyperlipidemia, hypertension, CAD with MI s/p CABG x4 (1986), ischemic cardiomyopathy s/p ICD placement, PAD s/p left SFA balloon angioplasty to assist a latissimus dorsi flap to the left calf, atrial fibrillation/flutter (no longer on anticoagulation). He underwent T7-S1 posterior spinal fusion with pelvic fixation and osteotomies of T12-L1 and L5 on 6/21/21 with Dr. Akins. Intraoperatively significant blood loss (4.3 L). Patient admitted to the SICU hypotensive, requiring NE. Now on medical floor. Doing well except pain issues.     # Status post complex spinal surgery including posterior T7-S1 fusion secondary to flat back syndrome by Dr. Akins's team  -per primary team  -pain and DVT prophylaxis management per orthopedics  -Pain management per orthopedics team.     # Acute urinary retention with suprapubic abdominal pain.   -required peng catheter. Now removed.   -Takes supplements for bladder, have been ordered. Pharmacy has reviewed, it does not cause any bleeding or significant interactions.     # Early DIC, Acute blood loss anemia and marked thrombocytopenia, status post surgery with concern for the development of DIC given elevated INR 1.66, however, most recent fibrinogen yesterday morning was 306.  -He required platelet transfusion to keep Platelet count above 100 K.   -Now better.   -Coagulopathy has improved.     # Hx Hypertension. Had post op hypotension requiring pressors. Has resolved.   # History of coronary artery disease, status post CABG in 1986, as well as  # Ischemic cardiomyopathy,   # SP ICD placement, most recently replaced in 2016.  Most recent EKG with paced rhythm and frequent ectopic beats with recent mildly elevated troponin now on downward trend.  The patient currently denies  chest pain.  Evaluated by cardiology prior to admission on 05/04/2021 with RCRI score of 2, which translates to a 10.1% risk of 30 day MI, death and cardiac arrest.    # Chronic atrial fibrillation, chronically managed on Xarelto prior to surgery, held for approximately 48 hours.    -HR controlled on metoprolol 6.25 mb BID. PTA on 12.5 mg every day.   -BP on high side. But likely from pain. Pt does not wan the metoprolol to be changed. We will watch BP.   -Anticoagulation on hold.   -Pt and wife report that PCP and [facility] have evaluated the patient on the need for anticoagulation and they did not recommend anticoagulation in the future.   -He is supposed to be on ASA for stroke prevention.   -When its safe from orthopedics standpoint, please let us know and we will resume ASA     # Postop intermittent sedation. From too much narcotics and Valium and lidocaine infusion postop.  has now improved.   -management per orthopedics team.   -Be judicious with narcotics use.   -He is doing much better now. Pain is controlled. He is less confused right now. Mentation is okay. Schedule tylenol 1 gm at HS. 500 mg BID during the day time. He can have additional tylenol in day time if needed. His LFTs are not bad. He does not like dietary supplements, so we will stop them. HR is good.  Platelet count is improving.     # Abdominal distention: Has resolved with oral narcan which causes abdominal crams, so stopped. He got dulcolax and robust bowel regimen.       DVT Prophylaxis: SCDs  Code Status: Full Code  Disposition: D      Johnny Palmer MD, MPH.  Internal Medicine Attending  [place], [place]        Subjective:   Patient feels well this morning   Continues to have some pain, which is well controlled with pain medicines  No fever or chills  Eating and drinking well     -Data reviewed today: I reviewed all new labs and imaging results over the last 24 hours.     Exam:        Temp: 97.8  F (36.6  C) Temp src: Oral BP: (!) 169/88 Pulse: 80   Resp: 16 SpO2: 92 % O2 Device: None (Room air)    Vitals:    06/21/21 0617 06/22/21 0630   Weight: 98.4 kg (216 lb 14.9 oz) 107.5 kg (236 lb 14.4 oz)     Vital Signs with Ranges  Temp:  [97.1  F (36.2  C)-97.9  F (36.6  C)] 97.8  F (36.6  C)  Pulse:  [80-86] 80  Resp:  [16-18] 16  BP: (135-169)/(65-88) 169/88  SpO2:  [92 %-96 %] 92 %  I/O last 3 completed shifts:  In: 120 [P.O.:120]  Out: 1925 [Urine:1925]    Constitutional: No distress noted, Alert, Answering questions appropriately  Respiratory: AE is good on both sides, no wheezing onr crackles  Cardiovascular: S1S2 normal, no new murmur  GI: Soft, non tender  Skin/Integumen: No rash    Medications       acetaminophen  1,000 mg Oral At Bedtime     ascorbic acid  250 mg Oral Daily     atorvastatin  80 mg Oral At Bedtime     cholecalciferol  1,250 mcg Oral Q7 Days     melatonin  6 mg Oral At Bedtime     methocarbamol  500 mg Oral 4x Daily     metoprolol tartrate  6.25 mg Oral BID     multivitamin, therapeutic  1 tablet Oral Daily     polyethylene glycol  17 g Oral BID     senna-docusate  2 tablet Oral BID     sodium chloride (PF)  3 mL Intracatheter Q8H     Urivarx Capsules (patient supplied supplement)  2 capsule Oral or Feeding Tube Daily     vitamin E  200 Units Oral Daily     zinc sulfate  220 mg Oral Daily     Data      Recent Labs   Lab 06/28/21  0826 06/27/21  0602 06/26/21  1446 06/25/21  0830 06/24/21  1538 06/24/21  0850 06/23/21  2022 06/23/21  0632 06/22/21 2001 06/22/21 2001   WBC  --   --   --  8.3 8.4 9.0 8.3 12.4*   < >  --    HGB  --   --   --  9.1* 8.2* 8.2* 7.6* 8.1*   < >  --    MCV  --   --   --  97 98 96 101* 95   < >  --    PLT  --   --   --  127* 89* 81* 66* 60*   < >  --    INR  --   --   --   --   --   --  1.56* 1.66*  --   --    NA  --   --   --  139 140  --   --  137  --   --    POTASSIUM 3.6 3.6 3.7 3.7 4.7  --   --  4.7   < >  --    CHLORIDE  --   --   --  104  107  --   --  103  --   --    CO2  --   --   --  31 31  --   --  29  --   --    BUN  --   --   --  10 10  --   --  17  --   --    CR  --   --   --  0.54* 0.54*  --   --  0.69  --   --    ANIONGAP  --   --   --  4 2*  --   --  5  --   --    TRENTON  --   --   --  8.6 8.1*  --   --  8.1*  --   --    GLC  --   --   --  101* 102*  --   --  116*  --   --    TROPI  --   --   --   --   --   --   --   --   --  0.056*    < > = values in this interval not displayed.       No results found for this or any previous visit (from the past 24 hour(s)).

## 2021-06-29 NOTE — PROGRESS NOTES
Care Management Follow Up    Length of Stay (days): 8    Expected Discharge Date: 06/29/21     Concerns to be Addressed: discharge planning     Patient plan of care discussed at interdisciplinary rounds: Yes    Anticipated Discharge Disposition: Transitional Care     Anticipated Discharge Services: None  Anticipated Discharge DME: None    Patient/family educated on Medicare website which has current facility and service quality ratings: yes  Education Provided on the Discharge Plan:  yes  Patient/Family in Agreement with the Plan: yes    Referrals Placed by CM/SW: Post Acute Facilities  Private pay costs discussed: Not applicable    Additional Information:  As of this morning, there were dual recs for ARU and TCU. ROSE received phone call from Endy in admissions at  [facility] asking if PT would endorse a change to ARU and if so, for SW to submit an [employer] auth. SW will update floor SW to f/u w/ PT tomorrow regarding this, then update Endy.      [facility]  PH: *[phone]    Nolberto Benavides MSW, LGSW  [facility]

## 2021-06-29 NOTE — PLAN OF CARE
"      VS: BP (!) 153/65   Pulse 86   Temp 97.9  F (36.6  C) (Oral)   Resp 18   Ht 1.854 m (6' 1\")   Wt 107.5 kg (236 lb 14.4 oz)   SpO2 96%   BMI 31.26 kg/m       Output: Voids spontaneously, uses urinal  LBM 6/28/21, pt had two loose stools, diarrhea, incontinent of bowels.    Lungs: LS clear, denies SOB   Activity: Pt not OOB this shift. Stood at side of bed earlier today with assist of 3. Pt turns well in bed with assist of 1, uses bedpan and urinal.   Skin: WDL ex for incision and scabs on upper bilateral thighs from surgery.   Pain:   Pt c/o pain in back, pt has chronic pain hx. Pain team met with pt today and pt states that pain feels a little bit more controlled with PRN valium, morphine, and home tylenol.    Neuro/CMS:   A&Ox4. Denies n/t   Dressing(s):   Aquacell CDI   Diet:   Regular, pt did not want to eat this shift as he is concerned that it will make his loose stools worse.   LDA:   PIV SL, One PIV removed d/t infiltration       Plan:   Continue to manage pt pain. Pt able to make needs known.   Additional Info:          "

## 2021-06-30 ENCOUNTER — APPOINTMENT (OUTPATIENT)
Dept: PHYSICAL THERAPY | Facility: CLINIC | Age: 71
DRG: 456 | End: 2021-06-30
Attending: ORTHOPAEDIC SURGERY
Payer: COMMERCIAL

## 2021-06-30 PROCEDURE — 250N000013 HC RX MED GY IP 250 OP 250 PS 637: Performed by: CLINICAL NURSE SPECIALIST

## 2021-06-30 PROCEDURE — 120N000002 HC R&B MED SURG/OB UMMC

## 2021-06-30 PROCEDURE — 97530 THERAPEUTIC ACTIVITIES: CPT | Mod: GP | Performed by: REHABILITATION PRACTITIONER

## 2021-06-30 PROCEDURE — 250N000013 HC RX MED GY IP 250 OP 250 PS 637: Performed by: HOSPITALIST

## 2021-06-30 PROCEDURE — 99232 SBSQ HOSP IP/OBS MODERATE 35: CPT | Performed by: INTERNAL MEDICINE

## 2021-06-30 PROCEDURE — 250N000013 HC RX MED GY IP 250 OP 250 PS 637: Performed by: STUDENT IN AN ORGANIZED HEALTH CARE EDUCATION/TRAINING PROGRAM

## 2021-06-30 RX ORDER — AMOXICILLIN 250 MG
2 CAPSULE ORAL 2 TIMES DAILY
Qty: 30 TABLET | DISCHARGE
Start: 2021-06-30

## 2021-06-30 RX ORDER — POLYETHYLENE GLYCOL 3350 17 G/17G
17 POWDER, FOR SOLUTION ORAL 2 TIMES DAILY
DISCHARGE
Start: 2021-06-30 | End: 2021-07-30

## 2021-06-30 RX ORDER — ACETAMINOPHEN 500 MG
500 TABLET ORAL 2 TIMES DAILY PRN
Qty: 60 TABLET | Refills: 0 | DISCHARGE
Start: 2021-06-30

## 2021-06-30 RX ORDER — DIAZEPAM 5 MG
5-10 TABLET ORAL EVERY 6 HOURS PRN
Qty: 30 TABLET | Status: ON HOLD | DISCHARGE
Start: 2021-06-30 | End: 2021-07-29

## 2021-06-30 RX ORDER — METHOCARBAMOL 500 MG/1
500 TABLET, FILM COATED ORAL 4 TIMES DAILY
Status: ON HOLD | DISCHARGE
Start: 2021-06-30 | End: 2021-07-29

## 2021-06-30 RX ORDER — ZINC SULFATE 50(220)MG
220 CAPSULE ORAL DAILY
Status: ON HOLD | DISCHARGE
Start: 2021-07-01 | End: 2021-07-29

## 2021-06-30 RX ORDER — ASPIRIN 81 MG/1
81 TABLET ORAL DAILY
DISCHARGE
Start: 2021-07-21

## 2021-06-30 RX ADMIN — Medication 500 MG: at 14:13

## 2021-06-30 RX ADMIN — MORPHINE SULFATE 30 MG: 15 TABLET ORAL at 17:20

## 2021-06-30 RX ADMIN — ACETAMINOPHEN 1000 MG: 500 TABLET, FILM COATED ORAL at 21:50

## 2021-06-30 RX ADMIN — MORPHINE SULFATE 30 MG: 15 TABLET ORAL at 08:30

## 2021-06-30 RX ADMIN — METHOCARBAMOL 500 MG: 500 TABLET, FILM COATED ORAL at 20:46

## 2021-06-30 RX ADMIN — Medication 250 MG: at 11:24

## 2021-06-30 RX ADMIN — MORPHINE SULFATE 30 MG: 15 TABLET ORAL at 00:22

## 2021-06-30 RX ADMIN — MORPHINE SULFATE 30 MG: 15 TABLET ORAL at 20:46

## 2021-06-30 RX ADMIN — THERA TABS 1 TABLET: TAB at 11:24

## 2021-06-30 RX ADMIN — LIDOCAINE: 50 OINTMENT TOPICAL at 23:54

## 2021-06-30 RX ADMIN — MORPHINE SULFATE 30 MG: 15 TABLET ORAL at 23:51

## 2021-06-30 RX ADMIN — ATORVASTATIN CALCIUM 80 MG: 40 TABLET, FILM COATED ORAL at 21:46

## 2021-06-30 RX ADMIN — MORPHINE SULFATE 30 MG: 15 TABLET ORAL at 11:21

## 2021-06-30 RX ADMIN — ZINC SULFATE 220 MG (50 MG) CAPSULE 220 MG: CAPSULE at 11:22

## 2021-06-30 RX ADMIN — DIAZEPAM 5 MG: 5 TABLET ORAL at 05:17

## 2021-06-30 RX ADMIN — Medication 125 MG: at 08:41

## 2021-06-30 RX ADMIN — METHOCARBAMOL 500 MG: 500 TABLET, FILM COATED ORAL at 08:30

## 2021-06-30 RX ADMIN — Medication 200 UNITS: at 11:22

## 2021-06-30 RX ADMIN — DIAZEPAM 5 MG: 5 TABLET ORAL at 21:46

## 2021-06-30 RX ADMIN — Medication 6.25 MG: at 20:46

## 2021-06-30 RX ADMIN — Medication 6.25 MG: at 08:30

## 2021-06-30 RX ADMIN — MORPHINE SULFATE 30 MG: 15 TABLET ORAL at 04:06

## 2021-06-30 RX ADMIN — METHOCARBAMOL 500 MG: 500 TABLET, FILM COATED ORAL at 13:19

## 2021-06-30 RX ADMIN — MORPHINE SULFATE 30 MG: 15 TABLET ORAL at 14:12

## 2021-06-30 RX ADMIN — DIAZEPAM 5 MG: 5 TABLET ORAL at 11:21

## 2021-06-30 RX ADMIN — METHOCARBAMOL 500 MG: 500 TABLET, FILM COATED ORAL at 17:20

## 2021-06-30 NOTE — PROGRESS NOTES
"Orthopaedic Surgery Progress Note:  06/30/2021     Subjective:   Continue pain this morning, says valium helped but not completely with lower back pain. VS stable.    Objective:   /74 (BP Location: Right arm)   Pulse 74   Temp 97.8  F (36.6  C) (Oral)   Resp 18   Ht 1.854 m (6' 1\")   Wt 107.5 kg (236 lb 14.4 oz)   SpO2 95%   BMI 31.26 kg/m     No intake/output data recorded.     Gen: NAD.   Resp: Breathing comfortably .  Drain:   Musculoskeletal: dressing c/d/i.  Sensation from L2-S1 is preserved.    Lower extremities:  Motor Strength Right Left   Hip flexion: L1, L2, L3 4/5 4/5   Knee flexion: S1 5/5 5/5   Knee extension: L3, L4 5/5 5/5   Ankle dosiflexion: L4, L5 1/5 5/5   EHL: L5 1/5 5/5   Ankle plantarflexion: S1 5/5 5/5       Denies decreased sensation in legs, bilaterally.    Labs:  Lab Results   Component Value Date    WBC 9.7 06/29/2021    HGB 9.8 (L) 06/29/2021     06/29/2021    INR 1.56 (H) 06/23/2021        Assessment & Plan:   Darleen Simmons is a 70 year old male with PMH including chronic opioid use, former smoker, HTN, HLP, CAD c/b MI, ischemic cardiomyopathy s/p ICD placement, PAD s/p left SFA POBA for latissimus dorsi flap to left calf, atrial fibrillation/flutter, acute blood loss anemia, flat back syndrome now s/p T7-pelvis revision PSIF with T12-L1 3CO and L5 PSO on 6/21/21 with Jazmyne Akins/Herb. Significant pain out of proportion could represent nerve root retraction and normal postop pain in the setting of a chronic opioid user. Given the symptoms in his legs are at the PSO level, this could also represent neural foraminal stenosis. The improving symptoms and imaging do not indicate iatrogenic neural foraminal stenosis.    Goals for today:  -ok to discharge once cleared by medicine.    Orthopedics Primary  Activity: Up with assist until independent. No excessive bending or twisting. No lifting >10 lbs x 6 weeks. Renny lift approved for transfers. Keep back straight if using " lift.  Weight bearing status: WBAT.  Pain management: Transition from IV to PO as tolerated. No NSAIDs.   Antibiotics:done.   Diet: ADAT.   DVT prophylaxis: SCDs only. No chemical DVT ppx needed.  Imaging: done..  Labs: labs PRN; ongoing close monitoring of hematologic status  Bracing/Splinting: None.  Dressings: Keep dressing c/d/i x 7 days, will change today  Drains: drains removed.  Casiano catheter: Removed 6/22. Replace if unable to void.  Physical Therapy/Occupational Therapy: Eval and treat.  Consults: Hospitalist.  Follow-up: Clinic with Dr. Akins in 6 weeks with repeat x-rays.   Disposition: Pending progress with therapies, pain control on orals, post-op imaging, and drain removal.    Orthopaedics surgery staff for this patient is Dr. Akins.    ------------------------------------------------------------------------------------------    [ x] Drains removed.  [ x] Post xrays done.  [   ] Discharge orders done.  [x] Discharge summary started.    Gregory Vincent MD  Orthopaedic Surgery Resident, PGY4  Pager: [phone]     Please page me directly with any questions/concerns during regular weekday hours before 5pm.     If there is no response, if it is a weekend, or if it is during evening hours then please page the orthopaedic surgery resident on call as listed on [facility] call schedule under the orthopaedics tab.    FOLLOWUP:    Future Appointments   Date Time Provider Department Center   8/5/2021 12:30 PM Akhil Akins MD [facility]   9/9/2021 11:00 AM Akhil Akins MD [facility]

## 2021-06-30 NOTE — PROGRESS NOTES
[facility], [place], [place]  Internal Medicine Progress Note    Assessment and Plans:     Jorje Simmons is a 70 year old male with history of hyperlipidemia, hypertension, CAD with MI s/p CABG x4 (1986), ischemic cardiomyopathy s/p ICD placement, PAD s/p left SFA balloon angioplasty to assist a latissimus dorsi flap to the left calf, atrial fibrillation/flutter (no longer on anticoagulation). He underwent T7-S1 posterior spinal fusion with pelvic fixation and osteotomies of T12-L1 and L5 on 6/21/21 with Dr. Akins. Intraoperatively significant blood loss (4.3 L). Patient admitted to the SICU hypotensive, requiring NE. Now on medical floor. Doing well except pain issues.     # Status post complex spinal surgery including posterior T7-S1 fusion secondary to flat back syndrome by Dr. Akins's team  -per primary team  -pain and DVT prophylaxis management per orthopedics  -Pain management per orthopedics team.     # Acute urinary retention with suprapubic abdominal pain ( resolved)   -required peng catheter. Now removed.   -Takes supplements for bladder, have been ordered. Pharmacy has reviewed, it does not cause any bleeding or significant interactions.     # Early DIC, Acute blood loss anemia and marked thrombocytopenia, status post surgery with concern for the development of DIC given elevated INR 1.66, however, most recent fibrinogen yesterday morning was 306.  -He required platelet transfusion to keep Platelet count above 100 K.   -Now better.   -Coagulopathy has improved.     # Hx Hypertension. Had post op hypotension requiring pressors. Has resolved.   # History of coronary artery disease, status post CABG in 1986, as well as  # Ischemic cardiomyopathy,   # SP ICD placement, most recently replaced in 2016.  Most recent EKG with paced rhythm and frequent ectopic beats with recent mildly elevated troponin now on downward trend.  The patient  currently denies chest pain.  Evaluated by cardiology prior to admission on 05/04/2021 with RCRI score of 2, which translates to a 10.1% risk of 30 day MI, death and cardiac arrest.  Currently no active cardia issues     # Chronic atrial fibrillation, chronically managed on Xarelto prior to surgery, held for approximately 48 hours.    -HR controlled on metoprolol 6.25 mb BID. PTA on 12.5 mg every day.   -Pt and wife report that PCP and [facility] have evaluated the patient on the need for anticoagulation and they did not recommend anticoagulation in the future.   -He is supposed to be on ASA for stroke prevention.   -When its safe from orthopedics standpoint, please let us know and we will resume ASA     # Postop intermittent sedation.   From too much narcotics and Valium and lidocaine infusion postop. Has improved significantly   -Be judicious with narcotics use.   . Pain is controlled. He is less confused right now. Mentation is okay. Schedule tylenol 1 gm at HS. 500 mg BID during the day time. He can have additional tylenol in day time if needed. His LFTs are not bad. He does not like dietary supplements, so we will stop them. HR is good.  Platelet count is improving.     # Abdominal distention: Has resolved with oral narcan which causes abdominal crams, so stopped. He got dulcolax and robust bowel regimen.       DVT Prophylaxis: SCDs  Code Status: Full Code  Disposition: TBD      Johnny Palmer MD  Internal Medicine Attending  [place], [place]        Subjective:   Patient feels well this morning  Says that it is a much better day today   was able to sit at the edge of bed yesterday  No fever or chills   eating and drinking well     -Data reviewed today: I reviewed all new labs and imaging results over the last 24 hours.     Exam:       Temp: 97.8  F (36.6  C) Temp src: Oral BP: 108/66 Pulse: 87   Resp: 20 SpO2: 92 % O2 Device: None (Room air)    Vitals:    06/21/21  0617 06/22/21 0630   Weight: 98.4 kg (216 lb 14.9 oz) 107.5 kg (236 lb 14.4 oz)     Vital Signs with Ranges  Temp:  [97.8  F (36.6  C)-98.4  F (36.9  C)] 97.8  F (36.6  C)  Pulse:  [74-98] 87  Resp:  [18-20] 20  BP: (108-148)/(60-74) 108/66  SpO2:  [92 %-95 %] 92 %  I/O last 3 completed shifts:  In: 590 [P.O.:590]  Out: 1175 [Urine:1175]    Constitutional: No distress noted, Alert, Answering questions appropriately  Respiratory: AE is good on both sides, no wheezing onr crackles  Cardiovascular: S1S2 normal, no new murmur  GI: Soft, non tender  Skin/Integumen: No rash    Medications       acetaminophen  1,000 mg Oral At Bedtime     ascorbic acid  250 mg Oral Daily     atorvastatin  80 mg Oral At Bedtime     cholecalciferol  1,250 mcg Oral Q7 Days     melatonin  6 mg Oral At Bedtime     methocarbamol  500 mg Oral 4x Daily     metoprolol tartrate  6.25 mg Oral BID     multivitamin, therapeutic  1 tablet Oral Daily     polyethylene glycol  17 g Oral BID     senna-docusate  2 tablet Oral BID     sodium chloride (PF)  3 mL Intracatheter Q8H     Urivarx Capsules (patient supplied supplement)  2 capsule Oral or Feeding Tube Daily     vitamin E  200 Units Oral Daily     zinc sulfate  220 mg Oral Daily     Data      Recent Labs   Lab 06/29/21  1258 06/28/21  0826 06/27/21  0602 06/26/21  1446 06/25/21  0830 06/24/21  1538 06/23/21 2022 06/23/21 2022   WBC 9.7  --   --   --  8.3 8.4   < > 8.3   HGB 9.8*  --   --   --  9.1* 8.2*   < > 7.6*   MCV 98  --   --   --  97 98   < > 101*     --   --   --  127* 89*   < > 66*   INR  --   --   --   --   --   --   --  1.56*   NA  --  143  --   --  139 140  --   --    POTASSIUM  --  3.6 3.6 3.7 3.7 4.7   < >  --    CHLORIDE  --  105  --   --  104 107  --   --    CO2  --  30  --   --  31 31  --   --    BUN  --  9  --   --  10 10  --   --    CR  --  0.54*  --   --  0.54* 0.54*  --   --    ANIONGAP  --   --   --   --  4 2*  --   --    TRENTON  --  8.6  --   --  8.6 8.1*  --   --    GLC   --  89  --   --  101* 102*  --   --     < > = values in this interval not displayed.       No results found for this or any previous visit (from the past 24 hour(s)).

## 2021-06-30 NOTE — PLAN OF CARE
"VS: /66 (BP Location: Right arm)   Pulse 87   Temp 97.8  F (36.6  C) (Oral)   Resp 20   Ht 1.854 m (6' 1\")   Wt 107.5 kg (236 lb 14.4 oz)   SpO2 92%   BMI 31.26 kg/m    Vss on RA, denies CP or SOB   Output: Voids spontaneously without difficulty using urinal  LBM this AM - loose, bowel meds held   Lungs: Clear- equal bilaterally    Activity: Assist X2, stood at edge of bed. Log rolled in bed, repositioned.    Skin: Scabbing bilateral upper thighs from surgery - pink/red in color - continue to monitor  Scab left knee  Incision  L ankle graph lump WDL   Pain:    Robaxin scheduled  Morphine Q3H, Valium Q6H   Home supply of tylenol given PRN      Neuro/CMS:    A&Ox4  CMS intact    Dressing(s):    Aquacel CDI   Diet:    Regular- fair appetite- encouraged protein intake & fluids    LDA:    R PIV   Equipment:    Walker, gait belt, call light, IV pole   Plan:    Continue POC, pt will need covid test before placement. Continue pain management and therapies   Additional Info:          "

## 2021-06-30 NOTE — PLAN OF CARE
"VS: /74 (BP Location: Right arm)   Pulse 74   Temp 97.8  F (36.6  C) (Oral)   Resp 18   Ht 1.854 m (6' 1\")   Wt 107.5 kg (236 lb 14.4 oz)   SpO2 95%   BMI 31.26 kg/m       O2: Room air. Denies SOB.   Output: Voiding spontaneously in urinal w/ staff emptying.   Last BM: 6/29.   Activity: Not OOB this shift.   Skin: Intact, ex incision and scabs on thighs.   Pain: Managed w/ PRN morphine and valium.   CMS: Intact, denies N/T.   Dressing: CDI.   Diet: Regular.   LDA: PIV SL.   Plan: TBD. Continue w/ plan of care.   Additional Info: A&O x4, able to make needs known. Sleeping between cares.       "

## 2021-06-30 NOTE — INTERIM SUMMARY
[facility] Pre-Admission Screen    Referral Source:  [facility] UNIT 8A 0815-01  Admit date to referring facility: 6/21/2021    Physical Medicine and Rehab Consult Completed: Yes, by Dr Sofi Maldonado on 6/28/2021    Rehab Diagnosis:    Orthopaedic Disorders 08.9 Other Orthopaedic, T7-pelvis spinal fusion revision    Justification for Acute Inpatient Rehabilitation  Darleen Simmons is a 70 year old male who presented to the 6/21/2021 with a history of a previous T9 through sacrum instrumented posterior fusion at [facility].  At the time of his surgery, it was thought that he would not regain the ability to ambulate after a traumatic spinal cord injury and the patient was fused in sitting balance. He did regain the ability to ambulate and found himself severely limited by the lack of lumbar lordosis and by his thoracolumbar junctional kyphosis.  This was rigid malalignment because of the previous arthrodesis. PMH includes chronic opioid use, former smoker, HTN, HLP, CAD c/b MI, ischemic cardiomyopathy s/p ICD placement, PAD s/p left SFA POBA for latissimus dorsi flap to left calf, atrial fibrillation/flutter, acute blood loss anemia. He was admitted and underwent a T7-pelvis revision PSIF with T12-L1 3CO and L5 PSO on 6/21/21 with Jazmyne Akins/Herb. Since surgery the patient's hospital course was complicated by acute urinary retention, acute blood loss anemia with thrombocytopenia, chronic afib, and postop intermittent sedation likely due to polypharmacy.     The patient is medically ready for transfer to [facility] for rehabilitation. Patient requires an intensive inpatient rehab program to address the following acute impairments:impaired ROM, impaired strength, impaired activity tolerance, impaired balance, impaired judgement and safety awareness and impaired weight shifting    Current Active Medical Management Needs/Risks for Clinical Complications  The patient requires the high level of  rehabilitation physician supervision that accompanies the provision of intensive rehabilitation therapy.  The patient needs the services of the rehabilitation physician to assess the patient medically and functionally and to modify the course of treatment as needed to maximize the patient's capacity to benefit from the rehabilitation process. The patient requires physician involvement at least 3 days per week to manage:   Orthopedic: in the setting of extensive spinal revision T7-pelvis assess and manage neuros, pain, healing, signs of infection, and promote spinal precautions for optimal healing and return of function  Cardiac: in setting of HTN, HLP, CAD-- currently on metoprolol, Lipitor  Pain: to ensure optimal participation in all therapies-- currently on Robaxin, morphine, Valium, Tylenol, topical lidocaine    The patient is at risk for falls, infection, wound dehiscence and medical complications due to Body Mass Index of approximately 31.     Past Medical/Surgical History   Surgery in the past 100 days: Yes   Additional relevant past medical history: chronic opioid use, former smoker, HTN, HLP, CAD c/b MI, ischemic cardiomyopathy s/p ICD placement, PAD s/p left SFA POBA for latissimus dorsi flap to left calf, atrial fibrillation/flutter, acute blood loss anemia.    Level of Functioning Prior to Admission:    LIVING ENVIRONMENT   People in home: spouse   Current Living Arrangements: house   Home Accessibility: stairs within home    Number of Stairs, Within Home, Primary: 9(one flight up and down)    Stair Railings, Within Home, Primary: railings on both sides of stairs   Transportation Anticipated: family or friend will provide   Living Environment Comments: Pt lives in split level home, has wheelchair ramp to enter and stair lift within.    SELF-CARE   Usual Activity Tolerance: moderate   Regular Exercise: Yes    Activity/Exercise Type: walking(PT 3x/week)    Exercise Amount/Frequency: 3-5  times/wk   Equipment Currently Used at Home: cane, straight, walker, standard, crutches, cane, quad, grab bar, toilet, grab bar, tub/shower, shower chair, lift device   Activity/Exercise/Self-Care Comment: Pt reports he was independent with all mobility with use of FWW prior to surgery, states he can navigate stairs but often uses the lift    DISABILITY/FUNCTION   Concentrating, Remembering or Making Decisions Difficulty: no   Difficulty Communicating: no     Difficulty Eating/Swallowing: no     Walking or Climbing Stairs Difficulty: yes    Walking or Climbing Stairs: ambulation difficulty, requires equipment, stair climbing difficulty, requires equipment, transferring difficulty, requires equipment    Mobility Management: FWW   Dressing/Bathing Difficulty: yes    Dressing/Bathing: bathing difficulty, assistance 1 person, dressing difficulty, assistance 1 person    Dressing/Bathing Management: spouse assist   Toileting issues: no     Doing Errands Independently Difficulty (such as shopping): yes    Errands Management: spouse assists   Fall history within last six months: no;     Change in Functional Status Since Onset of Current Illness/Injury: yes  Additional Comments: Pt lives with spouse who is able to assist upon discharge. He has a ramp to access the initial entry, and then a chair lift to get up and down the split entry stairs.     Level of Function: GG Scale (Section GG Functional Ability and Goals; CMS's KEENE Version 3.0 Manual effective 10.1.2019):  PT Current Function Goals for Rehab   Bed Rolling 1 Dependent 6 Independent   Supine to Sit 1 Dependent 6 Independent   Sit to Stand 3 Partial/moderate assistance 6 Independent   Transfer 1 Dependent 6 Independent   Ambulation Not completed 6 Independent   Stairs Not completed 3 Partial/moderate assistance     OT Current Function Goals for Rehab   Feeding 5 Setup or clean-up assistance 6 Independent   Grooming 3 Partial/moderate assistance 6 Independent   Bathing  3 Partial/moderate assistance 6 Independent   Upper Body Dressing 3 Partial/moderate assistance 6 Independent   Lower Body Dressing 2 Substantial/maximal assistance 6 Independent   Toileting 1 Dependent 6 Independent   Toilet Transfer 1 Dependent 6 Independent   Tub/Shower Transfer Not completed 3 Partial/moderate assistance   Cognition Not Impaired Not applicable     SLP Current Function Goals for Rehab   Swallow Not Impaired Not applicable   Communication Not Impaired Not applicable       Current Diet:  Regular diet and Thin liquids    Summary Statement:  Pt performs bed mobility with Max A x 2 to Min A x 1 variability due to pain. The patient requires SBA for sitting balance sitting at EOB up to 20. Pt performs sit to stand with Min A x 2 blocking Errol knees. Pt requires MaxA to don B knee braces and AFOs. Pt able to perform ADLs in seated at EOB including shampoo cap and shaving, generally good balance with Olu at times, otherwise unsupported.  Olu to change gowns. For return to supine the patient requires Ax2.    Expected Therapies and Services Required During Inpatient Rehab Admission  Intensity of Therapy: Patient requires intensive therapies not available in a lesser level of care. Patient is motivated, making gains, and can tolerate 3 hours of therapy a day.  Physical Therapy: 90 minutes per day, 7 days a week for 14 days  Occupational Therapy: 90 minutes per day, 7 days a week for 14 days  Speech and Language Therapy: No SLP needs noted at this time  Rehabilitation Nursing Needs: Patient requires 24 hour Rehab Nursing to manage wound care, vitals, medication education, positioning, carryover of new rehab techniques, care coordination, pain management and provide safe environment for patient at falls risk.    Precautions/restrictions/special needs:   Precautions: fall precautions and spinal precautions   Restrictions: No excessive bending or twisting. No lifting >10 lbs x 6 weeks. Renny lift approved for  transfers. Keep back straight if using lift.   Special Needs: lift    Designated Visitor: Bart Simmons (spouse), other visitor TBD    Expected Level of Improvement: Anticipate with intensive therapies, close medical management, and rehabilitative nursing care the patient will improve strength, balance, tolerance to activity, safety within restrictions of spinal precautions to ensure Mod I with basic mobility and ADLs to allow return home with family A and ongoing home therapies.   Expected Length of time to achieve: 14 days    Anticipated Discharge Needs:  Anticipated Discharge Destination: Home  Anticipated Discharge Support: Family member  24/7 support available : Yes  Identified caregiver(s):  Spouse  Anticipated Discharge Needs: Home with homecare    Identified challenges/barriers:  Has ramp to enter and stair lift chair for access to main level    Admissions Liaison Signature/Date/Time:       Physician statement of review and agreement:  I have reviewed and am in agreement of the need for IRF stay to address above functional and medical needs. In addition to above statements address, Patient requires intensive active and ongoing therapeutic intervention and multiple therapies; Patient requires medical supervision; Expected to actively participate in the intensive rehab program; Sufficiently stable to actively participate; Expectation for measurable improvement in functional capacity or adaption to impairments.    Physician Signature/Date/Time:

## 2021-07-01 ENCOUNTER — APPOINTMENT (OUTPATIENT)
Dept: PHYSICAL THERAPY | Facility: CLINIC | Age: 71
DRG: 456 | End: 2021-07-01
Attending: ORTHOPAEDIC SURGERY
Payer: COMMERCIAL

## 2021-07-01 PROCEDURE — 250N000013 HC RX MED GY IP 250 OP 250 PS 637: Performed by: CLINICAL NURSE SPECIALIST

## 2021-07-01 PROCEDURE — 250N000013 HC RX MED GY IP 250 OP 250 PS 637: Performed by: INTERNAL MEDICINE

## 2021-07-01 PROCEDURE — 97530 THERAPEUTIC ACTIVITIES: CPT | Mod: GP | Performed by: PHYSICAL THERAPIST

## 2021-07-01 PROCEDURE — 250N000013 HC RX MED GY IP 250 OP 250 PS 637: Performed by: HOSPITALIST

## 2021-07-01 PROCEDURE — 120N000002 HC R&B MED SURG/OB UMMC

## 2021-07-01 PROCEDURE — 250N000013 HC RX MED GY IP 250 OP 250 PS 637: Performed by: STUDENT IN AN ORGANIZED HEALTH CARE EDUCATION/TRAINING PROGRAM

## 2021-07-01 PROCEDURE — 99232 SBSQ HOSP IP/OBS MODERATE 35: CPT | Performed by: INTERNAL MEDICINE

## 2021-07-01 RX ORDER — ASPIRIN 81 MG/1
81 TABLET ORAL DAILY
Status: DISCONTINUED | OUTPATIENT
Start: 2021-07-01 | End: 2021-07-07 | Stop reason: HOSPADM

## 2021-07-01 RX ORDER — MORPHINE SULFATE 15 MG/1
15-30 TABLET ORAL EVERY 4 HOURS PRN
Qty: 40 TABLET | Refills: 0 | Status: ON HOLD | OUTPATIENT
Start: 2021-07-01 | End: 2021-07-29

## 2021-07-01 RX ADMIN — MORPHINE SULFATE 30 MG: 15 TABLET ORAL at 14:37

## 2021-07-01 RX ADMIN — ACETAMINOPHEN 1000 MG: 500 TABLET, FILM COATED ORAL at 22:53

## 2021-07-01 RX ADMIN — Medication 125 MG: at 21:25

## 2021-07-01 RX ADMIN — Medication 200 UNITS: at 09:34

## 2021-07-01 RX ADMIN — LIDOCAINE: 50 OINTMENT TOPICAL at 21:29

## 2021-07-01 RX ADMIN — METHOCARBAMOL 500 MG: 500 TABLET, FILM COATED ORAL at 09:34

## 2021-07-01 RX ADMIN — DIAZEPAM 10 MG: 5 TABLET ORAL at 05:46

## 2021-07-01 RX ADMIN — MORPHINE SULFATE 30 MG: 15 TABLET ORAL at 17:39

## 2021-07-01 RX ADMIN — Medication 6.25 MG: at 09:33

## 2021-07-01 RX ADMIN — POLYETHYLENE GLYCOL 3350 17 G: 17 POWDER, FOR SOLUTION ORAL at 20:27

## 2021-07-01 RX ADMIN — ATORVASTATIN CALCIUM 80 MG: 40 TABLET, FILM COATED ORAL at 21:25

## 2021-07-01 RX ADMIN — METHOCARBAMOL 500 MG: 500 TABLET, FILM COATED ORAL at 11:33

## 2021-07-01 RX ADMIN — THERA TABS 1 TABLET: TAB at 09:34

## 2021-07-01 RX ADMIN — MORPHINE SULFATE 30 MG: 15 TABLET ORAL at 20:30

## 2021-07-01 RX ADMIN — DIAZEPAM 10 MG: 5 TABLET ORAL at 18:14

## 2021-07-01 RX ADMIN — ASPIRIN 81 MG: 81 TABLET ORAL at 10:57

## 2021-07-01 RX ADMIN — Medication 125 MG: at 12:43

## 2021-07-01 RX ADMIN — Medication 12.5 MG: at 20:21

## 2021-07-01 RX ADMIN — METHOCARBAMOL 500 MG: 500 TABLET, FILM COATED ORAL at 20:22

## 2021-07-01 RX ADMIN — MORPHINE SULFATE 30 MG: 15 TABLET ORAL at 23:01

## 2021-07-01 RX ADMIN — MELATONIN TAB 3 MG 6 MG: 3 TAB at 22:53

## 2021-07-01 RX ADMIN — MORPHINE SULFATE 30 MG: 15 TABLET ORAL at 03:40

## 2021-07-01 RX ADMIN — DIAZEPAM 10 MG: 5 TABLET ORAL at 10:57

## 2021-07-01 RX ADMIN — LIDOCAINE: 50 OINTMENT TOPICAL at 07:53

## 2021-07-01 RX ADMIN — MORPHINE SULFATE 30 MG: 15 TABLET ORAL at 11:34

## 2021-07-01 RX ADMIN — MORPHINE SULFATE 30 MG: 15 TABLET ORAL at 07:52

## 2021-07-01 RX ADMIN — METHOCARBAMOL 500 MG: 500 TABLET, FILM COATED ORAL at 15:47

## 2021-07-01 RX ADMIN — Medication 500 MG: at 14:37

## 2021-07-01 RX ADMIN — ZINC SULFATE 220 MG (50 MG) CAPSULE 220 MG: CAPSULE at 09:33

## 2021-07-01 RX ADMIN — Medication 2 TABLET: at 09:34

## 2021-07-01 RX ADMIN — LIDOCAINE: 50 OINTMENT TOPICAL at 05:49

## 2021-07-01 RX ADMIN — Medication 250 MG: at 09:34

## 2021-07-01 NOTE — PLAN OF CARE
OT: Pt working with other providers this pm, unable to check back 2/2 schedule.  Reschedule per POC.

## 2021-07-01 NOTE — PLAN OF CARE
"  VS: BP (!) 160/64 (BP Location: Right arm)   Pulse 88   Temp 98.2  F (36.8  C) (Oral)   Resp 20   Ht 1.854 m (6' 1\")   Wt 107.5 kg (236 lb 14.4 oz)   SpO2 92%   BMI 31.26 kg/m    LS clear, diminished in bases, IS encouraged.     HS metoprolol back to PTA dose of 12.5mg.    Output: Voids in urinal, adequate amts. 2-3 BMs 6/30.   Activity: Turn q2 hours. Pt refusing to get OOB. Wife seems to enable his inactivity, sympathizing with his pain, telling writer he needs to have his pain meds. Pt may benefit from a pain consult if current regimen not sufficient.    Skin: Surgical incision on back, dressing changed this AM per ortho, gauze and tegaderm. Blanchable redness on back as well as shoulders and hips; he benefits from repositioning to promote perfusion.    Pain:   0900: When RN introduced self to pt he was lethargic but languidly expressed significant pain, 8-9/10. States pain is better at rest but when moving it spasms in his R foot and travels up to his back.    Neuro/CMS:   Aox4, denies n/t. Drowsy after pain meds. No signs of respiratory depression.    Dressing(s):   Posterior back drsg CDI.   PIV R arm intact.    Diet:   Regular, fair appetite, ate a muffin for breakfast.    LDA:   PIV R arm SL.    Equipment:   Home meds in room. Urinal, walker, gait belt.    Plan:   Continue POC, manage pain. Encourage patient to call for PRNs.    Additional Info: Pt's wife in room. Very attentive to cares but was irritated when RN asked the patient clarifying questions regarding his wants.       "

## 2021-07-01 NOTE — PLAN OF CARE
"  VS: Blood pressure 110/81, pulse 86, temperature 97.7  F (36.5  C), temperature source Oral, resp. rate 18, height 1.854 m (6' 1\"), weight 107.5 kg (236 lb 14.4 oz), SpO2 92 %.     O2: Room air, pt denies SOB.   Output: Pt uses urinal independently- sometimes needs help.   Last BM: 6/30.   Activity: Assist x2 with walker and gait belt. Not OOB this shift. Pt only allowed repositioning twice.   Skin: Pt has surgical incision on midline back. Blanchable redness on back, shoulders, and effected hip per turn.   Pain: Pt complains of severe pain in back and right leg. This seemed to be controlled with PRN valium and PRN morphine.     Pt complained of pain in R leg and expressed the need for it to be looked at. Pt stated that his thigh felt \"very tight and hard like it was freezing up\". Pt requested massage in outer thigh, calf, and foot with PRN lidocaine cream. Pt stated that this helped.   CMS: Pt denies any N/T.   Dressing: Aquacell on midline back, CDI.    Aquacell was removed approximately 0600 and replaced with steri strips, 4x4 gauze and tegaderm. This dressing is CDI.   Diet: Reg w/ carb count.   LDA: PIV, surgical incision.   Equipment: Urinal, walker, gait belt.   Plan: Continue with plan of care, manage pain.   Additional Info: Pt alert and oriented x4, pt slept on and off throughout the night. Pt was very frustrated with his pain at beginning of shift and became almost tearful. Pts thoughts and feelings acknowledged and sympathized.        "

## 2021-07-01 NOTE — PROGRESS NOTES
"Orthopaedic Surgery Progress Note:  07/01/2021     Subjective:   Looked more comfortable this morning. Tolerating oral diet. BM 6/30. voiding spontaneously.     Objective:   /81 (BP Location: Left arm)   Pulse 86   Temp 97.7  F (36.5  C) (Oral)   Resp 18   Ht 1.854 m (6' 1\")   Wt 107.5 kg (236 lb 14.4 oz)   SpO2 92%   BMI 31.26 kg/m     I/O this shift:  In: -   Out: 100 [Urine:100]     Gen: NAD.   Resp: Breathing comfortably .  Drain:   Musculoskeletal: incision c/d/i c/d/i.    Sensation from L2-S1 is preserved.    Lower extremities:  Motor Strength Right Left   Hip flexion: L1, L2, L3 4/5 4/5   Knee flexion: S1 5/5 5/5   Knee extension: L3, L4 5/5 5/5   Ankle dosiflexion: L4, L5 2/5 5/5   EHL: L5 1/5 5/5   Ankle plantarflexion: S1 5/5 5/5       Denies decreased sensation in legs, bilaterally.    Labs:  Lab Results   Component Value Date    WBC 9.7 06/29/2021    HGB 9.8 (L) 06/29/2021     06/29/2021    INR 1.56 (H) 06/23/2021        Assessment & Plan:   Darleen Simmons is a 70 year old male with PMH including chronic opioid use, former smoker, HTN, HLP, CAD c/b MI, ischemic cardiomyopathy s/p ICD placement, PAD s/p left SFA POBA for latissimus dorsi flap to left calf, atrial fibrillation/flutter, acute blood loss anemia, flat back syndrome now s/p T7-pelvis revision PSIF with T12-L1 3CO and L5 PSO on 6/21/21 with Jazmyne Akins/Herb. Significant pain out of proportion could represent nerve root retraction and normal postop pain in the setting of a chronic opioid user. Given the symptoms in his legs are at the PSO level, this could also represent neural foraminal stenosis. The improving symptoms and imaging do not indicate iatrogenic neural foraminal stenosis.    Dressing changed this am.    Goals for today:  -ok to discharge once cleared by medicine.    Orthopedics Primary  Activity: Up with assist until independent. No excessive bending or twisting. No lifting >10 lbs x 6 weeks. Renny lift approved " for transfers. Keep back straight if using lift.  Weight bearing status: WBAT.  Pain management: Transition from IV to PO as tolerated. No NSAIDs.   Antibiotics:done.   Diet: ADAT.   DVT prophylaxis: SCDs only. No chemical DVT ppx needed.  Imaging: done..  Labs: labs PRN; ongoing close monitoring of hematologic status  Bracing/Splinting: None.  Dressings: dressing changed 7/1. Reinforce as needed. Drains: drains removed.  Casiano catheter: Removed 6/22. Replace if unable to void.  Physical Therapy/Occupational Therapy: Eval and treat.  Consults: Hospitalist.  Follow-up: Clinic with Dr. Akins in 6 weeks with repeat x-rays.   Disposition: Pending progress with therapies, pain control on orals, post-op imaging, and drain removal.    Orthopaedics surgery staff for this patient is Dr. Akins.    ------------------------------------------------------------------------------------------    [ x] Drains removed.  [ x] Post xrays done.  [   ] Discharge orders done.  [x] Discharge summary started.    Gregory Vincent MD  Orthopaedic Surgery Resident, PGY4  Pager: [phone]     Please page me directly with any questions/concerns during regular weekday hours before 5pm.     If there is no response, if it is a weekend, or if it is during evening hours then please page the orthopaedic surgery resident on call as listed on [facility] call schedule under the orthopaedics tab.    FOLLOWUP:    Future Appointments   Date Time Provider Department Center   8/5/2021 12:30 PM Akhil Akins MD [facility]   9/9/2021 11:00 AM Akhil Akins MD [facility]

## 2021-07-01 NOTE — PLAN OF CARE
"  VS: BP (!) 142/60 (BP Location: Right arm)   Pulse 88   Temp 98.2  F (36.8  C) (Oral)   Resp 18   Ht 1.854 m (6' 1\")   Wt 107.5 kg (236 lb 14.4 oz)   SpO2 92%   BMI 31.26 kg/m     O2: On room air, sats stable. Denies SOB. LS clear   Output: Voids without difficulties using urinal at bedside. LBM 6/30 this morning per pt.   Activity: Pt did not get OOB, turned and repositioned q2h and as needed.  Had a bed bath.   Skin: Back incision, scabbing upper bilateral thighs and left ankle graph lump.   Pain: Pain controlled with scheduled robaxin and tylenol and PRN morphine Q3H and valium q6h   CMS: Alert and oriented x4. Denies numbness/tingling.   Dressing: Back incision dressing C/D/I   Diet: regular   LDA: Right arm PIV saline locked   Plan: Continue to monitor and follow POC.   Additional Info:        "

## 2021-07-01 NOTE — PROGRESS NOTES
Care Management Follow Up    Length of Stay (days): 10    Expected Discharge Date: TBD     Concerns to be Addressed: discharge planning     Patient plan of care discussed at interdisciplinary rounds: Yes    Anticipated Discharge Disposition:  [facility]     Anticipated Discharge Services: None  Anticipated Discharge DME: None    Patient/family educated on Medicare website which has current facility and service quality ratings: yes  Education Provided on the Discharge Plan:  yes  Patient/Family in Agreement with the Plan: yes    Referrals Placed by CM/SW: Post Acute Facilities  Private pay costs discussed: Not applicable    Additional Information:  Pt is clinically accepted to  [facility], once his insurance approves. Pt is medically ready for discharge. ROSE submitted [employer] auth and supporting documentation via [employer] today.     ROSE also rec'd call from [facility] Care Coordinator Rebecca Rosado (ph: [phone]), SW provided update.       KWAKU Araiza  [facility]  5 Ortho & 8A   Ph: [phone]  Pager: [phone]

## 2021-07-01 NOTE — PROGRESS NOTES
[facility], [place], [place]  Internal Medicine Progress Note    Assessment and Plans:     Jorje Simmons is a 70 year old male with history of hyperlipidemia, hypertension, CAD with MI s/p CABG x4 (1986), ischemic cardiomyopathy s/p ICD placement, PAD s/p left SFA balloon angioplasty to assist a latissimus dorsi flap to the left calf, atrial fibrillation/flutter (no longer on anticoagulation). He underwent T7-S1 posterior spinal fusion with pelvic fixation and osteotomies of T12-L1 and L5 on 6/21/21 with Dr. Akins. Intraoperatively significant blood loss (4.3 L). Patient admitted to the SICU hypotensive, requiring NE. Now on medical floor. Doing well except pain issues.     # Status post complex spinal surgery including posterior T7-S1 fusion secondary to flat back syndrome by Dr. Akins's team  -per primary team  -pain and DVT prophylaxis management per orthopedics  -Pain management per orthopedics team.     # Acute urinary retention with suprapubic abdominal pain ( resolved)   -required peng catheter. Now removed.   -Takes supplements for bladder, have been ordered. Pharmacy has reviewed, it does not cause any bleeding or significant interactions.     # Early DIC, Acute blood loss anemia and marked thrombocytopenia  status post surgery with concern for the development of DIC given elevated INR 1.66,-He required platelet transfusion to keep Platelet count above 100 K.   -Coagulopathy has improved since. Most recent platelet count on 6/29 at 189    # Hx Hypertension. Had post op hypotension requiring pressors. Has resolved.   # History of coronary artery disease, status post CABG in 1986, as well as  # Ischemic cardiomyopathy,   # SP ICD placement, most recently replaced in 2016.  Most recent EKG with paced rhythm and frequent ectopic beats with recent mildly elevated troponin now on downward trend.  The patient currently denies chest pain.   Evaluated by cardiology prior to admission on 05/04/2021 with RCRI score of 2, which translates to a 10.1% risk of 30 day MI, death and cardiac arrest.  Currently no active cardia issues     # Chronic atrial fibrillation, chronically managed on Xarelto prior to surgery, held for approximately 48 hours.    -HR controlled on metoprolol - Resumed back to 12.5 mg at bedtime   -Pt and wife report that PCP and [facility] have evaluated the patient on the need for anticoagulation and they did not recommend anticoagulation in the future.   -He is supposed to be on ASA for stroke prevention.   -Discussed with Orthopedic surgery team today ( 7/1), and OK'd for restarting aspirin     # Postop intermittent sedation, resolved   From too much narcotics and Valium and lidocaine infusion postop. Has improved significantly   -Be judicious with narcotics use.    Pain is controlled. He is less confused right now. Mentation is okay. Schedule tylenol 1 gm at HS. 500 mg BID during the day time. He can have additional tylenol in day time if needed.     # Abdominal distention:   Resolved       DVT Prophylaxis: SCDs  Code Status: Full Code  Disposition: TBHEIDI Palmer MD  Internal Medicine Attending  [place], [place]        Subjective:   Patient feels well this morning  No new complaints overnight   Continues to make slow progress with PT  Denies chest pain or SOB  No fever or chills  Eating and drinking well     -Data reviewed today: I reviewed all new labs and imaging results over the last 24 hours.     Exam:       Temp: 98.2  F (36.8  C) Temp src: Oral BP: (!) 160/64 Pulse: 88   Resp: 20 SpO2: 92 % O2 Device: None (Room air)    Vitals:    06/21/21 0617 06/22/21 0630   Weight: 98.4 kg (216 lb 14.9 oz) 107.5 kg (236 lb 14.4 oz)     Vital Signs with Ranges  Temp:  [97.7  F (36.5  C)-98.2  F (36.8  C)] 98.2  F (36.8  C)  Pulse:  [86-88] 88  Resp:  [18-20] 20  BP: (110-160)/(60-81)  160/64  SpO2:  [92 %-94 %] 92 %  I/O last 3 completed shifts:  In: -   Out: 700 [Urine:700]    Constitutional: No distress noted, Alert, Answering questions appropriately  Respiratory: AE is good on both sides, no wheezing onr crackles  Cardiovascular: S1S2 normal, no new murmur  GI: Soft, non tender  Skin/Integumen: No rash    Medications       acetaminophen  1,000 mg Oral At Bedtime     ascorbic acid  250 mg Oral Daily     aspirin  81 mg Oral Daily     atorvastatin  80 mg Oral At Bedtime     cholecalciferol  1,250 mcg Oral Q7 Days     melatonin  6 mg Oral At Bedtime     methocarbamol  500 mg Oral 4x Daily     metoprolol succinate ER  12.5 mg Oral At Bedtime     multivitamin, therapeutic  1 tablet Oral Daily     polyethylene glycol  17 g Oral BID     senna-docusate  2 tablet Oral BID     sodium chloride (PF)  3 mL Intracatheter Q8H     Urivarx Capsules (patient supplied supplement)  2 capsule Oral or Feeding Tube Daily     vitamin E  200 Units Oral Daily     zinc sulfate  220 mg Oral Daily     Data      Recent Labs   Lab 06/29/21  1258 06/28/21  0826 06/27/21  0602 06/26/21  1446 06/25/21  0830 06/24/21  1538   WBC 9.7  --   --   --  8.3 8.4   HGB 9.8*  --   --   --  9.1* 8.2*   MCV 98  --   --   --  97 98     --   --   --  127* 89*   NA  --  143  --   --  139 140   POTASSIUM  --  3.6 3.6 3.7 3.7 4.7   CHLORIDE  --  105  --   --  104 107   CO2  --  30  --   --  31 31   BUN  --  9  --   --  10 10   CR  --  0.54*  --   --  0.54* 0.54*   ANIONGAP  --   --   --   --  4 2*   TRENTON  --  8.6  --   --  8.6 8.1*   GLC  --  89  --   --  101* 102*       No results found for this or any previous visit (from the past 24 hour(s)).

## 2021-07-02 ENCOUNTER — APPOINTMENT (OUTPATIENT)
Dept: PHYSICAL THERAPY | Facility: CLINIC | Age: 71
DRG: 456 | End: 2021-07-02
Attending: ORTHOPAEDIC SURGERY
Payer: COMMERCIAL

## 2021-07-02 ENCOUNTER — APPOINTMENT (OUTPATIENT)
Dept: OCCUPATIONAL THERAPY | Facility: CLINIC | Age: 71
DRG: 456 | End: 2021-07-02
Attending: ORTHOPAEDIC SURGERY
Payer: COMMERCIAL

## 2021-07-02 PROCEDURE — 99232 SBSQ HOSP IP/OBS MODERATE 35: CPT | Performed by: INTERNAL MEDICINE

## 2021-07-02 PROCEDURE — 250N000013 HC RX MED GY IP 250 OP 250 PS 637: Performed by: STUDENT IN AN ORGANIZED HEALTH CARE EDUCATION/TRAINING PROGRAM

## 2021-07-02 PROCEDURE — 97535 SELF CARE MNGMENT TRAINING: CPT | Mod: GO

## 2021-07-02 PROCEDURE — 97530 THERAPEUTIC ACTIVITIES: CPT | Mod: GO

## 2021-07-02 PROCEDURE — 250N000013 HC RX MED GY IP 250 OP 250 PS 637: Performed by: INTERNAL MEDICINE

## 2021-07-02 PROCEDURE — 97530 THERAPEUTIC ACTIVITIES: CPT | Mod: GP | Performed by: PHYSICAL THERAPIST

## 2021-07-02 PROCEDURE — 250N000013 HC RX MED GY IP 250 OP 250 PS 637: Performed by: CLINICAL NURSE SPECIALIST

## 2021-07-02 PROCEDURE — 120N000002 HC R&B MED SURG/OB UMMC

## 2021-07-02 RX ORDER — METOPROLOL SUCCINATE 25 MG/1
12.5 TABLET, EXTENDED RELEASE ORAL AT BEDTIME
DISCHARGE
Start: 2021-07-02

## 2021-07-02 RX ADMIN — METHOCARBAMOL 500 MG: 500 TABLET, FILM COATED ORAL at 13:14

## 2021-07-02 RX ADMIN — MELATONIN TAB 3 MG 6 MG: 3 TAB at 21:53

## 2021-07-02 RX ADMIN — Medication 2 TABLET: at 09:24

## 2021-07-02 RX ADMIN — MORPHINE SULFATE 30 MG: 15 TABLET ORAL at 16:18

## 2021-07-02 RX ADMIN — ZINC SULFATE 220 MG (50 MG) CAPSULE 220 MG: CAPSULE at 09:24

## 2021-07-02 RX ADMIN — Medication 500 MG: at 13:14

## 2021-07-02 RX ADMIN — LIDOCAINE: 50 OINTMENT TOPICAL at 05:11

## 2021-07-02 RX ADMIN — MORPHINE SULFATE 30 MG: 15 TABLET ORAL at 21:54

## 2021-07-02 RX ADMIN — METHOCARBAMOL 500 MG: 500 TABLET, FILM COATED ORAL at 16:18

## 2021-07-02 RX ADMIN — DIAZEPAM 10 MG: 5 TABLET ORAL at 00:15

## 2021-07-02 RX ADMIN — MORPHINE SULFATE 30 MG: 15 TABLET ORAL at 13:14

## 2021-07-02 RX ADMIN — Medication 250 MG: at 09:24

## 2021-07-02 RX ADMIN — ACETAMINOPHEN 1000 MG: 500 TABLET, FILM COATED ORAL at 21:53

## 2021-07-02 RX ADMIN — ATORVASTATIN CALCIUM 80 MG: 40 TABLET, FILM COATED ORAL at 21:52

## 2021-07-02 RX ADMIN — MORPHINE SULFATE 30 MG: 15 TABLET ORAL at 02:01

## 2021-07-02 RX ADMIN — POLYETHYLENE GLYCOL 3350 17 G: 17 POWDER, FOR SOLUTION ORAL at 09:29

## 2021-07-02 RX ADMIN — METHOCARBAMOL 500 MG: 500 TABLET, FILM COATED ORAL at 22:00

## 2021-07-02 RX ADMIN — THERA TABS 1 TABLET: TAB at 09:24

## 2021-07-02 RX ADMIN — MORPHINE SULFATE 30 MG: 15 TABLET ORAL at 09:23

## 2021-07-02 RX ADMIN — DIAZEPAM 10 MG: 5 TABLET ORAL at 15:44

## 2021-07-02 RX ADMIN — Medication 2 TABLET: at 21:52

## 2021-07-02 RX ADMIN — MORPHINE SULFATE 30 MG: 15 TABLET ORAL at 05:09

## 2021-07-02 RX ADMIN — Medication 200 UNITS: at 09:24

## 2021-07-02 RX ADMIN — ASPIRIN 81 MG: 81 TABLET ORAL at 09:24

## 2021-07-02 RX ADMIN — Medication 12.5 MG: at 21:51

## 2021-07-02 RX ADMIN — DIAZEPAM 10 MG: 5 TABLET ORAL at 06:27

## 2021-07-02 RX ADMIN — METHOCARBAMOL 500 MG: 500 TABLET, FILM COATED ORAL at 05:08

## 2021-07-02 RX ADMIN — METHOCARBAMOL 500 MG: 500 TABLET, FILM COATED ORAL at 09:23

## 2021-07-02 NOTE — PROGRESS NOTES
Orthotics did evaluate the patient for and AFO. Patient will work with PT once he transfers to the rehab facility to help design the style of AFO. Patient and I discussed AFO and patient will take delivery of AFO once he transitions to rehab.  ASTER Holm.

## 2021-07-02 NOTE — PROGRESS NOTES
Care Management Follow Up    Length of Stay (days): 11    Expected Discharge Date: 07/02/21     Concerns to be Addressed: discharge planning     Patient plan of care discussed at interdisciplinary rounds: Yes    Anticipated Discharge Disposition: Transitional Care     Anticipated Discharge Services: None  Anticipated Discharge DME: None    Patient/family educated on Medicare website which has current facility and service quality ratings: yes  Education Provided on the Discharge Plan: yes   Patient/Family in Agreement with the Plan: yes    Referrals Placed by CM/SW: Post Acute Facilities  Private pay costs discussed: Not applicable    Additional Information:  [facility] accepted by  [facility] as soon as the [employer] auth is approved. It was submitted 7/1/21.  ROSE updated Endy EDEN with rehab admissions ([id number]) - they may have a bed this weekend if [employer] auth comes through.     ROSE spoke with the pt's wife Ayah (ph: [phone]) and provided an update. She thanked ROSE for the update.  ROSE attempted to give Jorje an update via his room phone, but he did not answer.     Dora Cheung ROSE  [place] [address] [facility]   Ph: [phone]  Pager: [phone]

## 2021-07-02 NOTE — PLAN OF CARE
"  VS: Blood pressure 113/77, pulse 81, temperature 98.3  F (36.8  C), temperature source Oral, resp. rate 19, height 1.854 m (6' 1\"), weight 107.5 kg (236 lb 14.4 oz), SpO2 95 %.    Denies chest pain.   O2: Pt on room air, denies SOB.   Output: Pt uses urinal independently.   Last BM: 7/1.   Activity: Assist x2 with walker and gait belt.   Skin: Pt has surgical incision, and abrasions on bilateral thighs. Some generalized scabbing. Blanchable redness on back.   Pain: Pt complains of 8/10 pain in back and right leg almost constantly. Pt is taking PRN morphine and PRN valium, does not seem to be controlling pain. Likes the lidocaine cream in room.    Provider was spoken to about different ways to manage pain. Provider and team unsure if comfortable with increasing any more pain medications.   CMS: Pt denies any N/T.   Dressing: Gauze and tegaderm dressing on midline back is CDI. Other abrasions are HUA.    Diet: Reg diet.    LDA: Surgical incision, L PIV.   Equipment: Walker, gait belt, urinal.   Plan: Continue with plan of care, manage pain.   Additional Info: Pt alert and oriented x4, pt sometimes difficult to understand. Pt really benefits from frequent repositioning. Pt slept on and off throughout the night.       "

## 2021-07-02 NOTE — PLAN OF CARE
"  VS: /67 (BP Location: Right arm)   Pulse 84   Temp 98.3  F (36.8  C) (Oral)   Resp 16   Ht 1.854 m (6' 1\")   Wt 107.5 kg (236 lb 14.4 oz)   SpO2 94%   BMI 31.26 kg/m      Output: Voids spontaneously without difficulty  LBM 7/1 BS normoactive - bowel meds administered today   Lungs: CTA - diminished Bilateral lower lobes   Education provided on incentive spirometer use- patient demonstrated use of IS    Activity: Repositioning in bed every 2 hours  Up to commode with PT assist 2 with gait belt   Skin: Spine incision  Bilateral thigh scabbing/abrasions from surg   Pain:   Morphine, tylenol, and Robaxin administered for pain  Valium available    Neuro/CMS:   A&Ox4  Lower extremity weakness - encourage activity and standing   Dressing(s):   Spine gauze and Tegaderm - CDI   Diet:   Regular diet   LDA:   L PIV SL   Equipment:   Walker, gait belt, call light, commode   Plan:   Continue plan of care, repositioning, pain management, encourage activity, likely to [facility] rehab tomorrow   Additional Info:   YAMILETH bilateral hearing aids      "

## 2021-07-02 NOTE — PROGRESS NOTES
"[facility]   Internal Medicine Daily Note           Interval History/Events     Overnight events reviewed   Reports doing well  No nausea, vomiting, chest pain, shortness of breath       Review of Systems        4 point ROS including Respiratory, CV, GI and , other than that noted above is negative      Medications   I have reviewed current medications  in the \"current medication\" section of Epic.  Relevant changes include:     Physical Exam   General:       Vital signs:    Blood pressure 139/67, pulse 84, temperature 98.3  F (36.8  C), temperature source Oral, resp. rate 16, height 1.854 m (6' 1\"), weight 107.5 kg (236 lb 14.4 oz), SpO2 94 %.  Estimated body mass index is 31.26 kg/m  as calculated from the following:    Height as of this encounter: 1.854 m (6' 1\").    Weight as of this encounter: 107.5 kg (236 lb 14.4 oz).      Intake/Output Summary (Last 24 hours) at 7/2/2021 1614  Last data filed at 7/2/2021 1313  Gross per 24 hour   Intake --   Output 1250 ml   Net -1250 ml        Constitutional: Laying in bed in no acute distress  Eye: No icterus, no pallor  Mouth/ENT: Normal oral mucosa  Cardiovascular: S1, S2 normal.  Respiratory: B/L CTA  GI: Soft, NT, BS+  : No peng's catheter  Neurology: Alert, awake,and oriented. Non focal.   Psych: Mood stable.   MSK:   Integumentary:   Heme/Lymph/Imm:      Laboratory and Imaging Studies     I have reviewed  laboratory and imaging studies in the Epic. Pertinent findings are as below:    BMP  Recent Labs   Lab 06/28/21  0826 06/27/21  0602 06/26/21  1446     --   --    POTASSIUM 3.6 3.6 3.7   CHLORIDE 105  --   --    TRENTON 8.6  --   --    CO2 30  --   --    BUN 9  --   --    CR 0.54*  --   --    GLC 89  --   --      CBC  Recent Labs   Lab 06/29/21  1258   WBC 9.7   RBC 3.17*   HGB 9.8*   HCT 30.9*   MCV 98   MCH 30.9   MCHC 31.7   RDW 13.6        INRNo lab results found in last 7 days.  LFTsNo lab results found in last " 7 days.   PANCNo lab results found in last 7 days.        Impression/Plan      70 year old male with history of hyperlipidemia, hypertension, CAD with MI s/p CABG x4 (1986), ischemic cardiomyopathy s/p ICD placement, PAD s/p left SFA balloon angioplasty to assist a latissimus dorsi flap to the left calf, atrial fibrillation/flutter (no longer on anticoagulation). He underwent T7-S1 posterior spinal fusion with pelvic fixation and osteotomies of T12-L1 and L5 on 6/21/21 with Dr. Akins. Intraoperatively significant blood loss (4.3 L). Patient admitted to the SICU hypotensive, requiring NE. Now on medical floor. Doing well except pain issues.      # s/p  complex spinal surgery including posterior T7-S1 fusion secondary to flat back syndrome by Dr. Akins's team  -per primary team  -pain and DVT prophylaxis management per orthopedics  -Pain management per orthopedics team.      # Acute urinary retention with suprapubic abdominal pain ( resolved)   -required peng catheter. Now removed.   -Takes supplements for bladder, have been ordered. Pharmacy has reviewed, it does not cause any bleeding or significant interactions.      # Early DIC, Acute blood loss anemia and marked thrombocytopenia  status post surgery with concern for the development of DIC given elevated INR 1.66,-He required platelet transfusion to keep Platelet count above 100 K.   -Coagulopathy has improved since. Most recent platelet count on 6/29 at 189     # Hx Hypertension. Had post op hypotension requiring pressors. Has resolved.   # History of coronary artery disease, status post CABG in 1986, as well as  # Ischemic cardiomyopathy,   # SP ICD placement, most recently replaced in 2016.  Most recent EKG with paced rhythm and frequent ectopic beats with recent mildly elevated troponin now on downward trend.  The patient currently denies chest pain.  Evaluated by cardiology prior to admission on 05/04/2021 with RCRI score of 2, which translates to a 10.1%  risk of 30 day MI, death and cardiac arrest.  Currently no active cardia issues      # Chronic atrial fibrillation, chronically managed on Xarelto prior to surgery, held for approximately 48 hours.     -HR controlled on metoprolol - Resumed back to 12.5 mg at bedtime   -Pt and wife report that PCP and [facility] have evaluated the patient on the need for anticoagulation and they did not recommend anticoagulation in the future.   -He is supposed to be on ASA for stroke prevention.   -Discussed with Orthopedic surgery team today ( 7/1), and OK'd for restarting aspirin      # Postop intermittent sedation, resolved   From too much narcotics and Valium and lidocaine infusion postop. Has improved significantly   -Be judicious with narcotics use.    Pain is controlled. He is less confused right now. Mentation is okay. Schedule tylenol 1 gm at HS. 500 mg BID during the day time. He can have additional tylenol in day time if needed.      # Abdominal distention:   Resolved         DVT Prophylaxis: SCDs  Code Status: Full Code  Disposition: TBD        Pt's care was discussed with bedside RN, patient and  during Care Team Rounds.               Walt Arreguin MD  Hospitalist ( Internal medicine)  Pager: [phone]

## 2021-07-02 NOTE — PROGRESS NOTES
"Orthopaedic Surgery Progress Note:  07/02/2021     Subjective:   PRATIBHAEON. Continues to endorse uncontrolled pain, although has appeared to be resting comfortably. Stand pivoted with PT yesterday but otherwise refused to get OOB. Tolerating diet. Voiding. Last BM 6/20.    Objective:   /70 (BP Location: Right arm)   Pulse 80   Temp 97.6  F (36.4  C) (Oral)   Resp 20   Ht 1.854 m (6' 1\")   Wt 107.5 kg (236 lb 14.4 oz)   SpO2 91%   BMI 31.26 kg/m     No intake/output data recorded.     Gen: NAD.   Resp: Breathing comfortably .  Drain:   Musculoskeletal: incision c/d/i c/d/i.    Sensation from L2-S1 is preserved.    Lower extremities:  Motor Strength Right Left   Hip flexion: L1, L2, L3 4/5 4/5   Knee flexion: S1 5/5 5/5   Knee extension: L3, L4 5/5 5/5   Ankle dorsiflexion: L4, L5 1/5 5/5   EHL: L5 1/5 5/5   Ankle plantarflexion: S1 5/5 5/5           Labs:  Lab Results   Component Value Date    WBC 9.7 06/29/2021    HGB 9.8 (L) 06/29/2021     06/29/2021    INR 1.56 (H) 06/23/2021        Assessment & Plan:   Darleen Simmons is a 70 year old male with PMH including chronic opioid use, former smoker, HTN, HLP, CAD c/b MI, ischemic cardiomyopathy s/p ICD placement, PAD s/p left SFA POBA for latissimus dorsi flap to left calf, atrial fibrillation/flutter, acute blood loss anemia, flat back syndrome now s/p T7-pelvis revision PSIF with T12-L1 3CO and L5 PSO on 6/21/21 with Jazmyne Akins/Herb. Significant pain out of proportion could represent nerve root retraction and normal postop pain in the setting of a chronic opioid user. Given the symptoms in his legs are at the PSO level, this could also represent neural foraminal stenosis. The improving symptoms and imaging do not indicate iatrogenic neural foraminal stenosis.    Goals for today:  -AFO ordered for right foot drop  -ok to discharge once cleared by medicine.    Orthopedics Primary  Activity: Up with assist until independent. No excessive bending or " twisting. No lifting >10 lbs x 6 weeks. Renny lift approved for transfers. Keep back straight if using lift.  Weight bearing status: WBAT.  Pain management: Transition from IV to PO as tolerated. No NSAIDs.   Antibiotics:done.   Diet: ADAT.   DVT prophylaxis: SCDs only. No chemical DVT ppx needed.  Imaging: done..  Labs: labs PRN; ongoing close monitoring of hematologic status  Bracing/Splinting: AFO right foot ordered  Dressings: dressing changed 7/1. Reinforce as needed. Drains: drains removed.  Casiano catheter: Removed 6/22. Replace if unable to void.  Physical Therapy/Occupational Therapy: Eval and treat.  Consults: Hospitalist.  Follow-up: Clinic with Dr. Akins in 6 weeks with repeat x-rays.   Disposition: Pending progress with therapies, pain control on orals, post-op imaging, and drain removal.    Orthopaedics surgery staff for this patient is Dr. Akins.    ------------------------------------------------------------------------------------------    [ x] Drains removed.  [ x] Post xrays done.  [   ] Discharge orders done.  [x] Discharge summary started.    Adelita Jon MD  Orthopaedic Surgery Resident, PGY4  Pager: [phone]     Please page me directly with any questions/concerns during regular weekday hours before 5pm.     If there is no response, if it is a weekend, or if it is during evening hours then please page the orthopaedic surgery resident on call as listed on [facility] call schedule under the orthopaedics tab.    FOLLOWUP:    Future Appointments   Date Time Provider Department [place]   8/5/2021 12:30 PM Akhil Akins MD [facility]   9/9/2021 11:00 AM Akhil Akins MD [facility]

## 2021-07-02 NOTE — PLAN OF CARE
"VS: BP (!) 148/73 (BP Location: Right arm)   Pulse 86   Temp 97.8  F (36.6  C) (Oral)   Resp 18   Ht 1.854 m (6' 1\")   Wt 107.5 kg (236 lb 14.4 oz)   SpO2 93%   BMI 31.26 kg/m      Vss on RA, denies CP or SOB   Output: Voids spontaneously without difficulty using urinal  LBM 6/30/21- Smear this evening.    Lungs: Clear- equal bilaterally    Activity: Assist X1-2, stood at edge of bed. Log rolled in bed, repositioned.    Skin: Scabbing bilateral upper thighs from surgery - pink/red in color -  Scab left knee  Incision  L ankle graph lump WDL- ankle compressions on    Pain:    Robaxin scheduled  Morphine Q3H, Valium Q6H   Home supply of tylenol given PRN       Neuro/CMS:    A&Ox4  CMS intact    Dressing(s):    Gauze & tegaderm CDI   Diet:    Regular- fair appetite- encouraged protein intake & fluids    LDA:    R PIV- SL    Equipment:    Walker, gait belt, call light, IV pole  Knee braces, Ankle compressions    Plan:    Continue POC, pt will need covid test before placement. Continue pain management and therapies. Continue managing pain   Additional Info:           "

## 2021-07-02 NOTE — PLAN OF CARE
OT: Attempted EOB/lt. ADLs; pt. declined, unable to tolerate at time of attempt 2/2 pain. Will reschedule per POC.

## 2021-07-02 NOTE — PROGRESS NOTES
CLINICAL NUTRITION SERVICES - REASSESSMENT NOTE     Nutrition Prescription    RECOMMENDATIONS FOR MDs/PROVIDERS TO ORDER:  Obtain updated weight    Malnutrition Status:    Unable to determine    Recommendations already ordered by Registered Dietitian (RD):  AMRGIE as pt is not ordering meals    Future/Additional Recommendations:  Reorder snacks/supplements once at  [facility]     EVALUATION OF THE PROGRESS TOWARD GOALS   Diet: Regular, Yogurt with peaches with every meal, Ensure clear q meal  PM snack: 2 hard boiled eggs with salt and pepper, strawberry yogurt + splenda  Intake: 50-75% of meals per flowsheet. Pt is often not ordering meals and is only receiving snacks/supplements. Unclear if he is receiving outside foods as well.        NEW FINDINGS   - Per WOC note from 6/29, Stage 2 PI on mid chest is healing, Stage 2 PI on Right Knee is healing, and right upper thigh wound is evolving  - Wt has been trending up, however no updated wt within the last week.   06/22/21 : 107.5 kg (236 lb 14.4 oz)  06/21/21 : 98.4 kg (216 lb 14.9 oz)   05/27/21 : 100.2 kg (221 lb)  05/04/21 : 101.6 kg (224 lb)  03/04/21 : 90.7 kg (200 lb)    MALNUTRITION  % Intake: < 75% for > 7 days (non-severe)  % Weight Loss: Unable to assess  Subcutaneous Fat Loss: Unable to assess  Muscle Loss: Unable to assess  Fluid Accumulation/Edema: Trace  Malnutrition Diagnosis: Unable to determine    Previous Goals   Patient to consume % of nutritionally adequate meal trays TID, or the equivalent with supplements/snacks.  Evaluation: Not met    Previous Nutrition Diagnosis  Inadequate oral intake related to decreased appetite and difficulty chewing as evidenced by documented intakes since admit and increased needs for wound healing    Evaluation: No change    CURRENT NUTRITION DIAGNOSIS  Inadequate oral intake related to decreased appetite and difficulty chewing as evidenced by documented intakes since admit and increased needs for wound healing         INTERVENTIONS  Implementation  Medical food supplement therapy - continue    Goals  Patient to consume % of nutritionally adequate meal trays TID, or the equivalent with supplements/snacks.    Monitoring/Evaluation  Progress toward goals will be monitored and evaluated per protocol.      Hailee Hardin RD, LD  TCU/8A Pager (M-F): [phone]  Weekend Pager ([place]): [phone]

## 2021-07-03 ENCOUNTER — APPOINTMENT (OUTPATIENT)
Dept: PHYSICAL THERAPY | Facility: CLINIC | Age: 71
DRG: 456 | End: 2021-07-03
Attending: ORTHOPAEDIC SURGERY
Payer: COMMERCIAL

## 2021-07-03 PROCEDURE — 250N000013 HC RX MED GY IP 250 OP 250 PS 637: Performed by: INTERNAL MEDICINE

## 2021-07-03 PROCEDURE — 250N000013 HC RX MED GY IP 250 OP 250 PS 637: Performed by: CLINICAL NURSE SPECIALIST

## 2021-07-03 PROCEDURE — 97530 THERAPEUTIC ACTIVITIES: CPT | Mod: GP

## 2021-07-03 PROCEDURE — 250N000013 HC RX MED GY IP 250 OP 250 PS 637: Performed by: HOSPITALIST

## 2021-07-03 PROCEDURE — 99232 SBSQ HOSP IP/OBS MODERATE 35: CPT | Performed by: INTERNAL MEDICINE

## 2021-07-03 PROCEDURE — 120N000002 HC R&B MED SURG/OB UMMC

## 2021-07-03 PROCEDURE — 250N000013 HC RX MED GY IP 250 OP 250 PS 637: Performed by: STUDENT IN AN ORGANIZED HEALTH CARE EDUCATION/TRAINING PROGRAM

## 2021-07-03 RX ADMIN — MORPHINE SULFATE 30 MG: 15 TABLET ORAL at 21:53

## 2021-07-03 RX ADMIN — DIAZEPAM 10 MG: 5 TABLET ORAL at 18:59

## 2021-07-03 RX ADMIN — CHOLECALCIFEROL CAP 1.25 MG (50000 UNIT) 1250 MCG: 1.25 CAP at 09:31

## 2021-07-03 RX ADMIN — Medication 12.5 MG: at 20:07

## 2021-07-03 RX ADMIN — ATORVASTATIN CALCIUM 80 MG: 40 TABLET, FILM COATED ORAL at 21:52

## 2021-07-03 RX ADMIN — MORPHINE SULFATE 30 MG: 15 TABLET ORAL at 17:27

## 2021-07-03 RX ADMIN — Medication 2 TABLET: at 20:07

## 2021-07-03 RX ADMIN — Medication 250 MG: at 09:31

## 2021-07-03 RX ADMIN — Medication 200 UNITS: at 09:31

## 2021-07-03 RX ADMIN — ASPIRIN 81 MG: 81 TABLET ORAL at 09:31

## 2021-07-03 RX ADMIN — ACETAMINOPHEN 1000 MG: 500 TABLET, FILM COATED ORAL at 21:52

## 2021-07-03 RX ADMIN — METHOCARBAMOL 500 MG: 500 TABLET, FILM COATED ORAL at 23:26

## 2021-07-03 RX ADMIN — MORPHINE SULFATE 30 MG: 15 TABLET ORAL at 06:52

## 2021-07-03 RX ADMIN — ZINC SULFATE 220 MG (50 MG) CAPSULE 220 MG: CAPSULE at 09:32

## 2021-07-03 RX ADMIN — METHOCARBAMOL 500 MG: 500 TABLET, FILM COATED ORAL at 13:16

## 2021-07-03 RX ADMIN — DIAZEPAM 5 MG: 5 TABLET ORAL at 01:50

## 2021-07-03 RX ADMIN — MELATONIN TAB 3 MG 6 MG: 3 TAB at 21:53

## 2021-07-03 RX ADMIN — THERA TABS 1 TABLET: TAB at 09:32

## 2021-07-03 RX ADMIN — DIAZEPAM 5 MG: 5 TABLET ORAL at 02:33

## 2021-07-03 RX ADMIN — MORPHINE SULFATE 30 MG: 15 TABLET ORAL at 10:18

## 2021-07-03 RX ADMIN — DIAZEPAM 10 MG: 5 TABLET ORAL at 09:36

## 2021-07-03 RX ADMIN — LIDOCAINE: 50 OINTMENT TOPICAL at 02:40

## 2021-07-03 RX ADMIN — MORPHINE SULFATE 30 MG: 15 TABLET ORAL at 13:17

## 2021-07-03 RX ADMIN — BISACODYL 10 MG: 10 SUPPOSITORY RECTAL at 17:27

## 2021-07-03 RX ADMIN — Medication 500 MG: at 06:55

## 2021-07-03 RX ADMIN — MORPHINE SULFATE 30 MG: 15 TABLET ORAL at 04:08

## 2021-07-03 RX ADMIN — METHOCARBAMOL 500 MG: 500 TABLET, FILM COATED ORAL at 09:30

## 2021-07-03 RX ADMIN — MORPHINE SULFATE 30 MG: 15 TABLET ORAL at 01:17

## 2021-07-03 NOTE — PLAN OF CARE
VS:   Temp: 98  F (36.7  C) Temp src: Oral BP: 131/72 Pulse: 81   Resp: 16 SpO2: 93 % O2 Device: None (Room air)    Patient denies chest pain and SOB.    Output:   Patient voids spontaneously without difficulty into bedside urinal. LBM 7/1/21 - loose.    Activity:   Patient repositioned Q2 throughout shift depending on patient compliance. Patient not OOB overnight.    Skin: Some scabs on BLE. Scar on L ankle from previous muscular surgery. Posterior spinal incision.    Pain:   Pain in R leg and back. Managed with PRN morphine Q3. Scheduled Robaxin. PRN valium for muscle spasms.    Neuro/CMS:   Patient denies N&T. Some weakness in BLE due to pain. Patient is oriented X4.    Dressing(s): Posterior spinal dressing C/D/I.   Diet: Regular diet. No nausea.   LDA: 1 PIV SL.    Equipment: Bedpan, urinal, walker, gait belt, IV pole.    Plan:   Patient to go to ARU when bed is available - possibly this weekend.    Additional Info: Patient has call light in reach and is able to make needs known.

## 2021-07-03 NOTE — PROGRESS NOTES
"Orthopaedic Surgery Progress Note:  07/03/2021     Subjective:   NAEON. Patient resting comfortably in bed. Awaiting prior authorization; plan to discharge either today vs Monday. Evaluated by Orthotist; plan to have custom AFO made once he transfers to rehab facility. Frustrated with his RLE distal weakness. Tolerating diet. Voiding. Last BM 7/1.    Objective:   /72 (BP Location: Right arm)   Pulse 81   Temp 98  F (36.7  C) (Oral)   Resp 16   Ht 1.854 m (6' 1\")   Wt 107.5 kg (236 lb 14.4 oz)   SpO2 93%   BMI 31.26 kg/m     No intake/output data recorded.     Gen: NAD.   Resp: Breathing comfortably .  Drain:   Musculoskeletal: incision c/d/i.     Sensation from L2-S1 is preserved.    Lower extremities:  Motor Strength Right Left   Hip flexion: L1, L2, L3 4/5 4/5   Knee flexion: S1 5/5 5/5   Knee extension: L3, L4 5/5 5/5   Ankle dorsiflexion: L4, L5 2/5 5/5   EHL: L5 1/5 5/5   Ankle plantarflexion: S1 5/5 5/5       Labs:  Lab Results   Component Value Date    WBC 9.7 06/29/2021    HGB 9.8 (L) 06/29/2021     06/29/2021    INR 1.56 (H) 06/23/2021        Assessment & Plan:   Darleen Simmons is a 70 year old male with PMH including chronic opioid use, former smoker, HTN, HLP, CAD c/b MI, ischemic cardiomyopathy s/p ICD placement, PAD s/p left SFA POBA for latissimus dorsi flap to left calf, atrial fibrillation/flutter, acute blood loss anemia, flat back syndrome now s/p T7-pelvis revision PSIF with T12-L1 3CO and L5 PSO on 6/21/21 with Jazmyne Akins/Herb. Significant pain out of proportion could represent nerve root retraction and normal postop pain in the setting of a chronic opioid user. Given the symptoms in his legs are at the PSO level, this could also represent neural foraminal stenosis. The improving symptoms and imaging do not indicate iatrogenic neural foraminal stenosis.    Goals for today:  -ok to discharge     Orthopedics Primary  Activity: Up with assist until independent. No excessive " bending or twisting. No lifting >10 lbs x 6 weeks. Renny lift approved for transfers. Keep back straight if using lift.  Weight bearing status: WBAT.  Pain management: Transition from IV to PO as tolerated. No NSAIDs.   Antibiotics:done.   Diet: ADAT.   DVT prophylaxis: SCDs only. No chemical DVT ppx needed.  Imaging: done..  Labs: labs PRN; ongoing close monitoring of hematologic status  Bracing/Splinting: AFO right foot ordered - seen by orthotist  Dressings: dressing changed 7/1. Reinforce as needed. Drains: drains removed.  Casiano catheter: Removed 6/22. Replace if unable to void.  Physical Therapy/Occupational Therapy: Eval and treat.  Consults: Hospitalist.  Follow-up: Clinic with Dr. Akins in 6 weeks with repeat x-rays.   Disposition: Pending progress with therapies, pain control on orals, post-op imaging, and drain removal.    Orthopaedics surgery staff for this patient is Dr. Akins.    ------------------------------------------------------------------------------------------    [ x] Drains removed.  [ x] Post xrays done.  [   ] Discharge orders done.  [x] Discharge summary started.    Marguerite Lozada MD  Orthopaedic Surgery, PGY-4  [phone]     Please page me directly with any questions/concerns during regular weekday hours before 5pm.     If there is no response, if it is a weekend, or if it is during evening hours then please page the orthopaedic surgery resident on call as listed on [facility] call schedule under the orthopaedics tab.    FOLLOWUP:    Future Appointments   Date Time Provider Department [place]   8/5/2021 12:30 PM Akhil Akins MD [facility]   9/9/2021 11:00 AM Akhil Akins MD [facility]

## 2021-07-03 NOTE — PLAN OF CARE
Patient alert and oriented x4, lungs sound clear, BS +, denied CP, lightheadedness, dizziness, numbness, tinlging and SOB, iv fluid infusing, drinking and voiding well, able to wiggle toes, CMS intact, pain tolerable and taking MS, Robaxin for pain, incision clean,dry and intact, heels elevated off bed, able to use call light appropriately. On right side. Will continue to monitor patient.

## 2021-07-03 NOTE — PROGRESS NOTES
Care Management Follow Up    Length of Stay (days): 12    Expected Discharge Date: 07/02/21     Concerns to be Addressed: discharge planning     Patient plan of care discussed at interdisciplinary rounds: No    Anticipated Discharge Disposition: [facility]     Anticipated Discharge Services: None  Anticipated Discharge DME: None    Patient/family educated on Medicare website which has current facility and service quality ratings: yes  Education Provided on the Discharge Plan:    Patient/Family in Agreement with the Plan: yes    Referrals Placed by CM/SW: Post Acute Facilities  Private pay costs discussed: Not applicable    Additional Information:  Contacted [employer] maeve Stubbs prior auth status:  [employer] Member ID [id number]  [place]ur[place]ristophermaeve prior authorization #[id number]    Case started 7/1/21, still pending review, once determination made it will be faxed although they noted lower staffing so may not be until Tuesday. Updated ARU admissions staff. No discharge anticipated today.     Kanika Wall, [facility]CROSE, [facility]  Social Work Services/Care Management, Casual staff  [facility]      Weekend Social Work (avail on [employer]):  4A, 4C, 4E, 5A, 5B pager [phone]  6A, 6B, 6C, 6D  pager [phone]  7A, 7B, 7C, 7D, 5C pager [phone]  on-call/after hours  pager [phone]    Weekend RN Care Coordinator  pager [phone]  (home d/c with needs incl home care, assisted living facility returns, durable medical equip, IV antibiotics)

## 2021-07-03 NOTE — PROGRESS NOTES
"[facility], [place]   Internal Medicine Daily Note           Interval History/Events     Overnight events reviewed   Reports doing well  Has some pain on the back  No nausea, vomiting  No chest pain, shortness of breath       Review of Systems        4 point ROS including Respiratory, CV, GI and , other than that noted above is negative      Medications   I have reviewed current medications  in the \"current medication\" section of Epic.  Relevant changes include:     Physical Exam   General:       Vital signs:    Blood pressure (!) 149/74, pulse 80, temperature 97.8  F (36.6  C), temperature source Oral, resp. rate 16, height 1.854 m (6' 1\"), weight 107.5 kg (236 lb 14.4 oz), SpO2 93 %.  Estimated body mass index is 31.26 kg/m  as calculated from the following:    Height as of this encounter: 1.854 m (6' 1\").    Weight as of this encounter: 107.5 kg (236 lb 14.4 oz).      Intake/Output Summary (Last 24 hours) at 7/2/2021 1614  Last data filed at 7/2/2021 1313  Gross per 24 hour   Intake --   Output 1250 ml   Net -1250 ml        Constitutional: Laying in bed in no acute distress  Eye: No icterus, no pallor  Mouth/ENT: Normal oral mucosa  Cardiovascular: S1, S2 normal.  Respiratory: B/L CTA  GI: Soft, NT, BS+  : No peng's catheter  Neurology: Alert, awake,and oriented. Non focal.   Psych: Mood stable.   MSK:   Integumentary:   Heme/Lymph/Imm:      Laboratory and Imaging Studies     I have reviewed  laboratory and imaging studies in the Epic. Pertinent findings are as below:    BMP  Recent Labs   Lab 06/28/21  0826 06/27/21  0602 06/26/21  1446     --   --    POTASSIUM 3.6 3.6 3.7   CHLORIDE 105  --   --    TRENTON 8.6  --   --    CO2 30  --   --    BUN 9  --   --    CR 0.54*  --   --    GLC 89  --   --      CBC  Recent Labs   Lab 06/29/21  1258   WBC 9.7   RBC 3.17*   HGB 9.8*   HCT 30.9*   MCV 98   MCH 30.9   MCHC 31.7   RDW 13.6        INRNo lab results found in last 7 " days.  LFTsNo lab results found in last 7 days.   PANCNo lab results found in last 7 days.        Impression/Plan      70 year old male with history of hyperlipidemia, hypertension, CAD with MI s/p CABG x4 (1986), ischemic cardiomyopathy s/p ICD placement, PAD s/p left SFA balloon angioplasty to assist a latissimus dorsi flap to the left calf, atrial fibrillation/flutter (no longer on anticoagulation). He underwent T7-S1 posterior spinal fusion with pelvic fixation and osteotomies of T12-L1 and L5 on 6/21/21 with Dr. Akins. Intraoperatively significant blood loss (4.3 L). Patient admitted to the SICU hypotensive, requiring NE. Now on medical floor. Doing well except pain issues.      # s/p  complex spinal surgery including posterior T7-S1 fusion secondary to flat back syndrome by Dr. Akins's team  -per primary team  -pain and DVT prophylaxis management per orthopedics  -Pain management per orthopedics team.      # Acute urinary retention with suprapubic abdominal pain ( resolved)   -required peng catheter. Now removed.   -Takes supplements for bladder, have been ordered. Pharmacy has reviewed, it does not cause any bleeding or significant interactions.      # Early DIC, Acute blood loss anemia and marked thrombocytopenia  status post surgery with concern for the development of DIC given elevated INR 1.66,-He required platelet transfusion to keep Platelet count above 100 K.   -Coagulopathy has improved since. Most recent platelet count on 6/29 at 189     # Hx Hypertension. Had post op hypotension requiring pressors. Has resolved.   # History of coronary artery disease, status post CABG in 1986, as well as  # Ischemic cardiomyopathy,   # SP ICD placement, most recently replaced in 2016.  Most recent EKG with paced rhythm and frequent ectopic beats with recent mildly elevated troponin now on downward trend.  The patient currently denies chest pain.  Evaluated by cardiology prior to admission on 05/04/2021 with RCRI  score of 2, which translates to a 10.1% risk of 30 day MI, death and cardiac arrest.  Currently no active cardia issues      # Chronic atrial fibrillation, chronically managed on Xarelto prior to surgery, held for approximately 48 hours.     -HR controlled on metoprolol - Resumed back to 12.5 mg at bedtime   -Pt and wife report that PCP and [facility] have evaluated the patient on the need for anticoagulation and they did not recommend anticoagulation in the future.   -He is supposed to be on ASA for stroke prevention.   -Discussed with Orthopedic surgery team today ( 7/1), and OK'd for restarting aspirin      # Postop intermittent sedation, resolved   From too much narcotics and Valium and lidocaine infusion postop. Has improved significantly   -Be judicious with narcotics use.    Pain is controlled. He is less confused right now. Mentation is okay. Schedule tylenol 1 gm at HS. 500 mg BID during the day time. He can have additional tylenol in day time if needed.      # Abdominal distention:   Resolved         DVT Prophylaxis: SCDs  Code Status: Full Code  Disposition: TBD        Pt's care was discussed with bedside RN, patient and  during Care Team Rounds.               Walt Arreguin MD  Hospitalist ( Internal medicine)  Pager: [phone]

## 2021-07-03 NOTE — PLAN OF CARE
"VS: /67 (BP Location: Right arm)   Pulse 84   Temp 98.3  F (36.8  C) (Oral)   Resp 16   Ht 1.854 m (6' 1\")   Wt 107.5 kg (236 lb 14.4 oz)   SpO2 94%   BMI 31.26 kg/m      Vss on RA, denies CP or SOB   Output: Voids spontaneously without difficulty using urinal  LBM 7/1/21- up to commode this afternoon    Lungs: Clear- equal bilaterally    Activity: Assist X1-2,  Log rolled in bed, repositioned.    Skin: Scabbing bilateral upper thighs from surgery - pink/red in color -  Scab left knee  Incision  L ankle graph lump WDL- ankle compressions on    Pain:    Robaxin scheduled  Morphine Q3H, Valium Q6H   Home supply of tylenol given PRN       Neuro/CMS:    A&Ox4  CMS intact    Dressing(s):    Gauze & tegaderm CDI   Diet:    Regular- fair appetite- encouraged protein intake & fluids    LDA:    R PIV- SL    Equipment:    Walker, gait belt, call light, IV pole  Knee braces, Ankle compressions    Plan:    Continue POC, pt will need covid test before placement. Continue pain management and therapies. Continue managing pain   Additional Info:     Plan to go to [facility] tomorrow or Monday       "

## 2021-07-03 NOTE — PLAN OF CARE
"      VS: /52 (BP Location: Right arm)   Pulse 79   Temp 97.5  F (36.4  C) (Oral)   Resp 14   Ht 1.854 m (6' 1\")   Wt 107.5 kg (236 lb 14.4 oz)   SpO2 91%   BMI 31.26 kg/m       Output: Voids spontaneously, uses urinal  LBM 7/1/21, pt did not want to take PO bowel meds today, wanted to use a suppository   Lungs: LS clear, denies SOB   Activity: Pt stood at edge of bed with nurse and PT, pt assist of 2 with walker and gait belt. Assist of 2 repositioning in bed.   Skin: WDL ex for incision, scabs on bilateral thighs, and edema from prior flap site   Pain:   Pt c/o of pain in his lower back that radiates down his L leg into his groin. Pt reports high pain and gets frustrated when he does not get his meds on time. Has scheduled robaxin, PRN morphine and valium and tylenol   Neuro/CMS:   A&Ox4. Pt at times today has had slow speech, slow to respond, seeming sleeping, but when awake or aroused pt makes eye contact, is oriented. Pt has been in a goofy mood, has made some inappropriate comments to nurse today, states that he is very happy to be going to the TCU.   Dressing(s):   Guaze/ tegaderm CDI   Diet:   Regular   LDA:   PIV SL   Equipment:   Walker, gait belt   Plan:   Pt will be going to [facility] when bed is available, most likely Monday or Tuesday.   Additional Info:          "

## 2021-07-04 ENCOUNTER — APPOINTMENT (OUTPATIENT)
Dept: ULTRASOUND IMAGING | Facility: CLINIC | Age: 71
DRG: 456 | End: 2021-07-04
Attending: ORTHOPAEDIC SURGERY
Payer: COMMERCIAL

## 2021-07-04 ENCOUNTER — APPOINTMENT (OUTPATIENT)
Dept: PHYSICAL THERAPY | Facility: CLINIC | Age: 71
DRG: 456 | End: 2021-07-04
Attending: ORTHOPAEDIC SURGERY
Payer: COMMERCIAL

## 2021-07-04 PROCEDURE — 99232 SBSQ HOSP IP/OBS MODERATE 35: CPT | Performed by: INTERNAL MEDICINE

## 2021-07-04 PROCEDURE — 93971 EXTREMITY STUDY: CPT

## 2021-07-04 PROCEDURE — 76705 ECHO EXAM OF ABDOMEN: CPT | Mod: 26 | Performed by: RADIOLOGY

## 2021-07-04 PROCEDURE — 97530 THERAPEUTIC ACTIVITIES: CPT | Mod: GP

## 2021-07-04 PROCEDURE — 120N000002 HC R&B MED SURG/OB UMMC

## 2021-07-04 PROCEDURE — 250N000013 HC RX MED GY IP 250 OP 250 PS 637: Performed by: CLINICAL NURSE SPECIALIST

## 2021-07-04 PROCEDURE — 250N000013 HC RX MED GY IP 250 OP 250 PS 637: Performed by: STUDENT IN AN ORGANIZED HEALTH CARE EDUCATION/TRAINING PROGRAM

## 2021-07-04 PROCEDURE — 250N000013 HC RX MED GY IP 250 OP 250 PS 637: Performed by: INTERNAL MEDICINE

## 2021-07-04 PROCEDURE — 93971 EXTREMITY STUDY: CPT | Mod: 26 | Performed by: RADIOLOGY

## 2021-07-04 PROCEDURE — 76882 US LMTD JT/FCL EVL NVASC XTR: CPT | Mod: RT

## 2021-07-04 RX ADMIN — Medication 250 MG: at 08:07

## 2021-07-04 RX ADMIN — MORPHINE SULFATE 30 MG: 15 TABLET ORAL at 21:54

## 2021-07-04 RX ADMIN — METHOCARBAMOL 500 MG: 500 TABLET, FILM COATED ORAL at 18:57

## 2021-07-04 RX ADMIN — Medication 2 TABLET: at 19:02

## 2021-07-04 RX ADMIN — THERA TABS 1 TABLET: TAB at 08:07

## 2021-07-04 RX ADMIN — LIDOCAINE: 50 OINTMENT TOPICAL at 01:58

## 2021-07-04 RX ADMIN — Medication 125 MG: at 02:05

## 2021-07-04 RX ADMIN — LIDOCAINE: 50 OINTMENT TOPICAL at 23:28

## 2021-07-04 RX ADMIN — MORPHINE SULFATE 30 MG: 15 TABLET ORAL at 08:03

## 2021-07-04 RX ADMIN — METHOCARBAMOL 500 MG: 500 TABLET, FILM COATED ORAL at 08:03

## 2021-07-04 RX ADMIN — ZINC SULFATE 220 MG (50 MG) CAPSULE 220 MG: CAPSULE at 08:07

## 2021-07-04 RX ADMIN — Medication 12.5 MG: at 19:02

## 2021-07-04 RX ADMIN — ATORVASTATIN CALCIUM 80 MG: 40 TABLET, FILM COATED ORAL at 21:53

## 2021-07-04 RX ADMIN — MORPHINE SULFATE 30 MG: 15 TABLET ORAL at 15:50

## 2021-07-04 RX ADMIN — Medication 200 UNITS: at 08:07

## 2021-07-04 RX ADMIN — METHOCARBAMOL 500 MG: 500 TABLET, FILM COATED ORAL at 23:25

## 2021-07-04 RX ADMIN — MORPHINE SULFATE 30 MG: 15 TABLET ORAL at 18:57

## 2021-07-04 RX ADMIN — ASPIRIN 81 MG: 81 TABLET ORAL at 08:07

## 2021-07-04 RX ADMIN — METHOCARBAMOL 500 MG: 500 TABLET, FILM COATED ORAL at 12:08

## 2021-07-04 RX ADMIN — DIAZEPAM 10 MG: 5 TABLET ORAL at 14:10

## 2021-07-04 RX ADMIN — MORPHINE SULFATE 30 MG: 15 TABLET ORAL at 01:57

## 2021-07-04 RX ADMIN — Medication 2 TABLET: at 08:07

## 2021-07-04 RX ADMIN — MORPHINE SULFATE 30 MG: 15 TABLET ORAL at 12:07

## 2021-07-04 RX ADMIN — MELATONIN TAB 3 MG 6 MG: 3 TAB at 21:53

## 2021-07-04 RX ADMIN — ACETAMINOPHEN 1000 MG: 500 TABLET, FILM COATED ORAL at 21:54

## 2021-07-04 RX ADMIN — LIDOCAINE: 50 OINTMENT TOPICAL at 05:52

## 2021-07-04 RX ADMIN — MORPHINE SULFATE 30 MG: 15 TABLET ORAL at 05:04

## 2021-07-04 RX ADMIN — DIAZEPAM 10 MG: 5 TABLET ORAL at 03:08

## 2021-07-04 NOTE — PROGRESS NOTES
Pt lays flat in the bed and turned L/R positions.  Pt requested and received 30mg Morphine x1.  Pt is alert, oriented and expressed his needs appropriately.

## 2021-07-04 NOTE — PROGRESS NOTES
"Orthopaedic Surgery Progress Note:  07/04/2021     Subjective:   NAEON. Patient resting comfortably in bed. When awakened complains of significant pain impacting bilateral lower extremities and right proximal buttock. Mild increase in bilateral lower extremity edema. Patient reports history of RUE blood clot; will obtain US. Awaiting prior authorization; discharge may be delayed until Tuesday. Stood at edge of bed with PT and nurse; assist of 2 with walker and gait belt. Tolerating diet. Voiding. Last BM 7/1.    Objective:   /57 (BP Location: Left arm)   Pulse 81   Temp 98.4  F (36.9  C) (Oral)   Resp 16   Ht 1.854 m (6' 1\")   Wt 107.5 kg (236 lb 14.4 oz)   SpO2 95%   BMI 31.26 kg/m     No intake/output data recorded.     Gen: NAD.   Resp: Breathing comfortably .  Drain:   Musculoskeletal: incision c/d/i.     Sensation from L2-S1 is preserved.    Lower extremities:  Motor Strength Right Left   Hip flexion: L1, L2, L3 4/5 4/5   Knee flexion: S1 5/5 5/5   Knee extension: L3, L4 5/5 5/5   Ankle dorsiflexion: L4, L5 2-/5 5/5   EHL: L5 1/5 5/5   Ankle plantarflexion: S1 5/5 5/5       Labs:  Lab Results   Component Value Date    WBC 9.7 06/29/2021    HGB 9.8 (L) 06/29/2021     06/29/2021    INR 1.56 (H) 06/23/2021        Assessment & Plan:   Darleen Simmons is a 70 year old male with PMH including chronic opioid use, former smoker, HTN, HLP, CAD c/b MI, ischemic cardiomyopathy s/p ICD placement, PAD s/p left SFA POBA for latissimus dorsi flap to left calf, atrial fibrillation/flutter, acute blood loss anemia, flat back syndrome now s/p T7-pelvis revision PSIF with T12-L1 3CO and L5 PSO on 6/21/21 with Jazmyne Akins/Herb. Significant pain out of proportion could represent nerve root retraction and normal postop pain in the setting of a chronic opioid user. Given the symptoms in his legs are at the PSO level, this could also represent neural foraminal stenosis. The improving symptoms and imaging do not " indicate iatrogenic neural foraminal stenosis.    Goals for today:  -ok to discharge if US negative     Orthopedics Primary  Activity: Up with assist until independent. No excessive bending or twisting. No lifting >10 lbs x 6 weeks. Renny lift approved for transfers. Keep back straight if using lift.  Weight bearing status: WBAT.  Pain management: Transition from IV to PO as tolerated. No NSAIDs.   Antibiotics:done.   Diet: ADAT.   DVT prophylaxis: SCDs only. No chemical DVT ppx needed.  Imaging: done..  Labs: labs PRN; ongoing close monitoring of hematologic status  Bracing/Splinting: AFO right foot ordered - seen by orthotist  Dressings: dressing changed 7/1. Reinforce as needed.   Drains: drains removed.  Casiano catheter: Removed 6/22. Replace if unable to void.  Physical Therapy/Occupational Therapy: Eval and treat.  Consults: Hospitalist.  Follow-up: Clinic with Dr. Akins in 6 weeks with repeat x-rays.   Disposition: Pending progress with therapies, pain control on orals, post-op imaging, and drain removal.    Orthopaedics surgery staff for this patient is Dr. Akins.    ------------------------------------------------------------------------------------------    [ x] Drains removed.  [ x] Post xrays done.  [   ] Discharge orders done.  [x] Discharge summary started.    Marguerite Lozada MD  Orthopaedic Surgery, PGY-4  [phone]     Please page me directly with any questions/concerns during regular weekday hours before 5pm.     If there is no response, if it is a weekend, or if it is during evening hours then please page the orthopaedic surgery resident on call as listed on [facility] call schedule under the orthopaedics tab.    FOLLOWUP:    Future Appointments   Date Time Provider Department Center   8/5/2021 12:30 PM Akhil Akins MD [facility]   9/9/2021 11:00 AM Akhil Akins MD [facility]

## 2021-07-04 NOTE — PLAN OF CARE
VS:   Vital signs:  Temp: 98.4  F (36.9  C) Temp src: Oral BP: 117/57 Pulse: 81   Resp: 16 SpO2: 95 % O2 Device: None (Room air)   Patient denies SOB and chest pain.    Output:   Patient voids spontaneously without difficulty. LBM 7/4/21. BM was loose and mucous-y.    Activity:   Patient weight shifted and repositioned throughout night. Not OOB on shift. Patient is assist of 2 with walker and gait belt when gets up.    Skin: Incision on posterior spine. L ankle scar. Healed scabs on thighs.    Pain:   Pain managed with PRN morphine, valium, robaxin, tylenol, and lidocaine gel. Patient reports that majority of pain is down R leg and up into back. Lidocaine gel massaged into leg seemed to help a bit along with other oral pain meds.    Neuro/CMS: Patient denies N&T. Weakness in BLE due to pain.Patient A&O X4. Sleepiness intermittently throughout night but arouses to voice.    Dressing(s): Spinal dressing C/D/I.    Diet: Patient on regular diet tolerating well with no nausea.    LDA: 1 PIV SL.    Equipment: Walker, gait belt. Bedside urinal.   Plan: Waiting for placement for transitional care.    Additional Info:   Patient expressed concern about blood clot in R leg. No redness, warmth, or other symptoms presenting indicating clot. Pain is in R buttock, thigh, and shin, not calf. Patient relayed concern to rounding resident in AM and bilateral lower extremity ultrasound was ordered per patient request.

## 2021-07-04 NOTE — PROGRESS NOTES
"[facility], [place]   Internal Medicine Daily Note           Interval History/Events     Overnight events reviewed   Patient about to go to US  Complained of some right leg pain from hip to the leg, primary team has ordered US leg to rule out blood clot  Otherwise denies any nausea, vomiting, chest pain, shortness of breath, lightheadedness or dizziness.        Review of Systems        4 point ROS including Respiratory, CV, GI and , other than that noted above is negative      Medications   I have reviewed current medications  in the \"current medication\" section of Epic.  Relevant changes include:     Physical Exam   General:       Vital signs:    Blood pressure (!) 147/72, pulse 78, temperature 97.5  F (36.4  C), temperature source Oral, resp. rate 14, height 1.854 m (6' 1\"), weight 107.5 kg (236 lb 14.4 oz), SpO2 94 %.  Estimated body mass index is 31.26 kg/m  as calculated from the following:    Height as of this encounter: 1.854 m (6' 1\").    Weight as of this encounter: 107.5 kg (236 lb 14.4 oz).      Intake/Output Summary (Last 24 hours) at 7/2/2021 1614  Last data filed at 7/2/2021 1313  Gross per 24 hour   Intake --   Output 1250 ml   Net -1250 ml        Constitutional: Laying in bed in no acute distress  Eye: No icterus, no pallor  Mouth/ENT: Normal oral mucosa  Cardiovascular: S1, S2 normal.  Respiratory: B/L CTA  GI: Soft, NT, BS+  : No peng's catheter  Neurology: Alert, awake,and oriented. Non focal.   Psych: Mood stable.   MSK:   Integumentary:   Heme/Lymph/Imm:      Laboratory and Imaging Studies     I have reviewed  laboratory and imaging studies in the Epic. Pertinent findings are as below:    BMP  Recent Labs   Lab 06/28/21  0826      POTASSIUM 3.6   CHLORIDE 105   TRENTON 8.6   CO2 30   BUN 9   CR 0.54*   GLC 89     CBC  Recent Labs   Lab 06/29/21  1258   WBC 9.7   RBC 3.17*   HGB 9.8*   HCT 30.9*   MCV 98   MCH 30.9   MCHC 31.7   RDW 13.6        INRNo lab " results found in last 7 days.  LFTsNo lab results found in last 7 days.   PANCNo lab results found in last 7 days.        Impression/Plan      70 year old male with history of hyperlipidemia, hypertension, CAD with MI s/p CABG x4 (1986), ischemic cardiomyopathy s/p ICD placement, PAD s/p left SFA balloon angioplasty to assist a latissimus dorsi flap to the left calf, atrial fibrillation/flutter (no longer on anticoagulation). He underwent T7-S1 posterior spinal fusion with pelvic fixation and osteotomies of T12-L1 and L5 on 6/21/21 with Dr. Akins. Intraoperatively significant blood loss (4.3 L). Patient admitted to the SICU hypotensive, requiring NE. Now on medical floor. Doing well except pain issues.      # s/p  complex spinal surgery including posterior T7-S1 fusion secondary to flat back syndrome by Dr. Akins's team  Right leg pain. US to rule out DVT  -per primary team  -pain and DVT prophylaxis management per orthopedics  -Pain management per orthopedics team.      # Acute urinary retention with suprapubic abdominal pain ( resolved)   -required peng catheter. Now removed.   -Takes supplements for bladder, have been ordered. Pharmacy has reviewed, it does not cause any bleeding or significant interactions.      # Early DIC, Acute blood loss anemia and marked thrombocytopenia  status post surgery with concern for the development of DIC given elevated INR 1.66,-He required platelet transfusion to keep Platelet count above 100 K.   -Coagulopathy has improved since. Most recent platelet count on 6/29 at 189     # Hx Hypertension. Had post op hypotension requiring pressors. Has resolved.   # History of coronary artery disease, status post CABG in 1986, as well as  # Ischemic cardiomyopathy,   # SP ICD placement, most recently replaced in 2016.  Most recent EKG with paced rhythm and frequent ectopic beats with recent mildly elevated troponin now on downward trend.  The patient currently denies chest pain.  Evaluated  by cardiology prior to admission on 05/04/2021 with RCRI score of 2, which translates to a 10.1% risk of 30 day MI, death and cardiac arrest.  Currently no active cardia issues      # Chronic atrial fibrillation, chronically managed on Xarelto prior to surgery, held for approximately 48 hours. HR controlled on metoprolol.  Resumed back to 12.5 mg at bedtime. Pt and wife report that PCP and [facility] have evaluated the patient on the need for anticoagulation and they did not recommend anticoagulation in the future.   He is supposed to be on ASA for stroke prevention. Discussed with Orthopedic surgery team today ( 7/1), and OK'd for restarting aspirin   - Continue Metoprolol   - Continue Aspirin  - Not on AC per May clinic      # Postop intermittent sedation, resolved   From too much narcotics and Valium and lidocaine infusion postop. Has improved significantly   -Be judicious with narcotics use.      # Abdominal distention:   Resolved         DVT Prophylaxis: SCDs  Code Status: Full Code  Disposition: TBD        Pt's care was discussed with bedside RN, patient and  during Care Team Rounds.               Walt Arreguin MD  Hospitalist ( Internal medicine)  Pager: [phone]

## 2021-07-04 NOTE — PLAN OF CARE
"      VS: BP (!) 147/72 (BP Location: Left arm)   Pulse 78   Temp 97.5  F (36.4  C) (Oral)   Resp 14   Ht 1.854 m (6' 1\")   Wt 107.5 kg (236 lb 14.4 oz)   SpO2 94%   BMI 31.26 kg/m     Output: Voids spontaneously using urinal  LBM 7/3/21   Lungs: LS clear, denies SOB   Activity: Assist of 2 with walker and gait belt if OOB. Pt mostly stays in bed, assist of 2 with repositioning.   Skin: WDL ex for incision, scabs on bilateral thighs, and edema at prior Flap site.   Pain:   Pt reports very high pain, 10/10 pain in his R leg. Pt stated no pain in his back today, just R leg pain down to his feet. Pt had an Ultrasound around 0900. Results came back that pt did not show DVT or clot.  Pt takes PRN morphine, valium, and scheduled tylenol.   Neuro/CMS:   Pt is oriented x4, is lethargic with slow and intermittent slurred speech. Pt was forgetful and confused as he was taking his morning medications. When nurse asked pt if he felt confused or sedated, he responded \"no I'm fine.\" Pt consistently asking for pain medications, stating his pain is uncontrolled, but is falling asleep easily. MD notified, stated no concerns as long as VSS. Denies n/t. States he feels weak on his R leg- MD aware.    Dressing(s):   Gauze/ Tegaderm CDI   Diet:   Regular   LDA:   PIV SL       Plan:   Pt will be going to [facility] most likely tomorrow. Will need a COVID test before he goes.    Additional Info:          "

## 2021-07-04 NOTE — PROGRESS NOTES
Care Management Follow Up    Length of Stay (days): 13    Expected Discharge Date: 07/05/21     Concerns to be Addressed: discharge planning     Patient plan of care discussed at interdisciplinary rounds: No    Anticipated Discharge Disposition: Acute Rehab     Anticipated Discharge Services: None  Anticipated Discharge DME: None    Patient/family educated on Medicare website which has current facility and service quality ratings: yes  Education Provided on the Discharge Plan:    Patient/Family in Agreement with the Plan: yes    Referrals Placed by CM/SW: Post Acute Facilities  Private pay costs discussed: Not applicable    Additional Information:  Contacted [employer] maeve [employer]ub[employer] prior auth status:  [employer] Member ID [id number]  [employer] prior authorization #[id number]    UPDATED: Fax update received 10:17 am - NOT approved for ARU, they suggest TCU. If medical team wish to initiate a [id number] request regarding case, please call above phone number by 10:17 07/05/21.    Contacted nursing unit to inquire about PT/OT re-eval, due to holiday no therapy staff avail today. Will need to re-assess Monday to see if indeed more appropriate for TCU or need to appeal above decision.    Kanika Wall, LICSW, [facility]  Social Work Services/Care Management, Casual staff  [facility]      Weekend Social Work (avail on [employer]):  4A, 4C, 4E, 5A, 5B pager [phone]  6A, 6B, 6C, 6D  pager [phone]  7A, 7B, 7C, 7D, 5C pager [phone]  on-call/after hours  pager [phone]    Weekend RN Care Coordinator  pager [phone]  (home d/c with needs incl home care, assisted living facility returns, durable medical equip, IV antibiotics)

## 2021-07-05 ENCOUNTER — APPOINTMENT (OUTPATIENT)
Dept: PHYSICAL THERAPY | Facility: CLINIC | Age: 71
DRG: 456 | End: 2021-07-05
Attending: ORTHOPAEDIC SURGERY
Payer: COMMERCIAL

## 2021-07-05 PROCEDURE — 250N000013 HC RX MED GY IP 250 OP 250 PS 637: Performed by: CLINICAL NURSE SPECIALIST

## 2021-07-05 PROCEDURE — 120N000002 HC R&B MED SURG/OB UMMC

## 2021-07-05 PROCEDURE — 250N000013 HC RX MED GY IP 250 OP 250 PS 637: Performed by: INTERNAL MEDICINE

## 2021-07-05 PROCEDURE — 250N000013 HC RX MED GY IP 250 OP 250 PS 637: Performed by: STUDENT IN AN ORGANIZED HEALTH CARE EDUCATION/TRAINING PROGRAM

## 2021-07-05 PROCEDURE — 99232 SBSQ HOSP IP/OBS MODERATE 35: CPT | Performed by: INTERNAL MEDICINE

## 2021-07-05 PROCEDURE — 97530 THERAPEUTIC ACTIVITIES: CPT | Mod: GP | Performed by: PHYSICAL THERAPIST

## 2021-07-05 RX ADMIN — ATORVASTATIN CALCIUM 80 MG: 40 TABLET, FILM COATED ORAL at 22:29

## 2021-07-05 RX ADMIN — ACETAMINOPHEN 1000 MG: 500 TABLET, FILM COATED ORAL at 22:00

## 2021-07-05 RX ADMIN — Medication 12.5 MG: at 20:37

## 2021-07-05 RX ADMIN — MORPHINE SULFATE 30 MG: 15 TABLET ORAL at 22:00

## 2021-07-05 RX ADMIN — METHOCARBAMOL 500 MG: 500 TABLET, FILM COATED ORAL at 20:36

## 2021-07-05 RX ADMIN — Medication 2 TABLET: at 20:37

## 2021-07-05 RX ADMIN — THERA TABS 1 TABLET: TAB at 08:26

## 2021-07-05 RX ADMIN — DIAZEPAM 10 MG: 5 TABLET ORAL at 00:56

## 2021-07-05 RX ADMIN — MORPHINE SULFATE 30 MG: 15 TABLET ORAL at 03:12

## 2021-07-05 RX ADMIN — MELATONIN TAB 3 MG 6 MG: 3 TAB at 22:00

## 2021-07-05 RX ADMIN — Medication 2 TABLET: at 08:26

## 2021-07-05 RX ADMIN — MORPHINE SULFATE 30 MG: 15 TABLET ORAL at 09:43

## 2021-07-05 RX ADMIN — MORPHINE SULFATE 30 MG: 15 TABLET ORAL at 18:16

## 2021-07-05 RX ADMIN — Medication 250 MG: at 08:25

## 2021-07-05 RX ADMIN — Medication 200 UNITS: at 08:25

## 2021-07-05 RX ADMIN — ZINC SULFATE 220 MG (50 MG) CAPSULE 220 MG: CAPSULE at 08:26

## 2021-07-05 RX ADMIN — MORPHINE SULFATE 30 MG: 15 TABLET ORAL at 06:29

## 2021-07-05 RX ADMIN — POLYETHYLENE GLYCOL 3350 17 G: 17 POWDER, FOR SOLUTION ORAL at 08:27

## 2021-07-05 RX ADMIN — DIAZEPAM 10 MG: 5 TABLET ORAL at 22:29

## 2021-07-05 RX ADMIN — ASPIRIN 81 MG: 81 TABLET ORAL at 08:26

## 2021-07-05 RX ADMIN — DIAZEPAM 10 MG: 5 TABLET ORAL at 08:24

## 2021-07-05 NOTE — PLAN OF CARE
"  VS: /68 (BP Location: Right arm)   Pulse 87   Temp 97.6  F (36.4  C) (Oral)   Resp 16   Ht 1.854 m (6' 1\")   Wt 107.5 kg (236 lb 14.4 oz)   SpO2 96%   BMI 31.26 kg/m       O2: Room air. Lung sounds clear. Denies SOB/chest pain.   Output: Voiding in urinal.   Last BM: 7/3.   Activity: Repo in bed q2h. Not OOB this shift.   Skin: Spinal incision.  Scabs on R thigh and shin.   Pain: C/o significant pain in back and R leg.  Requesting 30 mg morphine q3h.  Valium available PRN.  Scheduled tylenol and robaxin.   CMS: Intact. Denies numbness/tingling.   Dressing: Gauze and tegaderm to incision - CDI.   Diet: Regular. Patient refused dinner.  Drank 1 bottle ensure.   LDA: PIV - SL.   Equipment: FWW, gait belt, PCDs.   Plan: Acute rehab vs. TCU. See social work note from today.   Additional Info: Pt. A/O x 4. Frustrated with amount of pain he is having. Speech is slow and garbled at times but held conversation for 30 minutes this evening.       "

## 2021-07-05 NOTE — PROGRESS NOTES
"[facility], [place]   Internal Medicine Daily Note           Interval History/Events     Overnight events reviewed   Reports doing well  Pain is still there, but controlled compared to yesterday       Review of Systems        4 point ROS including Respiratory, CV, GI and , other than that noted above is negative      Medications   I have reviewed current medications  in the \"current medication\" section of Epic.  Relevant changes include:     Physical Exam   General:       Vital signs:    Blood pressure (!) 156/74, pulse 76, temperature 97.5  F (36.4  C), temperature source Oral, resp. rate 16, height 1.854 m (6' 1\"), weight 107.5 kg (236 lb 14.4 oz), SpO2 95 %.  Estimated body mass index is 31.26 kg/m  as calculated from the following:    Height as of this encounter: 1.854 m (6' 1\").    Weight as of this encounter: 107.5 kg (236 lb 14.4 oz).      Intake/Output Summary (Last 24 hours) at 7/2/2021 1614  Last data filed at 7/2/2021 1313  Gross per 24 hour   Intake --   Output 1250 ml   Net -1250 ml        Constitutional: Laying in bed in no acute distress  Eye: No icterus, no pallor  Mouth/ENT: Normal oral mucosa  Cardiovascular: S1, S2 normal.  Respiratory: B/L CTA  GI: Soft, NT, BS+  : No peng's catheter  Neurology: Alert, awake,and oriented. Non focal.   Psych: Mood stable.   MSK:   Integumentary:   Heme/Lymph/Imm:      Laboratory and Imaging Studies     I have reviewed  laboratory and imaging studies in the Epic. Pertinent findings are as below:    BMP  No lab results found in last 7 days.  CBC  Recent Labs   Lab 06/29/21  1258   WBC 9.7   RBC 3.17*   HGB 9.8*   HCT 30.9*   MCV 98   MCH 30.9   MCHC 31.7   RDW 13.6        INRNo lab results found in last 7 days.  LFTsNo lab results found in last 7 days.   PANCNo lab results found in last 7 days.        Impression/Plan      70 year old male with history of hyperlipidemia, hypertension, CAD with MI s/p CABG x4 (1986), ischemic " cardiomyopathy s/p ICD placement, PAD s/p left SFA balloon angioplasty to assist a latissimus dorsi flap to the left calf, atrial fibrillation/flutter (no longer on anticoagulation). He underwent T7-S1 posterior spinal fusion with pelvic fixation and osteotomies of T12-L1 and L5 on 6/21/21 with Dr. Akins. Intraoperatively significant blood loss (4.3 L). Patient admitted to the SICU hypotensive, requiring NE. Now on medical floor. Doing well except pain issues.      # s/p  complex spinal surgery including posterior T7-S1 fusion secondary to flat back syndrome by Dr. Akins's team  Right leg pain. Negative Vascular and non vascular US on 07/05/2021  - per primary team  - pain and DVT prophylaxis management per orthopedics  - Pain management per orthopedics team.      # Acute urinary retention with suprapubic abdominal pain ( resolved)   -required peng catheter. Now removed.   -Takes supplements for bladder, have been ordered. Pharmacy has reviewed, it does not cause any bleeding or significant interactions.      # Early DIC, Acute blood loss anemia and marked thrombocytopenia  status post surgery with concern for the development of DIC given elevated INR 1.66,-He required platelet transfusion to keep Platelet count above 100 K.   Coagulopathy has improved since. Most recent platelet count on 6/29 at 189       # Hx Hypertension. Had post op hypotension requiring pressors. Has resolved.   # History of coronary artery disease, status post CABG in 1986, as well as  # Ischemic cardiomyopathy,   # s/p  ICD placement, most recently replaced in 2016.  Most recent EKG with paced rhythm and frequent ectopic beats with recent mildly elevated troponin now on downward trend.  The patient currently denies chest pain.  Evaluated by cardiology prior to admission on 05/04/2021 with RCRI score of 2, which translates to a 10.1% risk of 30 day MI, death and cardiac arrest.  Currently no active cardiac issues      # Chronic atrial  fibrillation, chronically managed on Xarelto prior to surgery, held for approximately 48 hours. HR controlled on metoprolol.  Resumed back to 12.5 mg at bedtime. Pt and wife report that PCP and [facility] have evaluated the patient on the need for anticoagulation and they did not recommend anticoagulation in the future.   He is supposed to be on ASA for stroke prevention. Discussed with Orthopedic surgery team today ( 7/1), and OK'd for restarting aspirin   - Continue Metoprolol   - Continue Aspirin  - Not on AC per May clinic      # Postop intermittent sedation, resolved   From too much narcotics and Valium and lidocaine infusion postop. Has improved significantly   - Be judicious with narcotics use.      # Abdominal distention:   Resolved         # DVT Prophylaxis: SCDs  # Code Status: Full Code  # Disposition: Okay to discharge from medicine stand point    Pt's care was discussed with bedside RN, patient and  during Care Team Rounds.               Walt Arreguin MD  Hospitalist ( Internal medicine)  Pager: [phone]

## 2021-07-05 NOTE — PROGRESS NOTES
"Care Management Follow Up    Length of Stay (days): 14    Expected Discharge Date: 07/05/21     Concerns to be Addressed: discharge planning     Patient plan of care discussed at interdisciplinary rounds: Yes    Anticipated Discharge Disposition: Acute Rehab     Anticipated Discharge Services: None  Anticipated Discharge DME: None    Patient/family educated on Medicare website which has current facility and service quality ratings: yes  Education Provided on the Discharge Plan:    Patient/Family in Agreement with the Plan: yes    Referrals Placed by CM/SW: Post Acute Facilities  Private pay costs discussed: Not applicable    Additional Information:  The pt was declined by his [employer] ([employer] for ARU, they suggested TCU instead.      ROSE spoke with Marie at  TCU ([phone]) admissions - she relayed that  TCU is completely full right now, the pt would be #8 on the waiting list and it would likely be a week before he would be admitted.     ROSE spoke with ortho provider Liz Marcum who agreed to call [employer] and do the Peer to Peer review ([phone], 1, 8, 1, 2, 5) to ask for reconsideration.      ROSE spoke with Jossue later in the day who relayed that [facility] - the first time she was on hold for 30 minutes and the second time she was on hold for 45 minutes and was not able to complete the call.     ROSE faxed updated PT notes (dated 7/4/21) that still recommend ARU to [employer] to ask for re-consideration - ROSE will continue to follow.     ROSE met with pt Jorje at bedside to provide update.  ROSE provided him with a copy of the letter from [employer], denying him from ARU. Pt expressed frustration and stated that he does NOT want to go to TCU, he wants to go to ARU because he was \"promised\" that he would be able to go.  ROSE provided encouragement and support.  ROSE relayed that [facility] and may need to re-submit a new auth as the doctor was not able to reach [employer] to " complete the peer to peer review.  The pt was adamant that he wants to go to ARU and would not discuss community TCU referrals as a back-up. The pt stated that he thinks that Dr. Akins (the surgeon) would be willing to call the insurance company if needed.  SW relayed that her primary contact is the ortho provider and SW will continue to follow.       KWAKU Araiza  [facility]  5 Ortho & 8A   Ph: [phone]  Pager: [phone]

## 2021-07-05 NOTE — PROGRESS NOTES
"Orthopaedic Surgery Progress Note:  07/05/2021     Subjective:   NAEON. Doing well. Continued lower back pain but tolerable. No other changes. BM 7/4. Voiding spontaneously.     Objective:   BP (!) 128/90 (BP Location: Right arm)   Pulse 81   Temp 97.9  F (36.6  C) (Oral)   Resp 16   Ht 1.854 m (6' 1\")   Wt 107.5 kg (236 lb 14.4 oz)   SpO2 97%   BMI 31.26 kg/m     No intake/output data recorded.     Gen: NAD.   Resp: Breathing comfortably .  Drain:   Musculoskeletal: dressing c/d/i.     Sensation from L2-S1 is preserved.    Lower extremities:  Motor Strength Right Left   Hip flexion: L1, L2, L3 4/5 4/5   Knee flexion: S1 5/5 5/5   Knee extension: L3, L4 5/5 5/5   Ankle dorsiflexion: L4, L5 2-/5 5/5   EHL: L5 1/5 5/5   Ankle plantarflexion: S1 5/5 5/5       Labs:  Lab Results   Component Value Date    WBC 9.7 06/29/2021    HGB 9.8 (L) 06/29/2021     06/29/2021    INR 1.56 (H) 06/23/2021        Assessment & Plan:   Darleen Simmons is a 70 year old male with PMH including chronic opioid use, former smoker, HTN, HLP, CAD c/b MI, ischemic cardiomyopathy s/p ICD placement, PAD s/p left SFA POBA for latissimus dorsi flap to left calf, atrial fibrillation/flutter, acute blood loss anemia, flat back syndrome now s/p T7-pelvis revision PSIF with T12-L1 3CO and L5 PSO on 6/21/21 with Jazmyne Akins/Herb. Significant pain out of proportion could represent nerve root retraction and normal postop pain in the setting of a chronic opioid user. Given the symptoms in his legs are at the PSO level, this could also represent neural foraminal stenosis. The improving symptoms and imaging do not indicate iatrogenic neural foraminal stenosis.    Goals for today:  -ok to discharge  -AFO ordered, floor ODILIA Jensen aware and will help obtain one for patient    Orthopedics Primary  Activity: Up with assist until independent. No excessive bending or twisting. No lifting >10 lbs x 6 weeks. Renny lift approved for transfers. Keep back " straight if using lift.  Weight bearing status: WBAT.  Pain management: Transition from IV to PO as tolerated. No NSAIDs.   Antibiotics:done.   Diet: ADAT.   DVT prophylaxis: SCDs only. No chemical DVT ppx needed.  Imaging: done..  Labs: labs PRN; ongoing close monitoring of hematologic status  Bracing/Splinting: AFO right foot ordered - seen by orthotist  Dressings: dressing changed 7/1. Reinforce as needed.   Drains: drains removed.  Casiano catheter: Removed 6/22. Replace if unable to void.  Physical Therapy/Occupational Therapy: Eval and treat.  Consults: Hospitalist.  Follow-up: Clinic with Dr. Akins in 6 weeks with repeat x-rays.   Disposition: Pending progress with therapies, pain control on orals, post-op imaging, and drain removal.    Orthopaedics surgery staff for this patient is Dr. Akins.    ------------------------------------------------------------------------------------------    [ x] Drains removed.  [ x] Post xrays done.  [   ] Discharge orders done.  [x] Discharge summary started.    Gregory Vincent MD  Orthopaedic Surgery Resident, PGY4  Pager: [phone]     Please page me directly with any questions/concerns during regular weekday hours before 5pm.     If there is no response, if it is a weekend, or if it is during evening hours then please page the orthopaedic surgery resident on call as listed on [facility] call schedule under the orthopaedics tab.    FOLLOWUP:    Future Appointments   Date Time Provider Department [place]   8/5/2021 12:30 PM Akhil Akins MD [facility]   9/9/2021 11:00 AM Akhil Akins MD [facility]

## 2021-07-05 NOTE — PLAN OF CARE
VS:   Temp: 97.9  F (36.6  C) Temp src: Oral BP: (!) 128/90 Pulse: 81   Resp: 16 SpO2: 97 % O2 Device: None (Room air)    Patient denies chest pain and SOB.    Output: Patient voids spontaneously without difficulty. LBM 7/3/21.    Activity:   Patient not OOB on shift. Repositioned throughout night with assistance.    Skin: Posterior incision, scabs on anterior thighs, previous flap site on L outer ankle.    Pain:   Patient consistently reporting 10/10 pain. PRN morphine, valium, scheduled Robaxin, and lidocaine ointment administered as available when patient requests. Patient told writer that if his pain is not managed then he will start to have chest pain. Patient becomes agitated when told how much time until next medication is available.    Neuro/CMS:   Patient A&O X4. Slurred speech intermittently. Oriented and logical throughout shift. Patient becomes quickly agitated between pain med administration times.    Dressing(s): Dressing C/D/I.    Diet: Regular diet tolerated well with no nausea.    LDA: ANNE MARIE SL.    Plan:   Per SW note, patient not accepted to ARU and will need placement in TCU. Placement will be re-evaluated Monday (7/5).    Additional Info:   Patient has call light within reach and is able to make needs known.

## 2021-07-05 NOTE — PLAN OF CARE
"      VS: BP (!) 156/74 (BP Location: Right arm)   Pulse 76   Temp 97.5  F (36.4  C) (Oral)   Resp 16   Ht 1.854 m (6' 1\")   Wt 107.5 kg (236 lb 14.4 oz)   SpO2 95%   BMI 31.26 kg/m     BP elevated, pt was reporting high pain     Output: Voids spontaneously using urinal  LBM pt reports 7/4/21   Lungs: LS clear, denies SOB   Activity: Pt does not get OOB much, mostly stays in bed with HOB lowered being turned and repositioned Q2 with assist of 1-2. When OOB pt needs assist of 2 with walker and gait belt.   Skin: WDL ex for back incision, some scattered scabs on legs and upper thighs- healing. Trace edema at prior L ankle flap site.   Pain:   Pt reports very high pain in his R hip and leg, taking PRN morphine Q3 and valium Q6.     Neuro/CMS:   Pt is oriented x4. Pt was alert this AM during first assessment. Pt took PRN valium and an hour later was still reporting very high pain, took PRN morphine. Pt was very sleepy after this, was rousable, but lethargic, respirations normal, slept for a couple of hours. When pt woke up he was forgetful, he could not remember if nurse had just been in his room or not. Held off on pain medications for awhile until pt was less sleepy/ forgetful, more alert.    Dressing(s):   Back dressing CDI   Diet:   Regular, moderate intake, needs to be reminded to eat   LDA:   PIV SL   Equipment:   Walker, gait belt   Plan:   Social work is working on finding pt placement at either ACU or TCU.   Additional Info:          "

## 2021-07-06 ENCOUNTER — APPOINTMENT (OUTPATIENT)
Dept: OCCUPATIONAL THERAPY | Facility: CLINIC | Age: 71
DRG: 456 | End: 2021-07-06
Attending: ORTHOPAEDIC SURGERY
Payer: COMMERCIAL

## 2021-07-06 ENCOUNTER — APPOINTMENT (OUTPATIENT)
Dept: PHYSICAL THERAPY | Facility: CLINIC | Age: 71
DRG: 456 | End: 2021-07-06
Attending: ORTHOPAEDIC SURGERY
Payer: COMMERCIAL

## 2021-07-06 PROCEDURE — 250N000009 HC RX 250: Performed by: CLINICAL NURSE SPECIALIST

## 2021-07-06 PROCEDURE — 250N000013 HC RX MED GY IP 250 OP 250 PS 637: Performed by: STUDENT IN AN ORGANIZED HEALTH CARE EDUCATION/TRAINING PROGRAM

## 2021-07-06 PROCEDURE — 120N000002 HC R&B MED SURG/OB UMMC

## 2021-07-06 PROCEDURE — G0463 HOSPITAL OUTPT CLINIC VISIT: HCPCS

## 2021-07-06 PROCEDURE — 97535 SELF CARE MNGMENT TRAINING: CPT | Mod: GO | Performed by: OCCUPATIONAL THERAPIST

## 2021-07-06 PROCEDURE — 97530 THERAPEUTIC ACTIVITIES: CPT | Mod: GO | Performed by: OCCUPATIONAL THERAPIST

## 2021-07-06 PROCEDURE — 97530 THERAPEUTIC ACTIVITIES: CPT | Mod: GP | Performed by: PHYSICAL THERAPIST

## 2021-07-06 PROCEDURE — 99207 PR CDG-MDM COMPONENT: MEETS MODERATE - UP CODED: CPT | Performed by: INTERNAL MEDICINE

## 2021-07-06 PROCEDURE — 250N000013 HC RX MED GY IP 250 OP 250 PS 637: Performed by: INTERNAL MEDICINE

## 2021-07-06 PROCEDURE — 99232 SBSQ HOSP IP/OBS MODERATE 35: CPT | Performed by: INTERNAL MEDICINE

## 2021-07-06 PROCEDURE — 250N000013 HC RX MED GY IP 250 OP 250 PS 637: Performed by: HOSPITALIST

## 2021-07-06 PROCEDURE — 250N000013 HC RX MED GY IP 250 OP 250 PS 637: Performed by: CLINICAL NURSE SPECIALIST

## 2021-07-06 RX ADMIN — METHOCARBAMOL 500 MG: 500 TABLET, FILM COATED ORAL at 20:22

## 2021-07-06 RX ADMIN — METHOCARBAMOL 500 MG: 500 TABLET, FILM COATED ORAL at 00:06

## 2021-07-06 RX ADMIN — Medication 2 TABLET: at 09:09

## 2021-07-06 RX ADMIN — MORPHINE SULFATE 30 MG: 15 TABLET ORAL at 03:26

## 2021-07-06 RX ADMIN — Medication 12.5 MG: at 20:32

## 2021-07-06 RX ADMIN — MORPHINE SULFATE 30 MG: 15 TABLET ORAL at 13:20

## 2021-07-06 RX ADMIN — MORPHINE SULFATE 30 MG: 15 TABLET ORAL at 16:51

## 2021-07-06 RX ADMIN — THERA TABS 1 TABLET: TAB at 09:09

## 2021-07-06 RX ADMIN — MORPHINE SULFATE 30 MG: 15 TABLET ORAL at 20:22

## 2021-07-06 RX ADMIN — MELATONIN TAB 3 MG 6 MG: 3 TAB at 21:43

## 2021-07-06 RX ADMIN — METHOCARBAMOL 500 MG: 500 TABLET, FILM COATED ORAL at 12:17

## 2021-07-06 RX ADMIN — ASPIRIN 81 MG: 81 TABLET ORAL at 09:09

## 2021-07-06 RX ADMIN — Medication 2 TABLET: at 20:23

## 2021-07-06 RX ADMIN — MORPHINE SULFATE 30 MG: 15 TABLET ORAL at 06:53

## 2021-07-06 RX ADMIN — DIAZEPAM 10 MG: 5 TABLET ORAL at 10:06

## 2021-07-06 RX ADMIN — Medication 250 MG: at 09:09

## 2021-07-06 RX ADMIN — ACETAMINOPHEN 1000 MG: 500 TABLET, FILM COATED ORAL at 21:43

## 2021-07-06 RX ADMIN — DIAZEPAM 10 MG: 5 TABLET ORAL at 21:48

## 2021-07-06 RX ADMIN — ATORVASTATIN CALCIUM 80 MG: 40 TABLET, FILM COATED ORAL at 21:43

## 2021-07-06 RX ADMIN — BISACODYL 10 MG: 10 SUPPOSITORY RECTAL at 17:50

## 2021-07-06 RX ADMIN — LIDOCAINE: 50 OINTMENT TOPICAL at 03:26

## 2021-07-06 RX ADMIN — ZINC SULFATE 220 MG (50 MG) CAPSULE 220 MG: CAPSULE at 09:09

## 2021-07-06 RX ADMIN — Medication 200 UNITS: at 09:09

## 2021-07-06 NOTE — PROGRESS NOTES
"Orthopaedic Surgery Progress Note:  07/06/2021     Subjective:   NAEON. Doing well. Continued lower back pain but tolerable. No other changes. BM 7/4. Voiding spontaneously. Working w/ PT per patient, he has been able to get up and out of bed with therapies.    Objective:   /68 (BP Location: Right arm)   Pulse 80   Temp 97.6  F (36.4  C) (Oral)   Resp 14   Ht 1.854 m (6' 1\")   Wt 107.5 kg (236 lb 14.4 oz)   SpO2 93%   BMI 31.26 kg/m     No intake/output data recorded.     Gen: NAD.   Resp: Breathing comfortably .  Drain:   Musculoskeletal: dressing c/d/i.     Sensation from L2-S1 is preserved.    Lower extremities:  Motor Strength Right Left   Hip flexion: L1, L2, L3 4/5 4/5   Knee flexion: S1 5/5 5/5   Knee extension: L3, L4 5/5 5/5   Ankle dorsiflexion: L4, L5 2-/5 5/5   EHL: L5 1/5 5/5   Ankle plantarflexion: S1 5/5 5/5       Labs:  Lab Results   Component Value Date    WBC 9.7 06/29/2021    HGB 9.8 (L) 06/29/2021     06/29/2021    INR 1.56 (H) 06/23/2021        Assessment & Plan:   Darleen Simmons is a 70 year old male with PMH including chronic opioid use, former smoker, HTN, HLP, CAD c/b MI, ischemic cardiomyopathy s/p ICD placement, PAD s/p left SFA POBA for latissimus dorsi flap to left calf, atrial fibrillation/flutter, acute blood loss anemia, flat back syndrome now s/p T7-pelvis revision PSIF with T12-L1 3CO and L5 PSO on 6/21/21 with Jazmyne Akins/Herb. Significant pain out of proportion could represent nerve root retraction and normal postop pain in the setting of a chronic opioid user. Given the symptoms in his legs are at the PSO level, this could also represent neural foraminal stenosis. The improving symptoms and imaging do not indicate iatrogenic neural foraminal stenosis.    Goals for today:  -ok to discharge, pending placement  -AFO ordered, floor ODILIA Alfonso aware and will help obtain one for patient    Orthopedics Primary  Activity: Up with assist until independent. No excessive " bending or twisting. No lifting >10 lbs x 6 weeks. Renny lift approved for transfers. Keep back straight if using lift.  Weight bearing status: WBAT.  Pain management: Transition from IV to PO as tolerated. No NSAIDs.   Antibiotics:done.   Diet: ADAT.   DVT prophylaxis: SCDs only. No chemical DVT ppx needed.  Imaging: done..  Labs: labs PRN; ongoing close monitoring of hematologic status  Bracing/Splinting: AFO right foot ordered - seen by orthotist  Dressings: dressing changed 7/1. Reinforce as needed.   Drains: drains removed.  Casiano catheter: Removed 6/22. Replace if unable to void.  Physical Therapy/Occupational Therapy: Eval and treat.  Consults: Hospitalist.  Follow-up: Clinic with Dr. Akins in 6 weeks with repeat x-rays.   Disposition: Pending progress with therapies, pain control on orals, post-op imaging, and drain removal.    Orthopaedics surgery staff for this patient is Dr. Akins.    ------------------------------------------------------------------------------------------    [ x] Drains removed.  [ x] Post xrays done.  [   ] Discharge orders done.  [x] Discharge summary started.    Gregory Vincent MD  Orthopaedic Surgery Resident, PGY4  Pager: [phone]     Please page me directly with any questions/concerns during regular weekday hours before 5pm.     If there is no response, if it is a weekend, or if it is during evening hours then please page the orthopaedic surgery resident on call as listed on [facility] call schedule under the orthopaedics tab.    FOLLOWUP:    Future Appointments   Date Time Provider Department [place]   8/5/2021 12:30 PM Akhil Akins MD [facility]   9/9/2021 11:00 AM Akhil Akins MD [facility]

## 2021-07-06 NOTE — PROGRESS NOTES
"[facility], [place]   Internal Medicine Daily Note           Interval History/Events     Overnight events reviewed   Reports doing well  No nausea, vomiting, chest pain, shortness of breath  No fever, chills  Pain is controlled.        Review of Systems        4 point ROS including Respiratory, CV, GI and , other than that noted above is negative      Medications   I have reviewed current medications  in the \"current medication\" section of Epic.  Relevant changes include:     Physical Exam   General:       Vital signs:    Blood pressure 115/66, pulse 80, temperature 97.9  F (36.6  C), temperature source Oral, resp. rate 16, height 1.854 m (6' 1\"), weight 107.5 kg (236 lb 14.4 oz), SpO2 95 %.  Estimated body mass index is 31.26 kg/m  as calculated from the following:    Height as of this encounter: 1.854 m (6' 1\").    Weight as of this encounter: 107.5 kg (236 lb 14.4 oz).      Intake/Output Summary (Last 24 hours) at 7/2/2021 1614  Last data filed at 7/2/2021 1313  Gross per 24 hour   Intake --   Output 1250 ml   Net -1250 ml        Constitutional: Laying in bed in no acute distress  Eye: No icterus, no pallor  Mouth/ENT: Normal oral mucosa  Cardiovascular: S1, S2 normal.  Respiratory: B/L CTA  GI: Soft, NT, BS+  : No peng's catheter  Neurology: Alert, awake,and oriented. Non focal.   Psych: Mood stable.   MSK:   Integumentary:   Heme/Lymph/Imm:      Laboratory and Imaging Studies     I have reviewed  laboratory and imaging studies in the Epic. Pertinent findings are as below:    BMP  No lab results found in last 7 days.  CBC  No lab results found in last 7 days.  INRNo lab results found in last 7 days.  LFTsNo lab results found in last 7 days.   PANCNo lab results found in last 7 days.        Impression/Plan      70 year old male with history of hyperlipidemia, hypertension, CAD with MI s/p CABG x4 (1986), ischemic cardiomyopathy s/p ICD placement, PAD s/p left SFA balloon " angioplasty to assist a latissimus dorsi flap to the left calf, atrial fibrillation/flutter (no longer on anticoagulation). He underwent T7-S1 posterior spinal fusion with pelvic fixation and osteotomies of T12-L1 and L5 on 6/21/21 with Dr. Akins. Intraoperatively significant blood loss (4.3 L). Patient admitted to the SICU hypotensive, requiring NE. Now on medical floor. Doing well except pain issues.      # s/p  complex spinal surgery including posterior T7-S1 fusion secondary to flat back syndrome by Dr. Akins's team  Right leg pain. Negative Vascular and non vascular US on 07/05/2021  - per primary team  - pain and DVT prophylaxis management per orthopedics  - Pain management per orthopedics team.      # Acute urinary retention with suprapubic abdominal pain ( resolved)   -required peng catheter. Now removed.   -Takes supplements for bladder, have been ordered. Pharmacy has reviewed, it does not cause any bleeding or significant interactions.      # Early DIC, Acute blood loss anemia and marked thrombocytopenia  status post surgery with concern for the development of DIC given elevated INR 1.66,-He required platelet transfusion to keep Platelet count above 100 K.   Coagulopathy has improved since. Most recent platelet count on 6/29 at 189       # Hx Hypertension. Had post op hypotension requiring pressors. Has resolved.   # History of coronary artery disease, status post CABG in 1986, as well as  # Ischemic cardiomyopathy,   # s/p  ICD placement, most recently replaced in 2016.  Most recent EKG with paced rhythm and frequent ectopic beats with recent mildly elevated troponin now on downward trend.  The patient currently denies chest pain.  Evaluated by cardiology prior to admission on 05/04/2021 with RCRI score of 2, which translates to a 10.1% risk of 30 day MI, death and cardiac arrest.  Currently no active cardiac issues      # Chronic atrial fibrillation, chronically managed on Xarelto prior to surgery,  held for approximately 48 hours. HR controlled on metoprolol.  Resumed back to 12.5 mg at bedtime. Pt and wife report that PCP and [facility] have evaluated the patient on the need for anticoagulation and they did not recommend anticoagulation in the future.   He is supposed to be on ASA for stroke prevention. Discussed with Orthopedic surgery team today ( 7/1), and OK'd for restarting aspirin   - Continue Metoprolol   - Continue Aspirin  - Not on AC per May clinic      # Postop intermittent sedation, resolved   From too much narcotics and Valium and lidocaine infusion postop. Has improved significantly   - Be judicious with narcotics use.      # Abdominal distention:   Resolved         # DVT Prophylaxis: SCDs  # Code Status: Full Code  # Disposition: Okay to discharge from medicine stand point    Pt's care was discussed with bedside RN, patient and  during Care Team Rounds.               Walt Arreguin MD  Hospitalist ( Internal medicine)  Pager: [phone]

## 2021-07-06 NOTE — PLAN OF CARE
"      VS: /66 (BP Location: Right arm)   Pulse 80   Temp 97.9  F (36.6  C) (Oral)   Resp 16   Ht 1.854 m (6' 1\")   Wt 107.5 kg (236 lb 14.4 oz)   SpO2 95%   BMI 31.26 kg/m     Output: Voiding without difficulty per urinal; last BM 7/4   Lungs: CTA   Activity: Ao2-3; up with therapy this afternoon (pt stood 3 times)   Skin: WDL ex spinal incisin and scabbing to anterior bilateral thighs; vaseline applied per WOC   Pain:   Pt reports 10/10 pain. Poorly managed with robaxin, valium, and morphine. Pt refused tylenol, heat, and ice packs.   Neuro/CMS:   A & O x4, CMS intact ex weakness (3/5) to BLE   Dressing(s):   Posterior midline gauze and tegaderm; CDI   Diet:   Regular    LDA:   PIV SL   Equipment:   IV pole, leg braces, heel support in pt's show, commode, pt's meds from home   Plan:   Continue with POC; encouraging mobility and managing pain. Plan for TCU: pt is 7 or 8 in line    Additional Info:         "

## 2021-07-06 NOTE — PROGRESS NOTES
Care Management Follow Up    Length of Stay (days): 15    Expected Discharge Date: TBD     Concerns to be Addressed: discharge planning     Patient plan of care discussed at interdisciplinary rounds: Yes    Anticipated Discharge Disposition: Acute Rehab     Anticipated Discharge Services: None  Anticipated Discharge DME: None    Patient/family educated on Medicare website which has current facility and service quality ratings: yes  Education Provided on the Discharge Plan: yes    Patient/Family in Agreement with the Plan: yes    Referrals Placed by CM/SW: Post Acute Facilities  Private pay costs discussed: Not applicable    Additional Information:  ROSE submitted a brand new [employer] Auth for ARU to try and get the pt's ARU stay approved by [facility].  ROSE spoke with Dolly ([id number]) in rehab admissions to provide update.     ROSE called and spoke with the pt's wife Ayah (ph: [phone]) and provided an update. Ayah was very disappointed to hear that there is a possibility that the pt's insurance will not approve of an ARU stay, as she believes that this is what the patient needs. SW provided validation and emotional support.  ROSE relayed that if the insurance company denies a second time, we will need to proceed with making TCU referrals.  ROSE relayed that [facility] is at capacity and has a waiting list of at least 9 people.  ROSE relayed that we would need to make community referrals to TCU. Ayah relayed that the pt would NOT want to go [facility] in [place] - he had been there before and did not have a good experience.      Ayah relayed that there is a TCU connected to [facility] that the pt might be interested, she was not sure of the name.     According to the pt's wife Ayah, the pt has not been vaccinated against COVID-19 because he has Guillain-Barré syndrome and has been advised to not take it by his [facility].     ROSE relayed that she will keep both Ayah and the pt updated on what she hears back from [employer].       Dora  KWAKU Cheung  [facility]  5 Ortho & 8A   Ph: [phone]  Pager: [phone]

## 2021-07-06 NOTE — PLAN OF CARE
"  VS:   /68 (BP Location: Right arm)   Pulse 80   Temp 97.6  F (36.4  C) (Oral)   Resp 14   Ht 1.854 m (6' 1\")   Wt 107.5 kg (236 lb 14.4 oz)   SpO2 93%   BMI 31.26 kg/m       Output:   Voiding adequately. Bowel sounds active.    Activity:   Assist of 2.     Skin: Incision  Bilateral scab on thighs.    Pain:   Schedule robaxin given per MAR.    Neuro/CMS:   A&Ox4. CMS intact.    Dressing(s):   WDL   Diet:   Regular    LDA:   PIV SL   Equipment:   IV pole, call light, walker   Plan:   Continue to monitor care of plan. Waiting for placement in TCU.    Additional Info:   Calls appropriately.   Patient would like to know before right before PT goes to his room.        "

## 2021-07-06 NOTE — PLAN OF CARE
"  VS: /65 (BP Location: Right arm)   Pulse 81   Temp 97.4  F (36.3  C) (Oral)   Resp 16   Ht 1.854 m (6' 1\")   Wt 107.5 kg (236 lb 14.4 oz)   SpO2 94%   BMI 31.26 kg/m      Pt refused continuous pulse oximetry monitor. VSS.     O2: On room air, sats stable. Denies SOB. LS clear   Output: Voids without difficulties using urinal at bedside. LBM 7/4 this morning per pt.   Activity: Pt did not get OOB, turned and repositioned q2h and as needed.   Skin: Back incision, scabbing upper bilateral thighs and left ankle flap site   Pain: Pain controlled with scheduled robaxin and tylenol and PRN morphine Q3H and valium q6h   CMS: Alert and oriented x4. Denies numbness/tingling.  Has garbled speech at times but logical oriented.    Dressing: Back incision dressing C/D/I   Diet: regular   LDA: Right arm PIV saline locked   Plan: Continue to monitor and follow POC.  Discharge pending TCU/ARC placement, SW following.   Additional Info: Uses call light appropriately for assistance with repositioning and for pain medication.       "

## 2021-07-06 NOTE — PROGRESS NOTES
[facility] Nurse Inpatient Pressure Injury Assessment   Reason for consultation: Evaluate and treat Skin blister      ASSESSMENT  Pressure Injury: on mid chest, hospital acquired   Pressure Injury is Stage 2   Contributing factor of the pressure injury: pressure and immobility  Status: healed    Pressure Injury: on Right Knee, hospital acquired   Pressure Injury is Stage 2   Contributing factor of the pressure injury: pressure and immobility  Status: healing    Wound Location: Right upper thigh, suspect from tape removal  Status: healing     TREATMENT PLAN  Mid chest and Knee wound: healed, no wound care indicated    Right upper thigh: Wash daily with wound cleanser and gauze. Apply Triple Antibiotic Ointment twice daily.    Orders Updated  WO Nurse follow-up plan:signing off  Nursing to notify the Provider(s) and re-consult the WO Nurse if wound(s) deteriorates or new skin concern.    Patient History  According to provider note(s): Darleen Simmons is a 70 year old male with PMH including chronic opioid use, former smoker, HTN, HLP, CAD c/b MI, ischemic cardiomyopathy s/p ICD placement, PAD s/p left SFA POBA for latissimus dorsi flap to left calf, atrial fibrillation/flutter, acute blood loss anemia, flat back syndrome now s/p T7-pelvis revision PSIF with T12-L1 3CO and L5 PSO on 6/21/21 with Jazmyne Akins/Herb. Significant pain out of proportion could represent nerve root retraction and normal postop pain in the setting of a chronic opioid user. Given the symptoms in his legs are at the PSO level, this could also represent neural foraminal stenosis.    Objective Data  Containment of urine/stool: Indwelling catheter    Current Diet/ Nutrition:  Orders Placed This Encounter      Regular Diet Adult      Diet      Output:   I/O last 3 completed shifts:  In: -   Out: 1350 [Urine:1350]    Risk Assessment:   Sensory Perception: 4-->no impairment  Moisture: 3-->occasionally moist  Activity: 3-->walks occasionally  Mobility:  3-->slightly limited  Nutrition: 3-->adequate  Friction and Shear: 2-->potential problem  Omkar Score: 18      Labs:   Recent Labs   Lab 06/29/21  1258   HGB 9.8*   WBC 9.7       Physical Exam  Skin inspection: focused chest, arm, thighs, and legs    Wound Location:  Chest       Date of last Photo 6/22  Wound History: Long OR procedure on 6/21 and was proned.  Measurements (length x width x depth, in cm) 4 small serous filled blisters, largest one measured at  1 cm x 2 cm  x  0.0 cm   Healed      2. R) Knee        Date of last Photo 6/22  Wound History: Long OR procedure on 6/21 and was proned.  Measurements (length x width x depth, in cm) 1 cm x 0.8 cm  x  0.0 cm   Decompressed now, healing    Wound Location: Right upper thigh    6/29 Right upper thigh    Serous scabbing with dry dermis. Patient complains of itching at site.  Healing       Interventions  Current support surface: Standard  Low air loss mattress  Current off-loading measures: Foam padding and Pillows  Repositioning aid: Pillows  Visual inspection of wound(s) completed   Tube Securement: Flexitrac  Wound Care: was done per plan of care.  Supplies: discussed with RN  Educated provided: importance of repositioning, plan of care, wound progress and Off-loading pressure  Education provided to: nurse  Discussed importance of:repositioning every 2 hours and off-loading pressure to wound  Discussed plan of care with Nurse    Celia Franklin RN, CWOCN

## 2021-07-07 ENCOUNTER — HOSPITAL ENCOUNTER (INPATIENT)
Facility: CLINIC | Age: 71
LOS: 23 days | Discharge: HOME HEALTH | DRG: 559 | End: 2021-07-30
Attending: PHYSICAL MEDICINE & REHABILITATION | Admitting: PHYSICAL MEDICINE & REHABILITATION
Payer: COMMERCIAL

## 2021-07-07 ENCOUNTER — APPOINTMENT (OUTPATIENT)
Dept: PHYSICAL THERAPY | Facility: CLINIC | Age: 71
DRG: 456 | End: 2021-07-07
Attending: ORTHOPAEDIC SURGERY
Payer: COMMERCIAL

## 2021-07-07 VITALS
RESPIRATION RATE: 14 BRPM | WEIGHT: 236.9 LBS | BODY MASS INDEX: 31.4 KG/M2 | TEMPERATURE: 97.4 F | HEART RATE: 80 BPM | DIASTOLIC BLOOD PRESSURE: 63 MMHG | SYSTOLIC BLOOD PRESSURE: 123 MMHG | OXYGEN SATURATION: 93 % | HEIGHT: 73 IN

## 2021-07-07 DIAGNOSIS — M40.30 FLATBACK SYNDROME: ICD-10-CM

## 2021-07-07 DIAGNOSIS — Z98.1 S/P SPINAL FUSION: Primary | ICD-10-CM

## 2021-07-07 DIAGNOSIS — Z98.1 H/O SPINAL FUSION: ICD-10-CM

## 2021-07-07 PROCEDURE — 99232 SBSQ HOSP IP/OBS MODERATE 35: CPT | Performed by: INTERNAL MEDICINE

## 2021-07-07 PROCEDURE — 99223 1ST HOSP IP/OBS HIGH 75: CPT | Performed by: PHYSICAL MEDICINE & REHABILITATION

## 2021-07-07 PROCEDURE — 250N000013 HC RX MED GY IP 250 OP 250 PS 637: Performed by: STUDENT IN AN ORGANIZED HEALTH CARE EDUCATION/TRAINING PROGRAM

## 2021-07-07 PROCEDURE — 128N000003 HC R&B REHAB

## 2021-07-07 PROCEDURE — 250N000013 HC RX MED GY IP 250 OP 250 PS 637: Performed by: PHYSICIAN ASSISTANT

## 2021-07-07 PROCEDURE — 250N000013 HC RX MED GY IP 250 OP 250 PS 637: Performed by: INTERNAL MEDICINE

## 2021-07-07 PROCEDURE — 97530 THERAPEUTIC ACTIVITIES: CPT | Mod: GP | Performed by: PHYSICAL THERAPIST

## 2021-07-07 PROCEDURE — 250N000013 HC RX MED GY IP 250 OP 250 PS 637: Performed by: CLINICAL NURSE SPECIALIST

## 2021-07-07 RX ORDER — ATORVASTATIN CALCIUM 40 MG/1
80 TABLET, FILM COATED ORAL AT BEDTIME
Status: DISCONTINUED | OUTPATIENT
Start: 2021-07-07 | End: 2021-07-30 | Stop reason: HOSPADM

## 2021-07-07 RX ORDER — MULTIPLE VITAMINS W/ MINERALS TAB 9MG-400MCG
1 TAB ORAL DAILY
Status: DISCONTINUED | OUTPATIENT
Start: 2021-07-08 | End: 2021-07-30 | Stop reason: HOSPADM

## 2021-07-07 RX ORDER — ACETAMINOPHEN 500 MG
1000 TABLET ORAL EVERY 6 HOURS PRN
Status: DISCONTINUED | OUTPATIENT
Start: 2021-07-07 | End: 2021-07-08

## 2021-07-07 RX ORDER — SIMETHICONE 125 MG
375 TABLET,CHEWABLE ORAL PRN
Status: DISCONTINUED | OUTPATIENT
Start: 2021-07-07 | End: 2021-07-07

## 2021-07-07 RX ORDER — ZINC SULFATE 50(220)MG
220 CAPSULE ORAL DAILY
Status: DISCONTINUED | OUTPATIENT
Start: 2021-07-08 | End: 2021-07-21 | Stop reason: CLARIF

## 2021-07-07 RX ORDER — POLYETHYLENE GLYCOL 3350 17 G/17G
17 POWDER, FOR SOLUTION ORAL 2 TIMES DAILY PRN
Status: DISCONTINUED | OUTPATIENT
Start: 2021-07-07 | End: 2021-07-18

## 2021-07-07 RX ORDER — ASCORBIC ACID 250 MG
250 TABLET,CHEWABLE ORAL DAILY
Status: DISCONTINUED | OUTPATIENT
Start: 2021-07-08 | End: 2021-07-21 | Stop reason: CLARIF

## 2021-07-07 RX ORDER — METHOCARBAMOL 500 MG/1
500 TABLET, FILM COATED ORAL 4 TIMES DAILY
Status: DISCONTINUED | OUTPATIENT
Start: 2021-07-07 | End: 2021-07-30 | Stop reason: HOSPADM

## 2021-07-07 RX ORDER — SIMETHICONE 125 MG
375 TABLET,CHEWABLE ORAL 2 TIMES DAILY PRN
Status: DISCONTINUED | OUTPATIENT
Start: 2021-07-07 | End: 2021-07-14

## 2021-07-07 RX ORDER — ASPIRIN 81 MG/1
81 TABLET ORAL DAILY
Status: DISCONTINUED | OUTPATIENT
Start: 2021-07-08 | End: 2021-07-30 | Stop reason: HOSPADM

## 2021-07-07 RX ORDER — LANOLIN ALCOHOL/MO/W.PET/CERES
6 CREAM (GRAM) TOPICAL AT BEDTIME
Status: DISCONTINUED | OUTPATIENT
Start: 2021-07-07 | End: 2021-07-30 | Stop reason: HOSPADM

## 2021-07-07 RX ORDER — DIAZEPAM 5 MG
5-10 TABLET ORAL EVERY 6 HOURS PRN
Status: DISCONTINUED | OUTPATIENT
Start: 2021-07-07 | End: 2021-07-09

## 2021-07-07 RX ORDER — BISACODYL 10 MG
10 SUPPOSITORY, RECTAL RECTAL DAILY PRN
Status: DISCONTINUED | OUTPATIENT
Start: 2021-07-07 | End: 2021-07-30 | Stop reason: HOSPADM

## 2021-07-07 RX ORDER — AMOXICILLIN 250 MG
2 CAPSULE ORAL 2 TIMES DAILY
Status: DISCONTINUED | OUTPATIENT
Start: 2021-07-07 | End: 2021-07-30 | Stop reason: HOSPADM

## 2021-07-07 RX ORDER — MORPHINE SULFATE 15 MG/1
15-30 TABLET ORAL EVERY 4 HOURS PRN
Status: DISCONTINUED | OUTPATIENT
Start: 2021-07-07 | End: 2021-07-08

## 2021-07-07 RX ADMIN — ZINC SULFATE 220 MG (50 MG) CAPSULE 220 MG: CAPSULE at 08:50

## 2021-07-07 RX ADMIN — MORPHINE SULFATE 30 MG: 15 TABLET ORAL at 21:53

## 2021-07-07 RX ADMIN — Medication 12.5 MG: at 21:00

## 2021-07-07 RX ADMIN — ASPIRIN 81 MG: 81 TABLET ORAL at 08:50

## 2021-07-07 RX ADMIN — MORPHINE SULFATE 30 MG: 15 TABLET ORAL at 05:42

## 2021-07-07 RX ADMIN — METHOCARBAMOL 500 MG: 500 TABLET, FILM COATED ORAL at 11:27

## 2021-07-07 RX ADMIN — MORPHINE SULFATE 30 MG: 15 TABLET ORAL at 00:13

## 2021-07-07 RX ADMIN — METHOCARBAMOL 500 MG: 500 TABLET, FILM COATED ORAL at 08:49

## 2021-07-07 RX ADMIN — ATORVASTATIN CALCIUM 80 MG: 40 TABLET, FILM COATED ORAL at 21:00

## 2021-07-07 RX ADMIN — Medication 200 UNITS: at 08:50

## 2021-07-07 RX ADMIN — DIAZEPAM 10 MG: 5 TABLET ORAL at 13:38

## 2021-07-07 RX ADMIN — SENNOSIDES AND DOCUSATE SODIUM 2 TABLET: 8.6; 5 TABLET ORAL at 21:00

## 2021-07-07 RX ADMIN — METHOCARBAMOL 500 MG: 500 TABLET, FILM COATED ORAL at 21:00

## 2021-07-07 RX ADMIN — METHOCARBAMOL 500 MG: 500 TABLET, FILM COATED ORAL at 17:22

## 2021-07-07 RX ADMIN — Medication 2 TABLET: at 08:50

## 2021-07-07 RX ADMIN — THERA TABS 1 TABLET: TAB at 08:49

## 2021-07-07 RX ADMIN — MELATONIN TAB 3 MG 6 MG: 3 TAB at 21:52

## 2021-07-07 RX ADMIN — Medication 250 MG: at 08:50

## 2021-07-07 RX ADMIN — MORPHINE SULFATE 30 MG: 15 TABLET ORAL at 11:27

## 2021-07-07 RX ADMIN — MORPHINE SULFATE 30 MG: 15 TABLET ORAL at 08:46

## 2021-07-07 RX ADMIN — LIDOCAINE: 50 OINTMENT TOPICAL at 03:39

## 2021-07-07 RX ADMIN — MORPHINE SULFATE 30 MG: 15 TABLET ORAL at 15:04

## 2021-07-07 RX ADMIN — MORPHINE SULFATE 30 MG: 15 TABLET ORAL at 18:09

## 2021-07-07 RX ADMIN — DIAZEPAM 10 MG: 5 TABLET ORAL at 03:36

## 2021-07-07 ASSESSMENT — ACTIVITIES OF DAILY LIVING (ADL)
FALL_HISTORY_WITHIN_LAST_SIX_MONTHS: NO
WHICH_OF_THE_ABOVE_FUNCTIONAL_RISKS_HAD_A_RECENT_ONSET_OR_CHANGE?: TRANSFERRING;AMBULATION;TOILETING;BATHING;DRESSING
DIFFICULTY_EATING/SWALLOWING: NO
TOILETING_ISSUES: NO

## 2021-07-07 ASSESSMENT — MIFFLIN-ST. JEOR: SCORE: 1776.85

## 2021-07-07 NOTE — PLAN OF CARE
"/53 (BP Location: Right arm)   Pulse 85   Temp 98.2  F (36.8  C) (Oral)   Resp 17   Ht 1.854 m (6' 1\")   Wt 107.5 kg (236 lb 14.4 oz)   SpO2 94%   BMI 31.26 kg/m     Back incision with gauze and thin tegaderm,CDI.  No numbness/tingling sensation.  Locating pain on R hip/thigh all through the R foot.  Managed with oral morphine,valium.Frequent repositioning for more comfort.  Using urinal,voiding spontaneously.BM yesterday and another at start of this shift.  Plan to discharge to TCU once bed available.  Wants to have shower later this morning.  "

## 2021-07-07 NOTE — LETTER
Recipient: [facility]  ([facility])          Sender: ROSE Cho PH: [phone]  Discharge Fri 07/30. RN, PT, OT and HA.   Pt wants therapy from [facility].   [facility] - Rebecca Cook Ph: [phone].   Wife may be best to coordinate SOC.   I will update you next week to make sure we are on track for discharge and send orders on day of discharge. Thanks!!!           Date: July 21, 2021  Patient Name:  Darleen Simmons  Routing Message:          The documents accompanying this notice contain confidential information belonging to the sender.  This information is intended only for the use of the individual or entity named above.  The authorized recipient of this information is prohibited from disclosing this information to any other party and is required to destroy the information after its stated need has been fulfilled, unless otherwise required by state law.    If you are not the intended recipient, you are hereby notified that any disclosure, copy, distribution or action taken in reliance on the contents of these documents is strictly prohibited.  If you have received this document in error, please return it by fax to [phone] with a note on the cover sheet explaining why you are returning it (e.g. not your patient, not your provider, etc.).  If you need further assistance, please call .  Documents may also be returned by mail to [employer], , [facility] [address] [place], [place] [address].

## 2021-07-07 NOTE — PROGRESS NOTES
"Orthopaedic Surgery Progress Note:  07/07/2021     Subjective:   NAEON. Doing well. Pain controlled. Has been working w/ therapies, getting out of bed, and ambulating w/ therapies. Last BM 7/7. Voiding spontaneously. Tolerating PO diet.     Objective:   /53 (BP Location: Right arm)   Pulse 85   Temp 98.2  F (36.8  C) (Oral)   Resp 17   Ht 1.854 m (6' 1\")   Wt 107.5 kg (236 lb 14.4 oz)   SpO2 94%   BMI 31.26 kg/m     No intake/output data recorded.     Gen: NAD.   Resp: Breathing comfortably .  Drain:   Musculoskeletal: dressing c/d/i.     Refused exam this am, says he wants to sleep instead.  Lower extremities:                                         Labs:  Lab Results   Component Value Date    WBC 9.7 06/29/2021    HGB 9.8 (L) 06/29/2021     06/29/2021    INR 1.56 (H) 06/23/2021        Assessment & Plan:   Darleen Simmons is a 70 year old male with PMH including chronic opioid use, former smoker, HTN, HLP, CAD c/b MI, ischemic cardiomyopathy s/p ICD placement, PAD s/p left SFA POBA for latissimus dorsi flap to left calf, atrial fibrillation/flutter, acute blood loss anemia, flat back syndrome now s/p T7-pelvis revision PSIF with T12-L1 3CO and L5 PSO on 6/21/21 with Jazmyne Akins/Herb. Significant pain out of proportion could represent nerve root retraction and normal postop pain in the setting of a chronic opioid user. Given the symptoms in his legs are at the PSO level, this could also represent neural foraminal stenosis. The improving symptoms and imaging do not indicate iatrogenic neural foraminal stenosis.    Goals for today:  -ok to discharge, pending placement    Orthopedics Primary  Activity: Up with assist until independent. No excessive bending or twisting. No lifting >10 lbs x 6 weeks. Renny lift approved for transfers. Keep back straight if using lift.  Weight bearing status: WBAT.  Pain management: Transition from IV to PO as tolerated. No NSAIDs.   Antibiotics:done.   Diet: ADAT. "   DVT prophylaxis: SCDs only. No chemical DVT ppx needed.  Imaging: done..  Labs: labs PRN; ongoing close monitoring of hematologic status  Bracing/Splinting: AFO right foot ordered - seen by orthotist  Dressings: dressing changed 7/1. Reinforce as needed.   Drains: drains removed.  Casiano catheter: Removed 6/22. Replace if unable to void.  Physical Therapy/Occupational Therapy: Eval and treat.  Consults: Hospitalist.  Follow-up: Clinic with Dr. Akins in 6 weeks with repeat x-rays.   Disposition: Pending progress with therapies, pain control on orals, post-op imaging, and drain removal.    Orthopaedics surgery staff for this patient is Dr. Akins.    ------------------------------------------------------------------------------------------    [ x] Drains removed.  [ x] Post xrays done.  [   ] Discharge orders done.  [x] Discharge summary started.    Gregory Vincent MD  Orthopaedic Surgery Resident, PGY4  Pager: [phone]     Please page me directly with any questions/concerns during regular weekday hours before 5pm.     If there is no response, if it is a weekend, or if it is during evening hours then please page the orthopaedic surgery resident on call as listed on [facility] call schedule under the orthopaedics tab.    FOLLOWUP:    Future Appointments   Date Time Provider Department Center   8/5/2021 12:30 PM Akhil Akins MD [facility]   9/9/2021 11:00 AM Akhil Akins MD [facility]

## 2021-07-07 NOTE — PLAN OF CARE
"VS: /76   Pulse 93   Temp 97.6  F (36.4  C) (Oral)   Resp 16   Ht 1.854 m (6' 1\")   Wt 107.5 kg (236 lb 14.4 oz)   SpO2 94%   BMI 31.26 kg/m        O2: On room air, sats stable. Denies SOB. LS clear   Output: Voids without difficulties using urinal at bedside. LBM this evening after receiving a suppository.   Activity: Pt did not get OOB, turned and repositioned q2h and as needed.   Skin: Back incision, scabbing upper bilateral thighs and left ankle flap site   Pain: Pain moderately controlled with scheduled robaxin and tylenol and PRN morphine Q3H and valium q6h   CMS: Alert and oriented x4. Denies numbness/tingling.   Dressing: Back incision dressing C/D/I   Diet: Regular, ate two boiled eggs for dinner.   LDA: Right arm PIV saline locked   Plan: Continue to monitor and follow POC.  Discharge, TBD.   Additional Info:  Pt alert and oriented, able to make needs known using call light appropriately.       "

## 2021-07-07 NOTE — PLAN OF CARE
Occupational Therapy Discharge Summary    Reason for therapy discharge:    Discharged to acute rehabilitation facility.    Progress towards therapy goal(s). See goals on Care Plan in [facility] electronic health record for goal details.  Goals partially met.  Barriers to achieving goals:   discharge from facility.    Therapy recommendation(s):    Continued therapy is recommended.  Rationale/Recommendations:  limited self-cares/mobility.

## 2021-07-07 NOTE — PROGRESS NOTES
"[facility], [place]   Internal Medicine Daily Note           Interval History/Events     Overnight events reviewed   Reports doing well  Pain on the leg       Review of Systems        4 point ROS including Respiratory, CV, GI and , other than that noted above is negative      Medications   I have reviewed current medications  in the \"current medication\" section of Epic.  Relevant changes include:     Physical Exam   General:       Vital signs:    Blood pressure 123/63, pulse 80, temperature 97.4  F (36.3  C), temperature source Oral, resp. rate 14, height 1.854 m (6' 1\"), weight 107.5 kg (236 lb 14.4 oz), SpO2 93 %.  Estimated body mass index is 31.26 kg/m  as calculated from the following:    Height as of this encounter: 1.854 m (6' 1\").    Weight as of this encounter: 107.5 kg (236 lb 14.4 oz).      Intake/Output Summary (Last 24 hours) at 7/2/2021 1614  Last data filed at 7/2/2021 1313  Gross per 24 hour   Intake --   Output 1250 ml   Net -1250 ml        Constitutional: Laying in bed in no acute distress  Eye: No icterus, no pallor  Mouth/ENT: Normal oral mucosa  Cardiovascular: S1, S2 normal.  Respiratory: B/L CTA  GI: Soft, NT, BS+  : No peng's catheter  Neurology: Patient little sleepy  Psych: Mood stable.   MSK:   Integumentary:   Heme/Lymph/Imm:      Laboratory and Imaging Studies     I have reviewed  laboratory and imaging studies in the Epic. Pertinent findings are as below:    BMP  No lab results found in last 7 days.  CBC  No lab results found in last 7 days.  INRNo lab results found in last 7 days.  LFTsNo lab results found in last 7 days.   PANCNo lab results found in last 7 days.        Impression/Plan      70 year old male with history of hyperlipidemia, hypertension, CAD with MI s/p CABG x4 (1986), ischemic cardiomyopathy s/p ICD placement, PAD s/p left SFA balloon angioplasty to assist a latissimus dorsi flap to the left calf, atrial fibrillation/flutter (no longer " on anticoagulation). He underwent T7-S1 posterior spinal fusion with pelvic fixation and osteotomies of T12-L1 and L5 on 6/21/21 with Dr. Akins. Intraoperatively significant blood loss (4.3 L). Patient admitted to the SICU hypotensive, requiring NE. Now on medical floor. Doing well except pain issues.      # s/p complex spinal surgery:  including posterior T7-S1 fusion secondary to flat back syndrome by Dr. Akins's team  Right leg pain. Negative Vascular and non vascular US on 07/05/2021  - per primary team  - pain and DVT prophylaxis management per orthopedics  - Pain management per orthopedics team.      # Acute urinary retention with suprapubic abdominal pain ( resolved)   -required peng catheter. Now removed.   -Takes supplements for bladder, have been ordered. Pharmacy has reviewed, it does not cause any bleeding or significant interactions.      # Early DIC, Acute blood loss anemia and marked thrombocytopenia  status post surgery with concern for the development of DIC given elevated INR 1.66,-He required platelet transfusion to keep Platelet count above 100 K.   Coagulopathy has improved since. Most recent platelet count on 6/29 at 189     # Hx Hypertension. Had post op hypotension requiring pressors. Has resolved.   # History of coronary artery disease, status post CABG in 1986, as well as  # Ischemic cardiomyopathy,   # s/p  ICD placement, most recently replaced in 2016.  Most recent EKG with paced rhythm and frequent ectopic beats with recent mildly elevated troponin now on downward trend.  The patient currently denies chest pain.  Evaluated by cardiology prior to admission on 05/04/2021 with RCRI score of 2, which translates to a 10.1% risk of 30 day MI, death and cardiac arrest.  Currently no active cardiac issues      # Chronic atrial fibrillation, chronically managed on Xarelto prior to surgery, held for approximately 48 hours. HR controlled on metoprolol.  Resumed back to 12.5 mg at bedtime. Pt and  wife [facility] the need [facility] and they did not recommend anticoagulation in the future.   He is supposed to be on ASA for stroke prevention. Discussed with Orthopedic surgery team today ( 7/1), and OK'd for restarting aspirin   - Continue Metoprolol   - Continue Aspirin  - Not on AC per May clinic      # Postop intermittent sedation, resolved   From too much narcotics and Valium and lidocaine infusion postop. Has improved significantly   - Be judicious with narcotics use.      # Abdominal distention:   Resolved         # DVT Prophylaxis: SCDs  # Code Status: Full Code  # Disposition: Okay to discharge from medicine stand point    Pt's care was discussed with bedside RN, patient and  during Care Team Rounds.               Walt Arreguin MD  Hospitalist ( Internal medicine)  Pager: [phone]

## 2021-07-07 NOTE — PLAN OF CARE
Pt  Alert and Ox4, VSS, on RA,  Back incision with gauze and thin tegaderm,CDI.  No numbness/tingling sensation.  Locating pain on back all through the RLE, managed with Prn Morphine Q 3 hrs and Ativan, refused lidocaine ointment. Frequent repositioning for more comfort.  Using urinal,voiding spontaneously.LBM 7/6.  Skipped breakfast, but consumed 100% of lunch.   Hearing aids x2 in place. Dental x2  (upper and down) in place.  R PIV, SL.   Ax2 with GB and walker to transfer, w/c based.   Bed alarm on for safety.  Plan to discharge to ARU this PM.

## 2021-07-07 NOTE — PROGRESS NOTES
S: patient seen today at  for fitting of Sure step AFO    O/G: (prevent drop foot)(assist in ambulation)    A: patient seen today for fitting of AFO. Patient has been instructed on proper donning/doffing, use, care and break-in period.  Patient not observed in a walking trial. Patient and I are satisfied with the fit and function of the AFO at this time.     P: patient has been instructed to contact us if any issues arise.   ASTER Gimenez.

## 2021-07-07 NOTE — PROGRESS NOTES
Care Management Discharge Note    Discharge Date: 07/07/21 @ 1:30 pm     Discharge Disposition: [facility]    Discharge Services: None    Discharge DME: None    Discharge Transportation: Rollover at 1:30 pm     Private pay costs discussed: Not applicable    PAS Confirmation Code: (not applicable)  Patient/family educated on Medicare website which has current facility and service quality ratings: yes    Education Provided on the Discharge Plan: Yes   Persons Notified of Discharge Plans: Pt, pt's wife Ayah, 8A charge RN  Patient/Family in Agreement with the Plan: Yes    Handoff Referral Completed: Yes    Additional Information:  ROSE received the [employer] auth (#[id number]) from the pt's [employer] insurance, approving the pt for [facility].  ROSE faxed the auth to [facility] and spoke with Endy EDEN ([id number]) in rehab admissions - they can take the pt at 1:30 pm today.    ROSE met with pt at bedside and provided him with the auth letter from [employer] and notified him of [facility]. The pt was very grateful for the news - he relayed that a berry is planning to visit him today - ROSE confirmed with Endy in rehab admissions that [facility] is allowing visitors, ROSE relayed that information to the pt.  ROSE also called the pt's wife Ayah and provided update.     ROSE emailed handoff to ARU ROSE Kim.     KWAKU Araiza  [facility]  5 Ortho & 8A   Ph: [phone]  Pager: [phone]

## 2021-07-07 NOTE — H&P
[facility]   Acute Rehabilitation Unit  Admission History and Physical    CHIEF COMPLAINT      Orthopaedic Disorders 08.9 Other Orthopaedic, T7-pelvis spinal fusion revision    HISTORY OF PRESENT ILLNESS  Darleen Simmons is a 70 year old man with past medical history of HLD, HYPERTENSION, CAD s/p MI and CABG X 4 in 1986, ischemic cardiomyopathy, ICD placement, PAD, s/p left SFA balloon angioplasty to assist healing a latissimus dorsi flap to the left calf, atrial fibrillation/flutter, depression,  former tobacco use, and chronic back pain  who was admitted for planned T7-S1 fusion, with Dr. Akins.    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 Afterwards he experienced quadriplegia but he slowly was able to regain his ability to ambulate. In 2018, he underwent posterior decompression thoracolumbar fusion at [facility]. At that time It was thought that he would be primarily a sitter and he was fused in a fairly straight posture. The patient continued to work on his rehab and has returned to ambulation, however because of his fusion, he has a stooped posture. He describes pain between his shoulder blades as well as down into his pelvis. His thighs become very tired with any sort of distance walking or standing. Several visits with Dr. Akins ortho spine  Discussed flat back syndrome, with discussion of surgery to correct deformity, and sought additional opinon at [facility] ultimately admitted for surgery 6/21/21.  Course was complicated by acute blood loss anemia, acute on chronic pain, metabolic encephalopathy, hypotension, urinary retention, and thrombocytopenia.     Seen and evaluated by therapy who noted impaired strength, impaired activity tolerance, impaired balance, and impaired safety awareness.  He is currently performing bed mobility with min-max assist pending pain.  SBA for sitting edge of  bed.  Sit to stand with min assist of 2 with need to block bilateral knees.  Max assist for donning knee braces and afos.  Min assist for upper body dressing.      PAST MEDICAL HISTORY   Reviewed and updated in Epic.  Past Medical History:   Diagnosis Date     Acute osteomyelitis of left lower leg (H) 2017     Acute superficial venous thrombosis of lower extremity, right 2018     Atrial flutter (H)      Automatic implantable cardioverter-defibrillator in situ      CAD (coronary artery disease)      Depression      Flatback syndrome      History of acute inferior wall MI      HTN (hypertension)      Hyperlipidemia LDL goal <100      Ischemic cardiomyopathy      Kyphosis (acquired) (postural)      JOANNA (obstructive sleep apnea)      PAD (peripheral artery disease) (H)      Paroxysmal atrial fibrillation (H)      PVD (peripheral vascular disease) (H)        SURGICAL HISTORY  Reviewed and updated in Epic.  Past Surgical History:   Procedure Laterality Date     BYPASS GRAFT ARTERY CORONARY  1986     Decompression posterior lumbar and instrumented fusion  2018     Endovascular femoral and/or popliteal and/or tibial artery angioplasty/stent       Hardware removal tib/fib       I and D left extremity wound       Knee hardware removal       KNEE SURGERY       OPTICAL TRACKING SYSTEM FUSION POSTERIOR SPINE THORACIC THREE+ LEVELS N/A 6/21/2021    Procedure: o-arm/stealth assisted Posterior spinal fusion Thoracic 7 to Sacral 1 with segmental instrumentation Thoracic 7 to 8; reinsertion of instrumentation Thoracic 9 to Sacral 1; pelvic fixation;  Surgeon: Akhil Akins MD;  Location: [facility] OR     OPTICAL TRACKING SYSTEM FUSION SPINE POSTERIOR LUMBAR THREE+ LEVELS N/A 6/21/2021    Procedure: Posterior 3 column osteotomy T12-L1; pedicle subtraction osteotomy Lumbar 5;  Surgeon: Akhil Akins MD;  Location: [facility] OR     STSG left extremity wound       TONSILLECTOMY         SOCIAL HISTORY  Lives with his spouse  Lives in a  split-level house with 9 steps to enter and a flight to the upper level  The house is a wheelchair ramp.  There is a stair lift with him.  Support system includes wife  Retired     Social History     Socioeconomic History     Marital status:      Spouse name: Not on file     Number of children: Not on file     Years of education: Not on file     Highest education level: Not on file   Occupational History     Not on file   Social Needs     Financial resource strain: Not on file     Food insecurity     Worry: Not on file     Inability: Not on file     Transportation needs     Medical: Not on file     Non-medical: Not on file   Tobacco Use     Smoking status: Former Smoker     Types: Cigarettes     Quit date: 1988     Years since quittin.5     Smokeless tobacco: Former User   Substance and Sexual Activity     Alcohol use: Not Currently     Drug use: Not on file     Sexual activity: Not on file   Lifestyle     Physical activity     Days per week: Not on file     Minutes per session: Not on file     Stress: Not on file   Relationships     Social connections     Talks on phone: Not on file     Gets together: Not on file     Attends [employer] service: Not on file     Active member of club or organization: Not on file     Attends meetings of clubs or organizations: Not on file     Relationship status: Not on file     Intimate partner violence     Fear of current or ex partner: Not on file     Emotionally abused: Not on file     Physically abused: Not on file     Forced sexual activity: Not on file   Other Topics Concern     Not on file   Social History Narrative     Not on file       FAMILY HISTORY  Reviewed and updated in Epic.  Family History   Problem Relation Age of Onset     Coronary Artery Disease Father      Cancer Mother      Heart Disease Sister      Suicide Brother      Heart Disease Sister          PRIOR FUNCTIONAL HISTORY   Pt reports he was independent with all mobility with use of FWW prior to  surgery, states he can navigate stairs but often uses the lift    MEDICATIONS  Scheduled meds  Medications Prior to Admission   Medication Sig Dispense Refill Last Dose     atorvastatin (LIPITOR) 80 MG tablet Take 80 mg by mouth At Bedtime         D3-50 1.25 MG (28232 UT) capsule Take 1,250 mcg by mouth every 7 days Takes on Saturdays.        diclofenac (VOLTAREN) 1 % topical gel Apply topically as needed (shoulder pain)         fluticasone (FLONASE) 50 MCG/ACT nasal spray Spray 2 sprays into both nostrils daily as needed         hydrocortisone (CORTAID) 1 % external cream Apply topically 2 times daily as needed        multivitamin, therapeutic with minerals (THERA-VIT-M) TABS tablet Take 1 tablet by mouth daily        simethicone (MYLICON) 125 MG chewable tablet Take 375 mg by mouth as needed for intestinal gas        EPINEPHrine (ANY BX GENERIC EQUIV) 0.3 MG/0.3ML injection 2-pack Inject 0.3 mg into the muscle as needed for anaphylaxis        naloxone (NARCAN) 4 MG/0.1ML nasal spray Spray 4 mg into one nostril alternating nostrils once as needed for opioid reversal every 2-3 minutes until assistance arrives        nitroGLYcerin (NITROSTAT) 0.4 MG sublingual tablet Place 0.4 mg under the tongue as needed        NONFORMULARY Uriva Rx - supplement for bladder tone        NONFORMULARY Follinex supplement - herbal supplement for hair growth.        [DISCONTINUED] acetaminophen (TYLENOL) 500 MG tablet Take 500-1,000 mg by mouth every 8 hours as needed for mild pain        [DISCONTINUED] aspirin 81 MG EC tablet Take 81 mg by mouth daily        [DISCONTINUED] diazepam (VALIUM) 2 MG tablet Take 2-4 mg by mouth nightly as needed for muscle spasms         [DISCONTINUED] metoprolol succinate ER (TOPROL-XL) 25 MG 24 hr tablet Take 12.5 mg by mouth At Bedtime         [DISCONTINUED] morphine (MSIR) 15 MG IR tablet Take 15-30 mg by mouth every 3 hours as needed Every 3 hrs as needed        [DISCONTINUED] polyethylene glycol  "(MIRALAX) 17 GM/Dose powder Take 17 g by mouth daily as needed         [DISCONTINUED] senna-docusate (SENOKOT-S/PERICOLACE) 8.6-50 MG tablet Take 1 tablet by mouth 2 times daily          ALLERGIES     Allergies   Allergen Reactions     Wasp Venom Protein Shortness Of Breath and Swelling     sob  swelling  Other reaction(s): Swelling  sob  \"black wasps\"     Adhesive Tape      Paper tape - rash     Hydroxyzine Other (See Comments)     Nightmares     Influenza Vaccines Other (See Comments)     Avoid influenza vaccine, history of Guillain Central      Lorazepam Anxiety     Other reaction(s): Irritability, Mental Status Change, Other (see comments)  Pt states he becomes very angry and mean after taking at [facility].    Tolerates diazepam         REVIEW OF SYSTEMS  A 10 point ROS was performed and negative unless otherwise noted in HPI.     PHYSICAL EXAM  VITAL SIGNS:  There were no vitals taken for this visit.  BMI:  Estimated body mass index is 31.26 kg/m  as calculated from the following:    Height as of 6/21/21: 1.854 m (6' 1\").    Weight as of 6/22/21: 107.5 kg (236 lb 14.4 oz).     General: Alert and cooperative in NAD.  HEENT: PERRL, EOMI, face is symmetric  Pulmonary: Clear  Cardiovascular: S1, S2 RRR  Abdominal: Soft, non tender, BS +  Extremities: Prior PAD surgery +. Has foot drop on the right. Able to bring to neutral on left.  Extremities: warm, well perfused, no edema in bilateral lower extremities, no tenderness in calves   MSK/neuro:   Mental Status:  alert and oriented x3     Cranial Nerves: grossly normal    1. 2nd CN: Pupils equal, round, reactive to light and accomodation. and visual fields intact to confrontation.   2. 3rd,4th,6th CN:  EOMI, appropriate pupillary responses  3. 5th CN: facial sensation intact   4. 7th CN: face symmetrical   5. 8th CN: functional hearing bilaterally  6. 9th, 10th CN: palate elevates symmetrically   7. 11th CN: sternocleidomastoids and trapezii strong   8. 12th CN: " tongue midline and without fasciculations     Sensory: Normal to light touch in bilateral upper and lower extremities     Strength: 5/5 in all muscle groups of the upper, and in lower extremities has proximal weaknesss, as well as DX weakness on the right side.   Reflexes: Present and symmetrical    Ibrahim's test: negative bilaterally    Babinski reflex: downgoing bilaterally    Tone per modified Sonia Scale:  None   Abnormal movements: None     Coordination: No dysmetria on finger to nose b/l     Speech: Alert and appropriate   Cognition: Functional, with occasional need for redirectionj   Gait: Not able currently  Skin: Back with dressings over incision site, intact      LABS  CBC RESULTS:   Recent Labs   Lab Test 06/29/21  1258 06/25/21  0830 06/24/21  1538   WBC 9.7 8.3 8.4   RBC 3.17* 2.90* 2.61*   HGB 9.8* 9.1* 8.2*   HCT 30.9* 28.1* 25.6*   MCV 98 97 98   MCH 30.9 31.4 31.4   MCHC 31.7 32.4 32.0   RDW 13.6 13.9 14.1    127* 89*     Last Basic Metabolic Panel:  Recent Labs   Lab Test 06/28/21  0826 06/27/21  0602 06/26/21  1446 06/25/21  0830 06/24/21  1538 06/23/21  0632     --   --  139 140 137   POTASSIUM 3.6 3.6 3.7 3.7 4.7 4.7   CHLORIDE 105  --   --  104 107 103   CO2 30  --   --  31 31 29   ANIONGAP  --   --   --  4 2* 5   GLC 89  --   --  101* 102* 116*   BUN 9  --   --  10 10 17   CR 0.54*  --   --  0.54* 0.54* 0.69   GFRESTIMATED >90  --   --  >90 >90 >90   TRENTON 8.6  --   --  8.6 8.1* 8.1*           IMPRESSION/PLAN:  Darleen Simmons is a 70 year old man with past medical history of  HTN, HLP, CAD, ischemic cardiomyopathy s/p ICD placement, PAD s/p left SFA POBA for latissimus dorsi flap to left calf, atrial fibrillation/flutter,  flat back syndrome now s/p T7-pelvis revision PSIF with T12-L1 3CO and L5 PSO on 6/21/21 with Jazmyne Akins/Herb.  Admitted to acute rehab 7/7/21      Admission to acute inpatient rehab T7-pelvis fusion revision  Impairment group code: 08.9      1. PT, OT 90  "minutes of each on a daily basis, in addition to rehab nursing and close management of physiatrist.      2. Impairment of ADL's: Noted to have impaired strength, impaired activity tolerance, and impaired balance, leading to decreased ability to independently complete ADL's.  Will benefit from ongoing OT with goal for MOD I with basic ADLs.     3. Impairment of mobility:  Noted to have impaired strength, impaired activity tolerance, and impaired balance leading to decreased mobility.  Will benefit from ongoing PT with goal for GUSTAVO with basic mobility.     4. Medical Conditions  Flatback syndrome-   Chronic back pain  S/p T7-S1 posterior spinal fusion per Dr. 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 Afterwards he experienced quadriplegia but he slowly was able to regain his ability to ambulate. In 2018, he underwent posterior decompression thoracolumbar fusion at [facility]. At that time It was thought that he would be primarily a sitter and he was fused in a fairly straight posture.  -  Up with assist until independent. No excessive bending or twisting. No lifting >10 lbs x 6 weeks. Renny lift approved for transfers. Keep back straight if using lift.  -WBAT  -pain management as below  -continue PT/OT  - right AFO  -wound care as ordered.   -f/u Dr. Akins      CAD s/p MI CABG  Ischemic cardiomyopathy   s/p Pacemaker & ICD (medtronic)  -echo 5/4/21Mildly (EF 45-50%) reduced left ventricular function is present. There is a  pattern of wall motion abnormalities consistent with a prior RCA and,  possibly, LCx infarction.  Stress test 5/5/21  Per cardiology Dr. Reis 5/4/21: Dr. Reis on 5/4/21 with notes:  \"I reviewed the Lexiscan results with Dr. Lentz.  There are multiple areas of nontransmural infarction in all three vessel territories.  However, there is no ischemia.  I do not have the remote cardiac history but suspect the NSTEMIs are " "remote, possibly dating back to the CABG decades ago.  In the absence of ischemia, I would not recommend coronary angiography.   The patient is at moderate risk of a myocardial infarction given the Hx of multivessel CAD and prior infarctions.  I would continue him on Lopressor and Lipitor throughout the perioperative phase and would consider keeping him on ASA if the surgical risk of bleeding is not deemed high.\"   -continue lipitor  -continue asa      A-fib/ Aflutter- not anticoagulated prior to admission per pcp recs  -continue metoprolol xl 12.5 mg    Acute blood loss Anemia- Estimated blood loss ~ 4320 ml with 1630 returned via cell saver.  Hgb now stable 9.8 6/29  -trend    Acute on Chronic back pain- on morphine 15-30 mg taking qid prior to admission.   -continue robaxin scheduled  -continue tylenol, valium, morphine 15-30 mg prn    Depression- monitor mood consider psychology consult    PAD-  S/p L SFA baloon angioplasty. 2/11/21 MARIAMA Right:  Moderate peripheral arterial disease starting at or proximal to the superficial femoral level. Left:  Moderate peripheral arterial disease location not well determined.      Metabolic encephalopathy- prolonged surgery, pain med  -reorient as needed  -monitor    Urinary retention- acute retention post operatively   -continue prior to admission supplement   -check pvrs on admission    Pressure injury- wound care as ordered - apparently healing well.   Evaluated by WON on 7/6/21    Pressure Injury: on mid chest, hospital acquired   Pressure Injury is Stage 2   Contributing factor of the pressure injury: pressure and immobility  Status: healed     Pressure Injury: on Right Knee, hospital acquired   Pressure Injury is Stage 2   Contributing factor of the pressure injury: pressure and immobility  Status: healing     Wound Location: Right upper thigh, suspect from tape removal  Status: healing    5. Adjustment to disability:  Clinical psychology to eval and treat as " approprioate.  6. FEN: Regular, thins  7. Bowel: Needed suppository 7/6, monitor  8. Bladder: Urinal, voids,  9. DVT Prophylaxis: SCD  10. GI Prophylaxis: Regular diet  11. Code: Full  12. Disposition: Home with home care  13. ELOS:  2 weeks.  14. Rehab prognosis:  Fair  15. Follow up Appointments on Discharge: Surgeons in 6 weeks, PMD in 1-2 weeks. Others as recommended.    70 minutes, >50%  CC.    Franklyn Clarke MD

## 2021-07-08 ENCOUNTER — APPOINTMENT (OUTPATIENT)
Dept: OCCUPATIONAL THERAPY | Facility: CLINIC | Age: 71
End: 2021-07-08
Payer: COMMERCIAL

## 2021-07-08 ENCOUNTER — APPOINTMENT (OUTPATIENT)
Dept: PHYSICAL THERAPY | Facility: CLINIC | Age: 71
End: 2021-07-08
Payer: COMMERCIAL

## 2021-07-08 LAB
ANION GAP SERPL CALCULATED.3IONS-SCNC: 4 MMOL/L (ref 3–14)
BUN SERPL-MCNC: 14 MG/DL (ref 7–30)
CALCIUM SERPL-MCNC: 9.6 MG/DL (ref 8.5–10.1)
CHLORIDE SERPL-SCNC: 101 MMOL/L (ref 94–109)
CO2 SERPL-SCNC: 33 MMOL/L (ref 20–32)
CREAT SERPL-MCNC: 0.51 MG/DL (ref 0.66–1.25)
ERYTHROCYTE [DISTWIDTH] IN BLOOD BY AUTOMATED COUNT: 14.1 % (ref 10–15)
GFR SERPL CREATININE-BSD FRML MDRD: >90 ML/MIN/{1.73_M2}
GLUCOSE BLDC GLUCOMTR-MCNC: 146 MG/DL (ref 70–99)
GLUCOSE SERPL-MCNC: 102 MG/DL (ref 70–99)
HCT VFR BLD AUTO: 33.7 % (ref 40–53)
HGB BLD-MCNC: 10.4 G/DL (ref 13.3–17.7)
MCH RBC QN AUTO: 30 PG (ref 26.5–33)
MCHC RBC AUTO-ENTMCNC: 30.9 G/DL (ref 31.5–36.5)
MCV RBC AUTO: 97 FL (ref 78–100)
PLATELET # BLD AUTO: 270 10E9/L (ref 150–450)
POTASSIUM SERPL-SCNC: 4.2 MMOL/L (ref 3.4–5.3)
RBC # BLD AUTO: 3.47 10E12/L (ref 4.4–5.9)
SODIUM SERPL-SCNC: 138 MMOL/L (ref 133–144)
WBC # BLD AUTO: 7.4 10E9/L (ref 4–11)

## 2021-07-08 PROCEDURE — 999N001017 HC STATISTIC GLUCOSE BY METER IP

## 2021-07-08 PROCEDURE — 80048 BASIC METABOLIC PNL TOTAL CA: CPT | Performed by: PHYSICIAN ASSISTANT

## 2021-07-08 PROCEDURE — 85027 COMPLETE CBC AUTOMATED: CPT | Performed by: PHYSICIAN ASSISTANT

## 2021-07-08 PROCEDURE — 97530 THERAPEUTIC ACTIVITIES: CPT | Mod: GP | Performed by: PHYSICAL THERAPIST

## 2021-07-08 PROCEDURE — 97163 PT EVAL HIGH COMPLEX 45 MIN: CPT | Mod: GP | Performed by: PHYSICAL THERAPIST

## 2021-07-08 PROCEDURE — 250N000013 HC RX MED GY IP 250 OP 250 PS 637: Performed by: PHYSICIAN ASSISTANT

## 2021-07-08 PROCEDURE — 99233 SBSQ HOSP IP/OBS HIGH 50: CPT | Performed by: PHYSICAL MEDICINE & REHABILITATION

## 2021-07-08 PROCEDURE — 128N000003 HC R&B REHAB

## 2021-07-08 PROCEDURE — 36415 COLL VENOUS BLD VENIPUNCTURE: CPT | Performed by: PHYSICIAN ASSISTANT

## 2021-07-08 PROCEDURE — 97166 OT EVAL MOD COMPLEX 45 MIN: CPT | Mod: GO

## 2021-07-08 PROCEDURE — 97535 SELF CARE MNGMENT TRAINING: CPT | Mod: GO

## 2021-07-08 RX ORDER — MORPHINE SULFATE 15 MG/1
15-30 TABLET ORAL
Status: DISCONTINUED | OUTPATIENT
Start: 2021-07-08 | End: 2021-07-30 | Stop reason: HOSPADM

## 2021-07-08 RX ORDER — NALOXONE HYDROCHLORIDE 0.4 MG/ML
0.4 INJECTION, SOLUTION INTRAMUSCULAR; INTRAVENOUS; SUBCUTANEOUS
Status: DISCONTINUED | OUTPATIENT
Start: 2021-07-08 | End: 2021-07-30 | Stop reason: HOSPADM

## 2021-07-08 RX ORDER — ACETAMINOPHEN 500 MG
1000 TABLET ORAL EVERY 6 HOURS PRN
Status: DISCONTINUED | OUTPATIENT
Start: 2021-07-08 | End: 2021-07-30 | Stop reason: HOSPADM

## 2021-07-08 RX ORDER — NALOXONE HYDROCHLORIDE 0.4 MG/ML
0.2 INJECTION, SOLUTION INTRAMUSCULAR; INTRAVENOUS; SUBCUTANEOUS
Status: DISCONTINUED | OUTPATIENT
Start: 2021-07-08 | End: 2021-07-30 | Stop reason: HOSPADM

## 2021-07-08 RX ADMIN — DIAZEPAM 10 MG: 5 TABLET ORAL at 02:48

## 2021-07-08 RX ADMIN — Medication 200 UNITS: at 08:12

## 2021-07-08 RX ADMIN — MELATONIN TAB 3 MG 6 MG: 3 TAB at 21:44

## 2021-07-08 RX ADMIN — METHOCARBAMOL 500 MG: 500 TABLET, FILM COATED ORAL at 21:44

## 2021-07-08 RX ADMIN — ZINC SULFATE 220 MG (50 MG) CAPSULE 220 MG: CAPSULE at 08:11

## 2021-07-08 RX ADMIN — MORPHINE SULFATE 30 MG: 15 TABLET ORAL at 02:23

## 2021-07-08 RX ADMIN — MORPHINE SULFATE 15 MG: 15 TABLET ORAL at 17:23

## 2021-07-08 RX ADMIN — Medication 250 MG: at 08:12

## 2021-07-08 RX ADMIN — MORPHINE SULFATE 30 MG: 15 TABLET ORAL at 10:05

## 2021-07-08 RX ADMIN — ACETAMINOPHEN 1000 MG: 500 TABLET ORAL at 08:11

## 2021-07-08 RX ADMIN — DIAZEPAM 10 MG: 5 TABLET ORAL at 08:12

## 2021-07-08 RX ADMIN — DIAZEPAM 10 MG: 5 TABLET ORAL at 19:47

## 2021-07-08 RX ADMIN — Medication 12.5 MG: at 21:44

## 2021-07-08 RX ADMIN — Medication 1 TABLET: at 08:11

## 2021-07-08 RX ADMIN — MORPHINE SULFATE 30 MG: 15 TABLET ORAL at 06:26

## 2021-07-08 RX ADMIN — SENNOSIDES AND DOCUSATE SODIUM 2 TABLET: 8.6; 5 TABLET ORAL at 08:12

## 2021-07-08 RX ADMIN — MORPHINE SULFATE 30 MG: 15 TABLET ORAL at 22:28

## 2021-07-08 RX ADMIN — METHOCARBAMOL 500 MG: 500 TABLET, FILM COATED ORAL at 16:13

## 2021-07-08 RX ADMIN — ASPIRIN 81 MG: 81 TABLET ORAL at 08:11

## 2021-07-08 RX ADMIN — MORPHINE SULFATE 15 MG: 15 TABLET ORAL at 17:27

## 2021-07-08 RX ADMIN — METHOCARBAMOL 500 MG: 500 TABLET, FILM COATED ORAL at 08:12

## 2021-07-08 RX ADMIN — SENNOSIDES AND DOCUSATE SODIUM 2 TABLET: 8.6; 5 TABLET ORAL at 21:45

## 2021-07-08 RX ADMIN — DICLOFENAC 2 G: 10 GEL TOPICAL at 17:46

## 2021-07-08 RX ADMIN — ATORVASTATIN CALCIUM 80 MG: 40 TABLET, FILM COATED ORAL at 21:44

## 2021-07-08 NOTE — PROGRESS NOTES
"  [facility]   Acute Rehabilitation Unit  Daily progress note    INTERVAL HISTORY  Jorje was seen and examined at bedside. He was working with OT and getting dressed.  Reports chronic pain and dislike for opioids due to side effects but needs Q 3 hour moprhine right now, this change was recommended on discharge from hospital but will change back to q 3 prn.  Mr. Simmons denies nausea, vomiting, sob, headache,dizziness, and fever.  Reports chronic constipation but had good regimen at home \"now that's all messed up\", has urinary frequency no dysuria no suprapubic pain, feels symptoms stable on prior to admission supplement which was approved for him to continue to take.  He is motivated to return home.     Undergoing therapy evaluations today anticipating ~3 week length of stay.         MEDICATIONS    ascorbic acid  250 mg Oral Daily     aspirin  81 mg Oral Daily     atorvastatin  80 mg Oral At Bedtime     melatonin  6 mg Oral At Bedtime     methocarbamol  500 mg Oral 4x Daily     metoprolol succinate ER  12.5 mg Oral At Bedtime     multivitamin w/minerals  1 tablet Oral Daily     senna-docusate  2 tablet Oral BID     Urivarx Capsules (patient supplied supplement)   2 capsule Oral Daily     vitamin E  200 Units Oral Daily     zinc sulfate  220 mg Oral Daily        acetaminophen, bisacodyl, diazepam, diclofenac, morphine, naloxone **OR** naloxone **OR** naloxone **OR** naloxone, polyethylene glycol, simethicone     PHYSICAL EXAM  /59 (BP Location: Right arm)   Pulse 82   Temp 98.6  F (37  C) (Oral)   Resp 16   Ht 1.854 m (6' 1\")   Wt 97.3 kg (214 lb 8 oz)   SpO2 97%   BMI 28.30 kg/m    Gen: awake alert  HEENT: mmm  Cardio: rrr  Pulm: non labored clear  Abd: soft non tender  Ext: warm dry left lower leg graft site cdi  Neuro/MSK: alert follows commands rolling in bed with therapy    LABS  CBC RESULTS:   Recent Labs   Lab Test 07/08/21  0654 06/29/21  1258 06/25/21  0830 "   WBC 7.4 9.7 8.3   RBC 3.47* 3.17* 2.90*   HGB 10.4* 9.8* 9.1*   HCT 33.7* 30.9* 28.1*   MCV 97 98 97   MCH 30.0 30.9 31.4   MCHC 30.9* 31.7 32.4   RDW 14.1 13.6 13.9    189 127*     Last Basic Metabolic Panel:  Recent Labs   Lab Test 07/08/21  0654 06/28/21  0826 06/27/21  0602 06/25/21  0830 06/25/21  0830 06/24/21  1538    143  --   --  139 140   POTASSIUM 4.2 3.6 3.6   < > 3.7 4.7   CHLORIDE 101 105  --   --  104 107   CO2 33* 30  --   --  31 31   ANIONGAP 4  --   --   --  4 2*   * 89  --   --  101* 102*   BUN 14 9  --   --  10 10   CR 0.51* 0.54*  --   --  0.54* 0.54*   GFRESTIMATED >90 >90  --   --  >90 >90   TRENTON 9.6 8.6  --   --  8.6 8.1*    < > = values in this interval not displayed.         Rehabilitation - continue comprehensive acute inpatient rehabilitation program with multidisciplinary approach including therapies, rehab nursing, and physiatry following. See interval history for updates.      ASSESSMENT AND PLAN    Darleen Simmons is a 70 year old man with past medical history of  HTN, HLP, CAD, ischemic cardiomyopathy s/p ICD placement, PAD s/p left SFA POBA for latissimus dorsi flap to left calf, atrial fibrillation/flutter,  flat back syndrome now s/p T7-pelvis revision PSIF with T12-L1 3CO and L5 PSO on 6/21/21 with Jazmyne Akins/Herb.  Admitted to acute rehab 7/7/21      Flatback syndrome-   Chronic back pain  S/p T7-S1 posterior spinal fusion per Dr. 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 Afterwards he experienced quadriplegia but he slowly was able to regain his ability to ambulate. In 2018, he underwent posterior decompression thoracolumbar fusion at [facility]. At that time It was thought that he would be primarily a sitter and he was fused in a fairly straight posture.  -  Up with assist until independent. No excessive bending or twisting. No lifting >10 lbs x 6 weeks. Renny lift approved for  transfers. Keep back straight if using lift.  -WBAT  -pain management as below  -continue PT/OT  - right AFO  -wound care as ordered.   -f/u Dr. Akins        CAD s/p MI CABG  Ischemic cardiomyopathy   s/p Pacemaker & ICD (medtronic)  -echo 5/4/21Mildly (EF 45-50%) reduced left ventricular function is present. There is a  pattern of wall motion abnormalities consistent with a prior RCA and,  possibly, LCx infarction.  Stress test 5/5/21  -continue lipitor  -continue asa     A-fib/ Aflutter- not anticoagulated prior to admission per pcp recs  -continue metoprolol xl 12.5 mg     Acute blood loss Anemia- Estimated blood loss ~ 4320 ml with 1630 returned via cell saver.  Hgb now stable 9.8 6/29  -trend     Acute on Chronic back pain- on morphine 15-30 mg reportedly taking qid prior to admission.   -continue robaxin scheduled  -continue tylenol, valium, morphine 15-30 mg prn     Depression- monitor mood   -psychology consult for emotional support.      PAD-  S/p L SFA baloon angioplasty. 2/11/21 MARIAMA Right:  Moderate peripheral arterial disease starting at or proximal to the superficial femoral level. Left:  Moderate peripheral arterial disease location not well determined.       Metabolic encephalopathy- prolonged surgery, pain med  -reorient as needed  -monitor- hold meds for sedation     Urinary retention- acute retention post operatively   -continue prior to admission supplement   -check pvrs on admission     Pressure injury- wound care as ordered - apparently healing well.   Evaluated by WON on 7/6/21  -wound care as ordered.     1. Adjustment to disability:  Psychology for emotional support  2. FEN: reg  3. Bowel: constipated  4. Bladder: chronic urgency- monitor  5. DVT Prophylaxis: mechanical  6. GI Prophylaxis: reg diet  7. Code: full   8. Disposition: goal for home   9. ELOS: ~ 3 weeks  10. Follow up Appointments on Discharge: pcp, ortho spine     seen and discussed with Dr. Clarke  PM&R Staff  Physician    Ca Hernandez PA-C  Physical Medicine & Rehabilitation

## 2021-07-08 NOTE — PROGRESS NOTES
07/08/21 0656   Quick Adds   Type of Visit Initial Occupational Therapy Evaluation   Living Environment   People in home spouse   Current Living Arrangements house  (split level)   Home Accessibility   (ramp stair lift)   Transportation Anticipated family or friend will provide   Living Environment Comments Lives in a split-level house with 9 steps to enter and a flight to the upper levelThe house is a wheelchair ramp chair stairlift on stairs pt was walking up stairs and using stairchair lift   Self-Care   Usual Activity Tolerance moderate   Current Activity Tolerance poor   Equipment Currently Used at Home walker, rolling   Activity/Exercise/Self-Care Comment Pt reports he was independent with all mobility with use of FWW prior to surgery, states he can navigate stairs but often uses the lift   Disability/Function   Hearing Difficulty or Deaf yes   Patient's preferred means of communication verbal   Describe hearing loss bilateral hearing loss   Use of hearing assistive devices bilateral hearing aids   Wear Glasses or Blind yes   Concentrating, Remembering or Making Decisions Difficulty no   Difficulty Communicating no   Difficulty Eating/Swallowing no   Walking or Climbing Stairs Difficulty no   Dressing/Bathing Difficulty no   Toileting issues no   Doing Errands Independently Difficulty (such as shopping) no   Fall history within last six months no   Change in Functional Status Since Onset of Current Illness/Injury yes   General Information   Onset of Illness/Injury or Date of Surgery 07/21/21   Referring Physician Franklyn Clarke MD   Additional Occupational Profile Info/Pertinent History of Current Problem Per MD report 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 Afterwards he experienced quadriplegia but he slowly was able to regain his ability to ambulate. In 2018, he underwent posterior decompression thoracolumbar fusion at [facility]. At that time It was thought that he would be primarily a sitter and he was fused in a fairly straight posture. The patient continued to work on his rehab and has returned to ambulation, however because of his fusion, he has a stooped posture. He describes pain between his shoulder blades as well as down into his pelvis. His thighs become very tired with any sort of distance walking or standing. Several visits with Dr. Akins ortho spine  Discussed flat back syndrome, with discussion of surgery to correct deformity, and sought additional opinon at [facility] ultimately admitted for surgery 6/21/21.  Course was complicated by acute blood loss anemia, acute on chronic pain, metabolic encephalopathy, hypotension, urinary retention, and thrombocytopenia   Left Upper Extremity (Weight-bearing Status) full weight-bearing (FWB)   Right Upper Extremity (Weight-bearing Status) full weight-bearing (FWB)   Left Lower Extremity (Weight-bearing Status) weight-bearing as tolerated (WBAT)   Right Lower Extremity (Weight-bearing Status) weight-bearing as tolerated (WBAT)   Cognitive Status Examination   Orientation Status orientation to person, place and time   Cognitive Status Comments futher cog assessment to be completed   Visual Perception   Visual Impairment/Limitations WNL   Sensory   Sensory Comments wfl for b u/e   Pain Assessment   Patient Currently in Pain Yes, see Vital Sign flowsheet   Range of Motion Comprehensive   General Range of Motion no range of motion deficits identified   Strength Comprehensive (MMT)   General Manual Muscle Testing (MMT) Assessment   (b strength 4/5mmg)   Coordination   Coordination Comments not formally tested functional for adls   ARC Assessment Only   Acute Rehab Functional Assessment See IP Rehab Daily Documentation Flowsheet for Functional Mobility/ADL Assessment   Clinical Impression   Criteria for Skilled Therapeutic Interventions Met (OT) yes   OT Diagnosis decrease adls and  Iadls   OT Problem List-Impairments impacting ADL activity tolerance impaired;balance;cognition;mobility;strength;pain;post-surgical precautions   ADL comments/analysis decreae adls and Iadls pt [facility]   Assessment of Occupational Performance 3-5 Performance Deficits   Identified Performance Deficits dressing bathing toileting meal prep   Planned Therapy Interventions (OT) ADL retraining;IADL retraining;balance training;bed mobility training;cognition;strengthening;transfer training;home program guidelines;progressive activity/exercise;risk factor education   Intervention Comments decreae adls and Iadls pt will benifit from OT to return home with wife   Clinical Decision Making Complexity (OT) moderate complexity   Therapy Frequency (OT) Daily   Predicted Duration of Therapy 21 days   Anticipated Equipment Needs Upon Discharge (OT) shower chair   Risk & Benefits of therapy have been explained evaluation/treatment results reviewed;care plan/treatment goals reviewed;risks/benefits reviewed;current/potential barriers reviewed;participants voiced agreement with care plan;participants included;patient   Comment-Clinical Impression decreae adls and Iadls pt [facility] pt will benifit from ot goals   Total Evaluation Time (Minutes)   Total Evaluation Time (Minutes) 15

## 2021-07-08 NOTE — PLAN OF CARE
Patient is Alert and oriented x4, makes needs known. A2 liko lift. Uses urinal for voids. Having mild retention. PRN morphine and PRN valium given x1 for low back pain that radiates to R. Leg, which provided relief. Incision to posterior back is covered, dressing CDI. Did not given afternoon robaxin or additional PRN pain  medications due to patient sleeping, and being very drowsy when awakened. PA made aware of drowsiness, vitally stable.  Patient had personal medication that were sent to pharmacy for verification, approved by patient and placed in patient bin. Continue with POC.

## 2021-07-08 NOTE — CONSULTS
21 0900   Living Arrangements   People in home spouse   Able to Return to Prior Arrangements yes   Living Arrangement Comments Home with good A and DME    Home Safety   Patient Feels Safe Living in Home? yes   CM/SW Discharge Planning   Patient/Family Anticipates Transition to home with family;home;home with help/services   Concerns to be Addressed no discharge needs identified;denies needs/concerns at this time   Patient/family educated on Medicare website which has current facility and service quality ratings no   Transportation Anticipated family or friend will provide   Anticipated Discharge Disposition (CM/SW) Home;Home Care;Outpatient Rehab (PT, OT, SLP, Cardiac or Pulmonary)   Patient/Family in Agreement with Plan yes       Social Work: Initial Assessment with Discharge Plan    Patient Name: Darleen Simmons  : 1950  Age: 70 year old  MRN: [id number]  Completed assessment with: Chart review and interview with pt   Admitted to ARU: 2021    Presenting Information   Date of  assessment: 2021  Health Care Directive: Copy in Chart  Primary Health Care Agent: Patient/self   Secondary Health Care Agent: Pt spouse NOK   Living Situation: Lives in a split-level house with spouse and 2 cats in [place], [place]. 9 steps to enter and a flight to the upper level. Ramp to enter home and stair lift.   Previous Functional Status: Pt reports he was independent with all mobility with use of FWW prior to surgery, states he can navigate stairs but often uses the lift. Hearing aides and glasses. Manage own meds and finances. Wife A with household chores.   DME available: cane, straight, walker, standard, crutches, cane, quad, grab bar, toilet, grab bar, tub/shower, shower chair, lift device, ramp to enter home  Patient and family understanding of hospitalization: Appropriate, some irritability and anxiety.   Cultural/Language/Spiritual Considerations: 71 y/o male, , english-speaking and  "Religion-jose.       Physical Health  Reason for admission: Orthopaedic Disorders 08.9 Other Orthopaedic, T7-pelvis spinal fusion revision    Darleen Simmons is a 70 year old man with past medical history of HLD, HYPERTENSION, CAD s/p MI and CABG X 4 in 1986, ischemic cardiomyopathy, ICD placement, PAD, s/p left SFA balloon angioplasty to assist healing a latissimus dorsi flap to the left calf, atrial fibrillation/flutter, depression,  former tobacco use, and chronic back pain  who was admitted for planned T7-S1 fusion, with Dr. Akins.    Provider Information   Primary Care Physician:Dunphy, Tyler J   [facility] [place] [address], [place] [address]  PH: [phone]  : None reported     Mental Health/Chemical Dependency:   Diagnosis: See previous  notes from [facility]. Per sign-out from Dora Cheung, [facility] SWer pt has trauma from previous hospitalizations and operations. Dora reported pt and wife being anxious about delay in ARU admission due to insurance auth. Patient Relations and Accommodations was involved during [facility] inpt stay. SWer sent SW hand-off to Health Psychologist and recommending support.   Alcohol/Tobacco/Narcotis: Former smoker, no concerns reported or identified.   Support/Services in Place: None reported   Services Needed/Recommended: Supportive services available by consult (Health Psychology and [place] services)   Sexuality/Intimacy: Not discussed     Support System  Marital Status: , wife Joe supportive and able to A at discharge. Wife retired.   Family support: Pt reported 3-4 kids initially. Pt stated that his wife has a son who lives in [place] and about 1.5 hour away. SW asked about other kids and he expressed, 2 others; one in [place] and one in [place]. Pt then expressed \"why does any of this matter\" and refused to answer any other questions. Pt expressed \"my wife is my only support\".   Other support available: None reported     Community Resources  Current " in home services: None reported   Previous services: Pt reported having HC and OP in the past but did not elaborate.     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   Provided patient with [facility] Resources: N/A  Barriers to discharge: None identified     Discharge Recommendations   Disposition: See above   Transportation Needs: Family assistance   Name of Transportation Company and Phone: N/A     Additional comments   ELOS 14 days. Discharge needs pending. Pt denied immediate needs or concerns. SW will continue to follow.       Please invite to Care Conference:  pt's wife Ayah (ph: [phone])     Nuvia Kim, CLEMENTE, [facility] Acute Rehab Unit   Phone: [phone]  I   Pager: [phone]

## 2021-07-08 NOTE — PHARMACY-MEDICATION REGIMEN REVIEW
Pharmacy Medication Regimen Review  Darleen Simmons is a 70 year old male who is currently in the Acute Rehab Unit.    Assessment: All medications have an appropriate indications, durations and no unnecessary use was found    Plan:   Continue current medications. Pharmacy has labeled home meds for use per patient request.    Attending provider will be sent this note for review.  If there are any emergent issues noted above, pharmacist will contact provider directly by phone.      Pharmacy will periodically review the resident's medication regimen for any PRN medications not administered in > 72 hours and discontinue them. The pharmacist will discuss gradual dose reductions of psychopharmacologic medications with interdisciplinary team on a regular basis.    Please contact pharmacy if the above does not answer specific medication questions/concerns.    Background:  A pharmacist has reviewed all medications and pertinent medical history today.  Medications were reviewed for appropriate use and any irregularities found are listed with recommendations.      Current Facility-Administered Medications:      acetaminophen (TYLENOL) rapid release gels 500mg--PATIENT SUPPLIED MEDICATION, 1,000 mg, Oral, Q6H PRN, Franklyn Clarke MD     ascorbic acid (vitamin C) chewable tablet 250 mg, 250 mg, Oral, Daily, Ca Hernandez PA, 250 mg at 07/08/21 0812     aspirin EC tablet 81 mg, 81 mg, Oral, Daily, Ca Hernandez PA, 81 mg at 07/08/21 0811     atorvastatin (LIPITOR) tablet 80 mg, 80 mg, Oral, At Bedtime, Ca Hernandez PA, 80 mg at 07/07/21 2100     bisacodyl (DULCOLAX) Suppository 10 mg, 10 mg, Rectal, Daily PRN, Ca Hernandez PA     diazepam (VALIUM) tablet 5-10 mg, 5-10 mg, Oral, Q6H PRN, Ca Hernandez PA, 10 mg at 07/08/21 0812     diclofenac (VOLTAREN) 1% topical gel--PATIENT SUPPLIED MEDICATION, 2 g, Topical, 4x Daily PRN, Ca Hernandez PA     melatonin tablet 6 mg, 6 mg, Oral, At  Bedtime, Ca Hernandez PA, 6 mg at 07/07/21 2152     methocarbamol (ROBAXIN) tablet 500 mg, 500 mg, Oral, 4x Daily, Ca Hernandez PA, 500 mg at 07/08/21 0812     metoprolol succinate (TOPROL-XL) half-tab 12.5 mg, 12.5 mg, Oral, At Bedtime, Ca Hernandez PA, 12.5 mg at 07/07/21 2100     morphine (MSIR) IR tablet 15-30 mg, 15-30 mg, Oral, Q3H PRN, Ca Hernandez PA, 30 mg at 07/08/21 1005     multivitamin w/minerals (THERA-VIT-M) tablet 1 tablet, 1 tablet, Oral, Daily, Ca Hernandez PA, 1 tablet at 07/08/21 0811     naloxone (NARCAN) injection 0.2 mg, 0.2 mg, Intravenous, Q2 Min PRN **OR** naloxone (NARCAN) injection 0.4 mg, 0.4 mg, Intravenous, Q2 Min PRN **OR** naloxone (NARCAN) injection 0.2 mg, 0.2 mg, Intramuscular, Q2 Min PRN **OR** naloxone (NARCAN) injection 0.4 mg, 0.4 mg, Intramuscular, Q2 Min PRN, Ca Hernandez PA     polyethylene glycol (MIRALAX) powder--PATIENT SUPPLIED MEDICATION, 17 g, Oral, BID PRN, Ca Hernandez PA     senna-docusate (SENOKOT-S/PERICOLACE) 8.6-50 MG per tablet 2 tablet, 2 tablet, Oral, BID, Ca Hernandez PA, 2 tablet at 07/08/21 0812     simethicone (MYLICON)--PATIENT SUPPLIED MEDICATION, 375 mg, Oral, BID PRN, Ca Hernandez PA     Urivarx Capsules (patient supplied supplement) , 2 capsule, Oral, Daily, Ca Hernandez PA, 2 capsule at 07/08/21 1006     vitamin E (TOCOPHEROL) 200 units (90 mg) capsule 200 Units, 200 Units, Oral, Daily, Ca Hernandez PA, 200 Units at 07/08/21 0812     zinc sulfate (ZINCATE) capsule 220 mg, 220 mg, Oral, Daily, Ca Hernandez PA, 220 mg at 07/08/21 0811  No current outpatient prescriptions on file.   PMH:HLD, HYPERTENSION, CAD s/p MI and CABG X 4 in 1986, ischemic cardiomyopathy, ICD placement, PAD

## 2021-07-08 NOTE — PLAN OF CARE
FOCUS/GOAL  Pain management and Medical management    ASSESSMENT, INTERVENTIONS AND CONTINUING PLAN FOR GOAL:    Pt is alert and oriented. Makes needs known. VSS. [place], w/ joshua HA. Complained of back and right leg pain. Is weepy, restless and frustrated. Given PRN morphine and Valium with partial effect. Pt got upset about frequency of administration of pain meds. Would like to talk to PMR about it, Left a sticky note for provider. Was able to sleep for a while and woke up to use the urinal. Got upset as well about spilling urine over the chucks while using it. Provided avenue for pt to verbalize frustrations. Continent of bowel and bladder. No bm this shift. Uses the urinal independently. PVR >100ml. Pillows utilized for support of right leg and foot. A2 with the lift in transfers. Called again at 0545 and asked something for pain, Offered tylenol but refused. Is willing to wait for PRN morphine when due. Given morphine again at 0628. Will continue POC.

## 2021-07-08 NOTE — PLAN OF CARE
FOCUS/GOAL  Medication management, Pain management, and Medical management    ASSESSMENT, INTERVENTIONS AND CONTINUING PLAN FOR GOAL:  A&O, A2 lift. Makes needs known, alarms on.  Pt reported pain in back and R leg, PRN morphine given 2x.  Continent of bowel and bladder, LBM 7/6.  Fair appetite, ate 75% of his meal. Wears glasses and hearing aides.

## 2021-07-08 NOTE — PLAN OF CARE
Discharge Planner Post-Acute Rehab OT:     Discharge Plan: home with wife home ot    Precautions: spinal     Current Status:  ADLs: l/bdressing mod a  cues and physical assist for use of reacher min a supine to eob eob to supine mod a for b legs  IADLs: tba  Vision/Cognition: tba    Assessment: ot cx 60min pt after pain meds unable to stay awake to participate in ot session. Pt will benift from ot gaols to return home with wife    Other Barriers to Discharge (DME, Family Training, etc): dme needs

## 2021-07-09 ENCOUNTER — APPOINTMENT (OUTPATIENT)
Dept: OCCUPATIONAL THERAPY | Facility: CLINIC | Age: 71
End: 2021-07-09
Payer: COMMERCIAL

## 2021-07-09 ENCOUNTER — APPOINTMENT (OUTPATIENT)
Dept: PHYSICAL THERAPY | Facility: CLINIC | Age: 71
End: 2021-07-09
Payer: COMMERCIAL

## 2021-07-09 PROCEDURE — 128N000003 HC R&B REHAB

## 2021-07-09 PROCEDURE — 250N000013 HC RX MED GY IP 250 OP 250 PS 637: Performed by: PHYSICIAN ASSISTANT

## 2021-07-09 PROCEDURE — 97535 SELF CARE MNGMENT TRAINING: CPT | Mod: GO

## 2021-07-09 PROCEDURE — 97530 THERAPEUTIC ACTIVITIES: CPT | Mod: GP | Performed by: PHYSICAL THERAPIST

## 2021-07-09 PROCEDURE — 90791 PSYCH DIAGNOSTIC EVALUATION: CPT | Performed by: PSYCHOLOGIST

## 2021-07-09 PROCEDURE — 99233 SBSQ HOSP IP/OBS HIGH 50: CPT | Performed by: PHYSICAL MEDICINE & REHABILITATION

## 2021-07-09 RX ORDER — DIAZEPAM 2 MG
4-8 TABLET ORAL EVERY 6 HOURS PRN
Status: DISCONTINUED | OUTPATIENT
Start: 2021-07-09 | End: 2021-07-13

## 2021-07-09 RX ADMIN — ATORVASTATIN CALCIUM 80 MG: 40 TABLET, FILM COATED ORAL at 21:48

## 2021-07-09 RX ADMIN — METHOCARBAMOL 500 MG: 500 TABLET, FILM COATED ORAL at 08:12

## 2021-07-09 RX ADMIN — SENNOSIDES AND DOCUSATE SODIUM 2 TABLET: 8.6; 5 TABLET ORAL at 08:12

## 2021-07-09 RX ADMIN — MORPHINE SULFATE 30 MG: 15 TABLET ORAL at 22:42

## 2021-07-09 RX ADMIN — MORPHINE SULFATE 30 MG: 15 TABLET ORAL at 04:51

## 2021-07-09 RX ADMIN — ZINC SULFATE 220 MG (50 MG) CAPSULE 220 MG: CAPSULE at 08:12

## 2021-07-09 RX ADMIN — METHOCARBAMOL 500 MG: 500 TABLET, FILM COATED ORAL at 21:48

## 2021-07-09 RX ADMIN — MELATONIN TAB 3 MG 6 MG: 3 TAB at 21:48

## 2021-07-09 RX ADMIN — Medication 250 MG: at 08:12

## 2021-07-09 RX ADMIN — Medication 1 TABLET: at 08:12

## 2021-07-09 RX ADMIN — Medication 12.5 MG: at 21:48

## 2021-07-09 RX ADMIN — Medication 200 UNITS: at 08:12

## 2021-07-09 RX ADMIN — METHOCARBAMOL 500 MG: 500 TABLET, FILM COATED ORAL at 12:32

## 2021-07-09 RX ADMIN — ASPIRIN 81 MG: 81 TABLET ORAL at 08:12

## 2021-07-09 RX ADMIN — MORPHINE SULFATE 30 MG: 15 TABLET ORAL at 17:45

## 2021-07-09 RX ADMIN — MORPHINE SULFATE 30 MG: 15 TABLET ORAL at 08:11

## 2021-07-09 RX ADMIN — MORPHINE SULFATE 30 MG: 15 TABLET ORAL at 11:16

## 2021-07-09 RX ADMIN — MORPHINE SULFATE 30 MG: 15 TABLET ORAL at 01:45

## 2021-07-09 RX ADMIN — SENNOSIDES AND DOCUSATE SODIUM 2 TABLET: 8.6; 5 TABLET ORAL at 21:48

## 2021-07-09 NOTE — PLAN OF CARE
INPATIENT PAIN MANAGEMENT       DATE : July 9, 2021      REASON FOR PAIN phone call discussion: Patient is currently on MSIR 15mg PO Q 3 hours PRN, averaging about 6 out 8 doses per day.  He is sleepy after opioid dosing however c/o pain and requesting more opioid dosing.  Sleepiness is affecting his ability to participate in rehabilitation plan.        DISCUSSION:     Phone call discussion with PARAM Hernandez PA-C.    -discussed that patient had similar pain management challenges as above while he was inpatient post operatively.    -recommend consideration of discontinuing MSIR and opioid rotate to oxycodone 10-15mg PO Q 3 hours PRN.         To reach us:  Mon - Friday 8 AM - 3 PM: Pager [phone] (Text Page)  After hours, weekends and holidays: Primary service should call [phone] for the on-call pain specialist    Aleida Pederson PA-C  July 9, 2021, 4:05 PM  Inpatient Pain Management Service

## 2021-07-09 NOTE — PLAN OF CARE
FOCUS/GOAL  Pain management and Medical management    ASSESSMENT, INTERVENTIONS AND CONTINUING PLAN FOR GOAL:    Pt is alert and oriented. Able to make needs known. Denies sob, dizziness, numbness/tingling or any new weakness. Complained of back pain radiating to the right leg. Managed with PRN Morphine x2 this shift with good effect. However only lasts about 3-4 hours until pt calls again to request for another dose. Repositioning and pillows for support utilized. Declined PRN tylenol and voltaren gel when offered in between morphine doses while pt is awake. Mod A2 with bed mobility. Continent of bowel and bladder. LBM 7/8. Uses the urinal independently with staff to empty. In a better mood this shift. Spine dressing is cdi. Will continue POC.

## 2021-07-09 NOTE — PLAN OF CARE
Discharge Planner Post-Acute Rehab PT:      Discharge Plan: home w/ his wife, she is older and limited w/ ability to provide physical assist     Precautions: falls, spine; [place] , PTSD     Current Status:  Bed Mobility: bed rail and assist of one for rolling, raised HOB for supine>sit  Transfer: modified slide transfer w/o board, assist of two; Liko available and has extenders on sling per spine precautions  Gait: NT  Stairs: NT  Balance: sits EOB w/o support     Assessment:  Participated in use of standing frame, offered problem solving ideas for bed<>standing frame transfer. Conversation tangential, inconsistent  .    Other Barriers to Discharge (DME, Family Training, etc): None identified at this time.

## 2021-07-09 NOTE — PLAN OF CARE
FOCUS/GOAL  Bowel management, Bladder management, Pain management, Mobility, Skin integrity, and Safety management    ASSESSMENT, INTERVENTIONS AND CONTINUING PLAN FOR GOAL:  A/O, VS stable. Regular/thin, pills whole. Ate some of meal. Incontinent of bowel, continent of bladder. Pain in back and leg, scheduled and PRN medication given. Assist of 2 with liko to transfer. Incision on back covered. Calls appropriately with light. Continue POC.

## 2021-07-09 NOTE — PLAN OF CARE
Discharge Planner Post-Acute Rehab OT:      Discharge Plan: home with wife home ot     Precautions: spinal      Current Status:  ADLs:G/H:sba  Washing face shaving sitting eob with wedge back support  U/b dressing: pullover shirt sba doff/don  L/B dressing:  shorts disposable brief max a to don over legs mod a supine to adjust over hips  Feet:dep pt able to doff r knee brace max with l and mod a to don b knee braces supine in bed  Vision/Cognition: tba     Assessment:pt was able to be seen later for second tx session on 7-8  This date motivated for shower liko to rolling shower chair  Variable on sit to stand pt brennan to stand pm 7-9 cga unable to stand am of 7-9  Pt will benift from ot gaols to return home with wife     Other Barriers to Discharge (DME, Family Training, etc): dme needs

## 2021-07-09 NOTE — PLAN OF CARE
Discharge Planner Post-Acute Rehab PT:     Discharge Plan: home w/ his wife, she is older and limited w/ ability to provide physical assist    Precautions: falls, spine    Current Status:  Bed Mobility: bed rail and assist of one for rolling  Transfer: NT, Liko available and put extenders on sling per spine precautions  Gait: NT  Stairs: NT  Balance: NT    Assessment:  Sleepy and lots of pain today. Difficult to get a good perspective on ability to participate and potential for home mobility.    Other Barriers to Discharge (DME, Family Training, etc): None identified at this time.     Additional information: was issued an OTS AFO on at acute hospital; however, it does not fit properly due to known previous plantar flexion contracture, weakness of fibular muscles and long tibia.  Placed a call to orthotics on 7/8 regarding this.  Will need further problem solving in this area.

## 2021-07-09 NOTE — PLAN OF CARE
FOCUS/GOAL  Pain management, Skin integrity, and Safety management    ASSESSMENT, INTERVENTIONS AND CONTINUING PLAN FOR GOAL:  Pt was A/O, able to use call light and makes needs known to staff. Denies SOB, chest pain nor dizziness. Had back pains which radiated to rt leg and was managed with PRN Morpihne x 2 doses, scheduled Robaxin and PRN Voltaren gel. PRN Valium x 1 dose given per request. A2 liko lift for transfers. On regular diet, thin liquids, takes medicines whole. Cont of BL, incont of BM, LBM-7/8/21. VSS. Back incision dressing changed. Will continue with current POC.     Spoke with Jero as there were some issue with changes being made to patient's oxygen liter flow - decreased to 2.  It is not clear where these orders originated from - he believes the NH may have done that. The small tanks are running out to fast on Manuel and he is struggling to the point he does not even go down for meals in his new facility.  Discussed with Sherry at [facility] and we can send new orders with the appropriate liter flow (4 liters is documented in Dr. Foley's note of 4/2021).  Once they are signed and faxed we will call Jero and let him know.  It is ok to leave a message if he does not .

## 2021-07-09 NOTE — PROGRESS NOTES
"  [facility]   Acute Rehabilitation Unit  Daily progress note    INTERVAL HISTORY  Jorje was seen resting in bed, initially sleeping and when waking up speech was slurred.  As he woke he reported doing ok, denied n/v/, eating ok, no fevers, denied dizziness.  Providers expressed some concern over grogginess, slurred speech and sedation, recommended taper of morphine or valium.  Patient reports he is on his home morphine dose and valium dose and will go home if doses reduced, stated he would \"stand on his head\", if pain properly controlled.      Reviewed - morphine 15 mg #180 q month, and valium 2 mg #90 monthly.   Will work to titrate medications to be at home doses prior to discharge, taper valium to 4-8 mg q 6 from 5-10.      OT: ot cx 60min pt after pain meds unable to stay awake to participate in ot session. Pt will benift from ot gaols to return home with wife    PT: Sleepy and lots of pain today. Difficult to get a good perspective on ability to participate and potential for home mobility.      MEDICATIONS    ascorbic acid  250 mg Oral Daily     aspirin  81 mg Oral Daily     atorvastatin  80 mg Oral At Bedtime     melatonin  6 mg Oral At Bedtime     methocarbamol  500 mg Oral 4x Daily     metoprolol succinate ER  12.5 mg Oral At Bedtime     multivitamin w/minerals  1 tablet Oral Daily     senna-docusate  2 tablet Oral BID     Urivarx Capsules (patient supplied supplement)   2 capsule Oral Daily     vitamin E  200 Units Oral Daily     zinc sulfate  220 mg Oral Daily        acetaminophen, bisacodyl, diazepam, diclofenac, morphine, naloxone **OR** naloxone **OR** naloxone **OR** naloxone, polyethylene glycol, simethicone     PHYSICAL EXAM  /60   Pulse 81   Temp 97  F (36.1  C) (Oral)   Resp 16   Ht 1.854 m (6' 1\")   Wt 97.3 kg (214 lb 8 oz)   SpO2 96%   BMI 28.30 kg/m    Gen: awake speech slurred  HEENT: mmm  Pulm: non labored clear  Abd: soft non tender  Ext: " warm dry left lower leg graft site cdi  Neuro/MSK: alert follows commands rolling in bed with therapy    LABS  CBC RESULTS:   Recent Labs   Lab Test 07/08/21  0654 06/29/21  1258 06/25/21  0830   WBC 7.4 9.7 8.3   RBC 3.47* 3.17* 2.90*   HGB 10.4* 9.8* 9.1*   HCT 33.7* 30.9* 28.1*   MCV 97 98 97   MCH 30.0 30.9 31.4   MCHC 30.9* 31.7 32.4   RDW 14.1 13.6 13.9    189 127*     Last Basic Metabolic Panel:  Recent Labs   Lab Test 07/08/21  0654 06/28/21  0826 06/27/21  0602 06/25/21  0830 06/25/21  0830 06/24/21  1538    143  --   --  139 140   POTASSIUM 4.2 3.6 3.6   < > 3.7 4.7   CHLORIDE 101 105  --   --  104 107   CO2 33* 30  --   --  31 31   ANIONGAP 4  --   --   --  4 2*   * 89  --   --  101* 102*   BUN 14 9  --   --  10 10   CR 0.51* 0.54*  --   --  0.54* 0.54*   GFRESTIMATED >90 >90  --   --  >90 >90   TRENTON 9.6 8.6  --   --  8.6 8.1*    < > = values in this interval not displayed.         Rehabilitation - continue comprehensive acute inpatient rehabilitation program with multidisciplinary approach including therapies, rehab nursing, and physiatry following. See interval history for updates.      ASSESSMENT AND PLAN    Darleen Simmons is a 70 year old man with past medical history of  HTN, HLP, CAD, ischemic cardiomyopathy s/p ICD placement, PAD s/p left SFA POBA for latissimus dorsi flap to left calf, atrial fibrillation/flutter,  flat back syndrome now s/p T7-pelvis revision PSIF with T12-L1 3CO and L5 PSO on 6/21/21 with Jazmyne Akins/Herb.  Admitted to acute rehab 7/7/21    Flatback syndrome-   Chronic back pain  S/p T7-S1 posterior spinal fusion per Dr. 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 Afterwards he experienced quadriplegia but he slowly was able to regain his ability to ambulate. In 2018, he underwent posterior decompression thoracolumbar fusion at [facility]. At that time It was thought that he would be  primarily a sitter and he was fused in a fairly straight posture.  -  Up with assist until independent. No excessive bending or twisting. No lifting >10 lbs x 6 weeks. Renny lift approved for transfers. Keep back straight if using lift.  -per : 15 mg morphine #180 monthly, 2 mg valium #90 monthly  --continue robaxin 500 qid, prn tylenol, prn voltaren  -inability to participate in meaningful therapy due to sedation or pain- consult pain management for acute on chronic pain  -WBAT  -pain management as below  -continue PT/OT  - right AFO  -wound care as ordered.   -f/u Dr. Akins        CAD s/p MI CABG  Ischemic cardiomyopathy   s/p Pacemaker & ICD (medtronic)  -echo 5/4/21Mildly (EF 45-50%) reduced left ventricular function is present. There is a  pattern of wall motion abnormalities consistent with a prior RCA and,  possibly, LCx infarction.  Stress test 5/5/21  -continue lipitor  -continue asa     A-fib/ Aflutter- not anticoagulated prior to admission per pcp recs  -continue metoprolol xl 12.5 mg     Acute blood loss Anemia- Estimated blood loss ~ 4320 ml with 1630 returned via cell saver.  Hgb now stable 9.8 6/29  -trend     Acute on Chronic back pain- on morphine 15-30 mg reportedly taking qid prior to admission.   -continue robaxin scheduled  -continue tylenol, valium, morphine 15-30 mg prn     Depression- monitor mood   -psychology consult for emotional support.      PAD-  S/p L SFA baloon angioplasty. 2/11/21 MARIAMA Right:  Moderate peripheral arterial disease starting at or proximal to the superficial femoral level. Left:  Moderate peripheral arterial disease location not well determined.       Metabolic encephalopathy- prolonged surgery, pain med  -reorient as needed  -monitor- hold meds for sedation     Urinary retention- acute retention post operatively   -continue prior to admission supplement   -check pvrs on admission     Pressure injury- wound care as ordered - apparently healing well.   Evaluated by LINDEN  on 7/6/21  -wound care as ordered.     1. Adjustment to disability:  Psychology for emotional support  2. FEN: reg  3. Bowel: constipated  4. Bladder: chronic urgency- monitor  5. DVT Prophylaxis: mechanical  6. GI Prophylaxis: reg diet  7. Code: full   8. Disposition: goal for home   9. ELOS: ~ 3 weeks  10. Follow up Appointments on Discharge: pcp, ortho spine     seen and discussed with Dr. Clarke  PM&R Staff Physician    Ca Hernandez PA-C  Physical Medicine & Rehabilitation

## 2021-07-10 ENCOUNTER — APPOINTMENT (OUTPATIENT)
Dept: PHYSICAL THERAPY | Facility: CLINIC | Age: 71
End: 2021-07-10
Payer: COMMERCIAL

## 2021-07-10 ENCOUNTER — APPOINTMENT (OUTPATIENT)
Dept: OCCUPATIONAL THERAPY | Facility: CLINIC | Age: 71
End: 2021-07-10
Payer: COMMERCIAL

## 2021-07-10 PROCEDURE — 97530 THERAPEUTIC ACTIVITIES: CPT | Mod: GP | Performed by: PHYSICAL THERAPIST

## 2021-07-10 PROCEDURE — 97535 SELF CARE MNGMENT TRAINING: CPT | Mod: GO

## 2021-07-10 PROCEDURE — 97110 THERAPEUTIC EXERCISES: CPT | Mod: GP | Performed by: PHYSICAL THERAPIST

## 2021-07-10 PROCEDURE — 128N000003 HC R&B REHAB

## 2021-07-10 PROCEDURE — 97110 THERAPEUTIC EXERCISES: CPT | Mod: GO

## 2021-07-10 PROCEDURE — 250N000013 HC RX MED GY IP 250 OP 250 PS 637: Performed by: PHYSICIAN ASSISTANT

## 2021-07-10 RX ADMIN — DIAZEPAM 8 MG: 2 TABLET ORAL at 00:12

## 2021-07-10 RX ADMIN — ATORVASTATIN CALCIUM 80 MG: 40 TABLET, FILM COATED ORAL at 22:04

## 2021-07-10 RX ADMIN — MORPHINE SULFATE 30 MG: 15 TABLET ORAL at 02:16

## 2021-07-10 RX ADMIN — METHOCARBAMOL 500 MG: 500 TABLET, FILM COATED ORAL at 08:55

## 2021-07-10 RX ADMIN — SENNOSIDES AND DOCUSATE SODIUM 2 TABLET: 8.6; 5 TABLET ORAL at 20:06

## 2021-07-10 RX ADMIN — METHOCARBAMOL 500 MG: 500 TABLET, FILM COATED ORAL at 16:14

## 2021-07-10 RX ADMIN — ASPIRIN 81 MG: 81 TABLET ORAL at 08:55

## 2021-07-10 RX ADMIN — MORPHINE SULFATE 30 MG: 15 TABLET ORAL at 16:19

## 2021-07-10 RX ADMIN — ZINC SULFATE 220 MG (50 MG) CAPSULE 220 MG: CAPSULE at 08:55

## 2021-07-10 RX ADMIN — SENNOSIDES AND DOCUSATE SODIUM 2 TABLET: 8.6; 5 TABLET ORAL at 08:54

## 2021-07-10 RX ADMIN — Medication 250 MG: at 08:55

## 2021-07-10 RX ADMIN — Medication 200 UNITS: at 08:55

## 2021-07-10 RX ADMIN — MORPHINE SULFATE 30 MG: 15 TABLET ORAL at 22:05

## 2021-07-10 RX ADMIN — METHOCARBAMOL 500 MG: 500 TABLET, FILM COATED ORAL at 20:06

## 2021-07-10 RX ADMIN — POLYETHYLENE GLYCOL 3350 17 G: 17 POWDER, FOR SOLUTION ORAL at 09:00

## 2021-07-10 RX ADMIN — MORPHINE SULFATE 30 MG: 15 TABLET ORAL at 11:56

## 2021-07-10 RX ADMIN — MELATONIN TAB 3 MG 6 MG: 3 TAB at 22:05

## 2021-07-10 RX ADMIN — MORPHINE SULFATE 30 MG: 15 TABLET ORAL at 08:54

## 2021-07-10 RX ADMIN — Medication 12.5 MG: at 22:04

## 2021-07-10 RX ADMIN — Medication 1 TABLET: at 08:55

## 2021-07-10 RX ADMIN — METHOCARBAMOL 500 MG: 500 TABLET, FILM COATED ORAL at 11:56

## 2021-07-10 RX ADMIN — DIAZEPAM 4 MG: 2 TABLET ORAL at 17:49

## 2021-07-10 RX ADMIN — MORPHINE SULFATE 30 MG: 15 TABLET ORAL at 05:25

## 2021-07-10 NOTE — PLAN OF CARE
FOCUS/GOAL  Medical management    ASSESSMENT, INTERVENTIONS AND CONTINUING PLAN FOR GOAL:  Pt is alert and oriented. Use call light for needs. Pt asked for  prn pain medication every time he is awake. Assisted w/ repositioning. Having a lot of pain on his back and legs. Decline any other interventions aside from prn morphine and valium. Use urinal for voiding. Cont w/ POC.

## 2021-07-10 NOTE — PLAN OF CARE
Discharge Planner Post-Acute Rehab OT:      Discharge Plan: home with wife home ot     Precautions: spinal      Current Status:  ADLs:G/H:sba  Washing face shaving sitting eob with wedge back support  U/b dressing: pullover shirt sba doff/don  L/B dressing:  shorts disposable brief max a to don over legs mod a supine to adjust over hips  Feet:dep pt able to doff r knee brace max with l and mod a to don b knee braces supine in bed  Vision/Cognition: tba     Assessment:pt able to increase donning slippers on floor to sba from max a with extra time and wedge back support feet ot pt increased l/b dressing socks supine in bed with hob raised 30 degrees pt required min a to cross one leg over other and pt able to doff bsocks l foot min a to don and r mod a PM focus on b u/e scapula stabalizing and shoulder ex to increase strength with b u/e needed with mobility and adls to compensate for weaker l/e. pt needed min cues during reps to stay awake to complete 10 reps of 7 ex pt able to verballize and demo ex.  Pt will benift from ot goals to return home with wife     Other Barriers to Discharge (DME, Family Training, etc): dme needs

## 2021-07-10 NOTE — PLAN OF CARE
FOCUS/GOAL  Pain management    ASSESSMENT, INTERVENTIONS AND CONTINUING PLAN FOR GOAL:  Pt Aox4 able to make needs known, using call light appropriately. VSS, pain reported in back and legs taking PRN morphine every three hours which is somewhat effective in managing pain, pt can also have PRN Valium Q6hr for spacticity. Incision to back covered with island dressing.  Liko lift for transfers.  Reg/thin taking pills whole with water.  Pt requested Miralax this morning, no results yet.  Working with therapy.  Nursing will continue to monitor.

## 2021-07-10 NOTE — PLAN OF CARE
FOCUS/GOAL  Pain management, Skin integrity, and Safety management    ASSESSMENT, INTERVENTIONS AND CONTINUING PLAN FOR GOAL:  Pt was A/O, able to use call light and makes needs known to staff. Denies SOB, chest pain nor dizziness. Had back pains which radiated to rt leg and was managed with PRN Morpihne x 2 doses and scheduled Robaxin. A2 liko lift for transfers. On regular diet, thin liquids, takes medicines whole. Cont of BL, no BM this shift, LBM-7/8/21. VSS. Back incision dressing CDI. Will continue with current POC.

## 2021-07-10 NOTE — PLAN OF CARE
Discharge Planner Post-Acute Rehab PT:      Discharge Plan: home w/ his wife, she is older and limited w/ ability to provide physical assist     Precautions: falls, spine; [place] , PTSD     Current Status:  Bed Mobility: bed rail and assist of one for rolling, raised HOB for supine>sit  Transfer: modified slide transfer w/o board, mod A x1; Liko available and has extenders on sling per spine precautions  Gait: NT  Stairs: NT  Balance: sits EOB w/o support     Assessment:  Continues to progress lateral transfers.  Limited on standing from edge of bed due to fatigue and weakness.  Will need continued LE ROM prior to standing tomorrow to assess if increases standing quality.  Pt very happy with time on NuStep.  .    Other Barriers to Discharge (DME, Family Training, etc): None identified at this time.

## 2021-07-11 ENCOUNTER — APPOINTMENT (OUTPATIENT)
Dept: PHYSICAL THERAPY | Facility: CLINIC | Age: 71
DRG: 559 | End: 2021-07-11
Attending: PHYSICAL MEDICINE & REHABILITATION
Payer: COMMERCIAL

## 2021-07-11 ENCOUNTER — APPOINTMENT (OUTPATIENT)
Dept: OCCUPATIONAL THERAPY | Facility: CLINIC | Age: 71
DRG: 559 | End: 2021-07-11
Attending: PHYSICAL MEDICINE & REHABILITATION
Payer: COMMERCIAL

## 2021-07-11 PROCEDURE — 97535 SELF CARE MNGMENT TRAINING: CPT | Mod: GO

## 2021-07-11 PROCEDURE — 97110 THERAPEUTIC EXERCISES: CPT | Mod: GO

## 2021-07-11 PROCEDURE — 99232 SBSQ HOSP IP/OBS MODERATE 35: CPT | Performed by: PHYSICAL MEDICINE & REHABILITATION

## 2021-07-11 PROCEDURE — 97110 THERAPEUTIC EXERCISES: CPT | Mod: GP | Performed by: PHYSICAL THERAPIST

## 2021-07-11 PROCEDURE — 97530 THERAPEUTIC ACTIVITIES: CPT | Mod: GP | Performed by: PHYSICAL THERAPIST

## 2021-07-11 PROCEDURE — 97530 THERAPEUTIC ACTIVITIES: CPT | Mod: GO

## 2021-07-11 PROCEDURE — 250N000013 HC RX MED GY IP 250 OP 250 PS 637: Performed by: PHYSICIAN ASSISTANT

## 2021-07-11 PROCEDURE — 128N000003 HC R&B REHAB

## 2021-07-11 RX ADMIN — Medication 200 UNITS: at 08:37

## 2021-07-11 RX ADMIN — DIAZEPAM 4 MG: 2 TABLET ORAL at 04:08

## 2021-07-11 RX ADMIN — METHOCARBAMOL 500 MG: 500 TABLET, FILM COATED ORAL at 11:51

## 2021-07-11 RX ADMIN — MELATONIN TAB 3 MG 6 MG: 3 TAB at 21:09

## 2021-07-11 RX ADMIN — Medication 1 TABLET: at 08:37

## 2021-07-11 RX ADMIN — ATORVASTATIN CALCIUM 80 MG: 40 TABLET, FILM COATED ORAL at 21:09

## 2021-07-11 RX ADMIN — SENNOSIDES AND DOCUSATE SODIUM 2 TABLET: 8.6; 5 TABLET ORAL at 21:09

## 2021-07-11 RX ADMIN — ASPIRIN 81 MG: 81 TABLET ORAL at 08:37

## 2021-07-11 RX ADMIN — Medication 250 MG: at 08:37

## 2021-07-11 RX ADMIN — MORPHINE SULFATE 30 MG: 15 TABLET ORAL at 06:40

## 2021-07-11 RX ADMIN — SENNOSIDES AND DOCUSATE SODIUM 2 TABLET: 8.6; 5 TABLET ORAL at 08:37

## 2021-07-11 RX ADMIN — Medication 12.5 MG: at 21:10

## 2021-07-11 RX ADMIN — METHOCARBAMOL 500 MG: 500 TABLET, FILM COATED ORAL at 08:37

## 2021-07-11 RX ADMIN — BISACODYL 10 MG: 10 SUPPOSITORY RECTAL at 10:39

## 2021-07-11 RX ADMIN — MORPHINE SULFATE 30 MG: 15 TABLET ORAL at 16:08

## 2021-07-11 RX ADMIN — METHOCARBAMOL 500 MG: 500 TABLET, FILM COATED ORAL at 19:55

## 2021-07-11 RX ADMIN — MORPHINE SULFATE 30 MG: 15 TABLET ORAL at 10:39

## 2021-07-11 RX ADMIN — MORPHINE SULFATE 30 MG: 15 TABLET ORAL at 21:19

## 2021-07-11 RX ADMIN — Medication 375 MG: at 14:27

## 2021-07-11 RX ADMIN — ZINC SULFATE 220 MG (50 MG) CAPSULE 220 MG: CAPSULE at 08:37

## 2021-07-11 RX ADMIN — METHOCARBAMOL 500 MG: 500 TABLET, FILM COATED ORAL at 16:17

## 2021-07-11 RX ADMIN — MORPHINE SULFATE 30 MG: 15 TABLET ORAL at 02:15

## 2021-07-11 RX ADMIN — POLYETHYLENE GLYCOL 3350 17 G: 17 POWDER, FOR SOLUTION ORAL at 14:27

## 2021-07-11 NOTE — PLAN OF CARE
Discharge Planner Post-Acute Rehab OT:      Discharge Plan: home with wife home ot     Precautions: spinal      Current Status:  ADLs:G/H:sba  Washing face shaving sitting eob with wedge back support  U/b dressing: pullover shirt sba doff/don  L/B dressing:  shorts disposable brief max a to don over legs mod a supine to adjust over hips  Feet:dep pt able to doff r knee brace max with l and mod a to don b knee braces supine in bed  Vision/Cognition: tba     Assessment  pt seen for sitting balance off w/c with leg movements assist of therapist to bring legs inmotions needed for l/b dressing from w/c prior pt dressed sitting now supine pt will have better  and be able to be more I If able to do from sitting position.  pt seen in w/c trail of crossing leg over other pt unable to cross over other knee as able to do prior at home with foot up on chair infron pt able to touch b anckels, pt at sink with mod a of therapist able to stand and stay standing 20 sec second attempt half stand momentarly. pt able to use s/b to l from w/c to bed with cga of therapist and rest breaks with taskPt will benift from ot goals to return home with wife     Other Barriers to Discharge (DME, Family Training, etc): dme needs

## 2021-07-11 NOTE — PLAN OF CARE
Discharge Planner Post-Acute Rehab PT:      Discharge Plan: home w/ his wife, she is older and limited w/ ability to provide physical assist     Precautions: falls, spine; [place] , PTSD     Current Status:  Bed Mobility: bed rail and assist of one for rolling, raised HOB for supine>sit  Transfer: modified slide transfer w/o board, mod A x1; Liko available and has extenders on sling per spine precautions  Gait: NT  Stairs: NT  Balance: sits EOB w/o support     Assessment:  Pt was -10 in AM due to just waking up and needing nursing cars and decline PM session due to BM issues.  Pt overall lateral transfers continue to improve.  Decreased engagement on Nustep due to somnolence 2/2 decreased sleep last night.  Pt noted significant neuro pain in RLE and hip causing lack of sleep.  .    Other Barriers to Discharge (DME, Family Training, etc): None identified at this time.

## 2021-07-11 NOTE — PROGRESS NOTES
Individualized Overall Plan Of Care (IOPOC)      Rehab diagnosis/Impairment Group Code: Orthopaedic disorders 08.9 other orthopaedic, t7-pelvis spinal fusion revision  S/p spinal fusion       Expected functional outcome: reach a level of mod i    Clinical Impression Comments: patient post t-sacral fusion with functional decline    Mobility: 69 y/o male, acute on chronic pain and asking for meds frequently although cognition and attention appears impaired from narcotic use; will benefit from skilled PT services to improve all areas of mobility so he can return home w/ his family.     ADL: decreae adls and Iadls pt will benifit from OT to returnhome with wife pt will benifit from ot goals    Communication/Cognition/Swallow:  Reg with thins    Intensity of therapy:   PT 90 minutes, 2x/day, for 21 days  OT 90 minutes, Daily, for 21 days      Orthotics current R AFO          Medical Prognosis: fair     Physician summary statement: patient post Thoracic-lumbar sacral fusion goal is to achieve a level of mod I     Discharge destination: prior home  Discharge rehabilitation needs: home care, RN, PT and OT      Estimated length of stay: 21 days       Rehabilitation Physician Wallace Mixon DO

## 2021-07-11 NOTE — PLAN OF CARE
"/51 (BP Location: Right arm)   Pulse 77   Temp 97.8  F (36.6  C) (Oral)   Resp 18   Ht 1.854 m (6' 1\")   Wt 97.3 kg (214 lb 8 oz)   SpO2 91%   BMI 28.30 kg/m    Patient is A&O x 4, able to make needs known. LS clear, BS active. Bed alarm on. Use a urinal, staff empties.LBM 7/9.Transfers with a liko lift. Incision at back is covered with a dressing is CD&I.MS 30 mg last given at 2200 for pain. Continue to monitor.  "

## 2021-07-11 NOTE — PROGRESS NOTES
"/56 (BP Location: Right arm)   Pulse 84   Temp 96.6  F (35.9  C) (Oral)   Resp 18   Ht 1.854 m (6' 1\")   Wt 97.3 kg (214 lb 8 oz)   SpO2 98%   BMI 28.30 kg/m  Pt alert and oriented x 4.Able to make needs known. Denies SOB, chest pain, lightheadedness, and dizziness. Liko lift to transfer.LBM 7/11/21. Voiding adequate amount in urinal.Tolerating regular diet. Pain managed with PRN 30 mg Morphine and 4 mg Valium over night. Incontinent of bowel, 'cause not able to sit on bedpan due to pain. Spine incision CDI. Continue with plan of care. Call light within reach.  "

## 2021-07-11 NOTE — PROGRESS NOTES
"  [facility]   Acute Rehabilitation Unit  Daily progress note    INTERVAL HISTORY  Jorje was seen and examined in chair on way to start therapy session.  No acute events reported overnight.  Denies chest pain, shortness of breath, no fever or chills.  Encouraged continued participation in therapy program. Told patient not pick at eschars and skin breakdown sites.     Functional  PT:  Bed Mobility: bed rail and assist of one for rolling, raised HOB for supine>sit  Transfer: modified slide transfer w/o board, mod A x1; Liko available and has extenders on sling per spine precautions  Gait: NT  Stairs: NT  Balance: sits EOB w/o support     ROS: 10 point ROS neg other than the symptoms noted above in the HPI.      MEDICATIONS    ascorbic acid  250 mg Oral Daily     aspirin  81 mg Oral Daily     atorvastatin  80 mg Oral At Bedtime     melatonin  6 mg Oral At Bedtime     methocarbamol  500 mg Oral 4x Daily     metoprolol succinate ER  12.5 mg Oral At Bedtime     multivitamin w/minerals  1 tablet Oral Daily     senna-docusate  2 tablet Oral BID     Urivarx Capsules (patient supplied supplement)   2 capsule Oral Daily     vitamin E  200 Units Oral Daily     zinc sulfate  220 mg Oral Daily        acetaminophen, bisacodyl, diazepam, diclofenac, morphine, naloxone **OR** naloxone **OR** naloxone **OR** naloxone, polyethylene glycol, simethicone     PHYSICAL EXAM  /56 (BP Location: Right arm)   Pulse 84   Temp 96.6  F (35.9  C) (Oral)   Resp 18   Ht 1.854 m (6' 1\")   Wt 97.3 kg (214 lb 8 oz)   SpO2 98%   BMI 28.30 kg/m    Gen: awake alert, up in chair going to therapy  HEENT: NCAT, EOMI  Cardio: 2+ radial pulse, well perfused  Pulm: non labored, no wheezes  Abd: soft, non tender  Ext: warm dry left lower leg graft site cdi, R side distal eschar on thigh   Neuro/MSK: alert, follows commands     LABS  CBC RESULTS:   Recent Labs   Lab Test 07/08/21  0654 06/29/21  1258 " 06/25/21  0830   WBC 7.4 9.7 8.3   RBC 3.47* 3.17* 2.90*   HGB 10.4* 9.8* 9.1*   HCT 33.7* 30.9* 28.1*   MCV 97 98 97   MCH 30.0 30.9 31.4   MCHC 30.9* 31.7 32.4   RDW 14.1 13.6 13.9    189 127*     Last Basic Metabolic Panel:  Recent Labs   Lab Test 07/08/21  0654 06/28/21  0826 06/27/21  0602 06/25/21  0830 06/24/21  1538    143  --  139 140   POTASSIUM 4.2 3.6 3.6 3.7 4.7   CHLORIDE 101 105  --  104 107   CO2 33* 30  --  31 31   ANIONGAP 4  --   --  4 2*   * 89  --  101* 102*   BUN 14 9  --  10 10   CR 0.51* 0.54*  --  0.54* 0.54*   GFRESTIMATED >90 >90  --  >90 >90   TRENTON 9.6 8.6  --  8.6 8.1*         Rehabilitation - continue comprehensive acute inpatient rehabilitation program with multidisciplinary approach including therapies, rehab nursing, and physiatry following. See interval history for updates.      ASSESSMENT AND PLAN    Darleen Simmons is a 70 year old man with past medical history of  HTN, HLP, CAD, ischemic cardiomyopathy s/p ICD placement, PAD s/p left SFA POBA for latissimus dorsi flap to left calf, atrial fibrillation/flutter,  flat back syndrome now s/p T7-pelvis revision PSIF with T12-L1 3CO and L5 PSO on 6/21/21 with Jazmyne Akins/Herb.  Admitted to acute rehab 7/7/21      Flatback syndrome-   Chronic back pain  S/p T7-S1 posterior spinal fusion per Dr. 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 Afterwards he experienced quadriplegia but he slowly was able to regain his ability to ambulate. In 2018, he underwent posterior decompression thoracolumbar fusion at [facility]. At that time It was thought that he would be primarily a sitter and he was fused in a fairly straight posture.  -  Up with assist until independent. No excessive bending or twisting. No lifting >10 lbs x 6 weeks. Renny lift approved for transfers. Keep back straight if using lift.  -WBAT  -pain management as below  -continue PT/OT  -  right AFO  -wound care as ordered.   -f/u Dr. Akins        CAD s/p MI CABG  Ischemic cardiomyopathy   s/p Pacemaker & ICD (medtronic)  -echo 5/4/21Mildly (EF 45-50%) reduced left ventricular function is present. There is a  pattern of wall motion abnormalities consistent with a prior RCA and,  possibly, LCx infarction.  Stress test 5/5/21  -continue lipitor  -continue asa     A-fib/ Aflutter- not anticoagulated prior to admission per pcp recs  -continue metoprolol xl 12.5 mg     Acute blood loss Anemia- Estimated blood loss ~ 4320 ml with 1630 returned via cell saver.  Hgb now stable 9.8 6/29  -trend     Acute on Chronic back pain- on morphine 15-30 mg reportedly taking qid prior to admission.   -continue robaxin scheduled  -continue tylenol, valium, morphine 15-30 mg prn     Depression- monitor mood   -psychology consult for emotional support.      PAD-  S/p L SFA baloon angioplasty. 2/11/21 MARIAMA Right:  Moderate peripheral arterial disease starting at or proximal to the superficial femoral level. Left:  Moderate peripheral arterial disease location not well determined.       Metabolic encephalopathy- prolonged surgery, pain med  -reorient as needed  -monitor- hold meds for sedation     Urinary retention- acute retention post operatively   -continue prior to admission supplement   -check pvrs on admission     Pressure injury- wound care as ordered - apparently healing well.   Evaluated by WON on 7/6/21  -wound care as ordered.     1. Adjustment to disability:  Psychology for emotional support  2. FEN: reg  3. Bowel: constipated  4. Bladder: chronic urgency- monitor  5. DVT Prophylaxis: mechanical  6. GI Prophylaxis: reg diet  7. Code: full   8. Disposition: goal for home   9. ELOS: ~ 3 weeks  10. Follow up Appointments on Discharge: pcp, ortho spine      Wallace Mixon, DO  Physical Medicine & Rehabilitation       I spent a total of 25 minutes face to face and coordinating care of Darleen Simmons. Over 50% of  my time on the unit was spent counseling the patient and /or coordinating care regarding recent spinal surgery.

## 2021-07-11 NOTE — PLAN OF CARE
"  VS: VSS.   O2: 92% on room air. RR WNL. Denies SOB, denies difficulty breathing.    Output: Voiding spontaneously using urinal.    Last BM: LBM today, medium x2 today. Continued bowel medications.    Activity: Liko lift. Pillows in use in bed.    Up for meals? Encouraged.    Skin: Intact except LDA.    Pain: Pt reported R posterior constant aching back pain and R leg pain, requesting PRN morphine, given x1 to assist with decrease in pain, and scheduled robaxin given to assist with pain.   Sticky note left for MD: \"Pt's spouse requesting order to be OK to given morphine and robaxin together as RN's and Pharmacy repeatedly cautioning having together. Please have conversation with pt and pt's wife regarding their frustration.\"   CMS: Intact except LE weakness.   Dressing: CDI.   Diet: Regular, thin.   LDA: Surgical incisions.   Equipment: Heel protectors on. Pillows in use.    Plan: Continue to monitor pt, medical management and continue therapy.   Additional Info: Pt A&Ox4. Pt's spouse at bedside beginning of 4 hour shift.        "

## 2021-07-11 NOTE — PLAN OF CARE
Discharge Planner Post-Acute Rehab OT:      Discharge Plan: home with wife home ot     Precautions: spinal , Heel protectors on when in bed (beige open toe socks with gel paddings, due to bone spurs on heels)     Current Status:  ADLs:G/H:sba  Washing face shaving sitting eob with wedge back support  U/b dressing: pullover shirt sba doff/don  L/B dressing:  shorts disposable brief max a to don over legs mod a supine to adjust over hips  Feet:dep pt able to doff r knee brace max with l and mod a to don b knee braces supine in bed  Vision/Cognition: tba     Assessment  pt seen for sitting balance off w/c with leg movements assist of therapist to bring legs inmotions needed for l/b dressing from w/c prior pt dressed sitting now supine pt will have better  and be able to be more I If able to do from sitting position.  pt seen in w/c trail of crossing leg over other pt unable to cross over other knee as able to do prior at home with foot up on chair infron pt able to touch b anckels, pt at sink with mod a of therapist able to stand and stay standing 20 sec second attempt half stand momentarly. pt able to use s/b to l from w/c to bed with cga of therapist and rest breaks with taskPt will benift from ot goals to return home with wife     Other Barriers to Discharge (DME, Family Training, etc): dme needs

## 2021-07-11 NOTE — PLAN OF CARE
FOCUS/GOAL  Pain management     ASSESSMENT, INTERVENTIONS AND CONTINUING PLAN FOR GOAL:  Pt Aox4 able to make needs known, using call light appropriately. VSS, pain reported in back and legs taking PRN morphine every three hours which is somewhat effective in managing pain, pt can also have PRN Valium Q6hr for spacticity but did not request on writers shift. Incision to back covered with island dressing no drainage noted to dressing.  Liko lift for transfers.  Reg/thin taking pills whole with water.  Pt had medium BM this morning but still felt constipated so asked for suppository but only had a smear of BM after that.  Working with therapy.  Nursing will continue to monitor.

## 2021-07-12 ENCOUNTER — APPOINTMENT (OUTPATIENT)
Dept: OCCUPATIONAL THERAPY | Facility: CLINIC | Age: 71
DRG: 559 | End: 2021-07-12
Attending: PHYSICAL MEDICINE & REHABILITATION
Payer: COMMERCIAL

## 2021-07-12 ENCOUNTER — APPOINTMENT (OUTPATIENT)
Dept: PHYSICAL THERAPY | Facility: CLINIC | Age: 71
DRG: 559 | End: 2021-07-12
Attending: PHYSICAL MEDICINE & REHABILITATION
Payer: COMMERCIAL

## 2021-07-12 LAB
ANION GAP SERPL CALCULATED.3IONS-SCNC: 3 MMOL/L (ref 3–14)
BUN SERPL-MCNC: 16 MG/DL (ref 7–30)
CALCIUM SERPL-MCNC: 9.3 MG/DL (ref 8.5–10.1)
CHLORIDE BLD-SCNC: 102 MMOL/L (ref 94–109)
CO2 SERPL-SCNC: 31 MMOL/L (ref 20–32)
CREAT SERPL-MCNC: 0.48 MG/DL (ref 0.66–1.25)
GFR SERPL CREATININE-BSD FRML MDRD: >90 ML/MIN/1.73M2
GLUCOSE BLD-MCNC: 121 MG/DL (ref 70–99)
HOLD SPECIMEN: NORMAL
POTASSIUM BLD-SCNC: 4.1 MMOL/L (ref 3.4–5.3)
SODIUM SERPL-SCNC: 136 MMOL/L (ref 133–144)

## 2021-07-12 PROCEDURE — 97110 THERAPEUTIC EXERCISES: CPT | Mod: GP

## 2021-07-12 PROCEDURE — 80048 BASIC METABOLIC PNL TOTAL CA: CPT | Performed by: PHYSICIAN ASSISTANT

## 2021-07-12 PROCEDURE — 250N000013 HC RX MED GY IP 250 OP 250 PS 637: Performed by: PHYSICIAN ASSISTANT

## 2021-07-12 PROCEDURE — 99233 SBSQ HOSP IP/OBS HIGH 50: CPT | Performed by: PHYSICAL MEDICINE & REHABILITATION

## 2021-07-12 PROCEDURE — 97110 THERAPEUTIC EXERCISES: CPT | Mod: GO

## 2021-07-12 PROCEDURE — 97530 THERAPEUTIC ACTIVITIES: CPT | Mod: GO

## 2021-07-12 PROCEDURE — 97530 THERAPEUTIC ACTIVITIES: CPT | Mod: GP | Performed by: STUDENT IN AN ORGANIZED HEALTH CARE EDUCATION/TRAINING PROGRAM

## 2021-07-12 PROCEDURE — 97110 THERAPEUTIC EXERCISES: CPT | Mod: GP | Performed by: STUDENT IN AN ORGANIZED HEALTH CARE EDUCATION/TRAINING PROGRAM

## 2021-07-12 PROCEDURE — 97535 SELF CARE MNGMENT TRAINING: CPT | Mod: GO

## 2021-07-12 PROCEDURE — 36415 COLL VENOUS BLD VENIPUNCTURE: CPT | Performed by: PHYSICIAN ASSISTANT

## 2021-07-12 PROCEDURE — 128N000003 HC R&B REHAB

## 2021-07-12 RX ADMIN — ZINC SULFATE 220 MG (50 MG) CAPSULE 220 MG: CAPSULE at 07:44

## 2021-07-12 RX ADMIN — ATORVASTATIN CALCIUM 80 MG: 40 TABLET, FILM COATED ORAL at 20:36

## 2021-07-12 RX ADMIN — MORPHINE SULFATE 30 MG: 15 TABLET ORAL at 17:40

## 2021-07-12 RX ADMIN — SENNOSIDES AND DOCUSATE SODIUM 2 TABLET: 8.6; 5 TABLET ORAL at 20:35

## 2021-07-12 RX ADMIN — SENNOSIDES AND DOCUSATE SODIUM 2 TABLET: 8.6; 5 TABLET ORAL at 07:45

## 2021-07-12 RX ADMIN — METHOCARBAMOL 500 MG: 500 TABLET, FILM COATED ORAL at 20:35

## 2021-07-12 RX ADMIN — MORPHINE SULFATE 30 MG: 15 TABLET ORAL at 00:05

## 2021-07-12 RX ADMIN — Medication 250 MG: at 07:44

## 2021-07-12 RX ADMIN — DIAZEPAM 8 MG: 2 TABLET ORAL at 01:44

## 2021-07-12 RX ADMIN — METHOCARBAMOL 500 MG: 500 TABLET, FILM COATED ORAL at 12:29

## 2021-07-12 RX ADMIN — Medication 200 UNITS: at 07:44

## 2021-07-12 RX ADMIN — MORPHINE SULFATE 30 MG: 15 TABLET ORAL at 07:41

## 2021-07-12 RX ADMIN — MORPHINE SULFATE 30 MG: 15 TABLET ORAL at 23:54

## 2021-07-12 RX ADMIN — MORPHINE SULFATE 30 MG: 15 TABLET ORAL at 04:25

## 2021-07-12 RX ADMIN — DIAZEPAM 8 MG: 2 TABLET ORAL at 16:05

## 2021-07-12 RX ADMIN — MORPHINE SULFATE 30 MG: 15 TABLET ORAL at 10:43

## 2021-07-12 RX ADMIN — MORPHINE SULFATE 30 MG: 15 TABLET ORAL at 13:45

## 2021-07-12 RX ADMIN — ASPIRIN 81 MG: 81 TABLET ORAL at 07:44

## 2021-07-12 RX ADMIN — BISACODYL 10 MG: 10 SUPPOSITORY RECTAL at 22:32

## 2021-07-12 RX ADMIN — MORPHINE SULFATE 30 MG: 15 TABLET ORAL at 20:38

## 2021-07-12 RX ADMIN — METHOCARBAMOL 500 MG: 500 TABLET, FILM COATED ORAL at 07:44

## 2021-07-12 RX ADMIN — MELATONIN TAB 3 MG 6 MG: 3 TAB at 20:35

## 2021-07-12 RX ADMIN — Medication 12.5 MG: at 20:36

## 2021-07-12 RX ADMIN — METHOCARBAMOL 500 MG: 500 TABLET, FILM COATED ORAL at 16:05

## 2021-07-12 RX ADMIN — Medication 1 TABLET: at 07:44

## 2021-07-12 NOTE — PROGRESS NOTES
[facility]   Acute Rehabilitation Unit  Daily progress note    INTERVAL HISTORY  Jorje was seen sitting up in wheel chair, alert nad, speech clear.  Denies n/v/d, had suppository with some success, denies urinary concerns, denies fevers, sob, and dizziness.  States intermittent nausea at times due to severity of pain, appetite reports due to constipation and pain.  Denies other questions or concerns.       OT: Pt seen for OT AM sessions. Pt was mod A /w HOB up supine to sit EOB and max A w/ LE off EOB into sitting at EOB. Pt had 1 LOB w/ min A to recover and was CGA for sitting UE dynamic tasks- doffing/donning pull over t-shirt. Pt continues to be max A w/ LB ADLs at this time in supine. Tsfers were mod A w/ low pivot squat from bed to w/c and sliding board mod A-max A from w/c to bed. Pt engaged in BUE resistive bands approx. 1-2lbs within spinal precautions, engaged in LE ROM/stretching to increas flexibility and strength for I w/ ADLs. Pt was introduced reacher/long shoe horn to don B shoes. Edu on modified footwear such as no laces, pt was not interested. Pt reports he has reachers at home. Pt is motivated and progressing OT goals. Cont. OT POC.     MEDICATIONS    ascorbic acid  250 mg Oral Daily     aspirin  81 mg Oral Daily     atorvastatin  80 mg Oral At Bedtime     melatonin  6 mg Oral At Bedtime     methocarbamol  500 mg Oral 4x Daily     metoprolol succinate ER  12.5 mg Oral At Bedtime     multivitamin w/minerals  1 tablet Oral Daily     senna-docusate  2 tablet Oral BID     Urivarx Capsules (patient supplied supplement)   2 capsule Oral Daily     vitamin E  200 Units Oral Daily     zinc sulfate  220 mg Oral Daily        acetaminophen, bisacodyl, diazepam, diclofenac, morphine, naloxone **OR** naloxone **OR** naloxone **OR** naloxone, polyethylene glycol, simethicone     PHYSICAL EXAM  /60 (BP Location: Right arm)   Pulse 80   Temp 97.1  F (36.2  C) (Oral)    "Resp 16   Ht 1.854 m (6' 1\")   Wt 97.3 kg (214 lb 8 oz)   SpO2 94%   BMI 28.30 kg/m    Gen: awake alert, up in chair during end of  Therapy session  HEENT: NCAT, EOMI  Cardio: rrr  Pulm: non labored clear on room air  Abd: soft, non tender  Ext: warm dry left lower leg graft site cdi, R side thigh abrasions cdi  Neuro/MSK: alert, follows commands     LABS  CBC RESULTS:   Recent Labs   Lab Test 07/08/21  0654 06/29/21  1258 06/25/21  0830   WBC 7.4 9.7 8.3   RBC 3.47* 3.17* 2.90*   HGB 10.4* 9.8* 9.1*   HCT 33.7* 30.9* 28.1*   MCV 97 98 97   MCH 30.0 30.9 31.4   MCHC 30.9* 31.7 32.4   RDW 14.1 13.6 13.9    189 127*     Last Basic Metabolic Panel:  Recent Labs   Lab Test 07/12/21  0547 07/08/21  0654 06/28/21  0826 06/25/21  0830    138 143 139   POTASSIUM 4.1 4.2 3.6 3.7   CHLORIDE 102 101 105 104   CO2 31 33* 30 31   ANIONGAP 3 4  --  4   * 102* 89 101*   BUN 16 14 9 10   CR 0.48* 0.51* 0.54* 0.54*   GFRESTIMATED >90 >90 >90 >90   [place] 9.3 9.6 8.6 8.6         Rehabilitation - continue comprehensive acute inpatient rehabilitation program with multidisciplinary approach including therapies, rehab nursing, and physiatry following. See interval history for updates.      ASSESSMENT AND PLAN    Darleen Simmons is a 70 year old man with past medical history of  HTN, HLP, CAD, ischemic cardiomyopathy s/p ICD placement, PAD s/p left SFA POBA for latissimus dorsi flap to left calf, atrial fibrillation/flutter,  flat back syndrome now s/p T7-pelvis revision PSIF with T12-L1 3CO and L5 PSO on 6/21/21 with Jazmyne Akins/Herb.  Admitted to acute rehab 7/7/21      Flatback syndrome-   Chronic back pain  S/p T7-S1 posterior spinal fusion per Dr. 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 Afterwards he experienced quadriplegia but he slowly was able to regain his ability to ambulate. In 2018, he underwent posterior decompression " thoracolumbar fusion at [facility]. At that time It was thought that he would be primarily a sitter and he was fused in a fairly straight posture.  -  Up with assist until independent. No excessive bending or twisting. No lifting >10 lbs x 6 weeks. Renny lift approved for transfers. Keep back straight if using lift.  -WBAT  -pain management as below  -continue PT/OT  - right AFO  -wound care as ordered.   -f/u Dr. Akins        CAD s/p MI CABG  Ischemic cardiomyopathy   s/p Pacemaker & ICD (medtronic)  -echo 5/4/21Mildly (EF 45-50%) reduced left ventricular function is present. There is a  pattern of wall motion abnormalities consistent with a prior RCA and,  possibly, LCx infarction.Stress test 5/5/21  -continue lipitor  -continue asa     A-fib/ Aflutter- not anticoagulated prior to admission per pcp recs  -continue metoprolol xl 12.5 mg     Acute blood loss Anemia- Estimated blood loss ~ 4320 ml with 1630 returned via cell saver.  Hgb now stable 10.4 7/8  -trend     Acute on Chronic back pain- on morphine 15-30 mg reportedly taking qid prior to admission.   -continue robaxin scheduled  -continue tylenol, valium, morphine 15-30 mg prn     Depression- monitor mood   -psychology consult for emotional support.      PAD-  S/p L SFA baloon angioplasty. 2/11/21 MARIAMA Right:  Moderate peripheral arterial disease starting at or proximal to the superficial femoral level. Left:  Moderate peripheral arterial disease location not well determined.       Metabolic encephalopathy- prolonged surgery, pain meds  -reorient as needed  -monitor- hold meds for sedation     Urinary retention- acute retention post operatively   -continue prior to admission supplement        Pressure injury- wound care as ordered - apparently healing well.   Evaluated by WON on 7/6/21  -wound care as ordered.     1. Adjustment to disability:  Psychology for emotional support  2. FEN: reg  3. Bowel: constipated  4. Bladder: chronic urgency- monitor  5. DVT  Prophylaxis: mechanical  6. GI Prophylaxis: reg diet  7. Code: full   8. Disposition: goal for home   9. ELOS: ~ 3 weeks  10. Follow up Appointments on Discharge: pcp, ortho spine    Ca Hernandez PA-C  PM&R

## 2021-07-12 NOTE — PLAN OF CARE
FOCUS/GOAL  Medical management    ASSESSMENT, INTERVENTIONS AND CONTINUING PLAN FOR GOAL:  Pt is alert and oriented. Use call light for needs. He complain of pain. Pt was teary eyed, prn morphine given and warm blanket given for back/leg pain. Attended to his requests and put things he needs w/in reach can ease some of his anxiety. Use urinal independently. Pt wants to lay on his left side all the time and reposition self where he is more comfortable and ask assistance from staff. Prn valium given at 0144am. Checked pt when his prn is available to be given but pt seen sleeping. His second prn morphine given at 0425am. Cont w/ POC.

## 2021-07-12 NOTE — PLAN OF CARE
Discharge Planner Post-Acute Rehab PT:      Discharge Plan: home w/ his wife, she is older and limited w/ ability to provide physical assist     Precautions: falls, spine; [place] , PTSD     Current Status:  Bed Mobility: bed rail and assist of one for rolling, raised HOB for supine>sit  Transfer: modified slide transfer w/o board, mod A x1; Liko available and has extenders on sling per spine precautions  Gait: NT  Stairs: NT  Balance: sits EOB w/o support     Assessment:  unable to complete standing frame this AM d/t nausea, time restraints in PM. Will attempt standing frame tomorrow.  .    Other Barriers to Discharge (DME, Family Training, etc): None identified at this time.

## 2021-07-12 NOTE — PLAN OF CARE
"  VS: /49   Pulse 80   Temp 98.1  F (36.7  C) (Oral)   Resp 16   Ht 1.854 m (6' 1\")   Wt 97.3 kg (214 lb 8 oz)   SpO2 92%   BMI 28.30 kg/m    VSS   O2: >90% on RA denies SOB   Output: Voids spontaneously   Last BM: BM previous shift 7/11   Activity: liko lift, pt not OOB   Skin: Spinal incision, L leg scabbing   Pain: Managed with scheduled robaxin and PRN morphine.   CMS: Intact, denies N/T, AOx4. Flexion moderate to weak in BLE.   Dressing: Spinal dressing CDI, no drainage noted on dressing, UTV site d/t dressing   Diet: Reg, thin, takes pills whole   LDA: none   Equipment: Heel protectors on, pts personal protectors   Plan: Continue POC   Additional Info: Pt denies SOB, CP, N/V/D, N/T. Pt agitated at beginning of shift, pt later apologized for \"rude\" behavior and pt states he \"just woke up\". Pt is hard of hearing and wears hearing aids.       "

## 2021-07-12 NOTE — PLAN OF CARE
FOCUS/GOAL  Pain management     ASSESSMENT, INTERVENTIONS AND CONTINUING PLAN FOR GOAL:  Pt Aox4 able to make needs known, using call light appropriately. VSS, pain reported in back and legs taking PRN morphine every three hours which is somewhat effective in managing pain, pt can also have PRN Valium Q6hr for spacticity but did not request on writers shift. Incision to back covered with island dressing no drainage noted to dressing.  Liko lift for transfers.  Reg/thin taking pills whole with water.  Pt had smear of BM this morning, Using urinal and bed pan. Working with therapy.  Nursing will continue to monitor.

## 2021-07-12 NOTE — PLAN OF CARE
Discharge Planner Post-Acute Rehab OT:      Discharge Plan: home with wife home ot     Precautions: spinal , Heel protectors on when in bed (beige open toe socks with gel paddings, due to bone spurs on heels)     Current Status:  Functional tsfers: Mod-max A w/ low pivot squat bed to w/c and sliding board w/ Max A from w/c to bed. Max A to doff shoes and SBA to doff unlaced shoes w/ reacher/long shoe horn.  ADLs:G/H:sba  Washing face shaving sitting eob with wedge back support  U/b dressing: pullover shirt sba doff/don  L/B dressing:  shorts disposable brief max a to don over legs mod a supine to adjust over hips  Feet:dep pt able to doff r knee brace max with l and mod a to don b knee braces supine in bed  Vision/Cognition: tba     Assessment: Pt seen for OT AM sessions. Pt was mod A /w HOB up supine to sit EOB and max A w/ LE off EOB into sitting at EOB. Pt had 1 LOB w/ min A to recover and was CGA for sitting UE dynamic tasks- doffing/donning pull over t-shirt. Pt continues to be max A w/ LB ADLs at this time in supine. Tsfers were mod A w/ low pivot squat from bed to w/c and sliding board mod A-max A from w/c to bed. Pt engaged in BUE resistive bands approx. 1-2lbs within spinal precautions, engaged in LE ROM/stretching to increas flexibility and strength for I w/ ADLs. Pt was introduced reacher/long shoe horn to don B shoes. Edu on modified footwear such as no laces, pt was not interested. Pt reports he has reachers at home. Pt is motivated and progressing OT goals. Cont. OT POC.      Other Barriers to Discharge (DME, Family Training, etc): dme needs  Pt has 4 reachers at home.

## 2021-07-13 ENCOUNTER — APPOINTMENT (OUTPATIENT)
Dept: PHYSICAL THERAPY | Facility: CLINIC | Age: 71
DRG: 559 | End: 2021-07-13
Attending: PHYSICAL MEDICINE & REHABILITATION
Payer: COMMERCIAL

## 2021-07-13 ENCOUNTER — APPOINTMENT (OUTPATIENT)
Dept: OCCUPATIONAL THERAPY | Facility: CLINIC | Age: 71
DRG: 559 | End: 2021-07-13
Attending: PHYSICAL MEDICINE & REHABILITATION
Payer: COMMERCIAL

## 2021-07-13 PROCEDURE — 97110 THERAPEUTIC EXERCISES: CPT | Mod: GO

## 2021-07-13 PROCEDURE — 97535 SELF CARE MNGMENT TRAINING: CPT | Mod: GO

## 2021-07-13 PROCEDURE — 250N000013 HC RX MED GY IP 250 OP 250 PS 637: Performed by: PHYSICIAN ASSISTANT

## 2021-07-13 PROCEDURE — 97530 THERAPEUTIC ACTIVITIES: CPT | Mod: GP | Performed by: PHYSICAL THERAPIST

## 2021-07-13 PROCEDURE — 128N000003 HC R&B REHAB

## 2021-07-13 PROCEDURE — 99233 SBSQ HOSP IP/OBS HIGH 50: CPT | Performed by: PHYSICAL MEDICINE & REHABILITATION

## 2021-07-13 PROCEDURE — 97530 THERAPEUTIC ACTIVITIES: CPT | Mod: GO

## 2021-07-13 RX ORDER — DIAZEPAM 2 MG
4-6 TABLET ORAL EVERY 6 HOURS PRN
Status: DISCONTINUED | OUTPATIENT
Start: 2021-07-13 | End: 2021-07-30 | Stop reason: HOSPADM

## 2021-07-13 RX ADMIN — Medication 375 MG: at 16:11

## 2021-07-13 RX ADMIN — ASPIRIN 81 MG: 81 TABLET ORAL at 08:19

## 2021-07-13 RX ADMIN — DIAZEPAM 8 MG: 2 TABLET ORAL at 01:32

## 2021-07-13 RX ADMIN — METHOCARBAMOL 500 MG: 500 TABLET, FILM COATED ORAL at 15:49

## 2021-07-13 RX ADMIN — MORPHINE SULFATE 30 MG: 15 TABLET ORAL at 15:49

## 2021-07-13 RX ADMIN — MORPHINE SULFATE 30 MG: 15 TABLET ORAL at 21:58

## 2021-07-13 RX ADMIN — METHOCARBAMOL 500 MG: 500 TABLET, FILM COATED ORAL at 08:20

## 2021-07-13 RX ADMIN — DICLOFENAC 2 G: 10 GEL TOPICAL at 21:53

## 2021-07-13 RX ADMIN — MELATONIN TAB 3 MG 6 MG: 3 TAB at 21:20

## 2021-07-13 RX ADMIN — MORPHINE SULFATE 30 MG: 15 TABLET ORAL at 18:55

## 2021-07-13 RX ADMIN — Medication 250 MG: at 08:19

## 2021-07-13 RX ADMIN — MORPHINE SULFATE 30 MG: 15 TABLET ORAL at 03:56

## 2021-07-13 RX ADMIN — Medication 1 TABLET: at 08:20

## 2021-07-13 RX ADMIN — MORPHINE SULFATE 30 MG: 15 TABLET ORAL at 08:18

## 2021-07-13 RX ADMIN — Medication 200 UNITS: at 08:20

## 2021-07-13 RX ADMIN — ATORVASTATIN CALCIUM 80 MG: 40 TABLET, FILM COATED ORAL at 21:21

## 2021-07-13 RX ADMIN — SENNOSIDES AND DOCUSATE SODIUM 2 TABLET: 8.6; 5 TABLET ORAL at 08:19

## 2021-07-13 RX ADMIN — ZINC SULFATE 220 MG (50 MG) CAPSULE 220 MG: CAPSULE at 08:19

## 2021-07-13 RX ADMIN — Medication 12.5 MG: at 21:21

## 2021-07-13 RX ADMIN — SENNOSIDES AND DOCUSATE SODIUM 2 TABLET: 8.6; 5 TABLET ORAL at 21:20

## 2021-07-13 RX ADMIN — METHOCARBAMOL 500 MG: 500 TABLET, FILM COATED ORAL at 21:21

## 2021-07-13 ASSESSMENT — MIFFLIN-ST. JEOR: SCORE: 1783.2

## 2021-07-13 NOTE — PLAN OF CARE
FOCUS/GOAL  Pain management     ASSESSMENT, INTERVENTIONS AND CONTINUING PLAN FOR GOAL:  Pt Aox4 able to make needs known, using call light appropriately. VSS, pain reported in back and legs taking PRN morphine every three hours which is somewhat effective in managing pain, pt can also have PRN Valium Q6hr for spacticity but did not request on writers shift, dose was decreased from 6mg to 4-6mg today. Incision to back covered with island dressing no drainage noted to dressing. Upgraded to slide board for transfers to and from W/C and bed.  Reg/thin taking pills whole with water. Using urinal Independently and bed pan, no BM's on writers shift.. Working with therapy.  Nursing will continue to monitor.

## 2021-07-13 NOTE — PLAN OF CARE
Discharge Planner Post-Acute Rehab PT:      Discharge Plan: home w/ his wife, she is older and limited w/ ability to provide physical assist     Precautions: falls, spine; [place] , PTSD     Current Status:  Bed Mobility: bed rail and assist of one for rolling, raised HOB for supine>sit  Transfer: modified slide transfer w/o board, mod A x1; Liko available and has extenders on sling per spine precautions  Gait: NT  Stairs: NT  Balance: sits EOB w/o support     Assessment: Completed toilet transfer w/ great amt of time and problem solving, he was very grateful.    Other Barriers to Discharge (DME, Family Training, etc): None identified at this time.

## 2021-07-13 NOTE — PLAN OF CARE
Discharge Planner Post-Acute Rehab OT:      Discharge Plan: home with wife home ot     Precautions: spinal , Heel protectors on when in bed (beige open toe socks with gel paddings, due to bone spurs on heels)     Current Status:  Functional tsfers: Mod-max A w/ low pivot squat bed to w/c and sliding board w/ Max A from w/c to bed. Max A to doff shoes and SBA to doff unlaced shoes w/ reacher/long shoe horn.  ADLs:G/H:sba  Washing face shaving sitting eob with wedge back support  U/b dressing: pullover shirt sba doff/don  L/B dressing:  shorts disposable brief max a to don over legs mod a supine to adjust over hips  Feet:dep pt able to doff r knee brace max with l and mod a to don b knee braces supine in bed  Vision/Cognition: tba     Assessment:  Tsfers were mod A w/ low pivot squat from bed to w/c. Pt engaged in BUE resistive bands approx. 1-2lbs within spinal precautions. Pt completed IADL of doing laundry using a reacher. Pt engaged in EOB activity folding laundry. Pt has decreased activity tolerance however motivated and progressing OT goals. Cont. OT POC.      Other Barriers to Discharge (DME, Family Training, etc): dme needs  Pt has 4 reachers at home.

## 2021-07-13 NOTE — PLAN OF CARE
FOCUS/GOAL  Bowel management, Pain management, and Medical management    ASSESSMENT, INTERVENTIONS AND CONTINUING PLAN FOR GOAL:    Pt is alert and oriented. Weepy tonight due to pain on the right lower back that radiates to the right leg. Offered a listening ear to comfort pat. Given PRN Morphine sulfate x2 and Valium x1 with both having partial effect for about 2-2.5 hours after administration and then patient would complain again of the same pain. Repositioning and pillows for support done. Refused warm packs. Laying mostly supine this shift, Min A1 w/ bed mobility. Continent of bowel and bladder. Had 2 bm's this shift using the bedpan. Uses the urinal independently w/ staff to empty. Will continue POC.

## 2021-07-13 NOTE — PROGRESS NOTES
"  [facility]   Acute Rehabilitation Unit  Daily progress note    INTERVAL HISTORY  Jorje was seen sitting up in wheel chair after physical therapy session, verbalizing poor sleep states pain and interruptions keeping him awake, feels therapy is pushing too hard at times, he wants to continue to do more therapy, and does not want to make changes to pain regimen.  He is asking to be out of bed for meals, this is good idea and will encourage nursing to help him with this.  He reports breathing is without issue, ,denies urinary concerns, bowel is still irregular, but having successful bm with current regimen, abdomen soft and non tender.      PT:   Bed Mobility: bed rail and assist of one for rolling, raised HOB for supine>sit  Transfer: modified slide transfer w/o board, mod A x1; Liko available and has extenders on sling per spine precautions  Gait: NT  Stairs: NT  Balance: sits EOB w/o support    MEDICATIONS    ascorbic acid  250 mg Oral Daily     aspirin  81 mg Oral Daily     atorvastatin  80 mg Oral At Bedtime     melatonin  6 mg Oral At Bedtime     methocarbamol  500 mg Oral 4x Daily     metoprolol succinate ER  12.5 mg Oral At Bedtime     multivitamin w/minerals  1 tablet Oral Daily     senna-docusate  2 tablet Oral BID     Urivarx Capsules (patient supplied supplement)   2 capsule Oral Daily     vitamin E  200 Units Oral Daily     zinc sulfate  220 mg Oral Daily        acetaminophen, bisacodyl, diazepam, diclofenac, morphine, naloxone **OR** naloxone **OR** naloxone **OR** naloxone, polyethylene glycol, simethicone     PHYSICAL EXAM  /59 (BP Location: Right arm)   Pulse 80   Temp 98.2  F (36.8  C) (Oral)   Resp 18   Ht 1.854 m (6' 1\")   Wt 97.9 kg (215 lb 14.4 oz)   SpO2 95%   BMI 28.48 kg/m    Gen: awake alert, up in chair  HEENT: NCAT, EOMI  Cardio: rrr  Pulm: non labored clear on room air  Abd: soft, non tender  Ext: warm dry left lower leg graft site cdi, R " side thigh abrasions cdi  Neuro/MSK: alert, follows commands     LABS  CBC RESULTS:   Recent Labs   Lab Test 07/08/21  0654 06/29/21  1258 06/25/21  0830   WBC 7.4 9.7 8.3   RBC 3.47* 3.17* 2.90*   HGB 10.4* 9.8* 9.1*   HCT 33.7* 30.9* 28.1*   MCV 97 98 97   MCH 30.0 30.9 31.4   MCHC 30.9* 31.7 32.4   RDW 14.1 13.6 13.9    189 127*     Last Basic Metabolic Panel:  Recent Labs   Lab Test 07/12/21  0547 07/08/21  0654 06/28/21  0826 06/25/21  0830    138 143 139   POTASSIUM 4.1 4.2 3.6 3.7   CHLORIDE 102 101 105 104   CO2 31 33* 30 31   ANIONGAP 3 4  --  4   * 102* 89 101*   BUN 16 14 9 10   CR 0.48* 0.51* 0.54* 0.54*   GFRESTIMATED >90 >90 >90 >90   [place] 9.3 9.6 8.6 8.6         Rehabilitation - continue comprehensive acute inpatient rehabilitation program with multidisciplinary approach including therapies, rehab nursing, and physiatry following. See interval history for updates.      ASSESSMENT AND PLAN    Darleen Simmons is a 70 year old man with past medical history of  HTN, HLP, CAD, ischemic cardiomyopathy s/p ICD placement, PAD s/p left SFA POBA for latissimus dorsi flap to left calf, atrial fibrillation/flutter,  flat back syndrome now s/p T7-pelvis revision PSIF with T12-L1 3CO and L5 PSO on 6/21/21 with Jazmyne Akins/Herb.  Admitted to acute rehab 7/7/21      Flatback syndrome-   Chronic back pain  S/p T7-S1 posterior spinal fusion per Dr. 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 Afterwards he experienced quadriplegia but he slowly was able to regain his ability to ambulate. In 2018, he underwent posterior decompression thoracolumbar fusion at [facility]. At that time It was thought that he would be primarily a sitter and he was fused in a fairly straight posture.  -  Up with assist until independent. No excessive bending or twisting. No lifting >10 lbs x 6 weeks. Renny lift approved for transfers. Keep back straight  if using lift.  -WBAT  -pain management as below  -continue PT/OT  - right AFO  -wound care as ordered.   -f/u Dr. Akins        CAD s/p MI CABG  Ischemic cardiomyopathy   s/p Pacemaker & ICD (medtronic)  -echo 5/4/21Mildly (EF 45-50%) reduced left ventricular function is present. There is a  pattern of wall motion abnormalities consistent with a prior RCA and,  possibly, LCx infarction.Stress test 5/5/21  -continue lipitor  -continue asa     A-fib/ Aflutter- not anticoagulated prior to admission per pcp recs  -continue metoprolol xl 12.5 mg     Acute blood loss Anemia- Estimated blood loss ~ 4320 ml with 1630 returned via cell saver.  Hgb now stable 10.4 7/8  -trend     Acute on Chronic back pain- on morphine 15-30 mg reportedly taking qid prior to admission.   -continue robaxin scheduled  -continue tylenol, valium, morphine 15-30 mg prn     Depression- monitor mood   -psychology consult for emotional support.      PAD-  S/p L SFA baloon angioplasty. 2/11/21 MARIAMA Right:  Moderate peripheral arterial disease starting at or proximal to the superficial femoral level. Left:  Moderate peripheral arterial disease location not well determined.       Metabolic encephalopathy- prolonged surgery, pain meds  -reorient as needed  -monitor- hold meds for sedation     Urinary retention- acute retention post operatively   -continue prior to admission supplement      Pressure injury- wound care as ordered - apparently healing well.   Evaluated by WON on 7/6/21  -wound care as ordered.     1. Adjustment to disability:  Psychology for emotional support  2. FEN: reg  3. Bowel: constipated improved.   4. Bladder: chronic urgency- monitor  5. DVT Prophylaxis: mechanical  6. GI Prophylaxis: reg diet  7. Code: full   8. Disposition: goal for home   9. ELOS: ~ 3 weeks  10. Follow up Appointments on Discharge: pcp, ortho spine    Ca Hernandez PA-C  PM&R      Discussed with Dr. Clarke

## 2021-07-14 ENCOUNTER — APPOINTMENT (OUTPATIENT)
Dept: PHYSICAL THERAPY | Facility: CLINIC | Age: 71
DRG: 559 | End: 2021-07-14
Attending: PHYSICAL MEDICINE & REHABILITATION
Payer: COMMERCIAL

## 2021-07-14 ENCOUNTER — APPOINTMENT (OUTPATIENT)
Dept: OCCUPATIONAL THERAPY | Facility: CLINIC | Age: 71
DRG: 559 | End: 2021-07-14
Attending: PHYSICAL MEDICINE & REHABILITATION
Payer: COMMERCIAL

## 2021-07-14 DIAGNOSIS — Z98.1 S/P SPINAL FUSION: Primary | ICD-10-CM

## 2021-07-14 PROCEDURE — 250N000013 HC RX MED GY IP 250 OP 250 PS 637: Performed by: PHYSICAL MEDICINE & REHABILITATION

## 2021-07-14 PROCEDURE — 250N000013 HC RX MED GY IP 250 OP 250 PS 637: Performed by: PHYSICIAN ASSISTANT

## 2021-07-14 PROCEDURE — 99233 SBSQ HOSP IP/OBS HIGH 50: CPT | Performed by: PHYSICAL MEDICINE & REHABILITATION

## 2021-07-14 PROCEDURE — 97535 SELF CARE MNGMENT TRAINING: CPT | Mod: GO

## 2021-07-14 PROCEDURE — 999N000125 HC STATISTIC PATIENT MED CONFERENCE < 30 MIN

## 2021-07-14 PROCEDURE — 97110 THERAPEUTIC EXERCISES: CPT | Mod: GP | Performed by: PHYSICAL THERAPIST

## 2021-07-14 PROCEDURE — 97530 THERAPEUTIC ACTIVITIES: CPT | Mod: GP | Performed by: PHYSICAL THERAPIST

## 2021-07-14 PROCEDURE — 999N000150 HC STATISTIC PT MED CONFERENCE < 30 MIN: Performed by: PHYSICAL THERAPIST

## 2021-07-14 PROCEDURE — 128N000003 HC R&B REHAB

## 2021-07-14 RX ORDER — SIMETHICONE 125 MG
125-375 TABLET,CHEWABLE ORAL 2 TIMES DAILY PRN
Status: DISCONTINUED | OUTPATIENT
Start: 2021-07-14 | End: 2021-07-19

## 2021-07-14 RX ADMIN — MORPHINE SULFATE 30 MG: 15 TABLET ORAL at 19:57

## 2021-07-14 RX ADMIN — METHOCARBAMOL 500 MG: 500 TABLET, FILM COATED ORAL at 12:03

## 2021-07-14 RX ADMIN — Medication 1 TABLET: at 08:31

## 2021-07-14 RX ADMIN — SENNOSIDES AND DOCUSATE SODIUM 2 TABLET: 8.6; 5 TABLET ORAL at 21:13

## 2021-07-14 RX ADMIN — ASPIRIN 81 MG: 81 TABLET ORAL at 08:31

## 2021-07-14 RX ADMIN — METHOCARBAMOL 500 MG: 500 TABLET, FILM COATED ORAL at 16:08

## 2021-07-14 RX ADMIN — MORPHINE SULFATE 30 MG: 15 TABLET ORAL at 22:56

## 2021-07-14 RX ADMIN — ATORVASTATIN CALCIUM 80 MG: 40 TABLET, FILM COATED ORAL at 21:13

## 2021-07-14 RX ADMIN — POLYETHYLENE GLYCOL 3350 17 G: 17 POWDER, FOR SOLUTION ORAL at 18:38

## 2021-07-14 RX ADMIN — METHOCARBAMOL 500 MG: 500 TABLET, FILM COATED ORAL at 19:57

## 2021-07-14 RX ADMIN — SIMETHICONE 375 MG: 125 TABLET, CHEWABLE ORAL at 14:37

## 2021-07-14 RX ADMIN — Medication 12.5 MG: at 21:13

## 2021-07-14 RX ADMIN — SENNOSIDES AND DOCUSATE SODIUM 2 TABLET: 8.6; 5 TABLET ORAL at 08:31

## 2021-07-14 RX ADMIN — MORPHINE SULFATE 30 MG: 15 TABLET ORAL at 01:02

## 2021-07-14 RX ADMIN — DIAZEPAM 6 MG: 2 TABLET ORAL at 21:30

## 2021-07-14 RX ADMIN — MORPHINE SULFATE 30 MG: 15 TABLET ORAL at 08:31

## 2021-07-14 RX ADMIN — MORPHINE SULFATE 30 MG: 15 TABLET ORAL at 16:08

## 2021-07-14 RX ADMIN — MORPHINE SULFATE 30 MG: 15 TABLET ORAL at 04:28

## 2021-07-14 RX ADMIN — Medication 250 MG: at 08:31

## 2021-07-14 RX ADMIN — DIAZEPAM 6 MG: 2 TABLET ORAL at 00:19

## 2021-07-14 RX ADMIN — ZINC SULFATE 220 MG (50 MG) CAPSULE 220 MG: CAPSULE at 08:31

## 2021-07-14 RX ADMIN — Medication 200 UNITS: at 08:31

## 2021-07-14 RX ADMIN — MELATONIN TAB 3 MG 6 MG: 3 TAB at 21:13

## 2021-07-14 RX ADMIN — METHOCARBAMOL 500 MG: 500 TABLET, FILM COATED ORAL at 08:31

## 2021-07-14 NOTE — PLAN OF CARE
FOCUS/GOAL  Bowel management, Bladder management, Pain management, and Medical management    ASSESSMENT, INTERVENTIONS AND CONTINUING PLAN FOR GOAL:  Pt has been continent of bowel and bladder. LBM 7/13. Pt c/o upper and lower back pain and was given scheduled and PRN medications and was repositioned with some relief. He had a shower this evening and his spinal incision dressing was changed. Pt requested that he be woken throughout the night any time his PRN pain medications are available. Alerted on-coming RN. Will continue with POC.

## 2021-07-14 NOTE — PLAN OF CARE
Set therapy schedule for this patient:  0900  60 minute therapy  /1130  rest in bed    1115/1130-12 30 or 45 minute therapy  12-1 Lunch, up in chair  115  30 or 45 minute therapy  2-3 rest in bed    3-4 60 minute therapy     Patient assisted w/ development of this schedule and is in agreement with it.

## 2021-07-14 NOTE — CARE CONFERENCE
Acute Rehab Care Conference/Team Rounds      Type: Team Rounds    Present: Dr Franklyn Clarke, Ca Hernandez PA, Peter Gamez RN, Vidhya Marie PT, Heather Brewster OT, Nuvia Kim LICSW, Dianelys Graves Mgr, Ingris Fagan Dietician, Patient Jorje Simmons      Discharge Barriers/Treatment/Education    Rehab Diagnosis: Orthopaedic Disorders 08.9 Other Orthopaedic, T7-pelvis spinal fusion revision    Active Medical Co-morbidities/Prognosis:   Patient Active Problem List   Diagnosis     Flatback syndrome     H/O spinal fusion     H/O spinal cord injury     Ischemic cardiomyopathy     S/P spinal fusion        Safety: Pt is alert and oriented. Able to make needs known. Bed alarm on, call light in reach for safety.     Pain: Complains of constant lower back aching and spasm pain that radiates to the right leg. Managed w/ PRN Valium and Morphine. Pt prefers for staff to wake him up when pain medication is due.     Medications, Skin, Tubes/Lines: Takes medications whole with thin water. Has a spine incision post laminectomy, site is approximated with scant serous drainage. Dressing changed daily and PRN. Bilateral thigh surgical incision is scabbed with some redness, HUA. Right knee scabbing noted as well. No lines, drains or tubes.     Swallowing/Nutrition: Intact    Bowel/Bladder: Continent of bowel and bladder. LBM 7/13 using the bedpan. Independently uses the urinal with some spillage at times on the brief or chucks. Needing assistance with brief change.     Psychosocial: Lives in a split-level house with spouse and 2 cats in [place], [place]. 9 steps to enter and a flight to the upper level. Ramp to enter home and stair lift. Pt reports he was independent with all mobility with use of FWW prior to surgery, states he can navigate stairs but often uses the lift. Hearing aides and glasses. Manage own meds and finances. Wife A with household chores. Hx PTSD, Health Psych following. Wife is primary support.  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 [employer] plan, SaraAshlee Cook Ph: [phone].    ADLs/IADLs:Functional tsfers: Mod-max A w/ low pivot squat bed to w/c and sliding board w/ Max A from w/c to bedADLs:G/H:sba  Washing face shaving sitting eob with unsupported  U/b dressing: pullover shirt mod I doff/don  L/B dressing:  shorts sitting eob able to get both legs into shorts with extra time.   Feet: min A to doff mod A don shoes          Mobility: Up in room w/c based w/ assist. Transfers variable but progressing to lateral type w/o sliding board, needs assist of one in therapy, two w/ nsg. Using standing frame and nu step for interventions, able to stand w/ walker but not move his feet as yet.  Recommend ongoing IP care at this time.     Cognition/Language:    Community Re-Entry: w/c based    Transportation: Not a  due to LE weakness, car transfer not yet assessed    Decision maker: self    Plan of Care and goals reviewed and updated.    Discharge Plan/Recommendations    Fall Precautions: continue    Patient/Family input to goals: Yes    Anticipated rehab needs following discharge: Home with family    Anticipated care giver support after discharge:Family    Estimated length of stay: 7/30/21    Overall plan for the patient: Continue Ip rehabilitation.      Utilization Review and Continued Stay Justification    Medical Necessity Criteria:    For any criteria that is not met, please document reason and plan for discharge, transfer, or modification of plan of care to address.    Requires intensive rehabilitation program to treat functional deficits?: Yes    Requires 3x per week or greater involvement of rehabilitation physician to oversee rehabilitation program?: Yes    Requires rehabilitation nursing interventions?: Yes    Patient is making functional progress?: Yes    There is a potential for additional functional progress? Yes    Patient is participating in therapy 3 hours per day a minimum of 5 days per week or 15 hours  per week in 7 day period?:Yes    Has discharge needs that require coordinated discharge planning approach?:Yes      Final Physician Sign off    Statement of Approval: I approve the plan of care    Patient Goals  Social Work Goals: Confirm discharge recommendations with therapy, coordinate safe discharge plan and remain available to support and assist as needed.       OT Frequency: daily 90 min for 21 days  OT goal: hygiene/grooming: modified independent  OT goal: upper body dressing: Modified independent  OT goal: lower body dressing: Modified independent  OT goal: upper body bathing: Modified independent  OT goal: lower body bathing: Modified independent  OT goal: bed mobility: Modified independent  OT Goal: transfer: Modified independent  OT goal: toilet transfer/toileting: Modified independent  OT goal: meal preparation: Supervision/stand-by assist  OT goal: home management: Supervision/stand-by assist           OT goal 1: pt will be sba shower/tub transfer with dme  OT goal 2: pt will be mod I b u/e hep within spinal precautions                PT Frequency: daily x 90 minutes  PT goal: bed mobility: Modified independent  PT goal: transfers: Modified independent  PT goal: gait: Modified independent (TBD, may be mixed mobility for home)  PT goal: stairs:  (has stair glide and ramp, pt hopes to walk stairs)  PT goal: perform aerobic activity with stable cardiovascular response: 15 minutes, NuStep     PT goal 1: car transfer w/ min assist      RN Goal: Patient will verbalize adequate pain control and two non non pharmacological methods of pain control prior to discharge.  RN Goal:  Patient will be free from pressure injuries related to immobility prior to discharge.  RN Goal:  Patient will make needs known and use call light appropriately prior to discharge.

## 2021-07-14 NOTE — PROGRESS NOTES
CLINICAL NUTRITION SERVICES - ASSESSMENT NOTE     Nutrition Prescription    RECOMMENDATIONS FOR MDs/PROVIDERS TO ORDER:  None today     Malnutrition Status:    Patient does not meet two of the established criteria necessary for diagnosing malnutrition    Recommendations already ordered by Registered Dietitian (RD):  None today - continue supplement as ordered  Pt encouraged to verbalize food preferences to  staff when they visit pt daily for meal orders     Future/Additional Recommendations:  Monitor meal and supplement intakes  Monitor weight trends      REASON FOR ASSESSMENT  Darleen Simmons is a/an 70 year old male assessed by the dietitian for LOS    NUTRITION/MEDICAL HISTORY  Per chart review: Pt admits to ARU for rehabilitation in the setting of s/p T7-S1 fusion on 6/21. Past medical history of HLD, HTN, CAD s/p MI and CABG X 4 in 1986, ischemic cardiomyopathy, ICD placement, PAD, s/p left SFA balloon angioplasty to assist healing a latissimus dorsi flap to the left calf, atrial fibrillation/flutter, depression, former tobacco use, and chronic back pain.     Pt reviewed during care team rounds and visited at bedside during noon hour. Pt mostly reports some dislike of menu options, but notes enjoyed chicken last evening so RD encouraged pt to voice food preferences to NSA when they come to take pt's meal orders. Pt also seems upset about timing meals, RD reviewed therapy team discussed pt being on a more set schedule moving forward so pt can better plan meals, again encouraged pt to discuss delivery times with NSA. Reviewed PRN supplement order, pt agreeing to continue. RD gave pt encouragement to maintain/increase po intakes for improved health status.     CURRENT NUTRITION ORDERS  Diet: Regular  Supplement: RD placed order for Ensure Enlive PRN on 7/12 per pt/NSA request   Intake/Tolerance: % per flow sheets     LABS  Labs reviewed    MEDICATIONS  PRN Morphine, PRN Valium, Lipitor,  "Senna-docusate, Zinc sulfate, MVI, Vit C, Vit E    ANTHROPOMETRICS  Height: 185.4 cm (6' 1\")  Most Recent Weight: 97.9 kg (215 lb 14.4 oz)    IBW: 83.6 kg  BMI: Overweight BMI 25-29.9  Weight History:   07/13/21 0622 97.9 kg (215 lb 14.4 oz)   07/07/21 1751 97.3 kg (214 lb 8 oz)     06/22/21 107.5 kg (236 lb 14.4 oz)   05/27/21 100.2 kg (221 lb)   05/04/21 101.6 kg (224 lb)   03/04/21 90.7 kg (200 lb)     89.8 kg (198 lb) 06/18/2021 - per CE     92.2 kg (203 lb 4.2 oz) 02/11/2021 - per CE     Weight assessment: Weight appears stable since admit to ARU. Weight history appears to vary, likely multifactorial/fluid status changes.      Dosing Weight: 97.9 kg - current weight     ASSESSED NUTRITION NEEDS  Estimated Energy Needs: 9673-2728 kcals/day (20 - 25 kcals/kg)  Justification: Overweight  Estimated Protein Needs: 100 grams protein/day (1.0 grams of pro/kg)  Justification: Maintenance  Estimated Fluid Needs: 2445 mL/day (25 mL/kg)   Justification: Maintenance    PHYSICAL FINDINGS  Last noted WOC RN note reviewed from hospitalization, skin issues healed or healing    MALNUTRITION  % Intake: Decreased intake does not meet criteria  % Weight Loss: None noted  Subcutaneous Fat Loss: None observed  Muscle Loss: None observed  Fluid Accumulation/Edema: None noted  Malnutrition Diagnosis: Patient does not meet two of the established criteria necessary for diagnosing malnutrition    NUTRITION DIAGNOSIS  Predicted inadequate nutrient intake related to varying appetite/food preferences as evidenced by pt reports       INTERVENTIONS  Implementation  Nutrition Education: RD encouraged improving nutritional intakes for improved health status    Medical food supplement therapy - continue as ordered     Goals  Patient to consume % of nutritionally adequate meal trays TID, or the equivalent with supplements/snacks.     Monitoring/Evaluation  Progress toward goals will be monitored and evaluated per protocol.    Ingris Fagan, " RD, CNSC, LD  ARU RD pager: [phone]

## 2021-07-14 NOTE — PLAN OF CARE
FOCUS/GOAL  Pain management     ASSESSMENT, INTERVENTIONS AND CONTINUING PLAN FOR GOAL:  Pt Aox4 able to make needs known, using call light appropriately. VSS, pain reported in back and legs taking PRN morphine every three hours which is somewhat effective in managing pain, pt can also have PRN Valium Q6hr for spacticity. Incision to back covered with island dressing no drainage noted to dressing. Writer unable to find home supply of Gas-x so changed to hospital supply per pt request as they need it and no one can bring any of their supply in.  Writer watched how pt was doing A1 transfers with therapy today, working with therapy to train nursing staff to transfer A1 safely.  Reg/thin taking pills whole with water. Using urinal Independently and bed pan, no BM's on writers shift.. Working with therapy.  Nursing will continue to monitor.

## 2021-07-14 NOTE — PLAN OF CARE
FOCUS/GOAL  Pain management and Medical management    ASSESSMENT, INTERVENTIONS AND CONTINUING PLAN FOR GOAL:    Pt is alert and oriented. Received report from evening nurse that pt wants staff to wake him up when pain medication is due. Pt woke up after 2 hours from last pain medication administration, given PRN Valium x1 for spasticity with some relief. Given PRN Morphine x2 this shift as per pt's request. Repositioning and pillows for support seems to help. Min A1 w/ bed mobility. Continent of bowel and bladder. LBM 7/13. Uses the urinal independently but at times spills urine over chucks and brief. Dressing on the back is cdi, no drainage noted. Will continue POC.

## 2021-07-14 NOTE — PROGRESS NOTES
"  [facility]   Acute Rehabilitation Unit  Daily progress note    INTERVAL HISTORY  Jorje was seen sitting up in bed during team rounds, he is complaining of ongoing right hip pain radiating down leg.  He is not interested in trial of gabapentin or lyrica as found both ineffective in past, he is requesting pain be improved and asking to contact Dr. Akins, reports this pain is ongoing since surgery.  He continues to work on bowel program with nursing.  He is making progress with OT/PT, anticipating another 2 week course of rehab.     See rounds note by Dr. Clarke for further details.     MEDICATIONS    ascorbic acid  250 mg Oral Daily     aspirin  81 mg Oral Daily     atorvastatin  80 mg Oral At Bedtime     melatonin  6 mg Oral At Bedtime     methocarbamol  500 mg Oral 4x Daily     metoprolol succinate ER  12.5 mg Oral At Bedtime     multivitamin w/minerals  1 tablet Oral Daily     senna-docusate  2 tablet Oral BID     Urivarx Capsules (patient supplied supplement)   2 capsule Oral Daily     vitamin E  200 Units Oral Daily     zinc sulfate  220 mg Oral Daily        acetaminophen, bisacodyl, diazepam, diclofenac, morphine, naloxone **OR** naloxone **OR** naloxone **OR** naloxone, polyethylene glycol, simethicone     PHYSICAL EXAM  /62 (BP Location: Right arm)   Pulse 80   Temp (!) 96.2  F (35.7  C) (Oral)   Resp 16   Ht 1.854 m (6' 1\")   Wt 97.9 kg (215 lb 14.4 oz)   SpO2 96%   BMI 28.48 kg/m    Gen: awake alert  HEENT: NCAT, EOMI  Pulm: non labored on room air  Abd: soft, non tender  Ext: warm dry without edema.   Neuro/MSK: alert, follows commands     LABS  CBC RESULTS:   Recent Labs   Lab Test 07/08/21  0654 06/29/21  1258 06/25/21  0830   WBC 7.4 9.7 8.3   RBC 3.47* 3.17* 2.90*   HGB 10.4* 9.8* 9.1*   HCT 33.7* 30.9* 28.1*   MCV 97 98 97   MCH 30.0 30.9 31.4   MCHC 30.9* 31.7 32.4   RDW 14.1 13.6 13.9    189 127*     Last Basic Metabolic Panel:  Recent " Labs   Lab Test 07/12/21  0547 07/08/21  0654 06/28/21  0826 06/25/21  0830    138 143 139   POTASSIUM 4.1 4.2 3.6 3.7   CHLORIDE 102 101 105 104   CO2 31 33* 30 31   ANIONGAP 3 4  --  4   * 102* 89 101*   BUN 16 14 9 10   CR 0.48* 0.51* 0.54* 0.54*   GFRESTIMATED >90 >90 >90 >90   TRENTON 9.3 9.6 8.6 8.6         Rehabilitation - continue comprehensive acute inpatient rehabilitation program with multidisciplinary approach including therapies, rehab nursing, and physiatry following. See interval history for updates.      ASSESSMENT AND PLAN    Darleen Simmons is a 70 year old man with past medical history of  HTN, HLP, CAD, ischemic cardiomyopathy s/p ICD placement, PAD s/p left SFA POBA for latissimus dorsi flap to left calf, atrial fibrillation/flutter,  flat back syndrome now s/p T7-pelvis revision PSIF with T12-L1 3CO and L5 PSO on 6/21/21 with Jazmyne Akins/Herb.  Admitted to acute rehab 7/7/21      Flatback syndrome-   Chronic back pain  S/p T7-S1 posterior spinal fusion per Dr. 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 Afterwards he experienced quadriplegia but he slowly was able to regain his ability to ambulate. In 2018, he underwent posterior decompression thoracolumbar fusion at [facility]. At that time It was thought that he would be primarily a sitter and he was fused in a fairly straight posture.  -  Up with assist until independent. No excessive bending or twisting. No lifting >10 lbs x 6 weeks. Renny lift approved for transfers. Keep back straight if using lift.  -WBAT  -pain management as below  -continue PT/OT  - right AFO  -wound care as ordered.   -f/u Dr. Akins        CAD s/p MI CABG  Ischemic cardiomyopathy   s/p Pacemaker & ICD (medtronic)  -echo 5/4/21Mildly (EF 45-50%) reduced left ventricular function is present. There is a  pattern of wall motion abnormalities consistent with a prior RCA and,  possibly, LCx  infarction.Stress test 5/5/21  -continue lipitor  -continue asa     A-fib/ Aflutter- not anticoagulated prior to admission per pcp recs  -continue metoprolol xl 12.5 mg     Acute blood loss Anemia- Estimated blood loss ~ 4320 ml with 1630 returned via cell saver.  Hgb now stable 10.4 7/8  -trend     Acute on Chronic back pain- on morphine 15-30 mg reportedly taking qid prior to admission.   -continue robaxin scheduled  -continue tylenol, valium, morphine 15-30 mg prn     Depression- monitor mood   -psychology consult for emotional support.      PAD-  S/p L SFA baloon angioplasty. 2/11/21 MARIAMA Right:  Moderate peripheral arterial disease starting at or proximal to the superficial femoral level. Left:  Moderate peripheral arterial disease location not well determined.       Metabolic encephalopathy- prolonged surgery, pain meds  -reorient as needed  -monitor- hold meds for sedation     Urinary retention- acute retention post operatively   -continue prior to admission supplement      Pressure injury- wound care as ordered - apparently healing well.   Evaluated by WON on 7/6/21  -wound care as ordered.     1. Adjustment to disability:  Psychology for emotional support  2. FEN: reg  3. Bowel: constipated improved.   4. Bladder: chronic urgency- monitor  5. DVT Prophylaxis: mechanical  6. GI Prophylaxis: reg diet  7. Code: full   8. Disposition: goal for home   9. ELOS: ~ 3 weeks  10. Follow up Appointments on Discharge: pcp, ortho spine    Ca Hernandez PA-C  PM&R      Discussed with Dr. Clarke    I spent a total of 35 minutes face-to-face or managing the care of Jorje Troy. Over 50% of my time on the unit was spent counseling the patient and coordinating care. See note for details.

## 2021-07-14 NOTE — PROGRESS NOTES
"Received a call from [facility] insurance RebeccaSara Joey PH: [phone]. Offering assistance for discharge planning. Per Rebecca Rosado, PTA pt adamant about not going to SNF but interested in PCA services. Pt does have HC benefits. Pt has MA ([facility]) and a care coordinator (listed below) and SW will have to request assistance/follow-up with those community services prior to admission.     Rebecca Rosado has been in contact with pt often and wanted the medical team to be aware of the following--\"Has a lot of pain meds at home PTA. Monthly getting 180 morphine sulfate filled, surgey 06/21 and picked up 06/18 so has some at home.\"    Care Coordinator merry/ Cameron Coffey PH: [phone].     Nuvia Kim [facility] Acute Rehab   Direct Phone: [phone]  I   Pager: [phone]  I  Fax: [phone]          "

## 2021-07-15 ENCOUNTER — APPOINTMENT (OUTPATIENT)
Dept: PHYSICAL THERAPY | Facility: CLINIC | Age: 71
DRG: 559 | End: 2021-07-15
Attending: PHYSICAL MEDICINE & REHABILITATION
Payer: COMMERCIAL

## 2021-07-15 ENCOUNTER — APPOINTMENT (OUTPATIENT)
Dept: OCCUPATIONAL THERAPY | Facility: CLINIC | Age: 71
DRG: 559 | End: 2021-07-15
Attending: PHYSICAL MEDICINE & REHABILITATION
Payer: COMMERCIAL

## 2021-07-15 LAB
ANION GAP SERPL CALCULATED.3IONS-SCNC: 5 MMOL/L (ref 3–14)
BUN SERPL-MCNC: 16 MG/DL (ref 7–30)
CALCIUM SERPL-MCNC: 9.4 MG/DL (ref 8.5–10.1)
CHLORIDE BLD-SCNC: 102 MMOL/L (ref 94–109)
CO2 SERPL-SCNC: 29 MMOL/L (ref 20–32)
CREAT SERPL-MCNC: 0.5 MG/DL (ref 0.66–1.25)
ERYTHROCYTE [DISTWIDTH] IN BLOOD BY AUTOMATED COUNT: 13.8 % (ref 10–15)
GFR SERPL CREATININE-BSD FRML MDRD: >90 ML/MIN/1.73M2
GLUCOSE BLD-MCNC: 92 MG/DL (ref 70–99)
HCT VFR BLD AUTO: 31.4 % (ref 40–53)
HGB BLD-MCNC: 9.8 G/DL (ref 13.3–17.7)
MCH RBC QN AUTO: 29.9 PG (ref 26.5–33)
MCHC RBC AUTO-ENTMCNC: 31.2 G/DL (ref 31.5–36.5)
MCV RBC AUTO: 96 FL (ref 78–100)
PLATELET # BLD AUTO: 222 10E3/UL (ref 150–450)
POTASSIUM BLD-SCNC: 3.9 MMOL/L (ref 3.4–5.3)
RBC # BLD AUTO: 3.28 10E6/UL (ref 4.4–5.9)
SARS-COV-2 RNA RESP QL NAA+PROBE: NEGATIVE
SODIUM SERPL-SCNC: 136 MMOL/L (ref 133–144)
WBC # BLD AUTO: 7.6 10E3/UL (ref 4–11)

## 2021-07-15 PROCEDURE — 87635 SARS-COV-2 COVID-19 AMP PRB: CPT | Performed by: PHYSICIAN ASSISTANT

## 2021-07-15 PROCEDURE — 128N000003 HC R&B REHAB

## 2021-07-15 PROCEDURE — 97530 THERAPEUTIC ACTIVITIES: CPT | Mod: GP | Performed by: PHYSICAL THERAPIST

## 2021-07-15 PROCEDURE — 99233 SBSQ HOSP IP/OBS HIGH 50: CPT | Performed by: PHYSICAL MEDICINE & REHABILITATION

## 2021-07-15 PROCEDURE — 85014 HEMATOCRIT: CPT | Performed by: PHYSICIAN ASSISTANT

## 2021-07-15 PROCEDURE — 80048 BASIC METABOLIC PNL TOTAL CA: CPT | Performed by: PHYSICIAN ASSISTANT

## 2021-07-15 PROCEDURE — 97110 THERAPEUTIC EXERCISES: CPT | Mod: GP | Performed by: PHYSICAL THERAPIST

## 2021-07-15 PROCEDURE — 97535 SELF CARE MNGMENT TRAINING: CPT | Mod: GO

## 2021-07-15 PROCEDURE — 250N000013 HC RX MED GY IP 250 OP 250 PS 637: Performed by: PHYSICIAN ASSISTANT

## 2021-07-15 PROCEDURE — 36415 COLL VENOUS BLD VENIPUNCTURE: CPT | Performed by: PHYSICIAN ASSISTANT

## 2021-07-15 RX ADMIN — DICLOFENAC 2 G: 10 GEL TOPICAL at 08:40

## 2021-07-15 RX ADMIN — METHOCARBAMOL 500 MG: 500 TABLET, FILM COATED ORAL at 16:46

## 2021-07-15 RX ADMIN — ASPIRIN 81 MG: 81 TABLET ORAL at 08:43

## 2021-07-15 RX ADMIN — DICLOFENAC 2 G: 10 GEL TOPICAL at 21:31

## 2021-07-15 RX ADMIN — POLYETHYLENE GLYCOL 3350 17 G: 17 POWDER, FOR SOLUTION ORAL at 14:20

## 2021-07-15 RX ADMIN — DICLOFENAC 2 G: 10 GEL TOPICAL at 12:40

## 2021-07-15 RX ADMIN — MELATONIN TAB 3 MG 6 MG: 3 TAB at 21:28

## 2021-07-15 RX ADMIN — SENNOSIDES AND DOCUSATE SODIUM 2 TABLET: 8.6; 5 TABLET ORAL at 21:28

## 2021-07-15 RX ADMIN — MORPHINE SULFATE 30 MG: 15 TABLET ORAL at 08:42

## 2021-07-15 RX ADMIN — METHOCARBAMOL 500 MG: 500 TABLET, FILM COATED ORAL at 12:26

## 2021-07-15 RX ADMIN — MORPHINE SULFATE 30 MG: 15 TABLET ORAL at 21:33

## 2021-07-15 RX ADMIN — MORPHINE SULFATE 30 MG: 15 TABLET ORAL at 05:03

## 2021-07-15 RX ADMIN — ZINC SULFATE 220 MG (50 MG) CAPSULE 220 MG: CAPSULE at 08:43

## 2021-07-15 RX ADMIN — METHOCARBAMOL 500 MG: 500 TABLET, FILM COATED ORAL at 21:28

## 2021-07-15 RX ADMIN — METHOCARBAMOL 500 MG: 500 TABLET, FILM COATED ORAL at 08:43

## 2021-07-15 RX ADMIN — ATORVASTATIN CALCIUM 80 MG: 40 TABLET, FILM COATED ORAL at 21:28

## 2021-07-15 RX ADMIN — Medication 1 TABLET: at 08:43

## 2021-07-15 RX ADMIN — DIAZEPAM 6 MG: 2 TABLET ORAL at 03:38

## 2021-07-15 RX ADMIN — MORPHINE SULFATE 30 MG: 15 TABLET ORAL at 16:54

## 2021-07-15 RX ADMIN — Medication 250 MG: at 08:43

## 2021-07-15 RX ADMIN — Medication 200 UNITS: at 08:43

## 2021-07-15 RX ADMIN — Medication 12.5 MG: at 21:28

## 2021-07-15 RX ADMIN — MORPHINE SULFATE 30 MG: 15 TABLET ORAL at 02:04

## 2021-07-15 RX ADMIN — SENNOSIDES AND DOCUSATE SODIUM 2 TABLET: 8.6; 5 TABLET ORAL at 08:43

## 2021-07-15 NOTE — PLAN OF CARE
FOCUS/GOAL  Pain management     ASSESSMENT, INTERVENTIONS AND CONTINUING PLAN FOR GOAL:  Pt Aox4 able to make needs known, using call light appropriately. A1 pivot transfer to w/c and bed.  VSS, pain reported in back and legs taking PRN morphine every three hours which is somewhat effective in managing pain, pt can also have PRN Valium Q6hr for spacticity.  Only asked for MS once on writers shift, complained of neck pain today and used voltaren gel twice with some relief.  Incision to back covered with island dressing no drainage noted to dressing.  Reg/thin taking pills whole with water. Using urinal Independently and bed pan, no BM's on writers shift.. Working with therapy.  Nursing will continue to monitor.

## 2021-07-15 NOTE — PLAN OF CARE
Patient is A&Ox4, calm, and cooperative. Assist of 1-2 stand, pivot. Continent of bowel and bladder, last BM yesterday. He requested PRN Miralax during shift. Regular/thin diet, takes pills whole. Complains of pain, PRN Morphine given, heat applied. Has spinal incision, dressing changed. Requested PRN valium with HS meds. VS stable. Continue POC.

## 2021-07-15 NOTE — PROGRESS NOTES
"  [facility]   Acute Rehabilitation Unit  Daily progress note    INTERVAL HISTORY  Jorje was seen sitting up in chair, working with therapy, ongoing issues with pain and bowel though is making slow functional progress with therapy. No new complaints of sob, chest pain, fevers, headaches, or dizziness. Set schedule made for patient to encourage rest, out of bed meals.     MEDICATIONS    ascorbic acid  250 mg Oral Daily     aspirin  81 mg Oral Daily     atorvastatin  80 mg Oral At Bedtime     melatonin  6 mg Oral At Bedtime     methocarbamol  500 mg Oral 4x Daily     metoprolol succinate ER  12.5 mg Oral At Bedtime     multivitamin w/minerals  1 tablet Oral Daily     senna-docusate  2 tablet Oral BID     Urivarx Capsules (patient supplied supplement)   2 capsule Oral Daily     vitamin E  200 Units Oral Daily     zinc sulfate  220 mg Oral Daily        acetaminophen, bisacodyl, diazepam, diclofenac, morphine, naloxone **OR** naloxone **OR** naloxone **OR** naloxone, polyethylene glycol, simethicone     PHYSICAL EXAM  /56 (BP Location: Right arm)   Pulse 79   Temp (!) 96.5  F (35.8  C) (Oral)   Resp 18   Ht 1.854 m (6' 1\")   Wt 97.9 kg (215 lb 14.4 oz)   SpO2 94%   BMI 28.48 kg/m    Gen: awake alert  HEENT: NCAT, EOMI  Pulm: non labored on room air  Abd: soft, non tender  Ext: warm dry without edema.   Neuro/MSK: alert, follows commands     LABS  CBC RESULTS:   Recent Labs   Lab Test 07/15/21  0549 07/08/21  0654 06/29/21  1258   WBC 7.6 7.4 9.7   RBC 3.28* 3.47* 3.17*   HGB 9.8* 10.4* 9.8*   HCT 31.4* 33.7* 30.9*   MCV 96 97 98   MCH 29.9 30.0 30.9   MCHC 31.2* 30.9* 31.7   RDW 13.8 14.1 13.6    270 189     Last Basic Metabolic Panel:  Recent Labs   Lab Test 07/15/21  0549 07/12/21  0547 07/08/21  0654    136 138   POTASSIUM 3.9 4.1 4.2   CHLORIDE 102 102 101   CO2 29 31 33*   ANIONGAP 5 3 4   GLC 92 121* 102*   BUN 16 16 14   CR 0.50* 0.48* 0.51* "   GFRESTIMATED >90 >90 >90   TRENTON 9.4 9.3 9.6         Rehabilitation - continue comprehensive acute inpatient rehabilitation program with multidisciplinary approach including therapies, rehab nursing, and physiatry following. See interval history for updates.      ASSESSMENT AND PLAN    Darleen Simmons is a 70 year old man with past medical history of  HTN, HLP, CAD, ischemic cardiomyopathy s/p ICD placement, PAD s/p left SFA POBA for latissimus dorsi flap to left calf, atrial fibrillation/flutter,  flat back syndrome now s/p T7-pelvis revision PSIF with T12-L1 3CO and L5 PSO on 6/21/21 with Jazmyne Akins/Herb.  Admitted to acute rehab 7/7/21      Flatback syndrome-   Chronic back pain  S/p T7-S1 posterior spinal fusion per Dr. 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 Afterwards he experienced quadriplegia but he slowly was able to regain his ability to ambulate. In 2018, he underwent posterior decompression thoracolumbar fusion at [facility]. At that time It was thought that he would be primarily a sitter and he was fused in a fairly straight posture.  -  Up with assist until independent. No excessive bending or twisting. No lifting >10 lbs x 6 weeks. Renny lift approved for transfers. Keep back straight if using lift.  -WBAT  -pain management as below  -continue PT/OT  - right AFO  -wound care as ordered.   -f/u Dr. Akins        CAD s/p MI CABG  Ischemic cardiomyopathy   s/p Pacemaker & ICD (medtronic)  -echo 5/4/21Mildly (EF 45-50%) reduced left ventricular function is present. There is a  pattern of wall motion abnormalities consistent with a prior RCA and,  possibly, LCx infarction.Stress test 5/5/21  -continue lipitor  -continue asa     A-fib/ Aflutter- not anticoagulated prior to admission per pcp recs  -continue metoprolol xl 12.5 mg     Acute blood loss Anemia- Estimated blood loss ~ 4320 ml with 1630 returned via cell saver.  Hgb now  "stable 10.4 7/8  -trend     Acute on Chronic back pain- on morphine 15-30 mg reportedly taking qid prior to admission. Ongoing complaint though improving activity tolerance, reports \"nerve pain\" offered gabapentin/lyrica which patient declined.   -continue robaxin scheduled  -continue tylenol, valium, morphine 15-30 mg prn     Depression- monitor mood, reportedly PTSD as well, and resistance to psychiatry/ medications for mood.   -psychology consult for emotional support.      PAD-  S/p L SFA baloon angioplasty. 2/11/21 MARIAMA Right:  Moderate peripheral arterial disease starting at or proximal to the superficial femoral level. Left:  Moderate peripheral arterial disease location not well determined.       Metabolic encephalopathy- prolonged surgery, pain meds  -reorient as needed  -monitor- hold meds for sedation     Urinary retention- acute retention post operatively   -continue prior to admission supplement      Pressure injury- wound care as ordered - apparently healing well.   Evaluated by WON on 7/6/21  -wound care as ordered.     1. Adjustment to disability:  Psychology for emotional support  2. FEN: reg  3. Bowel: constipated improved.   4. Bladder: chronic urgency- monitor  5. DVT Prophylaxis: mechanical  6. GI Prophylaxis: reg diet  7. Code: full   8. Disposition: goal for home   9. ELOS: ~ 3 weeks  10. Follow up Appointments on Discharge: pcp, ortho spine    Ca Hernandez PA-C  PM&R      Seen & Discussed with Dr. Clarke        "

## 2021-07-15 NOTE — PLAN OF CARE
FOCUS/GOAL  Medical management    ASSESSMENT, INTERVENTIONS AND CONTINUING PLAN FOR GOAL:  Pt use call light for needs.He  has episode of cursing the staff when his brown socks for his heel protection were not on during the start of the shift. Calm approach manner to deal w/ pt's behavior. Use urinal independently and staff to empty it. Requested prn pain medications such as morphine 30 mg and valium. Refuse any other pain intervention. Slept intermittently.

## 2021-07-15 NOTE — PLAN OF CARE
Discharge Planner Post-Acute Rehab PT:      Discharge Plan: home w/ his wife, she is older and limited w/ ability to provide physical assist     Precautions: falls, spine; [place] , PTSD     Current Status:  Bed Mobility: bed rail and assist of one for rolling, raised HOB for supine>sit  Transfer: modified slide transfer w/o board, set up and SBA x1; Liko available and has extenders on sling per spine precautions  Gait: NT  Stairs: NT  Balance: sits EOB w/o support     Assessment: Switched out w/c to standard type w/ solid seat and solid back, he is ind w/ w/c propulsion on unit and may do so for diversion.     Other Barriers to Discharge (DME, Family Training, etc): None identified at this time.  Discussed w/c needs for home.  He insists that he has a chair at home that will work for him and that nothing more needs to be done.

## 2021-07-16 ENCOUNTER — APPOINTMENT (OUTPATIENT)
Dept: OCCUPATIONAL THERAPY | Facility: CLINIC | Age: 71
DRG: 559 | End: 2021-07-16
Attending: PHYSICAL MEDICINE & REHABILITATION
Payer: COMMERCIAL

## 2021-07-16 ENCOUNTER — APPOINTMENT (OUTPATIENT)
Dept: PHYSICAL THERAPY | Facility: CLINIC | Age: 71
DRG: 559 | End: 2021-07-16
Attending: PHYSICAL MEDICINE & REHABILITATION
Payer: COMMERCIAL

## 2021-07-16 PROCEDURE — 97112 NEUROMUSCULAR REEDUCATION: CPT | Mod: GO

## 2021-07-16 PROCEDURE — 128N000003 HC R&B REHAB

## 2021-07-16 PROCEDURE — 250N000013 HC RX MED GY IP 250 OP 250 PS 637: Performed by: PHYSICAL MEDICINE & REHABILITATION

## 2021-07-16 PROCEDURE — 97535 SELF CARE MNGMENT TRAINING: CPT | Mod: GO

## 2021-07-16 PROCEDURE — 97110 THERAPEUTIC EXERCISES: CPT | Mod: GO

## 2021-07-16 PROCEDURE — 97116 GAIT TRAINING THERAPY: CPT | Mod: GP | Performed by: PHYSICAL THERAPIST

## 2021-07-16 PROCEDURE — 99233 SBSQ HOSP IP/OBS HIGH 50: CPT | Performed by: PHYSICAL MEDICINE & REHABILITATION

## 2021-07-16 PROCEDURE — 250N000013 HC RX MED GY IP 250 OP 250 PS 637: Performed by: PHYSICIAN ASSISTANT

## 2021-07-16 PROCEDURE — 97542 WHEELCHAIR MNGMENT TRAINING: CPT | Mod: GP | Performed by: PHYSICAL THERAPIST

## 2021-07-16 PROCEDURE — 97112 NEUROMUSCULAR REEDUCATION: CPT | Mod: GP | Performed by: STUDENT IN AN ORGANIZED HEALTH CARE EDUCATION/TRAINING PROGRAM

## 2021-07-16 PROCEDURE — 97530 THERAPEUTIC ACTIVITIES: CPT | Mod: GP | Performed by: STUDENT IN AN ORGANIZED HEALTH CARE EDUCATION/TRAINING PROGRAM

## 2021-07-16 RX ADMIN — METHOCARBAMOL 500 MG: 500 TABLET, FILM COATED ORAL at 20:13

## 2021-07-16 RX ADMIN — METHOCARBAMOL 500 MG: 500 TABLET, FILM COATED ORAL at 17:03

## 2021-07-16 RX ADMIN — MORPHINE SULFATE 30 MG: 15 TABLET ORAL at 12:06

## 2021-07-16 RX ADMIN — MORPHINE SULFATE 30 MG: 15 TABLET ORAL at 05:16

## 2021-07-16 RX ADMIN — SIMETHICONE 375 MG: 125 TABLET, CHEWABLE ORAL at 15:11

## 2021-07-16 RX ADMIN — MORPHINE SULFATE 15 MG: 15 TABLET ORAL at 19:53

## 2021-07-16 RX ADMIN — ATORVASTATIN CALCIUM 80 MG: 40 TABLET, FILM COATED ORAL at 20:14

## 2021-07-16 RX ADMIN — Medication 12.5 MG: at 20:12

## 2021-07-16 RX ADMIN — Medication 200 UNITS: at 08:44

## 2021-07-16 RX ADMIN — Medication 250 MG: at 08:44

## 2021-07-16 RX ADMIN — SENNOSIDES AND DOCUSATE SODIUM 2 TABLET: 8.6; 5 TABLET ORAL at 20:13

## 2021-07-16 RX ADMIN — MORPHINE SULFATE 30 MG: 15 TABLET ORAL at 02:14

## 2021-07-16 RX ADMIN — MORPHINE SULFATE 15 MG: 15 TABLET ORAL at 08:49

## 2021-07-16 RX ADMIN — MORPHINE SULFATE 15 MG: 15 TABLET ORAL at 19:58

## 2021-07-16 RX ADMIN — ASPIRIN 81 MG: 81 TABLET ORAL at 08:44

## 2021-07-16 RX ADMIN — Medication 1 TABLET: at 08:44

## 2021-07-16 RX ADMIN — METHOCARBAMOL 500 MG: 500 TABLET, FILM COATED ORAL at 12:06

## 2021-07-16 RX ADMIN — SENNOSIDES AND DOCUSATE SODIUM 2 TABLET: 8.6; 5 TABLET ORAL at 08:44

## 2021-07-16 RX ADMIN — MELATONIN TAB 3 MG 6 MG: 3 TAB at 22:32

## 2021-07-16 RX ADMIN — ZINC SULFATE 220 MG (50 MG) CAPSULE 220 MG: CAPSULE at 08:44

## 2021-07-16 RX ADMIN — DICLOFENAC 2 G: 10 GEL TOPICAL at 11:15

## 2021-07-16 RX ADMIN — METHOCARBAMOL 500 MG: 500 TABLET, FILM COATED ORAL at 08:44

## 2021-07-16 RX ADMIN — DIAZEPAM 6 MG: 2 TABLET ORAL at 03:10

## 2021-07-16 RX ADMIN — MORPHINE SULFATE 30 MG: 15 TABLET ORAL at 22:36

## 2021-07-16 RX ADMIN — MORPHINE SULFATE 30 MG: 15 TABLET ORAL at 15:10

## 2021-07-16 NOTE — PLAN OF CARE
FOCUS/GOAL  Medical management    ASSESSMENT, INTERVENTIONS AND CONTINUING PLAN FOR GOAL:  Pt is alert and oriented. Use call light for needs. Use urinal independently. Repositioned w/ 1-2 staff depends on his pain. Prn morphine 30 mg given twice and 1 time for the prn valium. Multiple times arrange and re-arrange pillows on his bilateral lower extremities for comfort. Prn voltaren gel applied to rt leg for additional pain management. Slept in between cares. Cont w/ POC.

## 2021-07-16 NOTE — PLAN OF CARE
Discharge Planner Post-Acute Rehab OT:      Discharge Plan: home with wife home ot     Precautions: spinal , Heel protectors on when in bed (beige open toe socks with gel paddings, due to bone spurs on heels)     Current Status:  Functional tsfers: Mod-max A w/ low pivot squat bed to w/c and sliding board w/ Max A from w/c to bed. Max A to doff shoes and SBA to doff unlaced shoes w/ reacher/long shoe horn.  ADLs:G/H:sba  Washing face shaving sitting eob with wedge back support  U/b dressing: pullover shirt sba doff/don  L/B dressing:  shorts disposable brief max a to don over legs mod a supine to adjust over hips  Feet:dep pt able to doff r knee brace max with l and mod a to don b knee braces supine in bed  Vision/Cognition: tba     Assessment: pt seen this date for  b shoulder strengtheing and scapular stabalization supine that pt can carry our on own. Pt able to increase tolerance to ex with completeing 12 reps 6 ex with increae shoulder integrity with ex and less fatige pt educated in having his head at the top of the bed to increse flex at hips then at chest pt raiseing head off bed for drinking and pills then sitting up higher. pt able to verbalize and demo understanding of ex with less cues for technique pt able to propell w/c from room to and into elevator, 50 % assit down second hyman way then manuver self in room to tranfer back to bed OT goals. Cont. OT POC.      Other Barriers to Discharge (DME, Family Training, etc): dme needs  Pt has 4 reachers at home.

## 2021-07-16 NOTE — PROGRESS NOTES
[facility]   Acute Rehabilitation Unit  Daily progress note    INTERVAL HISTORY  Jorje was seen sitting up in wheel chair reportedly just walked with lift pants with PT.  He is making some ongoing progress with therapy, ongoing right lower extremity complaints with weakness and pain stated as new, reminded on pre operative exam and that these are ongoing.  PT wanting custom AFO given size and support needed off the shelf not appropriate.      OT: Functional tsfers: Mod-max A w/ low pivot squat bed to w/c and sliding board w/ Max A from w/c to bed. Max A to doff shoes and SBA to doff unlaced shoes w/ reacher/long shoe horn.  ADLs:G/H:sba  Washing face shaving sitting eob with wedge back support  U/b dressing: pullover shirt sba doff/don  L/B dressing:  shorts disposable brief max a to don over legs mod a supine to adjust over hips  Feet:dep pt able to doff r knee brace max with l and mod a to don b knee braces supine in bed  Vision/Cognition: tba    PT: Bed Mobility: bed rail and assist of one for rolling, raised HOB for supine>sit  Transfer: modified slide transfer w/o board, set up and SBA x1; Liko available and has extenders on sling per spine precautions  Gait: NT  Stairs: NT  Balance: sits EOB w/o support     Assessment: Switched out w/c to standard type w/ solid seat and solid back, he is ind w/ w/c propulsion on unit and may do so for diversion.        MEDICATIONS    ascorbic acid  250 mg Oral Daily     aspirin  81 mg Oral Daily     atorvastatin  80 mg Oral At Bedtime     melatonin  6 mg Oral At Bedtime     methocarbamol  500 mg Oral 4x Daily     metoprolol succinate ER  12.5 mg Oral At Bedtime     multivitamin w/minerals  1 tablet Oral Daily     senna-docusate  2 tablet Oral BID     Urivarx Capsules (patient supplied supplement)   2 capsule Oral Daily     vitamin E  200 Units Oral Daily     zinc sulfate  220 mg Oral Daily        acetaminophen, bisacodyl, diazepam,  "diclofenac, morphine, naloxone **OR** naloxone **OR** naloxone **OR** naloxone, polyethylene glycol, simethicone     PHYSICAL EXAM  /57 (BP Location: Left arm)   Pulse 81   Temp 96.8  F (36  C) (Oral)   Resp 16   Ht 1.854 m (6' 1\")   Wt 97.9 kg (215 lb 14.4 oz)   SpO2 95%   BMI 28.48 kg/m    Gen: awake alert  HEENT: NCAT, EOMI  Pulm: non labored on room air  Abd: soft, non tender  Ext: warm dry without edema.   Neuro/MSK: alert, follows commands     LABS  CBC RESULTS:   Recent Labs   Lab Test 07/15/21  0549 07/08/21  0654 06/29/21  1258   WBC 7.6 7.4 9.7   RBC 3.28* 3.47* 3.17*   HGB 9.8* 10.4* 9.8*   HCT 31.4* 33.7* 30.9*   MCV 96 97 98   MCH 29.9 30.0 30.9   MCHC 31.2* 30.9* 31.7   RDW 13.8 14.1 13.6    270 189     Last Basic Metabolic Panel:  Recent Labs   Lab Test 07/15/21  0549 07/12/21  0547 07/08/21  0654    136 138   POTASSIUM 3.9 4.1 4.2   CHLORIDE 102 102 101   CO2 29 31 33*   ANIONGAP 5 3 4   GLC 92 121* 102*   BUN 16 16 14   CR 0.50* 0.48* 0.51*   GFRESTIMATED >90 >90 >90   TRENTON 9.4 9.3 9.6         Rehabilitation - continue comprehensive acute inpatient rehabilitation program with multidisciplinary approach including therapies, rehab nursing, and physiatry following. See interval history for updates.      ASSESSMENT AND PLAN    Darleen Simmons is a 70 year old man with past medical history of  HTN, HLP, CAD, ischemic cardiomyopathy s/p ICD placement, PAD s/p left SFA POBA for latissimus dorsi flap to left calf, atrial fibrillation/flutter,  flat back syndrome now s/p T7-pelvis revision PSIF with T12-L1 3CO and L5 PSO on 6/21/21 with Jazmyne Akins/Herb.  Admitted to acute rehab 7/7/21      Flatback syndrome-   Chronic back pain  S/p T7-S1 posterior spinal fusion per Dr. 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 Afterwards he experienced quadriplegia but he slowly was able to regain his ability to ambulate. In " "2018, he underwent posterior decompression thoracolumbar fusion at [facility]. At that time It was thought that he would be primarily a sitter and he was fused in a fairly straight posture.  -  Up with assist until independent. No excessive bending or twisting. No lifting >10 lbs x 6 weeks. Renny lift approved for transfers. Keep back straight if using lift.  -WBAT  -pain management as below  -continue PT/OT  - right AFO- consult orthotics for custom per PT recs.   -wound care as ordered.   -f/u Dr. Akins        CAD s/p MI CABG  Ischemic cardiomyopathy   s/p Pacemaker & ICD (medtronic)  -echo 5/4/21Mildly (EF 45-50%) reduced left ventricular function is present. There is a pattern of wall motion abnormalities consistent with a prior RCA and, possibly, LCx infarction.Stress test 5/5/21  -continue lipitor  -continue asa     A-fib/ Aflutter- not anticoagulated prior to admission per pcp recs  -continue metoprolol xl 12.5 mg     Acute blood loss Anemia- Estimated blood loss ~ 4320 ml with 1630 returned via cell saver.  Hgb now stable 10.4 7/8  -trend     Acute on Chronic back pain- on morphine 15-30 mg reportedly taking qid prior to admission. Ongoing complaint though improving activity tolerance, reports \"nerve pain\" offered gabapentin/lyrica which patient declined.   -continue robaxin scheduled  -continue tylenol, valium, morphine 15-30 mg prn     Depression- monitor mood, reportedly PTSD as well, and resistance to psychiatry/ medications for mood.   -psychology consult for emotional support.      PAD-  S/p L SFA baloon angioplasty. 2/11/21 MARIAMA Right:  Moderate peripheral arterial disease starting at or proximal to the superficial femoral level. Left:  Moderate peripheral arterial disease location not well determined.       Metabolic encephalopathy- prolonged surgery, pain meds (improved)   -reorient as needed  -monitor- hold meds for sedation     Urinary retention- acute retention post operatively   -continue prior " to admission supplement      Pressure injury- wound care as ordered - apparently healing well.   Evaluated by WON on 7/6/21  -wound care as ordered.     1. Adjustment to disability:  Psychology for emotional support  2. FEN: reg  3. Bowel: constipated improved.   4. Bladder: chronic urgency- monitor  5. DVT Prophylaxis: mechanical  6. GI Prophylaxis: reg diet  7. Code: full   8. Disposition: goal for home   9. ELOS: ~ 3 weeks  10. Follow up Appointments on Discharge: pcp, ortho spine    Ca Hernandez PA-C  PM&R      Seen & Discussed with Dr. Clarke

## 2021-07-16 NOTE — PLAN OF CARE
FOCUS/GOAL  Bowel management, Bladder management, Pain management, Mobility, Skin integrity, and Safety management    ASSESSMENT, INTERVENTIONS AND CONTINUING PLAN FOR GOAL:  A/O, VS stable. Regular/thin, pills whole. Ate some of meal. Continent of bowel and bladder. Pain in back with PRN medications. Assist of 2 pivot to transfer. No new skin concerns. Calls appropriately with light. Continue POC.

## 2021-07-16 NOTE — PLAN OF CARE
"/63   Pulse 75   Temp 97.7  F (36.5  C) (Oral)   Resp 18   Ht 1.854 m (6' 1\")   Wt 97.9 kg (215 lb 14.4 oz)   SpO2 96%   BMI 28.48 kg/m    Pt vitals stable. Denies SOB or cough, on room air sats stable. C/o of back and right leg pain, controlled with morphine PRN q3h , scheduled robaxin and PRN Voltaren gel. Back incision approximated, adhesive strips in place. Dressing changed. Pt transfers with 2 assists/stand pivot to . Voids without difficulties using urinal at bedside. LBM 7/14 per pt. On regular diet/thin liquids, swallows pills whole without difficulties.   Alert and oriented x4, able to make needs known and using call light appropriately.  "

## 2021-07-16 NOTE — PLAN OF CARE
Discharge Planner Post-Acute Rehab PT:      Discharge Plan: home w/ his wife, she is older and limited w/ ability to provide physical assist     Precautions: falls, spine; [place] , PTSD     Current Status:  Bed Mobility: bed rail and assist of one for rolling, raised HOB for supine>sit  Transfer: modified slide transfer w/o board, set up and SBA x1; Liko available and has extenders on sling per spine precautions  Gait: Liko rail w/ lift pants, use towel for scrotum protection, 100' x 3 w/ ww  Stairs: NT  Balance: sits EOB w/o support     Assessment: Gait today w/ Liko rail, lift pants; needs assist w/ (R) LE advancing limb and placing foot; AFO in process, casted today for solid ankle/joint ready.    Other Barriers to Discharge (DME, Family Training, etc): None identified at this time.  Discussed w/c needs for home.  He insists that he has a chair at home that will work for him and that nothing more needs to be done.

## 2021-07-17 ENCOUNTER — APPOINTMENT (OUTPATIENT)
Dept: PHYSICAL THERAPY | Facility: CLINIC | Age: 71
DRG: 559 | End: 2021-07-17
Attending: PHYSICAL MEDICINE & REHABILITATION
Payer: COMMERCIAL

## 2021-07-17 ENCOUNTER — APPOINTMENT (OUTPATIENT)
Dept: OCCUPATIONAL THERAPY | Facility: CLINIC | Age: 71
DRG: 559 | End: 2021-07-17
Attending: PHYSICAL MEDICINE & REHABILITATION
Payer: COMMERCIAL

## 2021-07-17 PROCEDURE — 250N000013 HC RX MED GY IP 250 OP 250 PS 637: Performed by: PHYSICAL MEDICINE & REHABILITATION

## 2021-07-17 PROCEDURE — 97110 THERAPEUTIC EXERCISES: CPT | Mod: GO

## 2021-07-17 PROCEDURE — 97535 SELF CARE MNGMENT TRAINING: CPT | Mod: GO

## 2021-07-17 PROCEDURE — 97112 NEUROMUSCULAR REEDUCATION: CPT | Mod: GO

## 2021-07-17 PROCEDURE — 97116 GAIT TRAINING THERAPY: CPT | Mod: GP | Performed by: PHYSICAL THERAPIST

## 2021-07-17 PROCEDURE — 250N000013 HC RX MED GY IP 250 OP 250 PS 637: Performed by: PHYSICIAN ASSISTANT

## 2021-07-17 PROCEDURE — 128N000003 HC R&B REHAB

## 2021-07-17 PROCEDURE — 97110 THERAPEUTIC EXERCISES: CPT | Mod: GP | Performed by: PHYSICAL THERAPIST

## 2021-07-17 PROCEDURE — 97530 THERAPEUTIC ACTIVITIES: CPT | Mod: GP | Performed by: PHYSICAL THERAPIST

## 2021-07-17 RX ORDER — LIDOCAINE 4 G/G
1 PATCH TOPICAL
Status: DISCONTINUED | OUTPATIENT
Start: 2021-07-17 | End: 2021-07-30 | Stop reason: HOSPADM

## 2021-07-17 RX ADMIN — Medication 1 TABLET: at 08:48

## 2021-07-17 RX ADMIN — SIMETHICONE 375 MG: 125 TABLET, CHEWABLE ORAL at 23:40

## 2021-07-17 RX ADMIN — Medication 12.5 MG: at 21:30

## 2021-07-17 RX ADMIN — ZINC SULFATE 220 MG (50 MG) CAPSULE 220 MG: CAPSULE at 08:48

## 2021-07-17 RX ADMIN — MORPHINE SULFATE 30 MG: 15 TABLET ORAL at 08:48

## 2021-07-17 RX ADMIN — BISACODYL 10 MG: 10 SUPPOSITORY RECTAL at 19:35

## 2021-07-17 RX ADMIN — METHOCARBAMOL 500 MG: 500 TABLET, FILM COATED ORAL at 08:48

## 2021-07-17 RX ADMIN — LIDOCAINE 1 PATCH: 560 PATCH PERCUTANEOUS; TOPICAL; TRANSDERMAL at 19:35

## 2021-07-17 RX ADMIN — Medication 200 UNITS: at 08:48

## 2021-07-17 RX ADMIN — MORPHINE SULFATE 30 MG: 15 TABLET ORAL at 01:38

## 2021-07-17 RX ADMIN — DICLOFENAC 2 G: 10 GEL TOPICAL at 16:45

## 2021-07-17 RX ADMIN — DICLOFENAC 2 G: 10 GEL TOPICAL at 01:13

## 2021-07-17 RX ADMIN — SENNOSIDES AND DOCUSATE SODIUM 2 TABLET: 8.6; 5 TABLET ORAL at 20:31

## 2021-07-17 RX ADMIN — MORPHINE SULFATE 30 MG: 15 TABLET ORAL at 05:02

## 2021-07-17 RX ADMIN — MORPHINE SULFATE 30 MG: 15 TABLET ORAL at 21:36

## 2021-07-17 RX ADMIN — METHOCARBAMOL 500 MG: 500 TABLET, FILM COATED ORAL at 12:16

## 2021-07-17 RX ADMIN — MELATONIN TAB 3 MG 6 MG: 3 TAB at 21:30

## 2021-07-17 RX ADMIN — DICLOFENAC 2 G: 10 GEL TOPICAL at 23:57

## 2021-07-17 RX ADMIN — SENNOSIDES AND DOCUSATE SODIUM 2 TABLET: 8.6; 5 TABLET ORAL at 09:01

## 2021-07-17 RX ADMIN — ASPIRIN 81 MG: 81 TABLET ORAL at 08:48

## 2021-07-17 RX ADMIN — MORPHINE SULFATE 30 MG: 15 TABLET ORAL at 12:16

## 2021-07-17 RX ADMIN — DICLOFENAC 2 G: 10 GEL TOPICAL at 08:51

## 2021-07-17 RX ADMIN — ATORVASTATIN CALCIUM 80 MG: 40 TABLET, FILM COATED ORAL at 21:30

## 2021-07-17 RX ADMIN — Medication 250 MG: at 08:48

## 2021-07-17 RX ADMIN — MORPHINE SULFATE 30 MG: 15 TABLET ORAL at 16:40

## 2021-07-17 RX ADMIN — METHOCARBAMOL 500 MG: 500 TABLET, FILM COATED ORAL at 19:35

## 2021-07-17 RX ADMIN — METHOCARBAMOL 500 MG: 500 TABLET, FILM COATED ORAL at 16:38

## 2021-07-17 NOTE — PROGRESS NOTES
[facility], [place]   Physical Medicine and Rehabilitation Daily Note           Assessment and Plan of Care:   Darleen Simmons is a 70 year old man with past medical history of  HTN, HLP, CAD, ischemic cardiomyopathy s/p ICD placement, PAD s/p left SFA POBA for latissimus dorsi flap to left calf, atrial fibrillation/flutter,  flat back syndrome now s/p T7-pelvis revision PSIF with T12-L1 3CO and L5 PSO on 6/21/21 with Jazmyne Akins/Herb.      Admitted to acute rehab 7/7/21    --Vitals stable. No lab today.  -- Lidocaine patch for shoulder pain  --Continue ongoing medical management.   --Continue therapies and plan of care.    Patient was discussed with Dr Whitmore.         Interval history:   NAEO. Slept poorly due to new neck pain that radiate to his shoulder and his ongoing leg pain. He has concern about his spine given his history. Denies fever, chills, CP, SOB, N/V, abdominal pain, new pain or weakness/numbness/tingling.             Physical Exam:     Vitals:    07/16/21 0906 07/16/21 1619 07/16/21 2012 07/17/21 0741   BP: 135/57 114/52 (!) 157/68 127/64   BP Location: Left arm Left arm  Right arm   Pulse: 81 80 88 79   Resp: 16 16 16   Temp: 96.8  F (36  C) 97.1  F (36.2  C)  97.2  F (36.2  C)   TempSrc: Oral Oral  Oral   SpO2: 95% 98%  95%   Weight:       Height:         Gen: NAD, sitting up at bed side  Heart: RRR  Lungs: clear breath sounds b/l  Abd: soft and non-tender  Ext: wwp, no edema in BLE, no tenderness in calves  MSK/neuro: alert and oriented. speech fluent. R shoulder is ttp diffusely throughout the trapzius. Sensation intact, symmetric through BUE dermatomes. Moves BUE and BLE volitionally. R foot drop         Data:   Scheduled meds    ascorbic acid  250 mg Oral Daily     aspirin  81 mg Oral Daily     atorvastatin  80 mg Oral At Bedtime     melatonin  6 mg Oral At Bedtime     methocarbamol  500 mg Oral 4x Daily     metoprolol succinate ER  12.5 mg Oral At Bedtime      multivitamin w/minerals  1 tablet Oral Daily     senna-docusate  2 tablet Oral BID     Urivarx Capsules (patient supplied supplement)   2 capsule Oral Daily     vitamin E  200 Units Oral Daily     zinc sulfate  220 mg Oral Daily       PRN meds:  acetaminophen, bisacodyl, diazepam, diclofenac, morphine, naloxone **OR** naloxone **OR** naloxone **OR** naloxone, polyethylene glycol, simethicone        Leonardo Vickers MD  Physical Medicine and Rehabilitation

## 2021-07-17 NOTE — PLAN OF CARE
Discharge Planner Post-Acute Rehab PT:      Discharge Plan: home w/ his wife, she is older and limited w/ ability to provide physical assist     Precautions: falls, spine; [place] , PTSD     Current Status:  Bed Mobility: bed rail and SBA for out of bed using rails/head of bed elevation but mod A for sit -> supine for LE management  Transfer:seated pivot w/o board, set up and CGA x1; Liko available and has extenders on sling per spine precautions  Gait: parallel bars with close wc follow, 5-10 ft  Stairs: NT  Balance: sits EOB w/o support     Assessment: Gait today in parallel bars, able to advance LEs indepenently, requires close wc follow and maintains squat posture unable to fully extend knees; AFO in process, casted today for solid ankle/joint ready.      Other Barriers to Discharge (DME, Family Training, etc): None identified at this time.  Discussed w/c needs for home.  He insists that he has a chair at home that will work for him and that nothing more needs to be done.

## 2021-07-17 NOTE — PLAN OF CARE
Pt is alert and oriented x4 and verbalized needs well  S/P spinal fusion and Morphine tablet 30 mg were given q 3 hrs for back of neck , back and Thigh /leg pain and also pain cream applied on those areas that are hurting pt has his own supply , he was able to take a nap after the Morphine was given , Melatonin tab was given at hs to promote sleep , back incision has dressing which is CDI assisted with turning and repositioning on bed , LBM was on 7/14 with scheduled Senna at HS continue to monitor pain and the effectiveness of pain management .

## 2021-07-17 NOTE — PLAN OF CARE
FOCUS/GOAL  Bowel management, Bladder management, Pain management, Mobility, Skin integrity, and Safety management    ASSESSMENT, INTERVENTIONS AND CONTINUING PLAN FOR GOAL:  A/O, VS stable. Regular/thin, pills whole. Ate most of meal. Continent of bowel and bladder, last BM 7/14, pt requesting suppository after wife leaves this evening. Pain in back and leg with PRN medications to help relieve. Assist of 2 pivot to transfer. No new skin concerns. Calls appropriately with light. Continue POC.

## 2021-07-17 NOTE — PLAN OF CARE
FOCUS/GOAL  Pain management and Medical management    ASSESSMENT, INTERVENTIONS AND CONTINUING PLAN FOR GOAL:    Pt is alert and oriented. Uses the call light appropriately.   Complained of neck and back pain that radiates to the right leg.   Given PRN morphine x2 this shift and applied voltaren gelwith good effect per patient.   Pillows used to support right leg, heel protectors in place.  Requesting for writer to wake pt up when due for PRN pain medications.   Able to assist with boosting self up in bed, Min A1.   Continent of bowel and bladder. No bm this shift  Uses the urinal independently w/ staff to empty.   Will continue POC.

## 2021-07-17 NOTE — PLAN OF CARE
Discharge Planner Post-Acute Rehab OT:      Discharge Plan: home with wife home ot     Precautions: spinal , Heel protectors on when in bed (beige open toe socks with gel paddings, due to bone spurs on heels)     Current Status:  Functional tsfers: Mod-max A w/ low pivot squat bed to w/c and sliding board w/ Max A from w/c to bed. Max A to doff shoes and SBA seated additional effort and time to doff unlaced shoes w/ reacher/long shoe horn.  ADLs:G/H:sba  Washing face shaving sitting eob SBA, no back support this date.   U/b dressing: pullover shirt sba doff/don  L/B dressing:  shorts disposable brief max a to don over legs mod a supine to adjust over hips  Feet:dep pt able to doff r knee brace max with l and mod a to don b knee braces supine in bed  Vision/Cognition: tba     Assessment:  AM OT session.Pt had improved sitting balance at EOB this date. Sat for approx. 30 mins, sometimes holding onto bed rail for assistance when fatigued. Pt completed shaving, placing his dentures and facial cares w/ set up. Pt c/o of R neck pain, neck ROM limitations noted in all planes. MF and manual therapy provided for soft tissue tightness in upper back and neck muscles in sitting and then in supine, pt had positive response. Pt reports feeling more fatigued and having more neck pain this date. Cont. OT POC and goals.      Other Barriers to Discharge (DME, Family Training, etc): dme needs  Pt has 4 reachers at home.

## 2021-07-18 ENCOUNTER — APPOINTMENT (OUTPATIENT)
Dept: OCCUPATIONAL THERAPY | Facility: CLINIC | Age: 71
DRG: 559 | End: 2021-07-18
Attending: PHYSICAL MEDICINE & REHABILITATION
Payer: COMMERCIAL

## 2021-07-18 ENCOUNTER — APPOINTMENT (OUTPATIENT)
Dept: PHYSICAL THERAPY | Facility: CLINIC | Age: 71
DRG: 559 | End: 2021-07-18
Attending: PHYSICAL MEDICINE & REHABILITATION
Payer: COMMERCIAL

## 2021-07-18 PROCEDURE — 97116 GAIT TRAINING THERAPY: CPT | Mod: GP | Performed by: PHYSICAL THERAPIST

## 2021-07-18 PROCEDURE — 99232 SBSQ HOSP IP/OBS MODERATE 35: CPT | Mod: GC | Performed by: PHYSICAL MEDICINE & REHABILITATION

## 2021-07-18 PROCEDURE — 250N000013 HC RX MED GY IP 250 OP 250 PS 637: Performed by: PHYSICAL MEDICINE & REHABILITATION

## 2021-07-18 PROCEDURE — 97530 THERAPEUTIC ACTIVITIES: CPT | Mod: GP | Performed by: PHYSICAL THERAPIST

## 2021-07-18 PROCEDURE — 250N000013 HC RX MED GY IP 250 OP 250 PS 637: Performed by: PHYSICIAN ASSISTANT

## 2021-07-18 PROCEDURE — 97110 THERAPEUTIC EXERCISES: CPT | Mod: GP | Performed by: PHYSICAL THERAPIST

## 2021-07-18 PROCEDURE — 128N000003 HC R&B REHAB

## 2021-07-18 PROCEDURE — 97535 SELF CARE MNGMENT TRAINING: CPT | Mod: GO

## 2021-07-18 RX ORDER — POLYETHYLENE GLYCOL 3350 17 G/17G
17 POWDER, FOR SOLUTION ORAL DAILY
Status: DISCONTINUED | OUTPATIENT
Start: 2021-07-19 | End: 2021-07-30 | Stop reason: HOSPADM

## 2021-07-18 RX ADMIN — SENNOSIDES AND DOCUSATE SODIUM 2 TABLET: 8.6; 5 TABLET ORAL at 08:42

## 2021-07-18 RX ADMIN — MORPHINE SULFATE 30 MG: 15 TABLET ORAL at 22:46

## 2021-07-18 RX ADMIN — MORPHINE SULFATE 30 MG: 15 TABLET ORAL at 11:49

## 2021-07-18 RX ADMIN — MORPHINE SULFATE 30 MG: 15 TABLET ORAL at 00:37

## 2021-07-18 RX ADMIN — SIMETHICONE 375 MG: 125 TABLET, CHEWABLE ORAL at 16:02

## 2021-07-18 RX ADMIN — Medication 1000 MG: at 19:48

## 2021-07-18 RX ADMIN — ATORVASTATIN CALCIUM 80 MG: 40 TABLET, FILM COATED ORAL at 21:26

## 2021-07-18 RX ADMIN — Medication 1 TABLET: at 08:42

## 2021-07-18 RX ADMIN — DICLOFENAC 2 G: 10 GEL TOPICAL at 08:42

## 2021-07-18 RX ADMIN — POLYETHYLENE GLYCOL 3350 17 G: 17 POWDER, FOR SOLUTION ORAL at 12:41

## 2021-07-18 RX ADMIN — METHOCARBAMOL 500 MG: 500 TABLET, FILM COATED ORAL at 11:50

## 2021-07-18 RX ADMIN — METHOCARBAMOL 500 MG: 500 TABLET, FILM COATED ORAL at 08:42

## 2021-07-18 RX ADMIN — Medication 12.5 MG: at 21:26

## 2021-07-18 RX ADMIN — MORPHINE SULFATE 30 MG: 15 TABLET ORAL at 08:43

## 2021-07-18 RX ADMIN — Medication 200 UNITS: at 08:42

## 2021-07-18 RX ADMIN — MELATONIN TAB 3 MG 6 MG: 3 TAB at 21:26

## 2021-07-18 RX ADMIN — ASPIRIN 81 MG: 81 TABLET ORAL at 08:42

## 2021-07-18 RX ADMIN — METHOCARBAMOL 500 MG: 500 TABLET, FILM COATED ORAL at 16:01

## 2021-07-18 RX ADMIN — Medication 250 MG: at 08:42

## 2021-07-18 RX ADMIN — MORPHINE SULFATE 30 MG: 15 TABLET ORAL at 16:02

## 2021-07-18 RX ADMIN — SENNOSIDES AND DOCUSATE SODIUM 2 TABLET: 8.6; 5 TABLET ORAL at 21:26

## 2021-07-18 RX ADMIN — LIDOCAINE 1 PATCH: 560 PATCH PERCUTANEOUS; TOPICAL; TRANSDERMAL at 19:47

## 2021-07-18 RX ADMIN — MORPHINE SULFATE 30 MG: 15 TABLET ORAL at 19:46

## 2021-07-18 RX ADMIN — MORPHINE SULFATE 30 MG: 15 TABLET ORAL at 03:37

## 2021-07-18 RX ADMIN — METHOCARBAMOL 500 MG: 500 TABLET, FILM COATED ORAL at 19:46

## 2021-07-18 RX ADMIN — ZINC SULFATE 220 MG (50 MG) CAPSULE 220 MG: CAPSULE at 08:42

## 2021-07-18 NOTE — PLAN OF CARE
Discharge Planner Post-Acute Rehab PT:      Discharge Plan: home w/ his wife, she is older and limited w/ ability to provide physical assist     Precautions: falls, spine; [place] , PTSD     Current Status:  Bed Mobility: bed rail and SBA for out of bed using rails/head of bed elevation but mod A for sit -> supine for LE management  Transfer:seated pivot w/o board, set up and CGA x1; Liko available and has extenders on sling per spine precautions  Gait: LISA walker or FWW with close wc follow, 5-10 ft  Stairs: NT  Balance: sits EOB w/o support    Assessment: gait progression with LISA walker 20 ft, 30 ft, 35 ft and FWW 20 ft using significant UE support and trunk flexion, max A +2 progressing to mod A +1      Other Barriers to Discharge (DME, Family Training, etc): None identified at this time.  Discussed w/c needs for home.  He insists that he has a chair at home that will work for him and that nothing more needs to be done.

## 2021-07-18 NOTE — PLAN OF CARE
FOCUS/GOAL  Bowel management, Bladder management, Pain management, Mobility, Skin integrity, and Safety management    ASSESSMENT, INTERVENTIONS AND CONTINUING PLAN FOR GOAL:  A/O, VS stable. Regular/thin, pills whole. Ate most of meal. Continent of bowel and bladder. Pain in back and neck, medications given as available to help relieve. No current skin concerns. Calls appropriately with light. Continue POC.

## 2021-07-18 NOTE — PLAN OF CARE
FOCUS/GOAL  Pain management and Medical management    ASSESSMENT, INTERVENTIONS AND CONTINUING PLAN FOR GOAL:    Pt is alert and oriented. Make needs known. Requested for writer to wake patient up when pain medication is due. Given PRN Morphine every 3 hours for neck, back and right leg pain. Applied Voltaren gel as well on affected areas. Lidocaine patch in place on posterior neck. With some relief from interventions. Pillows and emotional support given.  Patient  wakes up at night and then asks for pain medication even when not due yet. Is frustrated that he had to wait for pain medication time and cannot have it when he wants it. Staff able to pacify patient's feelings. Min A1 in turning from side to side. Continent of bowel and bladder. Uses the urinal independently to void and staff to empty. Complained of some gas discomfort, given PRN Simethicone with total relief. Had a medium bm this shift. Will continue POC.

## 2021-07-18 NOTE — PROGRESS NOTES
[facility], [place]   Physical Medicine and Rehabilitation Daily Note           Assessment and Plan of Care:   Darleen Simmons is a 70 year old man with past medical history of  HTN, HLP, CAD, ischemic cardiomyopathy s/p ICD placement, PAD s/p left SFA POBA for latissimus dorsi flap to left calf, atrial fibrillation/flutter,  flat back syndrome now s/p T7-pelvis revision PSIF with T12-L1 3CO and L5 PSO on 6/21/21 with Jazmyne Akins/Herb.      Admitted to acute rehab 7/7/21    --Vitals stable. No lab today.  --Continue ongoing medical management.   --Continue therapies and plan of care.    Patient was discussed with Dr Whitmore.         Interval history:   NAEO. Lido patch was somewhat helpful for his shoulder pain. Able to sleep somewhat better as a result Denies fever, chills, CP, SOB, N/V, abdominal pain, new pain or weakness/numbness/tingling.             Physical Exam:     Vitals:    07/16/21 1619 07/16/21 2012 07/17/21 0741 07/17/21 1612   BP: 114/52 (!) 157/68 127/64 110/54   BP Location: Left arm  Right arm Right arm   Pulse: 80 88 79 80   Resp: 16  16 16   Temp: 97.1  F (36.2  C)  97.2  F (36.2  C) 96.8  F (36  C)   TempSrc: Oral  Oral Oral   SpO2: 98%  95% 98%   Weight:       Height:         Gen: NAD, sitting up at bed side  Heart: Distal pulses palpable, symmetric  Lungs: breathing comfortably on RA  Abd: soft and non-tender  Ext: wwp, no edema in BLE, no tenderness in calves  MSK/neuro: alert and oriented. speech fluent. R shoulder is ttp diffusely throughout the trapezius, extending up to his neck. Sensation intact, symmetric through BUE dermatomes. Neg Ibrahim bilaterally. Moves BUE and BLE volitionally. R foot drop         Data:   Scheduled meds    ascorbic acid  250 mg Oral Daily     aspirin  81 mg Oral Daily     atorvastatin  80 mg Oral At Bedtime     lidocaine  1 patch Transdermal Q24h    And     lidocaine   Transdermal Q8H     melatonin  6 mg Oral At Bedtime     methocarbamol   500 mg Oral 4x Daily     metoprolol succinate ER  12.5 mg Oral At Bedtime     multivitamin w/minerals  1 tablet Oral Daily     senna-docusate  2 tablet Oral BID     Urivarx Capsules (patient supplied supplement)   2 capsule Oral Daily     vitamin E  200 Units Oral Daily     zinc sulfate  220 mg Oral Daily       PRN meds:  acetaminophen, bisacodyl, diazepam, diclofenac, morphine, naloxone **OR** naloxone **OR** naloxone **OR** naloxone, polyethylene glycol, simethicone        Leonardo Vickers MD  Physical Medicine and Rehabilitation

## 2021-07-18 NOTE — PLAN OF CARE
FOCUS/GOAL  Bowel management, Bladder management, Pain management, Medical management, Mobility, Skin integrity, and Safety management    ASSESSMENT, INTERVENTIONS AND CONTINUING PLAN FOR GOAL:  Patient is alert and oriented. Complains of pain on lower back neck and right leg PRN morphine x 2 given and was effective. Voltaren gel applied on right leg, foot and hip. Lidocaine patch to lower back. Patient denies dizziness, headache, CP or SOB. VS WDL. Large surgical incision at the back cleaned and new dressing applied. Suppository was given per no BM for 03 day patient also received scheduled senna and had medium formed BM. Patient take pills whole. Regular/thin diet with fair appetite. Call light in reach alarm is on. We will continue with the POC.

## 2021-07-18 NOTE — PLAN OF CARE
Discharge Planner Post-Acute Rehab OT:      Discharge Plan: home with wife home ot     Precautions: spinal , Heel protectors on when in bed (beige open toe socks with gel paddings, due to bone spurs on heels)     Current Status:  Functional tsfers: CGA w/ low pivot squat bed to w/c and sliding board w/ Max A from w/c to bed. Max A to doff shoes and SBA seated additional effort and time to doff unlaced shoes w/ reacher/long shoe horn.  ADLs:G/H:sba  Washing face shaving sitting eob SBA, no back support this date.   U/b dressing: pullover shirt CGA/SBA doff/don at EOB, additional time  L/B dressing:  shorts disposable brief max a to don over legs mod a supine to adjust over hips  Feet:dep pt able to doff r knee brace max with l and mod a to don b knee braces supine in bed  Vision/Cognition: tba     Assessment:  AM OT session, pt requested shower. Pt initially requested transferring due to bench but due to this OT's safety concern, use shower chair. Due to time constraint, pt was max-dep A w/ LB ADLs, see notes above. Pt had improved tsferring status from bed to w/c w/ low pivot squat but elevated bed required. Improved trunk control and strength, less support needed and no LOB noted.   In PM session, pt had improved standing ability w/ FWW at EOB from elevated height. Able to shift weight but required cues to decrease UE dependence to adhere to spinal precautions, pt able to follow through w/ education. Improved LB ADls w/ AE but still requiring moderate assistance. Pt is motivated, ocassional back pain with therapy but is not a barrier in his sessions. Cont. OT POC.      Other Barriers to Discharge (DME, Family Training, etc): dme needs  Pt owns hospital bed, wheel chair, lift chair for his split level home,walker, reacher, long shoe horn etc. Pt reports home is accessible by ramp and he had wide door frames for w/c accessibility.

## 2021-07-19 ENCOUNTER — APPOINTMENT (OUTPATIENT)
Dept: PHYSICAL THERAPY | Facility: CLINIC | Age: 71
DRG: 559 | End: 2021-07-19
Attending: PHYSICAL MEDICINE & REHABILITATION
Payer: COMMERCIAL

## 2021-07-19 ENCOUNTER — APPOINTMENT (OUTPATIENT)
Dept: OCCUPATIONAL THERAPY | Facility: CLINIC | Age: 71
DRG: 559 | End: 2021-07-19
Attending: PHYSICAL MEDICINE & REHABILITATION
Payer: COMMERCIAL

## 2021-07-19 LAB
ANION GAP SERPL CALCULATED.3IONS-SCNC: 1 MMOL/L (ref 3–14)
BUN SERPL-MCNC: 16 MG/DL (ref 7–30)
CALCIUM SERPL-MCNC: 9.3 MG/DL (ref 8.5–10.1)
CHLORIDE BLD-SCNC: 103 MMOL/L (ref 94–109)
CO2 SERPL-SCNC: 34 MMOL/L (ref 20–32)
CREAT SERPL-MCNC: 0.57 MG/DL (ref 0.66–1.25)
GFR SERPL CREATININE-BSD FRML MDRD: >90 ML/MIN/1.73M2
GLUCOSE BLD-MCNC: 102 MG/DL (ref 70–99)
POTASSIUM BLD-SCNC: 3.9 MMOL/L (ref 3.4–5.3)
SODIUM SERPL-SCNC: 138 MMOL/L (ref 133–144)

## 2021-07-19 PROCEDURE — 97535 SELF CARE MNGMENT TRAINING: CPT | Mod: GO

## 2021-07-19 PROCEDURE — 80048 BASIC METABOLIC PNL TOTAL CA: CPT | Performed by: PHYSICIAN ASSISTANT

## 2021-07-19 PROCEDURE — 128N000003 HC R&B REHAB

## 2021-07-19 PROCEDURE — 36415 COLL VENOUS BLD VENIPUNCTURE: CPT | Performed by: PHYSICIAN ASSISTANT

## 2021-07-19 PROCEDURE — 250N000013 HC RX MED GY IP 250 OP 250 PS 637: Performed by: PHYSICAL MEDICINE & REHABILITATION

## 2021-07-19 PROCEDURE — 97530 THERAPEUTIC ACTIVITIES: CPT | Mod: GP

## 2021-07-19 PROCEDURE — 250N000013 HC RX MED GY IP 250 OP 250 PS 637: Performed by: PHYSICIAN ASSISTANT

## 2021-07-19 PROCEDURE — 97110 THERAPEUTIC EXERCISES: CPT | Mod: GO

## 2021-07-19 PROCEDURE — 97530 THERAPEUTIC ACTIVITIES: CPT | Mod: GP | Performed by: REHABILITATION PRACTITIONER

## 2021-07-19 PROCEDURE — 99233 SBSQ HOSP IP/OBS HIGH 50: CPT | Performed by: PHYSICAL MEDICINE & REHABILITATION

## 2021-07-19 RX ORDER — SIMETHICONE 125 MG
125-375 TABLET,CHEWABLE ORAL 4 TIMES DAILY PRN
Status: DISCONTINUED | OUTPATIENT
Start: 2021-07-19 | End: 2021-07-30 | Stop reason: HOSPADM

## 2021-07-19 RX ADMIN — METHOCARBAMOL 500 MG: 500 TABLET, FILM COATED ORAL at 12:29

## 2021-07-19 RX ADMIN — ASPIRIN 81 MG: 81 TABLET ORAL at 12:29

## 2021-07-19 RX ADMIN — MORPHINE SULFATE 30 MG: 15 TABLET ORAL at 05:01

## 2021-07-19 RX ADMIN — SENNOSIDES AND DOCUSATE SODIUM 2 TABLET: 8.6; 5 TABLET ORAL at 08:47

## 2021-07-19 RX ADMIN — DIAZEPAM 6 MG: 2 TABLET ORAL at 01:17

## 2021-07-19 RX ADMIN — POLYETHYLENE GLYCOL 3350 17 G: 17 POWDER, FOR SOLUTION ORAL at 09:55

## 2021-07-19 RX ADMIN — SIMETHICONE 250 MG: 125 TABLET, CHEWABLE ORAL at 15:10

## 2021-07-19 RX ADMIN — ZINC SULFATE 220 MG (50 MG) CAPSULE 220 MG: CAPSULE at 12:30

## 2021-07-19 RX ADMIN — DICLOFENAC SODIUM 4 G: 10 GEL TOPICAL at 08:58

## 2021-07-19 RX ADMIN — Medication 250 MG: at 12:30

## 2021-07-19 RX ADMIN — MORPHINE SULFATE 30 MG: 15 TABLET ORAL at 16:08

## 2021-07-19 RX ADMIN — MORPHINE SULFATE 30 MG: 15 TABLET ORAL at 23:04

## 2021-07-19 RX ADMIN — SIMETHICONE 375 MG: 125 TABLET, CHEWABLE ORAL at 09:55

## 2021-07-19 RX ADMIN — MORPHINE SULFATE 30 MG: 15 TABLET ORAL at 12:28

## 2021-07-19 RX ADMIN — MORPHINE SULFATE 30 MG: 15 TABLET ORAL at 08:47

## 2021-07-19 RX ADMIN — DICLOFENAC SODIUM 4 G: 10 GEL TOPICAL at 16:14

## 2021-07-19 RX ADMIN — Medication 1 TABLET: at 12:29

## 2021-07-19 RX ADMIN — MORPHINE SULFATE 30 MG: 15 TABLET ORAL at 19:49

## 2021-07-19 RX ADMIN — SENNOSIDES AND DOCUSATE SODIUM 2 TABLET: 8.6; 5 TABLET ORAL at 21:22

## 2021-07-19 RX ADMIN — BISACODYL 10 MG: 10 SUPPOSITORY RECTAL at 08:41

## 2021-07-19 RX ADMIN — METHOCARBAMOL 500 MG: 500 TABLET, FILM COATED ORAL at 08:47

## 2021-07-19 RX ADMIN — DICLOFENAC SODIUM 4 G: 10 GEL TOPICAL at 01:25

## 2021-07-19 RX ADMIN — MELATONIN TAB 3 MG 6 MG: 3 TAB at 23:04

## 2021-07-19 RX ADMIN — Medication 200 UNITS: at 12:29

## 2021-07-19 RX ADMIN — Medication 1000 MG: at 14:53

## 2021-07-19 RX ADMIN — LIDOCAINE 1 PATCH: 560 PATCH PERCUTANEOUS; TOPICAL; TRANSDERMAL at 19:49

## 2021-07-19 RX ADMIN — ATORVASTATIN CALCIUM 80 MG: 40 TABLET, FILM COATED ORAL at 21:22

## 2021-07-19 RX ADMIN — METHOCARBAMOL 500 MG: 500 TABLET, FILM COATED ORAL at 16:08

## 2021-07-19 RX ADMIN — METHOCARBAMOL 500 MG: 500 TABLET, FILM COATED ORAL at 19:49

## 2021-07-19 RX ADMIN — Medication 12.5 MG: at 21:22

## 2021-07-19 RX ADMIN — MORPHINE SULFATE 30 MG: 15 TABLET ORAL at 01:57

## 2021-07-19 RX ADMIN — DIAZEPAM 6 MG: 2 TABLET ORAL at 17:46

## 2021-07-19 NOTE — PROGRESS NOTES
"  [facility]   Acute Rehabilitation Unit  Daily progress note    INTERVAL HISTORY  Jorje was seen resting in bed, smiling reports he walked in parallel bars this weekend and decreased level of assist with transfers.  Ongoing issues with constipation using suppositories.  Ongoing complaints of pain to right leg, no n/v/, no dizziness, did not complain/ report neck pain today.    PT: gait progression with LISA walker 20 ft, 30 ft, 35 ft and FWW 20 ft using significant UE support and trunk flexion, max A +2 progressing to mod A +1       MEDICATIONS    ascorbic acid  250 mg Oral Daily     aspirin  81 mg Oral Daily     atorvastatin  80 mg Oral At Bedtime     lidocaine  1 patch Transdermal Q24h    And     lidocaine   Transdermal Q8H     melatonin  6 mg Oral At Bedtime     methocarbamol  500 mg Oral 4x Daily     metoprolol succinate ER  12.5 mg Oral At Bedtime     multivitamin w/minerals  1 tablet Oral Daily     polyethylene glycol  17 g Oral Daily     senna-docusate  2 tablet Oral BID     Urivarx Capsules (patient supplied supplement)   2 capsule Oral Daily     vitamin E  200 Units Oral Daily     zinc sulfate  220 mg Oral Daily        acetaminophen, bisacodyl, diazepam, diclofenac, morphine, naloxone **OR** naloxone **OR** naloxone **OR** naloxone, simethicone     PHYSICAL EXAM  /59 (BP Location: Left arm)   Pulse 80   Temp 97.1  F (36.2  C) (Oral)   Resp 14   Ht 1.854 m (6' 1\")   Wt 97.9 kg (215 lb 14.4 oz)   SpO2 94%   BMI 28.48 kg/m    Gen: awake alert  HEENT: NCAT, EOMI  Pulm: non labored on room air  Abd: soft, non tender  Ext: warm dry without edema.   Neuro/MSK: alert, follows commands     LABS  CBC RESULTS:   Recent Labs   Lab Test 07/15/21  0549 07/08/21  0654 06/29/21  1258   WBC 7.6 7.4 9.7   RBC 3.28* 3.47* 3.17*   HGB 9.8* 10.4* 9.8*   HCT 31.4* 33.7* 30.9*   MCV 96 97 98   MCH 29.9 30.0 30.9   MCHC 31.2* 30.9* 31.7   RDW 13.8 14.1 13.6    270 189 "     Last Basic Metabolic Panel:  Recent Labs   Lab Test 07/19/21  0756 07/15/21  0549 07/12/21  0547    136 136   POTASSIUM 3.9 3.9 4.1   CHLORIDE 103 102 102   CO2 34* 29 31   ANIONGAP 1* 5 3   * 92 121*   BUN 16 16 16   CR 0.57* 0.50* 0.48*   GFRESTIMATED >90 >90 >90   TRENTON 9.3 9.4 9.3         Rehabilitation - continue comprehensive acute inpatient rehabilitation program with multidisciplinary approach including therapies, rehab nursing, and physiatry following. See interval history for updates.      ASSESSMENT AND PLAN    Darleen Simmons is a 70 year old man with past medical history of  HTN, HLP, CAD, ischemic cardiomyopathy s/p ICD placement, PAD s/p left SFA POBA for latissimus dorsi flap to left calf, atrial fibrillation/flutter,  flat back syndrome now s/p T7-pelvis revision PSIF with T12-L1 3CO and L5 PSO on 6/21/21 with Jazmyne Akins/Herb.  Admitted to acute rehab 7/7/21      Flatback syndrome-   Chronic back pain  S/p T7-S1 posterior spinal fusion per Dr. 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 Afterwards he experienced quadriplegia but he slowly was able to regain his ability to ambulate. In 2018, he underwent posterior decompression thoracolumbar fusion at [facility]. At that time It was thought that he would be primarily a sitter and he was fused in a fairly straight posture.  -  Up with assist until independent. No excessive bending or twisting. No lifting >10 lbs x 6 weeks. Renny lift approved for transfers. Keep back straight if using lift.  -WBAT  -pain management as below  -continue PT/OT  - right AFO- consult orthotics for custom per PT recs.   -wound care as ordered.   -f/u Dr. Akins        CAD s/p MI CABG  Ischemic cardiomyopathy   s/p Pacemaker & ICD (medtronic)  -echo 5/4/21Mildly (EF 45-50%) reduced left ventricular function is present. There is a pattern of wall motion abnormalities consistent with a  "prior RCA and, possibly, LCx infarction.Stress test 5/5/21  -continue lipitor  -continue asa     A-fib/ Aflutter- not anticoagulated prior to admission per pcp recs  -continue metoprolol xl 12.5 mg     Acute blood loss Anemia- Estimated blood loss ~ 4320 ml with 1630 returned via cell saver.  Hgb now stable 10.4 7/8  -trend     Acute on Chronic back pain- on morphine 15-30 mg reportedly taking qid prior to admission. Ongoing complaint though improving activity tolerance, reports \"nerve pain\" offered gabapentin/lyrica which patient declined.   -continue robaxin scheduled  -continue tylenol, valium, morphine 15-30 mg prn     Depression- monitor mood, reportedly PTSD as well, and resistance to psychiatry/ medications for mood.   -psychology consult for emotional support.      PAD-  S/p L SFA baloon angioplasty. 2/11/21 MARIAMA Right:  Moderate peripheral arterial disease starting at or proximal to the superficial femoral level. Left:  Moderate peripheral arterial disease location not well determined.       Metabolic encephalopathy- prolonged surgery, pain meds (improved)   -reorient as needed  -monitor- hold meds for sedation     Urinary retention- acute retention post operatively, no noted issues during rehab stay.   -continue prior to admission supplement      Pressure injury- wound care as ordered - apparently healing well.   Evaluated by WON on 7/6/21  -wound care as ordered.     1. Adjustment to disability:  Psychology for emotional support  2. FEN: reg  3. Bowel: constipated improved.   4. Bladder: chronic urgency- monitor  5. DVT Prophylaxis: mechanical  6. GI Prophylaxis: reg diet  7. Code: full   8. Disposition: goal for home   9. ELOS: ~ 3 weeks  10. Follow up Appointments on Discharge: pcp, ortho spine    Ca Hernandez PA-C  PM&R      Seen & Discussed with Dr. Clarke        "

## 2021-07-19 NOTE — PLAN OF CARE
FOCUS/GOAL  Pain management and Medical management    ASSESSMENT, INTERVENTIONS AND CONTINUING PLAN FOR GOAL:    Pt is A/O. Make needs known. Denies sob, N/V, dizziness or numbness/tingling. Requesting for staff to wake pt up when pain meds are due. Given PRN Voltaren gel, Valium x1 and Morphine x2 this shift. With some relief. Pt is able to sleep after meds were given but at times wakes up w/ spasmic pain on the right leg. Lidocaine patch in place on posterior neck which pt claims does not do anything. Min A1 in bed mobility. Continent of bowel and bladder. LBM 7/17. Uses the urinal independently and staff to empty. Will continue poc.

## 2021-07-19 NOTE — PLAN OF CARE
Discharge Planner Post-Acute Rehab OT:      Discharge Plan: home with wife home ot     Precautions: spinal , Heel protectors on when in bed (beige open toe socks with gel paddings, due to bone spurs on heels)     Current Status:  Functional tsfers: CGA w/ low pivot squat bed to w/c and sliding board w/ Max A from w/c to bed. Max A to doff shoes and SBA seated additional effort and time to doff unlaced shoes w/ reacher/long shoe horn.  ADLs:G/H:sba  Washing face shaving sitting eob SBA, no back support this date.   U/b dressing: pullover shirt CGA/SBA doff/don at EOB, additional time  L/B dressing:  shorts disposable brief max a to don over legs mod a supine to adjust over hips  Feet:dep pt able to doff r knee brace max with l and mod a to don b knee braces supine in bed  Vision/Cognition: tba     Assessment:.ot PT SEEN FOR SHOULDER STRENGTHENING AND SH STABALIZATION EX PT SBLE TO COMPLETE 7 EX WITH INCREASE REPS TO 15 WITH INCREASE R SHOULDER integrety with decrease crepitus with motion. ot with shoes off flat bed pt for the first time able to get legs onto bexd with out assist.  sba with hob raised only 15 degrees at home flatbed with railPt is motivated, ocassional back pain with therapy but is not a barrier in his sessions. Cont. OT POC.      Other Barriers to Discharge (DME, Family Training, etc): dme needs  Pt has a standard bed with rail per pt and wife 7-19, wheel chair, lift chair for his split level home, electric scooter,walker, reacher, long shoe horn etc. Pt reports home is accessible by ramp and he had wide door frames for w/c accessibility.

## 2021-07-19 NOTE — PLAN OF CARE
Discharge Planner Post-Acute Rehab PT:     Discharge Plan: home w/ his wife, she is older and limited w/ ability to provide physical assist     Precautions: falls, spine; [place] , PTSD    Current Status:  Bed Mobility: slight min A for supine to sit. SBA for sit to supine.   Transfer: CGA - squat pivot transfer   Gait: NT today- 7/18  LISA walker or FWW with close wc follow, 5-10 ft  Stairs: NT   Balance: SBA sitting.     Assessment:  pt willing to work with PT today. Pt able to demo bed mob, transfer with close SBA. Pt demo  standing frame up to 26 min .     Other Barriers to Discharge (DME, Family Training, etc): None identified at this time.  Discussed w/c needs for home.  He insists that he has a chair at home that will work for him and that nothing more needs to be done

## 2021-07-19 NOTE — PLAN OF CARE
FOCUS/GOAL  Bowel management, Bladder management, Pain management, Medical management, Mobility, Skin integrity, and Safety management    ASSESSMENT, INTERVENTIONS AND CONTINUING PLAN FOR GOAL:  Patient is alert and oriented x 4. Denies headache, dizziness, chest pain or SOB. Complains of pain on neck, back and right leg. PRN morphine x 3 per every 3 hr order and tylenol x 1 and was effective at a time.Large incision site at the cleaned and dressing new dressing applied. Regular/thin with good appetite. Take pills whole. Patient is continent of B/B uses urinal last BM 07/17. We will continue per POC.

## 2021-07-19 NOTE — PLAN OF CARE
Patient is Alert and Oriented x4, makes needs known. Denies pain. Voids spontaneously in urinal. Having Abdominal pain and constipation this AM, requested suppository and simethicone. Both provided relief. PRN morphine given x2 and tylenol x1 for back pain which provides relief. A1 stand pivot. Continue with POC.

## 2021-07-19 NOTE — PROGRESS NOTES
S: patient seen today at [facility] for evaluation and casting for right AFO as ordered by Franklyn Carlisle MD.    O/G: (prevent foot drop)(assist in ambulation)    A: Patient was casted for right AFO.  The casting procedure was done non-weightbearing with the patient in a sitting position. The patient was prepped for the procedure by applying a cotton stockinette.  Reference marks were made at the malleoli, first and fifth metatarsal heads, head and neck of fibula and other prominences.  Cast material rolled circumferentially around patient's leg.  Care was taken to properly position the lower extremity to achieve the desired results. An incision was made down the anterior aspect of the cast and cast removed from patient.  The negative model will be sent to our lab for fabrication. Patient will be fit as soon as the AFO is completed. Expected on 7/22.     P: patient instructed to contact this clinic if any issues arise.   ASTER Gimenez.

## 2021-07-20 ENCOUNTER — APPOINTMENT (OUTPATIENT)
Dept: PHYSICAL THERAPY | Facility: CLINIC | Age: 71
DRG: 559 | End: 2021-07-20
Attending: PHYSICAL MEDICINE & REHABILITATION
Payer: COMMERCIAL

## 2021-07-20 ENCOUNTER — APPOINTMENT (OUTPATIENT)
Dept: OCCUPATIONAL THERAPY | Facility: CLINIC | Age: 71
DRG: 559 | End: 2021-07-20
Attending: PHYSICAL MEDICINE & REHABILITATION
Payer: COMMERCIAL

## 2021-07-20 PROCEDURE — 99233 SBSQ HOSP IP/OBS HIGH 50: CPT | Performed by: PHYSICAL MEDICINE & REHABILITATION

## 2021-07-20 PROCEDURE — 250N000013 HC RX MED GY IP 250 OP 250 PS 637: Performed by: PHYSICIAN ASSISTANT

## 2021-07-20 PROCEDURE — 128N000003 HC R&B REHAB

## 2021-07-20 PROCEDURE — 250N000013 HC RX MED GY IP 250 OP 250 PS 637: Performed by: PHYSICAL MEDICINE & REHABILITATION

## 2021-07-20 PROCEDURE — 97535 SELF CARE MNGMENT TRAINING: CPT | Mod: GO

## 2021-07-20 PROCEDURE — L1970 AFO PLASTIC MOLDED W/ANKLE J: HCPCS

## 2021-07-20 PROCEDURE — 97530 THERAPEUTIC ACTIVITIES: CPT | Mod: GP | Performed by: PHYSICAL THERAPIST

## 2021-07-20 PROCEDURE — 97116 GAIT TRAINING THERAPY: CPT | Mod: GP | Performed by: PHYSICAL THERAPIST

## 2021-07-20 RX ADMIN — ZINC SULFATE 220 MG (50 MG) CAPSULE 220 MG: CAPSULE at 08:08

## 2021-07-20 RX ADMIN — METHOCARBAMOL 500 MG: 500 TABLET, FILM COATED ORAL at 08:08

## 2021-07-20 RX ADMIN — DIAZEPAM 6 MG: 2 TABLET ORAL at 22:47

## 2021-07-20 RX ADMIN — LIDOCAINE 1 PATCH: 560 PATCH PERCUTANEOUS; TOPICAL; TRANSDERMAL at 21:38

## 2021-07-20 RX ADMIN — BISACODYL 10 MG: 10 SUPPOSITORY RECTAL at 13:09

## 2021-07-20 RX ADMIN — SENNOSIDES AND DOCUSATE SODIUM 2 TABLET: 8.6; 5 TABLET ORAL at 21:43

## 2021-07-20 RX ADMIN — MORPHINE SULFATE 30 MG: 15 TABLET ORAL at 21:42

## 2021-07-20 RX ADMIN — Medication 1000 MG: at 04:25

## 2021-07-20 RX ADMIN — Medication 200 UNITS: at 08:09

## 2021-07-20 RX ADMIN — METHOCARBAMOL 500 MG: 500 TABLET, FILM COATED ORAL at 21:43

## 2021-07-20 RX ADMIN — METHOCARBAMOL 500 MG: 500 TABLET, FILM COATED ORAL at 16:24

## 2021-07-20 RX ADMIN — DICLOFENAC SODIUM 4 G: 10 GEL TOPICAL at 22:11

## 2021-07-20 RX ADMIN — Medication 1 TABLET: at 08:09

## 2021-07-20 RX ADMIN — ATORVASTATIN CALCIUM 80 MG: 40 TABLET, FILM COATED ORAL at 21:43

## 2021-07-20 RX ADMIN — MELATONIN TAB 3 MG 6 MG: 3 TAB at 21:43

## 2021-07-20 RX ADMIN — Medication 12.5 MG: at 21:42

## 2021-07-20 RX ADMIN — METHOCARBAMOL 500 MG: 500 TABLET, FILM COATED ORAL at 12:07

## 2021-07-20 RX ADMIN — MORPHINE SULFATE 30 MG: 15 TABLET ORAL at 08:07

## 2021-07-20 RX ADMIN — POLYETHYLENE GLYCOL 3350 17 G: 17 POWDER, FOR SOLUTION ORAL at 08:12

## 2021-07-20 RX ADMIN — Medication 250 MG: at 08:08

## 2021-07-20 RX ADMIN — MORPHINE SULFATE 30 MG: 15 TABLET ORAL at 16:24

## 2021-07-20 RX ADMIN — SENNOSIDES AND DOCUSATE SODIUM 2 TABLET: 8.6; 5 TABLET ORAL at 08:08

## 2021-07-20 RX ADMIN — MORPHINE SULFATE 30 MG: 15 TABLET ORAL at 03:36

## 2021-07-20 RX ADMIN — ASPIRIN 81 MG: 81 TABLET ORAL at 08:09

## 2021-07-20 RX ADMIN — SIMETHICONE 250 MG: 125 TABLET, CHEWABLE ORAL at 09:38

## 2021-07-20 RX ADMIN — DIAZEPAM 6 MG: 2 TABLET ORAL at 16:33

## 2021-07-20 NOTE — PROGRESS NOTES
[facility]   Acute Rehabilitation Unit  Daily progress note    INTERVAL HISTORY  Jorje was seen resting in bed, no complaints of n/v/, headache, dizziness, or fever, ongoing constipation, no reported bladder concerns.  Reports progress with therapy feels limited by pain, states ongoing pain down right leg.  He requests pain medications not be changed but wants to assure that nursing gives valium and morphine together when requested.  While this is not advised patient is aware of adverse events, as have personally discussed in past.      OT: Functional tsfers: CGA w/ low pivot squat bed to w/c and sliding board w/ Max A from w/c to bed. Max A to doff shoes and SBA seated additional effort and time to doff unlaced shoes w/ reacher/long shoe horn.  ADLs:G/H:sba  Washing face shaving sitting eob SBA, no back support this date.   U/b dressing: pullover shirt CGA/SBA doff/don at EOB, additional time  L/B dressing:  shorts disposable brief max a to don over legs mod a supine to adjust over hips  Feet:dep pt able to doff r knee brace max with l and mod a to don b knee braces supine in bed  Vision/Cognition: tba    PT: pt willing to work with PT today. Pt able to demo bed mob, transfer with close SBA. Pt demo  standing frame up to 26 min .        MEDICATIONS    ascorbic acid  250 mg Oral Daily     aspirin  81 mg Oral Daily     atorvastatin  80 mg Oral At Bedtime     lidocaine  1 patch Transdermal Q24h    And     lidocaine   Transdermal Q8H     melatonin  6 mg Oral At Bedtime     methocarbamol  500 mg Oral 4x Daily     metoprolol succinate ER  12.5 mg Oral At Bedtime     multivitamin w/minerals  1 tablet Oral Daily     polyethylene glycol  17 g Oral Daily     senna-docusate  2 tablet Oral BID     Urivarx Capsules (patient supplied supplement)   2 capsule Oral Daily     vitamin E  200 Units Oral Daily     zinc sulfate  220 mg Oral Daily        acetaminophen, bisacodyl, diazepam,  "diclofenac, morphine, naloxone **OR** naloxone **OR** naloxone **OR** naloxone, simethicone     PHYSICAL EXAM  /61   Pulse 81   Temp (!) 96.3  F (35.7  C) (Oral)   Resp 16   Ht 1.854 m (6' 1\")   Wt 97.9 kg (215 lb 14.4 oz)   SpO2 95%   BMI 28.48 kg/m    Gen: awake alert  HEENT: NCAT, EOMI  Pulm: non labored on room air  Abd: soft, non tender  Ext: warm dry without edema.   Neuro/MSK: alert, follows commands     LABS  CBC RESULTS:   Recent Labs   Lab Test 07/15/21  0549 07/08/21  0654 06/29/21  1258   WBC 7.6 7.4 9.7   RBC 3.28* 3.47* 3.17*   HGB 9.8* 10.4* 9.8*   HCT 31.4* 33.7* 30.9*   MCV 96 97 98   MCH 29.9 30.0 30.9   MCHC 31.2* 30.9* 31.7   RDW 13.8 14.1 13.6    270 189     Last Basic Metabolic Panel:  Recent Labs   Lab Test 07/19/21  0756 07/15/21  0549 07/12/21  0547    136 136   POTASSIUM 3.9 3.9 4.1   CHLORIDE 103 102 102   CO2 34* 29 31   ANIONGAP 1* 5 3   * 92 121*   BUN 16 16 16   CR 0.57* 0.50* 0.48*   GFRESTIMATED >90 >90 >90   TRENTON 9.3 9.4 9.3         Rehabilitation - continue comprehensive acute inpatient rehabilitation program with multidisciplinary approach including therapies, rehab nursing, and physiatry following. See interval history for updates.      ASSESSMENT AND PLAN    Darleen Simmons is a 70 year old man with past medical history of  HTN, HLP, CAD, ischemic cardiomyopathy s/p ICD placement, PAD s/p left SFA POBA for latissimus dorsi flap to left calf, atrial fibrillation/flutter,  flat back syndrome now s/p T7-pelvis revision PSIF with T12-L1 3CO and L5 PSO on 6/21/21 with Jazmyne Akins/Herb.  Admitted to acute rehab 7/7/21      Flatback syndrome-   Chronic back pain  S/p T7-S1 posterior spinal fusion per Dr. 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 Afterwards he experienced quadriplegia but he slowly was able to regain his ability to ambulate. In 2018, he underwent posterior " "decompression thoracolumbar fusion at [facility]. At that time It was thought that he would be primarily a sitter and he was fused in a fairly straight posture.  -  Up with assist until independent. No excessive bending or twisting. No lifting >10 lbs x 6 weeks. Renny lift approved for transfers. Keep back straight if using lift.  -WBAT  -pain management as below  -continue PT/OT  - right AFO- consult orthotics for custom per PT recs.   -wound care as ordered.   -f/u Dr. Akins        CAD s/p MI CABG  Ischemic cardiomyopathy   s/p Pacemaker & ICD (medtronic)  -echo 5/4/21Mildly (EF 45-50%) reduced left ventricular function is present. There is a pattern of wall motion abnormalities consistent with a prior RCA and, possibly, LCx infarction.Stress test 5/5/21  -continue lipitor  -continue asa     A-fib/ Aflutter- not anticoagulated prior to admission per pcp recs  -continue metoprolol xl 12.5 mg     Acute blood loss Anemia- Estimated blood loss ~ 4320 ml with 1630 returned via cell saver.  Hgb 9.8 7/15  -trend     Acute on Chronic back pain- on morphine 15-30 mg reportedly taking qid prior to admission. Ongoing complaint though improving activity tolerance, reports \"nerve pain\" offered gabapentin/lyrica which patient declined.   -continue robaxin scheduled  -continue tylenol, valium, morphine 15-30 mg prn     Depression- monitor mood, reportedly PTSD as well, and resistance to psychiatry/ medications for mood.   -psychology consult for emotional support.      PAD-  S/p L SFA baloon angioplasty. 2/11/21 MARIAMA Right:  Moderate peripheral arterial disease starting at or proximal to the superficial femoral level. Left:  Moderate peripheral arterial disease location not well determined.       Metabolic encephalopathy- prolonged surgery, pain meds (improved)   -reorient as needed  -monitor- hold meds for sedation     Urinary retention- acute retention post operatively, no noted issues during rehab stay.   -continue prior to " admission supplement      Pressure injury- wound care as ordered - apparently healing well.   Evaluated by WON on 7/6/21  -wound care as ordered.     1. Adjustment to disability:  Psychology for emotional support  2. FEN: reg  3. Bowel: constipated improved.   4. Bladder: chronic urgency- monitor  5. DVT Prophylaxis: mechanical  6. GI Prophylaxis: reg diet  7. Code: full   8. Disposition: goal for home   9. ELOS: ~ 3 weeks  10. Follow up Appointments on Discharge: pcp, ortho spine    Ca Hernandez PA-C  PM&R      Seen & Discussed with Dr. Clarke

## 2021-07-20 NOTE — PLAN OF CARE
Discharge Planner Post-Acute Rehab OT:      Discharge Plan: home with wife home ot     Precautions: spinal , Heel protectors on when in bed (beige open toe socks with gel paddings, due to bone spurs on heels)     Current Status:  Functional tsfers: CGA w/ low pivot squat bed to w/c and sliding board w/ cga-min A w/ following set up w/ use of HOB bed rail from w/c to bed. Improved bed mobility from sit to supine w/ CGA/SBA.   ADLs:G/H:  Max A to doff shoes, min-mod A to don/doff knee brace and SBA seated additional effort and time to doff unlaced shoes w/ reacher/long shoe horn.   U/b dressing: pullover shirt SBA doff/don at EOB. Improved trunk control but variable.   L/B dressing:  Variable, additional time and effort w/ use of AE.Shorts disposable brief max a to don over legs mod a supine to adjust over hips.Max A to pull up pants in standing FWW and elevated bed height. Set up w/ g/h tasks.  Feet: min-mod A to doff r knee brace and mod a to don b knee braces   Vision/Cognition: tba     Assessment:  Pt had increased difficulty in AM session. 2 attempts in PM session, pt declined as he was constipated and sitting on bedpan still. Forgetful at times and attendance to task was off. Pt appeared to have increased pain/grunting and groaning but did not complain verbally.Reporting more pain than usual in his legs this date. Session was more slower than usual w/ participation. Pt had improved performance of LB dressing. Completed in standing vs. Supine in bed this date. Standing less than a minute at a time due to decreased LE tolerance. Pt required additional time for ADLs but had improved low pivot squat tsfers w/ CGA-min A additional time, set up w/ bed rails,and this OT block R w/c diamond. Pt used AE w/ additional assistance and did not know of his bending restrictions, pt was re-educated on spinal precautions.      Other Barriers to Discharge (DME, Family Training, etc): dme needs  Pt owns hospital bed, wheel chair,  lift chair for his split level home,walker, reacher, long shoe horn etc. Pt reports home is accessible by ramp and he had wide door frames for w/c accessibility.    -30 mins due to bedpan use and constipation

## 2021-07-20 NOTE — PLAN OF CARE
Discharge Planner Post-Acute Rehab PT:      Discharge Plan: home w/ his wife, she is older and limited w/ ability to provide physical assist     Precautions: falls, spine; [place] , PTSD     Current Status:  Bed Mobility: slight min A for supine to sit. SBA for sit to supine.   Transfer: CGA - squat pivot transfer   Gait: NT today- 7/18  LISA walker or FWW with close wc follow, 5-10 ft  Stairs: NT   Balance: SBA sitting.      Assessment: Gait progressing; however, continues w/ severely flexed posture at hips and knees, this will be months of LE strengthening for functional ambulation.     Other Barriers to Discharge (DME, Family Training, etc): None identified at this time.  Discussed w/c needs for home.  He insists that he has a chair at home that will work for him and that nothing more needs to be done

## 2021-07-20 NOTE — PLAN OF CARE
"[id number]    Pt is A&O x4, able to make needs known. C/o pain \"all over,\" managed w/ PRN morphine and valium. Also using voltaren gel. Up w/ Ax2 and sliding board. Continent of bladder using the urinal w/ staff emptying. LBM 7/20. Using call light appropriately.  "

## 2021-07-20 NOTE — PLAN OF CARE
-30 mins for OT. Pt was attempted x2 but pt could not complete BM. Pt was still sitting on bedpan following 20 mins. CNA in and pt had been sitting on bedpan for longer duration. Pt not able to void. Declined to skilled OT.

## 2021-07-20 NOTE — PLAN OF CARE
FOCUS/GOAL  Bowel management, Bladder management, Pain management, Mobility, Skin integrity, and Safety management    ASSESSMENT, INTERVENTIONS AND CONTINUING PLAN FOR GOAL:  A/O. VS stable. Regular/thin, pills whole. Ate most of meal. Continent of bowel and bladder. Pain in back and neck, medication given to help relieve. Assist of 2 pivot to transfer. No new skin concerns. Slept throughout the night with occasional wakings because of pain. Calls appropriately with light. Continue POC.

## 2021-07-20 NOTE — PLAN OF CARE
FOCUS/GOAL  Pain management     ASSESSMENT, INTERVENTIONS AND CONTINUING PLAN FOR GOAL:  Pt Aox4 able to make needs known, using call light appropriately. A1 pivot transfer to w/c and bed.  VSS, pain reported in back and legs taking PRN morphine every three hours which is somewhat effective in managing pain, pt can also have PRN Valium Q6hr for spacticity.  Only asked for MS once on writers shift some relief reported.  Incision to back covered with island dressing no drainage noted to dressing.  Reg/thin taking pills whole with water. Using urinal Independently and bed sims,  Pt reports feeling constipated, Large BM recorded yesterday, more BM's recorded the day before that, scheduled Miralax given and then pt requested Suppository which was given with no results, writer explaining that pt has had frequent BM's and might not have any, Helped pt onto toilet and was able to have  a BM. Working with therapy.  Nursing will continue to monitor.

## 2021-07-21 ENCOUNTER — APPOINTMENT (OUTPATIENT)
Dept: PHYSICAL THERAPY | Facility: CLINIC | Age: 71
DRG: 559 | End: 2021-07-21
Attending: PHYSICAL MEDICINE & REHABILITATION
Payer: COMMERCIAL

## 2021-07-21 ENCOUNTER — APPOINTMENT (OUTPATIENT)
Dept: OCCUPATIONAL THERAPY | Facility: CLINIC | Age: 71
DRG: 559 | End: 2021-07-21
Attending: PHYSICAL MEDICINE & REHABILITATION
Payer: COMMERCIAL

## 2021-07-21 PROCEDURE — 999N000150 HC STATISTIC PT MED CONFERENCE < 30 MIN: Performed by: PHYSICAL THERAPIST

## 2021-07-21 PROCEDURE — 97032 APPL MODALITY 1+ESTIM EA 15: CPT | Mod: GP | Performed by: PHYSICAL THERAPIST

## 2021-07-21 PROCEDURE — 97530 THERAPEUTIC ACTIVITIES: CPT | Mod: GP | Performed by: PHYSICAL THERAPIST

## 2021-07-21 PROCEDURE — 250N000013 HC RX MED GY IP 250 OP 250 PS 637: Performed by: PHYSICAL MEDICINE & REHABILITATION

## 2021-07-21 PROCEDURE — 99233 SBSQ HOSP IP/OBS HIGH 50: CPT | Performed by: PHYSICAL MEDICINE & REHABILITATION

## 2021-07-21 PROCEDURE — 97535 SELF CARE MNGMENT TRAINING: CPT | Mod: GO

## 2021-07-21 PROCEDURE — 97116 GAIT TRAINING THERAPY: CPT | Mod: GP | Performed by: PHYSICAL THERAPIST

## 2021-07-21 PROCEDURE — 250N000013 HC RX MED GY IP 250 OP 250 PS 637: Performed by: PHYSICIAN ASSISTANT

## 2021-07-21 PROCEDURE — 999N000125 HC STATISTIC PATIENT MED CONFERENCE < 30 MIN

## 2021-07-21 PROCEDURE — 128N000003 HC R&B REHAB

## 2021-07-21 PROCEDURE — 97530 THERAPEUTIC ACTIVITIES: CPT | Mod: GP

## 2021-07-21 RX ADMIN — ASPIRIN 81 MG: 81 TABLET ORAL at 08:16

## 2021-07-21 RX ADMIN — Medication 12.5 MG: at 21:12

## 2021-07-21 RX ADMIN — Medication 1 TABLET: at 08:16

## 2021-07-21 RX ADMIN — MORPHINE SULFATE 30 MG: 15 TABLET ORAL at 02:09

## 2021-07-21 RX ADMIN — POLYETHYLENE GLYCOL 3350 17 G: 17 POWDER, FOR SOLUTION ORAL at 08:27

## 2021-07-21 RX ADMIN — MORPHINE SULFATE 30 MG: 15 TABLET ORAL at 10:19

## 2021-07-21 RX ADMIN — SENNOSIDES AND DOCUSATE SODIUM 2 TABLET: 8.6; 5 TABLET ORAL at 21:12

## 2021-07-21 RX ADMIN — DICLOFENAC SODIUM 4 G: 10 GEL TOPICAL at 13:03

## 2021-07-21 RX ADMIN — Medication 250 MG: at 08:16

## 2021-07-21 RX ADMIN — METHOCARBAMOL 500 MG: 500 TABLET, FILM COATED ORAL at 08:15

## 2021-07-21 RX ADMIN — METHOCARBAMOL 500 MG: 500 TABLET, FILM COATED ORAL at 16:47

## 2021-07-21 RX ADMIN — DICLOFENAC SODIUM 4 G: 10 GEL TOPICAL at 08:12

## 2021-07-21 RX ADMIN — ZINC SULFATE 220 MG (50 MG) CAPSULE 220 MG: CAPSULE at 08:16

## 2021-07-21 RX ADMIN — MORPHINE SULFATE 30 MG: 15 TABLET ORAL at 13:19

## 2021-07-21 RX ADMIN — ATORVASTATIN CALCIUM 80 MG: 40 TABLET, FILM COATED ORAL at 21:13

## 2021-07-21 RX ADMIN — METHOCARBAMOL 500 MG: 500 TABLET, FILM COATED ORAL at 21:12

## 2021-07-21 RX ADMIN — DIAZEPAM 6 MG: 2 TABLET ORAL at 11:14

## 2021-07-21 RX ADMIN — Medication 200 UNITS: at 08:16

## 2021-07-21 RX ADMIN — MORPHINE SULFATE 30 MG: 15 TABLET ORAL at 06:50

## 2021-07-21 RX ADMIN — METHOCARBAMOL 500 MG: 500 TABLET, FILM COATED ORAL at 11:14

## 2021-07-21 RX ADMIN — SENNOSIDES AND DOCUSATE SODIUM 2 TABLET: 8.6; 5 TABLET ORAL at 08:16

## 2021-07-21 RX ADMIN — MELATONIN TAB 3 MG 6 MG: 3 TAB at 21:13

## 2021-07-21 RX ADMIN — MORPHINE SULFATE 30 MG: 15 TABLET ORAL at 21:13

## 2021-07-21 RX ADMIN — Medication 1000 MG: at 08:15

## 2021-07-21 RX ADMIN — MORPHINE SULFATE 30 MG: 15 TABLET ORAL at 16:48

## 2021-07-21 RX ADMIN — DIAZEPAM 6 MG: 2 TABLET ORAL at 21:13

## 2021-07-21 RX ADMIN — LIDOCAINE 1 PATCH: 560 PATCH PERCUTANEOUS; TOPICAL; TRANSDERMAL at 21:13

## 2021-07-21 NOTE — PLAN OF CARE
FOCUS/GOAL  Bladder management, Pain management, and Safety management    ASSESSMENT, INTERVENTIONS AND CONTINUING PLAN FOR GOAL:  Patient slept majority of the shift. Complained of low back and right lower leg pain and was given PRN Morphine (see MAR). He was able to sleep thereafter. Used his call light appropriately. Was continent of urine using the urinal at bedside.

## 2021-07-21 NOTE — PROGRESS NOTES
Rehabilitation Medicine Face to Face for Hospital Bed     Diagnosis: 08.9 T7 to pelvis spinal fusion revision    The patient has a medical condition which requires positioning of the body in ways not feasible with an ordinary bed.   Medical condition:spinal fusion    The patient requires positioning of the body in ways not feasible with an ordinary bed in order to alleviate pain.       The patient requires a bed height different than a fixed height hospital bed to permit transfers to chair, wheelchair, or standing position.  Current transfer status:  Lateral transfer w/o sliding board    The patient requires frequent changes in body position and/or has an immediate need for change in body position due to pain and medical diagnosis as written above.       Type of rails: 2 upper half    Length of need 99    Patient ht: 6'1:  Patient wt: 215 lbs  Patient :1950    Franklyn Clarke MD NPI# [id number]    Signature:   Date: 2021

## 2021-07-21 NOTE — PROGRESS NOTES
CLINICAL NUTRITION SERVICES - REASSESSMENT NOTE     Nutrition Prescription    RECOMMENDATIONS FOR MDs/PROVIDERS TO ORDER:  None today - pt reviewed face to face during team rounds, PA agreeing to have vitamins discontinued     Malnutrition Status:     Patient does not meet two of the established criteria necessary for diagnosing malnutrition    Recommendations already ordered by Registered Dietitian (RD):  RD discontinued folic acid, Vit C, and Vit E - will continue MVI at this time     Continue PRN supplement as ordered     Future/Additional Recommendations:  Continue to monitor meal/supplement intakes and weight trends      EVALUATION OF THE PROGRESS TOWARD GOALS   Diet: Regular  Supplement: Ensure Enlive PRN   Intake: 25-50% per flow sheets        NEW FINDINGS   MAR reviewed. Labs reviewed. Pt reviewed during team rounds and then RD visited later at bedside, pt continues to have specific food preferences, but otherwise tolerating meals, accepting of supplement PRN, weights appear stable. Reviewed discontinuation of vitamins, pt verbalized being thankful for this to reduce medication burden.     07/13/21 0622 97.9 kg (215 lb 14.4 oz)   07/07/21 1751 97.3 kg (214 lb 8 oz)     06/22/21 107.5 kg (236 lb 14.4 oz)   05/27/21 100.2 kg (221 lb)   05/04/21 101.6 kg (224 lb)   03/04/21 90.7 kg (200 lb)     92.2 kg (203 lb 4.2 oz) 02/11/2021 - per CE     Weight assessment: Weight appears stable from ARU admission, no new weight this week. Weight history appears to vary, likely multifactorial/fluid status changes, current weight appears more stable from >4 months ago.     MALNUTRITION  % Intake: Decreased intake does not meet criteria  % Weight Loss: None noted  Subcutaneous Fat Loss: None observed  Muscle Loss: None observed  Fluid Accumulation/Edema: None noted  Malnutrition Diagnosis: Patient does not meet two of the established criteria necessary for diagnosing malnutrition    Previous Goals   Patient to consume %  of nutritionally adequate meal trays TID, or the equivalent with supplements/snacks.  Evaluation: Not met/question stable     Previous Nutrition Diagnosis  Predicted inadequate nutrient intake related to varying appetite/food preferences as evidenced by pt reports     Evaluation: No change    CURRENT NUTRITION DIAGNOSIS  Predicted inadequate nutrient intake related to varying appetite/food preferences as evidenced by pt reports    INTERVENTIONS  Implementation  Medical food supplement therapy - continue as ordered  Address medication burden - non-necessary vitamins discontinued      Goals  Patient to consume % of nutritionally adequate meal trays TID, or the equivalent with supplements/snacks.    Monitoring/Evaluation  Progress toward goals will be monitored and evaluated per protocol.    Ingris Fagan RD, CNSC, LD  ARU RD pager: [phone]

## 2021-07-21 NOTE — CARE CONFERENCE
Acute Rehab Care Conference/Team Rounds      Type: Team Rounds    Present: Dr Franklyn Clarke, Ca Hernandez PA, Peter Gamez RN, Vidhya Marie PT, Heather Brewster OT, Nuvia Kim LICSW, Dianelys Mcclelland , Ingris Fagan Dietician, Patient Jorje Simmons      Discharge Barriers/Treatment/Education    Rehab Diagnosis: Orthopaedic Disorders 08.9 Other Orthopaedic, T7-pelvis spinal fusion revision    Active Medical Co-morbidities/Prognosis:   Patient Active Problem List   Diagnosis     Flatback syndrome     H/O spinal fusion     H/O spinal cord injury     Ischemic cardiomyopathy     S/P spinal fusion        Safety: alert and oriented x 4. Uses his call light appropriately.    Pain: low back and right leg pain. Requests PRN Morphine. Also likes Voltaren gel for right leg.    Medications, Skin, Tubes/Lines: takes meds whole. Incision on back. No lines or tubes.    Swallowing/Nutrition: RD assisting.    Bowel/Bladder: continent of bladder and bowel.    Psychosocial:  Lives in a split-level house with spouse and 2 cats in [place], [place]. 9 steps to enter and a flight to the upper level. Ramp to enter home and stair lift. Pt reports he was independent with all mobility with use of FWW prior to surgery, states he can navigate stairs but often uses the lift. Hearing aides and glasses. Manage own meds and finances. Wife A with household chores. Hx PTSD, Health Psych following. Wife is primary support. 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 BCBS plan, Rebecca Cook Ph: [phone].    ADLs/IADLs::G/H:sba U/b dressing: pullover shirt SBA doff/don at EOB. Improved trunk control but variable.   L/B dressing:  Variable, additional time and effort w/ use of AE.Shorts disposable brief max a to don over legs mod a supine to adjust over hips.Max A to pull up pants in standing FWW and elevated bed height.Feet: . Max A to doff shoes, min-mod A to don/doff knee brace and SBA seated additional effort and time to doff unlaced shoes w/  reacher/long shoe horn.       Mobility: Up in room w/c based w/ assist for transfers.  AFO delivered, need to open the joint. Will need hospital bed at home from vendor in [place].  Home care at time of discharge.     Cognition/Language: Functional    Community Re-Entry: w/c based    Transportation: Not a , car transfer with assist    Decision maker: self    Plan of Care and goals reviewed and updated.    Discharge Plan/Recommendations    Fall Precautions: continue    Patient/Family input to goals: Yes    Anticipated rehab needs following discharge: Home with home care    Anticipated care giver support after discharge: Family    Estimated length of stay: 7/30/21    Overall plan for the patient: Continue IP Rehabilitation      Utilization Review and Continued Stay Justification    Medical Necessity Criteria:    For any criteria that is not met, please document reason and plan for discharge, transfer, or modification of plan of care to address.    Requires intensive rehabilitation program to treat functional deficits?: Yes    Requires 3x per week or greater involvement of rehabilitation physician to oversee rehabilitation program?: Yes    Requires rehabilitation nursing interventions?: Yes    Patient is making functional progress?: Yes    There is a potential for additional functional progress? Yes    Patient is participating in therapy 3 hours per day a minimum of 5 days per week or 15 hours per week in 7 day period?:Yes    Has discharge needs that require coordinated discharge planning approach?:Yes        Final Physician Sign off    Statement of Approval: I approve the plan of care    Patient Goals  Social Work Goals: Confirm discharge recommendations with therapy, coordinate safe discharge plan and remain available to support and assist as needed.       OT Frequency: daily 90 min for 21 days  OT goal: hygiene/grooming: modified independent  OT goal: upper body dressing: Modified independent  OT goal: lower  body dressing: Modified independent  OT goal: upper body bathing: Modified independent  OT goal: lower body bathing: Modified independent  OT goal: bed mobility: Modified independent  OT Goal: transfer: Modified independent  OT goal: toilet transfer/toileting: Modified independent  OT goal: meal preparation: Supervision/stand-by assist  OT goal: home management: Supervision/stand-by assist           OT goal 1: pt will be sba shower/tub transfer with dme  OT goal 2: pt will be mod I b u/e hep within spinal precautions                PT Frequency: daily x 90 minutes  PT goal: bed mobility:  (met)  PT goal: transfers: Modified independent  PT goal: gait: Modified independent (TBD, may be mixed mobility for home)  PT goal: stairs:  (has stair glide and ramp, pt hopes to walk stairs)  PT goal: perform aerobic activity with stable cardiovascular response:  (met)     PT goal 1: car transfer w/ min assist                                                                      Patient Goal:  Pain Management: Pt will utilize pain medications and non-parmacological methods of pain management to participate in all therapies.                             Goal: Skin Integrity: Pt will reposition as necessary to help prevent pressure ulcers while on ARU.                    Goal: Safety Management: Pt will use call light to make needs known while on ARU.

## 2021-07-21 NOTE — PROGRESS NOTES
Team rounds this morning. Team met with pt alone in his room. Discussed progress, discharge recommendations and target discharge date. Will target Fri 07/30. Pt aware and agreeable. Therapy plans to A pt with getting a hospital bed and will update pt wife. HC recommended, pt agreeable. Pt stated that 'he will only work with [facility]' and that it would be good to coordinate that care with his wife. Pt denied any additional SW needs at this time.     SWer called [facility] ([place] office) PH: [phone], F: [phone]. SWer was informed that [facility] will provide the in-home therapy but that the referral needs to go through [facility].     SWer called [facility] PH: [phone], F: [phone] and spoke with Nestor with intake. Confirmed details RE: HC services. Nestor stated that they can accept the pt. SWer faxed preliminary information. [facility] aware of recommended services and anticipated discharge date. SWer will call [facility] next week to confirm discharge details and SOC.     SW called pt wife with plan to update and confirm discharge details. No answer. SW left  and awaiting a return call.     SWer called [facility] Care Coordinator, Barb Coffey PH: [phone] and left . Wanting to confirm services at home, services pt may be eligible for and to update Barb on discharge plans.     SWer called [facility] , Rebecca Cook PH: [phone] and provided update from team rounds. Will continue to update as needed.     Home Health Care:   [facility]  PH: [phone], F: [phone]  RN, PT, OT and HA    ADDENDUM: Spoke with Barb Coffey Care Coordinator. Was on EW and had very little PCA services. Since pt has been out of the home, pt would need a re-assessment. Per Barb, pt has been resistance to services in the past. Barb plans to connect with pt/pt wife, offer/schedule a re-assessment prior to discharge and to work on getting additional services in the  home. Barb will connect with pt, f/u with SWer and will continue to A with coordinating cares.     CLEMENTE Cho   [facility]   Direct Phone: [phone]  I   Pager: [phone]  I  Fax: [phone]

## 2021-07-21 NOTE — PROGRESS NOTES
"  [facility]   Acute Rehabilitation Unit  Daily progress note    INTERVAL HISTORY  Jorje was seen sitting up in bed during team rounds.  Team noting progress with transfers, working toward mod I with basic transfers and adls with goal for discharge 7/30/21. He is currently mod assist for lower body dressing and toileting.  He is independent with bed mobility with use of bed rails/ and up right head of bed.  Discussed recommendation for hospital bed, would provide needed support for safer transfers.  Continues to be frustrated by acute on chronic pain.  Given healing wounds have discontinued supplements which patient is grateful about.     See rounds note by Dr. Clarke for details.     MEDICATIONS    aspirin  81 mg Oral Daily     atorvastatin  80 mg Oral At Bedtime     lidocaine  1 patch Transdermal Q24h    And     lidocaine   Transdermal Q8H     melatonin  6 mg Oral At Bedtime     methocarbamol  500 mg Oral 4x Daily     metoprolol succinate ER  12.5 mg Oral At Bedtime     multivitamin w/minerals  1 tablet Oral Daily     polyethylene glycol  17 g Oral Daily     senna-docusate  2 tablet Oral BID     Urivarx Capsules (patient supplied supplement)   2 capsule Oral Daily        acetaminophen, bisacodyl, diazepam, diclofenac, morphine, naloxone **OR** naloxone **OR** naloxone **OR** naloxone, simethicone     PHYSICAL EXAM  /58 (BP Location: Right arm)   Pulse 80   Temp (!) 96  F (35.6  C) (Oral)   Resp 16   Ht 1.854 m (6' 1\")   Wt 97.9 kg (215 lb 14.4 oz)   SpO2 98%   BMI 28.48 kg/m    Gen: awake alert  HEENT: NCAT, EOMI  Pulm: non labored on room air  Abd: non distended  Ext: warm dry without edema.   Neuro/MSK: alert, follows commands     LABS  CBC RESULTS:   Recent Labs   Lab Test 07/15/21  0549 07/08/21  0654 06/29/21  1258   WBC 7.6 7.4 9.7   RBC 3.28* 3.47* 3.17*   HGB 9.8* 10.4* 9.8*   HCT 31.4* 33.7* 30.9*   MCV 96 97 98   MCH 29.9 30.0 30.9   MCHC 31.2* " 30.9* 31.7   RDW 13.8 14.1 13.6    270 189     Last Basic Metabolic Panel:  Recent Labs   Lab Test 07/19/21  0756 07/15/21  0549 07/12/21  0547    136 136   POTASSIUM 3.9 3.9 4.1   CHLORIDE 103 102 102   CO2 34* 29 31   ANIONGAP 1* 5 3   * 92 121*   BUN 16 16 16   CR 0.57* 0.50* 0.48*   GFRESTIMATED >90 >90 >90   [place] 9.3 9.4 9.3         Rehabilitation - continue comprehensive acute inpatient rehabilitation program with multidisciplinary approach including therapies, rehab nursing, and physiatry following. See interval history for updates.      ASSESSMENT AND PLAN    Darleen Simmons is a 70 year old man with past medical history of  HTN, HLP, CAD, ischemic cardiomyopathy s/p ICD placement, PAD s/p left SFA POBA for latissimus dorsi flap to left calf, atrial fibrillation/flutter,  flat back syndrome now s/p T7-pelvis revision PSIF with T12-L1 3CO and L5 PSO on 6/21/21 with Jazmyne Akins/Herb.  Admitted to acute rehab 7/7/21      Flatback syndrome-   Chronic back pain  S/p T7-S1 posterior spinal fusion per Dr. 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 Afterwards he experienced quadriplegia but he slowly was able to regain his ability to ambulate. In 2018, he underwent posterior decompression thoracolumbar fusion at [facility]. At that time It was thought that he would be primarily a sitter and he was fused in a fairly straight posture.  -  Up with assist until independent. No excessive bending or twisting. No lifting >10 lbs x 6 weeks. Renny lift approved for transfers. Keep back straight if using lift.  -WBAT  -pain management as below  -continue PT/OT  - right AFO- consult orthotics to open joint.   -f/u Dr. Akins        CAD s/p MI CABG  Ischemic cardiomyopathy   s/p Pacemaker & ICD (medtronic)  -echo 5/4/21Mildly (EF 45-50%) reduced left ventricular function is present. There is a pattern of wall motion abnormalities  "consistent with a prior RCA and, possibly, LCx infarction.Stress test 5/5/21  -continue lipitor  -continue asa     A-fib/ Aflutter- not anticoagulated prior to admission per pcp recs  -continue metoprolol xl 12.5 mg     Acute blood loss Anemia- Estimated blood loss ~ 4320 ml with 1630 returned via cell saver.  Hgb 9.8 7/15  -trend     Acute on Chronic back pain- on morphine 15-30 mg reportedly taking qid prior to admission. Ongoing complaint though improving activity tolerance, reports \"nerve pain\" offered gabapentin/lyrica which patient declined.   -continue robaxin scheduled  -continue tylenol, valium, morphine 15-30 mg prn     Depression- monitor mood, reportedly PTSD as well, and resistance to psychiatry/ medications for mood.   -psychology consult for emotional support.      PAD-  S/p L SFA baloon angioplasty. 2/11/21 MARIAMA Right:  Moderate peripheral arterial disease starting at or proximal to the superficial femoral level. Left:  Moderate peripheral arterial disease location not well determined.       Metabolic encephalopathy- prolonged surgery, pain meds (improved)   -reorient as needed  -monitor- hold meds for sedation     Urinary retention- acute retention post operatively, no noted issues during rehab stay.   -continue prior to admission supplement      Pressure injury- wound care as ordered - apparently healing well.   Evaluated by WON on 7/6/21  -wound care as ordered.     1. Adjustment to disability:  Psychology for emotional support  2. FEN: reg  3. Bowel: constipated improved.   4. Bladder: chronic urgency- monitor  5. DVT Prophylaxis: mechanical  6. GI Prophylaxis: reg diet  7. Code: full   8. Disposition: goal for home   9. ELOS: ~ 3 weeks  10. Follow up Appointments on Discharge: pcp, ortho spine    Ca Hernandez PA-C  PM&R    I spent a total of 40 minutes face-to-face or managing the care of Jorje Simmons. Over 50% of my time on the unit was spent counseling the patient and coordinating care. " See note for details.

## 2021-07-21 NOTE — PROGRESS NOTES
S: patient seen today at [facility]  for fitting of right custom solid ankle AFO.     O/G: (prevent drop foot)(assist in ambulation)    A: patient seen today for fitting of custom solid ankle AFO with joints non articulated at time of delivery. Patient has been instructed on proper donning/doffing, use, care and break-in period.  Patient not observed in a walking trial but both the patient and I are satisfied with the fit and function of the AFO at this time.     P: patient has been instructed to contact us if any issues arise.   Ulises FUNEZ,LO.

## 2021-07-21 NOTE — PROGRESS NOTES
"/58   Pulse 80   Temp 98  F (36.7  C) (Oral)   Resp 16   Ht 1.854 m (6' 1\")   Wt 97.9 kg (215 lb 14.4 oz)   SpO2 99%   BMI 28.48 kg/m  Pt alert and oriented X 4. Denies chest pain, SOB, dizziness, lightheadedness and nausea and vomiting. [place], hearing aid on. AX 1-2 pivot to transfer. Voiding adequate amount in urinal. LBM 7/20/21. PRN morphine and valium given. Diclofenac 1% tropical gel applied.Tolerating regular diet. Spine incision CDI. Had shower. Continue with plan of care. Call light within reach.  "

## 2021-07-21 NOTE — PLAN OF CARE
FOCUS/GOAL  Pain management     ASSESSMENT, INTERVENTIONS AND CONTINUING PLAN FOR GOAL:  Pt Aox4 able to make needs known, using call light appropriately. A1 pivot transfer to w/c and bed.  VSS, pain reported in back and legs taking PRN morphine every three hours which is somewhat effective in managing pain, pt can also have PRN Valium Q6hr for spacticity given once on writers shift. Incision to back covered with island dressing no drainage noted to dressing.  Reg/thin taking pills whole with water. Using urinal Independently, encouraging pt to use toilet instead of bedpan when needs to have a BM. Set schedule to be up for meals.  Working with therapy.  Nursing will continue to monitor.

## 2021-07-22 ENCOUNTER — APPOINTMENT (OUTPATIENT)
Dept: OCCUPATIONAL THERAPY | Facility: CLINIC | Age: 71
DRG: 559 | End: 2021-07-22
Attending: PHYSICAL MEDICINE & REHABILITATION
Payer: COMMERCIAL

## 2021-07-22 ENCOUNTER — APPOINTMENT (OUTPATIENT)
Dept: PHYSICAL THERAPY | Facility: CLINIC | Age: 71
DRG: 559 | End: 2021-07-22
Attending: PHYSICAL MEDICINE & REHABILITATION
Payer: COMMERCIAL

## 2021-07-22 LAB
ANION GAP SERPL CALCULATED.3IONS-SCNC: 1 MMOL/L (ref 3–14)
BUN SERPL-MCNC: 16 MG/DL (ref 7–30)
CALCIUM SERPL-MCNC: 9.3 MG/DL (ref 8.5–10.1)
CHLORIDE BLD-SCNC: 105 MMOL/L (ref 94–109)
CO2 SERPL-SCNC: 32 MMOL/L (ref 20–32)
CREAT SERPL-MCNC: 0.55 MG/DL (ref 0.66–1.25)
ERYTHROCYTE [DISTWIDTH] IN BLOOD BY AUTOMATED COUNT: 13.9 % (ref 10–15)
GFR SERPL CREATININE-BSD FRML MDRD: >90 ML/MIN/1.73M2
GLUCOSE BLD-MCNC: 97 MG/DL (ref 70–99)
HCT VFR BLD AUTO: 32.8 % (ref 40–53)
HGB BLD-MCNC: 10 G/DL (ref 13.3–17.7)
MCH RBC QN AUTO: 28.9 PG (ref 26.5–33)
MCHC RBC AUTO-ENTMCNC: 30.5 G/DL (ref 31.5–36.5)
MCV RBC AUTO: 95 FL (ref 78–100)
PLATELET # BLD AUTO: 200 10E3/UL (ref 150–450)
POTASSIUM BLD-SCNC: 3.9 MMOL/L (ref 3.4–5.3)
RBC # BLD AUTO: 3.46 10E6/UL (ref 4.4–5.9)
SARS-COV-2 RNA RESP QL NAA+PROBE: NEGATIVE
SODIUM SERPL-SCNC: 138 MMOL/L (ref 133–144)
WBC # BLD AUTO: 5.5 10E3/UL (ref 4–11)

## 2021-07-22 PROCEDURE — 97760 ORTHOTIC MGMT&TRAING 1ST ENC: CPT | Mod: GO

## 2021-07-22 PROCEDURE — 97110 THERAPEUTIC EXERCISES: CPT | Mod: GP | Performed by: PHYSICAL THERAPIST

## 2021-07-22 PROCEDURE — 97116 GAIT TRAINING THERAPY: CPT | Mod: GP | Performed by: PHYSICAL THERAPIST

## 2021-07-22 PROCEDURE — 97530 THERAPEUTIC ACTIVITIES: CPT | Mod: GP | Performed by: PHYSICAL THERAPIST

## 2021-07-22 PROCEDURE — 85041 AUTOMATED RBC COUNT: CPT | Performed by: PHYSICIAN ASSISTANT

## 2021-07-22 PROCEDURE — 97535 SELF CARE MNGMENT TRAINING: CPT | Mod: GO

## 2021-07-22 PROCEDURE — 128N000003 HC R&B REHAB

## 2021-07-22 PROCEDURE — U0003 INFECTIOUS AGENT DETECTION BY NUCLEIC ACID (DNA OR RNA); SEVERE ACUTE RESPIRATORY SYNDROME CORONAVIRUS 2 (SARS-COV-2) (CORONAVIRUS DISEASE [COVID-19]), AMPLIFIED PROBE TECHNIQUE, MAKING USE OF HIGH THROUGHPUT TECHNOLOGIES AS DESCRIBED BY CMS-2020-01-R: HCPCS | Performed by: PHYSICIAN ASSISTANT

## 2021-07-22 PROCEDURE — 36415 COLL VENOUS BLD VENIPUNCTURE: CPT | Performed by: PHYSICIAN ASSISTANT

## 2021-07-22 PROCEDURE — 99233 SBSQ HOSP IP/OBS HIGH 50: CPT | Performed by: PHYSICAL MEDICINE & REHABILITATION

## 2021-07-22 PROCEDURE — 80048 BASIC METABOLIC PNL TOTAL CA: CPT | Performed by: PHYSICIAN ASSISTANT

## 2021-07-22 PROCEDURE — 250N000013 HC RX MED GY IP 250 OP 250 PS 637: Performed by: PHYSICAL MEDICINE & REHABILITATION

## 2021-07-22 PROCEDURE — 250N000013 HC RX MED GY IP 250 OP 250 PS 637: Performed by: PHYSICIAN ASSISTANT

## 2021-07-22 RX ADMIN — MORPHINE SULFATE 30 MG: 15 TABLET ORAL at 06:58

## 2021-07-22 RX ADMIN — DIAZEPAM 6 MG: 2 TABLET ORAL at 20:17

## 2021-07-22 RX ADMIN — ATORVASTATIN CALCIUM 80 MG: 40 TABLET, FILM COATED ORAL at 21:15

## 2021-07-22 RX ADMIN — MORPHINE SULFATE 30 MG: 15 TABLET ORAL at 21:14

## 2021-07-22 RX ADMIN — Medication 1000 MG: at 14:28

## 2021-07-22 RX ADMIN — DIAZEPAM 6 MG: 2 TABLET ORAL at 06:01

## 2021-07-22 RX ADMIN — DICLOFENAC SODIUM 4 G: 10 GEL TOPICAL at 21:29

## 2021-07-22 RX ADMIN — MELATONIN TAB 3 MG 6 MG: 3 TAB at 21:18

## 2021-07-22 RX ADMIN — SENNOSIDES AND DOCUSATE SODIUM 2 TABLET: 8.6; 5 TABLET ORAL at 20:18

## 2021-07-22 RX ADMIN — METHOCARBAMOL 500 MG: 500 TABLET, FILM COATED ORAL at 16:11

## 2021-07-22 RX ADMIN — MORPHINE SULFATE 30 MG: 15 TABLET ORAL at 11:03

## 2021-07-22 RX ADMIN — MORPHINE SULFATE 30 MG: 15 TABLET ORAL at 14:12

## 2021-07-22 RX ADMIN — SIMETHICONE 375 MG: 125 TABLET, CHEWABLE ORAL at 21:20

## 2021-07-22 RX ADMIN — MORPHINE SULFATE 30 MG: 15 TABLET ORAL at 17:36

## 2021-07-22 RX ADMIN — LIDOCAINE 1 PATCH: 560 PATCH PERCUTANEOUS; TOPICAL; TRANSDERMAL at 20:18

## 2021-07-22 RX ADMIN — SENNOSIDES AND DOCUSATE SODIUM 2 TABLET: 8.6; 5 TABLET ORAL at 08:46

## 2021-07-22 RX ADMIN — METHOCARBAMOL 500 MG: 500 TABLET, FILM COATED ORAL at 12:12

## 2021-07-22 RX ADMIN — Medication 12.5 MG: at 21:14

## 2021-07-22 RX ADMIN — ASPIRIN 81 MG: 81 TABLET ORAL at 08:46

## 2021-07-22 RX ADMIN — MORPHINE SULFATE 30 MG: 15 TABLET ORAL at 00:26

## 2021-07-22 RX ADMIN — METHOCARBAMOL 500 MG: 500 TABLET, FILM COATED ORAL at 20:18

## 2021-07-22 RX ADMIN — METHOCARBAMOL 500 MG: 500 TABLET, FILM COATED ORAL at 08:45

## 2021-07-22 RX ADMIN — MORPHINE SULFATE 30 MG: 15 TABLET ORAL at 03:38

## 2021-07-22 RX ADMIN — Medication 1 TABLET: at 08:46

## 2021-07-22 RX ADMIN — DIAZEPAM 6 MG: 2 TABLET ORAL at 14:12

## 2021-07-22 NOTE — PLAN OF CARE
"  VS: /53 (BP Location: Left arm)   Pulse 83   Temp 97.4  F (36.3  C) (Oral)   Resp 16   Ht 1.854 m (6' 1\")   Wt 97.9 kg (215 lb 14.4 oz)   SpO2 100%   BMI 28.48 kg/m       O2: RA   Output: Voids spontaneously   Last BM: 7/21, pt said it was loose. Still wanted to take senna but not miralax   Activity: A1-2 pivot transfers   Skin: Incision on back. Dressings changed on shift   Pain: Pain in R leg and back. Managed with PRN morphine, valium, scheduled robaxin, home med tylenol due to pt preference.   CMS: A&O x4, intact. Denies n/t   Dressing: CDI, changed today   Diet: Regular thin liquids   LDA: Back inc.    Equipment: Sliding board, wheel chair, personal belongings, knee braces   Plan: Continue w POC   Additional Info: Call light within reach, able to make needs known  [place], hearing aids on. Bed alarms safety  Pt got asympomatic covid swab completed today       "

## 2021-07-22 NOTE — PROGRESS NOTES
"  [facility]   Acute Rehabilitation Unit  Daily progress note    INTERVAL HISTORY  Jorje was seen sitting up in bed reports some trouble sleeping feels due to pain, asks about increasing valium.  Denies n/v/d, bowel ongoing titration of bowel medications but is having more success now that he can use toilet.  No urinary complaints, no sob, no chest pain. Reviewed lab results with Jorje.  Did standing frame prior to visit today.  Is making progress with therapy.      PT: Gait progressing; however, continues w/ severely flexed posture at hips and knees, this will be months of LE strengthening for functional ambulation.    MEDICATIONS    aspirin  81 mg Oral Daily     atorvastatin  80 mg Oral At Bedtime     lidocaine  1 patch Transdermal Q24h    And     lidocaine   Transdermal Q8H     melatonin  6 mg Oral At Bedtime     methocarbamol  500 mg Oral 4x Daily     metoprolol succinate ER  12.5 mg Oral At Bedtime     multivitamin w/minerals  1 tablet Oral Daily     polyethylene glycol  17 g Oral Daily     senna-docusate  2 tablet Oral BID     Urivarx Capsules (patient supplied supplement)   2 capsule Oral Daily        acetaminophen, bisacodyl, diazepam, diclofenac, morphine, naloxone **OR** naloxone **OR** naloxone **OR** naloxone, simethicone     PHYSICAL EXAM  /53 (BP Location: Left arm)   Pulse 83   Temp 97.4  F (36.3  C) (Oral)   Resp 16   Ht 1.854 m (6' 1\")   Wt 97.9 kg (215 lb 14.4 oz)   SpO2 100%   BMI 28.48 kg/m    Gen: awake alert  HEENT: NCAT, EOMI  CV: rrr  Pulm: non labored clear on room air  Abd: non distended non tender   Ext: warm dry without edema.   Neuro/MSK: alert, follows commands     LABS  CBC RESULTS:   Recent Labs   Lab Test 07/22/21  0551 07/15/21  0549 07/08/21  0654   WBC 5.5 7.6 7.4   RBC 3.46* 3.28* 3.47*   HGB 10.0* 9.8* 10.4*   HCT 32.8* 31.4* 33.7*   MCV 95 96 97   MCH 28.9 29.9 30.0   MCHC 30.5* 31.2* 30.9*   RDW 13.9 13.8 14.1    222 270 "     Last Basic Metabolic Panel:  Recent Labs   Lab Test 07/22/21  0551 07/19/21  0756 07/15/21  0549    138 136   POTASSIUM 3.9 3.9 3.9   CHLORIDE 105 103 102   CO2 32 34* 29   ANIONGAP 1* 1* 5   GLC 97 102* 92   BUN 16 16 16   CR 0.55* 0.57* 0.50*   GFRESTIMATED >90 >90 >90   [place] 9.3 9.3 9.4         Rehabilitation - continue comprehensive acute inpatient rehabilitation program with multidisciplinary approach including therapies, rehab nursing, and physiatry following. See interval history for updates.      ASSESSMENT AND PLAN    Darleen Simmons is a 70 year old man with past medical history of  HTN, HLP, CAD, ischemic cardiomyopathy s/p ICD placement, PAD s/p left SFA POBA for latissimus dorsi flap to left calf, atrial fibrillation/flutter,  flat back syndrome now s/p T7-pelvis revision PSIF with T12-L1 3CO and L5 PSO on 6/21/21 with Jazmyne Akins/Herb.  Admitted to acute rehab 7/7/21      Flatback syndrome-   Chronic back pain  S/p T7-S1 posterior spinal fusion per Dr. 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 Afterwards he experienced quadriplegia but he slowly was able to regain his ability to ambulate. In 2018, he underwent posterior decompression thoracolumbar fusion at [facility]. At that time It was thought that he would be primarily a sitter and he was fused in a fairly straight posture.  -  Up with assist until independent. No excessive bending or twisting. No lifting >10 lbs x 6 weeks. Renny lift approved for transfers. Keep back straight if using lift.  -WBAT  -pain management as below  -continue PT/OT  - right AFO- consult orthotics to open joint.   -f/u Dr. Akins        CAD s/p MI CABG  Ischemic cardiomyopathy   s/p Pacemaker & ICD (medtronic)  -echo 5/4/21Mildly (EF 45-50%) reduced left ventricular function is present. There is a pattern of wall motion abnormalities consistent with a prior RCA and, possibly, LCx  "infarction.Stress test 5/5/21  -continue lipitor  -continue asa     A-fib/ Aflutter- not anticoagulated prior to admission per pcp recs  -continue metoprolol xl 12.5 mg     Acute blood loss Anemia- Estimated blood loss ~ 4320 ml with 1630 returned via cell saver.  Hgb 9.8 7/15  -trend     Acute on Chronic back pain- on morphine 15-30 mg reportedly taking qid prior to admission. Ongoing complaint though improving activity tolerance, reports \"nerve pain\" offered gabapentin/lyrica which patient declined. Resistant in further changes to med regimen.   -continue robaxin scheduled  -continue tylenol, valium, morphine 15-30 mg prn     Depression- monitor mood, reportedly PTSD as well, and resistance to psychiatry/ medications for mood.   -psychology consult for emotional support.      PAD-  S/p L SFA baloon angioplasty. 2/11/21 MARIAMA Right:  Moderate peripheral arterial disease starting at or proximal to the superficial femoral level. Left:  Moderate peripheral arterial disease location not well determined.       Metabolic encephalopathy- prolonged surgery, pain meds (improved)   -reorient as needed  -monitor- hold meds for sedation     Urinary retention- acute retention post operatively, no noted issues during rehab stay.   -continue prior to admission supplement      Pressure injury- wound care as ordered - apparently healing well.   Evaluated by WON on 7/6/21  -wound care as ordered.     1. Adjustment to disability:  Psychology for emotional support  2. FEN: reg  3. Bowel: constipated improved.   4. Bladder: chronic urgency- monitor  5. DVT Prophylaxis: mechanical  6. GI Prophylaxis: reg diet  7. Code: full   8. Disposition: goal for home   9. ELOS: ~ 3 weeks  10. Follow up Appointments on Discharge: pcp, ortho spine    Ca Hernandez PA-C  PM&R    Seen and discussed with Dr. Clarke.          "

## 2021-07-22 NOTE — PLAN OF CARE
Discharge Planner Post-Acute Rehab PT:      Discharge Plan: home w/ his wife, she is older and limited w/ ability to provide physical assist     Precautions: falls, spine; [place] , PTSD     Current Status:  Bed Mobility: Mod I w/ hospital bed   Transfer: CGA - squat pivot transfer   Gait: ww, (R) AFO, close w/c follow, 20'  Stairs: NT   Balance: ind EOB     Assessment: Gait progressing; AFO w/ open joint allows for improved stability and step length     Other Barriers to Discharge (DME, Family Training, etc): None identified at this time.  Discussed w/c needs for home.  He insists that he has a chair at home that will work for him and that nothing more needs to be done. Would like  Prescription for hosp bed but wants to wait for delivery until after he tries the bed at home.

## 2021-07-22 NOTE — PROGRESS NOTES
"VS: /65   Pulse 73   Temp 96.9  F (36.1  C) (Oral)   Resp 16   Ht 1.854 m (6' 1\")   Wt 97.9 kg (215 lb 14.4 oz)   SpO2 96%   BMI 28.48 kg/m   Pt denies chest pain, shortness of breath, lightheadedness, dizziness and nausea and vomiting.   O2: Room air sat. > 90%.    Output: Voiding adequate amount in urinal without difficulty.   Last BM: 7/21/21   Activity: AX 1-2  Pivot to transfer, or 2 with Liko   Skin: Intact.Ex spinal incision.   Pain: Managed with PRN morphine and Valium. Leg pain in back and right leg.   CMS: CMS and neuro intact.A/O . Able to make needs known. Denies numbness and tingling.   Dressing: CDI. Changed this shift.   Diet: Tolerating regular diet, thin liquids.   LDA: None   Equipment: Sliding board, wheel chair, and personal belongings.   Plan: Continue with plan of care. Call light within reach.   Additional Info:  [place]. Hearing aid on. Alarm in bed for safety.     "

## 2021-07-22 NOTE — PROGRESS NOTES
SWer called and spoke with pt wife. Confirmed discharge date (Friday 07/30) and HC set up ([facility]). Pt wife expressed agreement with plan and stated that she is coming Sat morning to visit with pt. Pt wife expressed that pt is asking about a piece of equipment for discharge and she is unsure what it is AND if they need to order on their own. SWer spoke with PT, piece of equipment is floor bike that pt found at end of the hyman and began to use on his own. Per PT, PT informed pt that it would be purchased out-of-pocket. SWer notified wife of this, shared that there are options on [employer], and that therapy can give specific recommendations on Sat when she is here on the unit. Therapy notified to follow-up. Pt wife requesting education on wound care since pt will need that dressing changed daily. SWer notified Charge RN.    SWer coordinated PT/OT FT with pt wife on Sat from 10-12. RN education to follow. Pt wife requesting wound care supplies be sent home at that time so that pt has some until HC RN comes to the home and orders what else is needed.     Pt wife stated that the biggest issue they faced after pt discharge from his last surgery was not having HC set up right away. Pt wife relieved to know that HC in place and FT being coordinated. Pt wife stated that she got a call today from [facility] CM ([facility]) and aware that the EW ended and pt will get re-assessment for services. Pt wife relieved to know that both EW CM and [employer] insurance CM updated on pt discharge plans and that [employer] CM can A with getting a hospital bed down the road, if needed.     Pt wife denied any other immediate needs at this time. Pt wife has SW number if needs arise and aware that SW will send orders and updated progress notes to HC agency, [employer] CM and [facility] EW CM at discharge. SW will continue to follow.     [facility] Care Coordinator, Barb Coffey PH: [phone]    [employer] , Rebecca  Ashlee Cook PH: [phone]      Home Health Care:   [facility]  PH: [phone], F: [phone]  RN, PT, OT and RAMIREZ      [facility]   Direct Phone: [phone]  I   Pager: [phone]  I  Fax: [phone]'

## 2021-07-22 NOTE — PLAN OF CARE
"  VS: /67 (BP Location: Right arm)   Pulse 81   Temp 96.9  F (36.1  C) (Oral)   Resp 16   Ht 1.854 m (6' 1\")   Wt 97.9 kg (215 lb 14.4 oz)   SpO2 96%   BMI 28.48 kg/m       O2: Pt denies any SOB   Output: Uses urinal ind.  In bed   Last BM: LBM this shift; continent   Activity: Need A 1-2 pivot transfers   Skin: Inc to back   Pain: Rt leg and back pain reported; managed with morphine and valium   CMS: intact   Dressing: CDI   Diet: Regular/thin liquids   LDA:    Equipment: Sliding board to transfers    Plan: Continue to monitor    Additional Info:        "

## 2021-07-23 ENCOUNTER — APPOINTMENT (OUTPATIENT)
Dept: OCCUPATIONAL THERAPY | Facility: CLINIC | Age: 71
DRG: 559 | End: 2021-07-23
Attending: PHYSICAL MEDICINE & REHABILITATION
Payer: COMMERCIAL

## 2021-07-23 ENCOUNTER — APPOINTMENT (OUTPATIENT)
Dept: PHYSICAL THERAPY | Facility: CLINIC | Age: 71
DRG: 559 | End: 2021-07-23
Attending: PHYSICAL MEDICINE & REHABILITATION
Payer: COMMERCIAL

## 2021-07-23 PROCEDURE — 97116 GAIT TRAINING THERAPY: CPT | Mod: GP | Performed by: PHYSICAL THERAPIST

## 2021-07-23 PROCEDURE — 250N000013 HC RX MED GY IP 250 OP 250 PS 637: Performed by: PHYSICAL MEDICINE & REHABILITATION

## 2021-07-23 PROCEDURE — 128N000003 HC R&B REHAB

## 2021-07-23 PROCEDURE — 97110 THERAPEUTIC EXERCISES: CPT | Mod: GP | Performed by: PHYSICAL THERAPIST

## 2021-07-23 PROCEDURE — 99233 SBSQ HOSP IP/OBS HIGH 50: CPT | Performed by: PHYSICAL MEDICINE & REHABILITATION

## 2021-07-23 PROCEDURE — 250N000013 HC RX MED GY IP 250 OP 250 PS 637: Performed by: PHYSICIAN ASSISTANT

## 2021-07-23 PROCEDURE — 97530 THERAPEUTIC ACTIVITIES: CPT | Mod: GP | Performed by: PHYSICAL THERAPIST

## 2021-07-23 PROCEDURE — 97110 THERAPEUTIC EXERCISES: CPT | Mod: GO

## 2021-07-23 PROCEDURE — 97535 SELF CARE MNGMENT TRAINING: CPT | Mod: GO

## 2021-07-23 RX ADMIN — Medication 12.5 MG: at 21:46

## 2021-07-23 RX ADMIN — Medication 1 TABLET: at 08:14

## 2021-07-23 RX ADMIN — MORPHINE SULFATE 30 MG: 15 TABLET ORAL at 03:28

## 2021-07-23 RX ADMIN — METHOCARBAMOL 500 MG: 500 TABLET, FILM COATED ORAL at 20:48

## 2021-07-23 RX ADMIN — SENNOSIDES AND DOCUSATE SODIUM 2 TABLET: 8.6; 5 TABLET ORAL at 20:48

## 2021-07-23 RX ADMIN — MORPHINE SULFATE 30 MG: 15 TABLET ORAL at 16:38

## 2021-07-23 RX ADMIN — METHOCARBAMOL 500 MG: 500 TABLET, FILM COATED ORAL at 13:10

## 2021-07-23 RX ADMIN — ASPIRIN 81 MG: 81 TABLET ORAL at 08:14

## 2021-07-23 RX ADMIN — MORPHINE SULFATE 30 MG: 15 TABLET ORAL at 00:15

## 2021-07-23 RX ADMIN — DIAZEPAM 6 MG: 2 TABLET ORAL at 08:21

## 2021-07-23 RX ADMIN — DIAZEPAM 6 MG: 2 TABLET ORAL at 21:46

## 2021-07-23 RX ADMIN — DIAZEPAM 6 MG: 2 TABLET ORAL at 02:30

## 2021-07-23 RX ADMIN — MELATONIN TAB 3 MG 6 MG: 3 TAB at 21:46

## 2021-07-23 RX ADMIN — MORPHINE SULFATE 30 MG: 15 TABLET ORAL at 20:48

## 2021-07-23 RX ADMIN — LIDOCAINE 1 PATCH: 560 PATCH PERCUTANEOUS; TOPICAL; TRANSDERMAL at 21:45

## 2021-07-23 RX ADMIN — MORPHINE SULFATE 30 MG: 15 TABLET ORAL at 13:10

## 2021-07-23 RX ADMIN — METHOCARBAMOL 500 MG: 500 TABLET, FILM COATED ORAL at 08:14

## 2021-07-23 RX ADMIN — METHOCARBAMOL 500 MG: 500 TABLET, FILM COATED ORAL at 16:39

## 2021-07-23 RX ADMIN — MORPHINE SULFATE 30 MG: 15 TABLET ORAL at 07:14

## 2021-07-23 RX ADMIN — MORPHINE SULFATE 30 MG: 15 TABLET ORAL at 10:06

## 2021-07-23 RX ADMIN — ATORVASTATIN CALCIUM 80 MG: 40 TABLET, FILM COATED ORAL at 21:46

## 2021-07-23 RX ADMIN — SENNOSIDES AND DOCUSATE SODIUM 2 TABLET: 8.6; 5 TABLET ORAL at 08:14

## 2021-07-23 RX ADMIN — DIAZEPAM 6 MG: 2 TABLET ORAL at 14:42

## 2021-07-23 ASSESSMENT — MIFFLIN-ST. JEOR: SCORE: 1734.21

## 2021-07-23 NOTE — PLAN OF CARE
FOCUS/GOAL  Medical management    ASSESSMENT, INTERVENTIONS AND CONTINUING PLAN FOR GOAL:  Pt is alert and oriented. [place]. Complained of pain in back and R leg. Frequently calling for Morphine. Pt upset with frequency of Morphine and wanted sooner than available. RN educated pt on current orders and offered Tylenol in between doses as well as heat/cold packs, but pt refused. Offered to help reposition pt, but he refused. Given Morphine as able with current orders. Valium given x1. Assist of 1 pivot w/c. Continent of bladder using urinal independently with staff emptying. Appeared to be sleeping on rounds.

## 2021-07-23 NOTE — PLAN OF CARE
Discharge Planner Post-Acute Rehab PT:      Discharge Plan: home w/ his wife, she is older and limited w/ ability to provide physical assist     Precautions: falls, spine; [place] , PTSD     Current Status:  Bed Mobility: Mod I w/ hospital bed   Transfer: CGA - squat pivot transfer   Gait: ww, (R) AFO, close w/c follow, 20'  Stairs: NT   Balance: ind EOB     Assessment: Decreased the angle on the w/c back rest and he is more comfortable. He may ask his wife to bring his upright walker from home when she comes tomorrow.  Will plan for car transfer during family training, he is aware and in agreement.      Other Barriers to Discharge (DME, Family Training, etc): None identified at this time.  Discussed w/c needs for home.  He insists that he has a chair at home that will work for him and that nothing more needs to be done. Would like  Prescription for hosp bed but wants to wait for delivery until after he tries the bed at home. Vendor in [place] is [employer]. Chart note completed and signed by MD 7/22.

## 2021-07-23 NOTE — PLAN OF CARE
FOCUS/GOAL  Pain management     ASSESSMENT, INTERVENTIONS AND CONTINUING PLAN FOR GOAL:  Pt Aox4 able to make needs known, using call light appropriately. A1 pivot transfer to w/c and bed.  VSS, severe pain reported in back and legs taking PRN morphine every three hours which is somewhat effective in managing pain, pt can also have PRN Valium Q6hr for spacticity given once on writers shift. Incision to back covered with island dressing no drainage noted to dressing and was changed in AM.  Reg/thin taking pills whole with water. Using urinal Independently, encouraging pt to use toilet instead of bedpan when needs to have a BM, LBM 7/21/2021. Set schedule to be up for meals but refused for dinner tonight.  Nursing will continue to monitor.

## 2021-07-23 NOTE — PLAN OF CARE
"FOCUS/GOAL  Pain management, Wound care management, Medical management, Mobility, Skin integrity, Cognition/Memory/Judgment/Problem solving, and Safety management    ASSESSMENT, INTERVENTIONS AND CONTINUING PLAN FOR GOAL:  A/Ox4. On RA. Denies CP, SOB, N/T, N/V, dizziness. Spinal pain managed with scheduled robaxin, PRN valium x2 and PRN morphine 30mg x2, declined complementary pain interventions. Tolerating regular diet. Ax1-2 squat/pivot to WC. BM today. Voiding without difficulty using urinal. Continent B&B. Spinal dressing CDI. Pt able to make needs known and call light within reach. Continue POC     /70 (BP Location: Right arm)   Pulse 80   Temp 98.6  F (37  C) (Oral)   Resp 16   Ht 1.854 m (6' 1\")   Wt 93 kg (205 lb 1.6 oz)   SpO2 95%   BMI 27.06 kg/m       "

## 2021-07-23 NOTE — PROGRESS NOTES
"  [facility]   Acute Rehabilitation Unit  Daily progress note    INTERVAL HISTORY  Jorje was seen sitting up in bed reports some new movement in right toes, says he has been working on foot bike and feels this has helped his progress.  Denies sob, chest pain, headaches, dizziness, bladder without issues, bowel now regular with bowel regimen.  Ongoing acute on chronic pain though reports last night was best night sleep he has had since admission.     MEDICATIONS    aspirin  81 mg Oral Daily     atorvastatin  80 mg Oral At Bedtime     lidocaine  1 patch Transdermal Q24h    And     lidocaine   Transdermal Q8H     melatonin  6 mg Oral At Bedtime     methocarbamol  500 mg Oral 4x Daily     metoprolol succinate ER  12.5 mg Oral At Bedtime     multivitamin w/minerals  1 tablet Oral Daily     polyethylene glycol  17 g Oral Daily     senna-docusate  2 tablet Oral BID     Urivarx Capsules (patient supplied supplement)   2 capsule Oral Daily        acetaminophen, bisacodyl, diazepam, diclofenac, morphine, naloxone **OR** naloxone **OR** naloxone **OR** naloxone, simethicone     PHYSICAL EXAM  /70 (BP Location: Right arm)   Pulse 80   Temp 98.6  F (37  C) (Oral)   Resp 16   Ht 1.854 m (6' 1\")   Wt 93 kg (205 lb 1.6 oz)   SpO2 95%   BMI 27.06 kg/m    Gen: awake alert  HEENT: NCAT, EOMI  CV: rrr  Pulm: non labored clear on room air  Abd: non distended non tender   Ext: warm dry without edema.   Neuro/MSK: alert, follows commands     LABS  CBC RESULTS:   Recent Labs   Lab Test 07/22/21  0551 07/15/21  0549 07/08/21  0654   WBC 5.5 7.6 7.4   RBC 3.46* 3.28* 3.47*   HGB 10.0* 9.8* 10.4*   HCT 32.8* 31.4* 33.7*   MCV 95 96 97   MCH 28.9 29.9 30.0   MCHC 30.5* 31.2* 30.9*   RDW 13.9 13.8 14.1    222 270     Last Basic Metabolic Panel:  Recent Labs   Lab Test 07/22/21  0551 07/19/21  0756 07/15/21  0549    138 136   POTASSIUM 3.9 3.9 3.9   CHLORIDE 105 103 102   CO2 32 34* " 29   ANIONGAP 1* 1* 5   GLC 97 102* 92   BUN 16 16 16   CR 0.55* 0.57* 0.50*   GFRESTIMATED >90 >90 >90   TRENTON 9.3 9.3 9.4         Rehabilitation - continue comprehensive acute inpatient rehabilitation program with multidisciplinary approach including therapies, rehab nursing, and physiatry following. See interval history for updates.      ASSESSMENT AND PLAN    Darleen Simmons is a 70 year old man with past medical history of  HTN, HLP, CAD, ischemic cardiomyopathy s/p ICD placement, PAD s/p left SFA POBA for latissimus dorsi flap to left calf, atrial fibrillation/flutter,  flat back syndrome now s/p T7-pelvis revision PSIF with T12-L1 3CO and L5 PSO on 6/21/21 with Jazmyne Akins/Herb.  Admitted to acute rehab 7/7/21      Flatback syndrome-   Chronic back pain  S/p T7-S1 posterior spinal fusion per Dr. 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 Afterwards he experienced quadriplegia but he slowly was able to regain his ability to ambulate. In 2018, he underwent posterior decompression thoracolumbar fusion at [facility]. At that time It was thought that he would be primarily a sitter and he was fused in a fairly straight posture.  -  Up with assist until independent. No excessive bending or twisting. No lifting >10 lbs x 6 weeks  -WBAT  -pain management as below  -continue PT/OT  - right AFO- consult orthotics to open joint.   -f/u Dr. Akins        CAD s/p MI CABG  Ischemic cardiomyopathy   s/p Pacemaker & ICD (medtronic)  -echo 5/4/21Mildly (EF 45-50%) reduced left ventricular function is present. There is a pattern of wall motion abnormalities consistent with a prior RCA and, possibly, LCx infarction.Stress test 5/5/21  -continue lipitor  -continue asa     A-fib/ Aflutter- not anticoagulated prior to admission per pcp recs  -continue metoprolol xl 12.5 mg     Acute blood loss Anemia- Estimated blood loss ~ 4320 ml with 1630 returned via cell  "saver.  Hgb 10.0 7/22. Stable.  -trend     Acute on Chronic back pain- on morphine 15-30 mg reportedly taking qid prior to admission. Ongoing complaint though improving activity tolerance, reports \"nerve pain\" offered gabapentin/lyrica which patient declined. Resistant in further changes to med regimen.   -continue lidocaine, and voltaren reports finds beneficial  -continue robaxin scheduled  -continue tylenol, valium, morphine 15-30 mg prn     Depression- monitor mood, reportedly PTSD as well, and resistance to psychiatry/ medications for mood.   -psychology consult for emotional support.      PAD-  S/p L SFA baloon angioplasty. 2/11/21 MARIAMA Right:  Moderate peripheral arterial disease starting at or proximal to the superficial femoral level. Left:  Moderate peripheral arterial disease location not well determined.       Metabolic encephalopathy- prolonged surgery, pain meds (improved)   -reorient as needed  -monitor- hold meds for sedation     Urinary retention- acute retention post operatively, no noted issues during rehab stay.   -continue prior to admission supplement (urivax)     Pressure injury- wound care as ordered - apparently healing well.   Evaluated by [facility] on 7/6/21  -wound care as ordered.     1. Adjustment to disability:  Psychology for emotional support  2. FEN: reg  3. Bowel: constipated improved.   4. Bladder: chronic urgency- monitor  5. DVT Prophylaxis: mechanical  6. GI Prophylaxis: reg diet  7. Code: full   8. Disposition: goal for home   9. ELOS: ~ 3 weeks  10. Follow up Appointments on Discharge: pcp, ortho spine    Ca Hernandez PA-C  PM&R    Seen and discussed with Dr. Clarke.          "

## 2021-07-23 NOTE — PLAN OF CARE
Discharge Planner Post-Acute Rehab OT:      Discharge Plan: home with wife home ot     Precautions: spinal , Heel protectors on when in bed (beige open toe socks with gel paddings, due to bone spurs on heels)     Current Status:  Functional tsfers: sba w/ low pivot squat bed to w/c  cga to l with standing pivot transfer to l. Improved bed mobility from sit to supine w/ sba-mod I flat bed and sba-mod I supine to eob to l and r bed flat and hob raised both options at home.   ADLs:G/H:  sba to doff shoes, sba to don/doff knee brace and SBA seatedU/b dressing: pullover shirt SBA doff/don at EOB. Improved trunk control but variable.   L/B dressing:  supine sba underwear and min a with pants supine  Feet: min-mod A to doff r knee brace and mod a to don b knee braces sba l shoe r mod a with afo and shoe heel guard shoe horn  Vision/Cognition: tba     Assessment:ot increased l/b dressing supine , increase transfers to cga x1 to l stand pivot transfer with fww. Increase bed mobility and squat pivot transfer to sba looking at mod I soon. Pt educated in hep with b t band ex with holter in chair   Other Barriers to Discharge (DME, Family Training, etc): dme needs  Pt owns queen hob and feet adjustable with rail bed, wheel chair,  Stair/lift chair for his split level home,walker, reacher, long shoe horn etc. Pt reports home is accessible by ramp and he had wide door frames for w/c accessibility.

## 2021-07-24 ENCOUNTER — APPOINTMENT (OUTPATIENT)
Dept: OCCUPATIONAL THERAPY | Facility: CLINIC | Age: 71
DRG: 559 | End: 2021-07-24
Attending: PHYSICAL MEDICINE & REHABILITATION
Payer: COMMERCIAL

## 2021-07-24 ENCOUNTER — APPOINTMENT (OUTPATIENT)
Dept: PHYSICAL THERAPY | Facility: CLINIC | Age: 71
DRG: 559 | End: 2021-07-24
Attending: PHYSICAL MEDICINE & REHABILITATION
Payer: COMMERCIAL

## 2021-07-24 PROCEDURE — 250N000013 HC RX MED GY IP 250 OP 250 PS 637: Performed by: PHYSICAL MEDICINE & REHABILITATION

## 2021-07-24 PROCEDURE — 97116 GAIT TRAINING THERAPY: CPT | Mod: GP | Performed by: PHYSICAL THERAPIST

## 2021-07-24 PROCEDURE — 128N000003 HC R&B REHAB

## 2021-07-24 PROCEDURE — 97530 THERAPEUTIC ACTIVITIES: CPT | Mod: GP

## 2021-07-24 PROCEDURE — 250N000013 HC RX MED GY IP 250 OP 250 PS 637: Performed by: PHYSICIAN ASSISTANT

## 2021-07-24 PROCEDURE — 97535 SELF CARE MNGMENT TRAINING: CPT | Mod: GO

## 2021-07-24 PROCEDURE — 97110 THERAPEUTIC EXERCISES: CPT | Mod: GO

## 2021-07-24 PROCEDURE — 97530 THERAPEUTIC ACTIVITIES: CPT | Mod: GP | Performed by: PHYSICAL THERAPIST

## 2021-07-24 RX ADMIN — ASPIRIN 81 MG: 81 TABLET ORAL at 08:44

## 2021-07-24 RX ADMIN — POLYETHYLENE GLYCOL 3350 17 G: 17 POWDER, FOR SOLUTION ORAL at 10:58

## 2021-07-24 RX ADMIN — MORPHINE SULFATE 30 MG: 15 TABLET ORAL at 13:10

## 2021-07-24 RX ADMIN — ATORVASTATIN CALCIUM 80 MG: 40 TABLET, FILM COATED ORAL at 21:43

## 2021-07-24 RX ADMIN — MORPHINE SULFATE 30 MG: 15 TABLET ORAL at 02:11

## 2021-07-24 RX ADMIN — DICLOFENAC SODIUM 4 G: 10 GEL TOPICAL at 02:32

## 2021-07-24 RX ADMIN — LIDOCAINE 1 PATCH: 560 PATCH PERCUTANEOUS; TOPICAL; TRANSDERMAL at 21:42

## 2021-07-24 RX ADMIN — DICLOFENAC SODIUM 4 G: 10 GEL TOPICAL at 21:51

## 2021-07-24 RX ADMIN — Medication 12.5 MG: at 21:43

## 2021-07-24 RX ADMIN — Medication 1000 MG: at 13:15

## 2021-07-24 RX ADMIN — MELATONIN TAB 3 MG 6 MG: 3 TAB at 21:43

## 2021-07-24 RX ADMIN — MORPHINE SULFATE 30 MG: 15 TABLET ORAL at 21:42

## 2021-07-24 RX ADMIN — SENNOSIDES AND DOCUSATE SODIUM 2 TABLET: 8.6; 5 TABLET ORAL at 21:43

## 2021-07-24 RX ADMIN — METHOCARBAMOL 500 MG: 500 TABLET, FILM COATED ORAL at 08:44

## 2021-07-24 RX ADMIN — POLYETHYLENE GLYCOL 3350 17 G: 17 POWDER, FOR SOLUTION ORAL at 08:48

## 2021-07-24 RX ADMIN — MORPHINE SULFATE 30 MG: 15 TABLET ORAL at 05:07

## 2021-07-24 RX ADMIN — SENNOSIDES AND DOCUSATE SODIUM 2 TABLET: 8.6; 5 TABLET ORAL at 08:43

## 2021-07-24 RX ADMIN — MORPHINE SULFATE 30 MG: 15 TABLET ORAL at 08:44

## 2021-07-24 RX ADMIN — METHOCARBAMOL 500 MG: 500 TABLET, FILM COATED ORAL at 16:13

## 2021-07-24 RX ADMIN — METHOCARBAMOL 500 MG: 500 TABLET, FILM COATED ORAL at 13:10

## 2021-07-24 RX ADMIN — MORPHINE SULFATE 30 MG: 15 TABLET ORAL at 16:13

## 2021-07-24 RX ADMIN — Medication 1 TABLET: at 08:44

## 2021-07-24 RX ADMIN — METHOCARBAMOL 500 MG: 500 TABLET, FILM COATED ORAL at 21:43

## 2021-07-24 NOTE — PLAN OF CARE
Discharge Planner Post-Acute Rehab OT:      Discharge Plan: home with wife home ot     Precautions: spinal , Heel protectors on when in bed (beige open toe socks with gel paddings, due to bone spurs on heels)     Current Status:  Functional tsfers: sba w/ low pivot squat bed to w/c  cga to l with standing pivot transfer to l. Improved bed mobility from sit to supine w/ sba-mod I flat bed and sba-mod I supine to eob to l and r bed flat and hob raised both options at home.   ADLs:G/H:  sba to doff shoes, sba to don/doff knee brace and SBA seatedU/b dressing: pullover shirt SBA doff/don at EOB. Improved trunk control but variable.   L/B dressing:  supine sba underwear and Min A in supine.  Mod A when standing w/ FWW.   Feet: min-mod A to doff r knee brace and mod a to don b knee braces sba l shoe r mod a with afo and shoe heel guard shoe horn  Vision/Cognition: tba     Assessment:Pt had more anxiety this AM OT session. At the EOB independently w/o assistance. Later pt tsferred himself w/o assistance. Provided pt education on supervision w/ tsfers for safety and needing set up of equipment prior to tsfer. Pt was receptive but monitor follow up on safety education.     Pt did have improved tsfers from EOB to w/c w/ squat pivot but had more difficulty from w/c to EOB. Pt's wife was present and family education was provided. Wife had no concerns, assist mainly would be to progress tsfers to toilet/commode as pt is just making progressing those tsfers w/ nsg, otherwise pt has commode/bedpan he can use at home. Pt would need adaptive equipment to adhere to spinal precautions. Wife demonstrated she understood as this is not the only spine surgery he has had in the past. Pt declined to have wife in the room during ADL session, so education was provided prior to his participation in ADLs. Continue to progress tsfers onto commode/toilet and standing tolerance w/ FWW.       Other Barriers to Discharge (DME, Family Training,  etc): dme needs  Pt owns queen hob and feet adjustable with rail bed, wheel chair,  Stair/lift chair for his split level home,walker, reacher, long shoe horn etc. Pt reports home is accessible by ramp and he had wide door frames for w/c accessibility. Pt owns 2 reachers. Owns a commode and bed pan per wife.

## 2021-07-24 NOTE — PLAN OF CARE
FOCUS/GOAL  Medical management    ASSESSMENT, INTERVENTIONS AND CONTINUING PLAN FOR GOAL:  Pt is alert and oriented. Complained of pain in his back into his R leg. Pt was sleeping when writer arrived until after 0200. He then called for Morphine and was upset that he was not woken up for it. RN tried explaining that it was a PRN medication and that he needed to call for it. Pt adamant that he needs to be woken up to take PRN medication. Pt appeared groggy in all interactions. Morphine given x2 this shift. PRN voltaren given x1. Continent of bladder using urinal at bedside independently with staff emptying.

## 2021-07-24 NOTE — PLAN OF CARE
Pt has ongoing pain in his back related to surgery. He had Morphine 30 mg and Robaxin 500 mg twice this shift. He also had 1000 mg at 1310 with the rest of his pain medication. Back incision dressing is clean, dry, and intact. We will continue to monitor for pain control and intervene while assisting with activity of daily livings.

## 2021-07-24 NOTE — PLAN OF CARE
Discharge Planner Post-Acute Rehab PT:      Discharge Plan: home w/ his wife, she is older and limited w/ ability to provide physical assist     Precautions: falls, spine; [place] , PTSD     Current Status:  Bed Mobility: Mod I w/ hospital bed   Transfer: CGA - squat pivot transfer   Gait: ww, (R) AFO, close w/c follow, 20'  Stairs: NT   Balance: ind EOB     Assessment: Car transfer completed today. CGA with WW with correct setup (needs to get leg rests off chair fully and chair backed up for adequate room). Needing to problem solve AFO donning with OT this week. Wife unable to provide much physical assist.      Other Barriers to Discharge (DME, Family Training, etc): None identified at this time.  Discussed w/c needs for home.  He insists that he has a chair at home that will work for him and that nothing more needs to be done. Would like  Prescription for hosp bed but wants to wait for delivery until after he tries the bed at home. Vendor in [place] is [employer]. Chart note completed and signed by MD 7/22.

## 2021-07-24 NOTE — PLAN OF CARE
FOCUS/GOAL  Bladder management, Pain management, Mobility, and Safety management    ASSESSMENT, INTERVENTIONS AND CONTINUING PLAN FOR GOAL:  Patient is alert and oriented x 4. Able to make his needs known. Assist of 1 stand pivot for transfers but was so tired and needed 2 staff tonight when he was up for his shower. PRN Morphine given (see MAR) for low back pain radiating to right leg. Continent of bladder using the urinal at bedside. PRN Valium given at hs per request.

## 2021-07-25 ENCOUNTER — APPOINTMENT (OUTPATIENT)
Dept: OCCUPATIONAL THERAPY | Facility: CLINIC | Age: 71
DRG: 559 | End: 2021-07-25
Attending: PHYSICAL MEDICINE & REHABILITATION
Payer: COMMERCIAL

## 2021-07-25 ENCOUNTER — APPOINTMENT (OUTPATIENT)
Dept: PHYSICAL THERAPY | Facility: CLINIC | Age: 71
DRG: 559 | End: 2021-07-25
Attending: PHYSICAL MEDICINE & REHABILITATION
Payer: COMMERCIAL

## 2021-07-25 PROCEDURE — 97116 GAIT TRAINING THERAPY: CPT | Mod: GP | Performed by: PHYSICAL THERAPIST

## 2021-07-25 PROCEDURE — 97535 SELF CARE MNGMENT TRAINING: CPT | Mod: GO

## 2021-07-25 PROCEDURE — 250N000013 HC RX MED GY IP 250 OP 250 PS 637: Performed by: PHYSICAL MEDICINE & REHABILITATION

## 2021-07-25 PROCEDURE — 99231 SBSQ HOSP IP/OBS SF/LOW 25: CPT | Performed by: STUDENT IN AN ORGANIZED HEALTH CARE EDUCATION/TRAINING PROGRAM

## 2021-07-25 PROCEDURE — 97530 THERAPEUTIC ACTIVITIES: CPT | Mod: GO

## 2021-07-25 PROCEDURE — 97530 THERAPEUTIC ACTIVITIES: CPT | Mod: GP | Performed by: PHYSICAL THERAPIST

## 2021-07-25 PROCEDURE — 128N000003 HC R&B REHAB

## 2021-07-25 PROCEDURE — 250N000013 HC RX MED GY IP 250 OP 250 PS 637: Performed by: PHYSICIAN ASSISTANT

## 2021-07-25 PROCEDURE — 97110 THERAPEUTIC EXERCISES: CPT | Mod: GO

## 2021-07-25 RX ADMIN — METHOCARBAMOL 500 MG: 500 TABLET, FILM COATED ORAL at 16:27

## 2021-07-25 RX ADMIN — MORPHINE SULFATE 30 MG: 15 TABLET ORAL at 11:04

## 2021-07-25 RX ADMIN — Medication 1 TABLET: at 08:01

## 2021-07-25 RX ADMIN — METHOCARBAMOL 500 MG: 500 TABLET, FILM COATED ORAL at 12:15

## 2021-07-25 RX ADMIN — ATORVASTATIN CALCIUM 80 MG: 40 TABLET, FILM COATED ORAL at 22:35

## 2021-07-25 RX ADMIN — MORPHINE SULFATE 30 MG: 15 TABLET ORAL at 01:03

## 2021-07-25 RX ADMIN — Medication 12.5 MG: at 22:35

## 2021-07-25 RX ADMIN — DIAZEPAM 4 MG: 2 TABLET ORAL at 12:18

## 2021-07-25 RX ADMIN — ASPIRIN 81 MG: 81 TABLET ORAL at 08:01

## 2021-07-25 RX ADMIN — SENNOSIDES AND DOCUSATE SODIUM 2 TABLET: 8.6; 5 TABLET ORAL at 20:22

## 2021-07-25 RX ADMIN — MORPHINE SULFATE 30 MG: 15 TABLET ORAL at 04:18

## 2021-07-25 RX ADMIN — DIAZEPAM 6 MG: 2 TABLET ORAL at 04:22

## 2021-07-25 RX ADMIN — METHOCARBAMOL 500 MG: 500 TABLET, FILM COATED ORAL at 20:21

## 2021-07-25 RX ADMIN — MORPHINE SULFATE 30 MG: 15 TABLET ORAL at 08:05

## 2021-07-25 RX ADMIN — MORPHINE SULFATE 30 MG: 15 TABLET ORAL at 20:22

## 2021-07-25 RX ADMIN — POLYETHYLENE GLYCOL 3350 17 G: 17 POWDER, FOR SOLUTION ORAL at 08:07

## 2021-07-25 RX ADMIN — LIDOCAINE 1 PATCH: 560 PATCH PERCUTANEOUS; TOPICAL; TRANSDERMAL at 20:23

## 2021-07-25 RX ADMIN — MORPHINE SULFATE 30 MG: 15 TABLET ORAL at 16:27

## 2021-07-25 RX ADMIN — SENNOSIDES AND DOCUSATE SODIUM 2 TABLET: 8.6; 5 TABLET ORAL at 08:01

## 2021-07-25 RX ADMIN — DIAZEPAM 6 MG: 2 TABLET ORAL at 23:09

## 2021-07-25 RX ADMIN — MELATONIN TAB 3 MG 6 MG: 3 TAB at 22:35

## 2021-07-25 RX ADMIN — METHOCARBAMOL 500 MG: 500 TABLET, FILM COATED ORAL at 08:01

## 2021-07-25 RX ADMIN — SIMETHICONE 250 MG: 125 TABLET, CHEWABLE ORAL at 10:44

## 2021-07-25 RX ADMIN — DICLOFENAC SODIUM 4 G: 10 GEL TOPICAL at 22:35

## 2021-07-25 NOTE — PLAN OF CARE
Discharge Planner Post-Acute Rehab OT:      Discharge Plan: home with wife home ot     Precautions: spinal , Heel protectors on when in bed (beige open toe socks with gel paddings, due to bone spurs on heels)     Current Status:  Functional tsfers: sba w/ low pivot squat bed to w/c  cga to l with standing pivot transfer to l. Improved bed mobility from sit to supine w/ sba-mod I flat bed and sba-mod I supine to eob to l and r bed flat and hob raised both options at home.   ADLs:G/H:  sba to doff shoes, sba to don/doff knee brace and SBA seatedU/b dressing: pullover shirt SBA doff/don at EOB. Improved trunk control but variable.   L/B dressing:  supine sba underwear and Min A in supine.  Mod A when standing w/ FWW.   Feet: min-mod A to doff r knee brace and mod a to don b knee braces sba l shoe r mod a with afo and shoe heel guard shoe horn  Vision/Cognition: tba     Assessment: Focus of ADLS: LB dressing and transfers between bed and w/c.  Th followed pt cues to facilitate donning pants, R AFO and shoes.  Pt chose to use his R AFO as a shoe horn by placing the AFO in the shoe (also has a heel protector made of splinting material).  He reports his R heel is sore, and c/o this after doffing shoes and R AFO too.  Th requested he have nsg or next th look at his skin and pt said he would though he was minimizing the need for this.  Pt is demo a low pivot/sliding board level of transfer without the board between the bed and w/c.  Th provided CGA.  Pt needed mod A to lift LE's back into bed after OT as he had his shoes on and they feel heavy.  Pt talkative and indicated he has learned a lot from his primary OT and has found easier ways to do ADLs.  Con't OT per POC.  Continue to progress tsfers onto commode/toilet and standing tolerance w/ FWW.       Other Barriers to Discharge (DME, Family Training, etc): dme needs  Pt owns queen hob and feet adjustable with rail bed, wheel chair,  Stair/lift chair for his split level  home,walker, reacher, long shoe horn etc. Pt reports home is accessible by ramp and he had wide door frames for w/c accessibility. Pt owns 2 reachers. Owns a commode and bed pan per wife.

## 2021-07-25 NOTE — PROGRESS NOTES
"  [facility]   Acute Rehabilitation Unit  Daily progress note    INTERVAL HISTORY  Jorje was seen sitting up in bed and complains of ongoing pain in lower back and right upper thigh. Has not acutely become worse. No other concerns aside from his ongoing pain. Specifically denies SOB, chest pain, nausea, vomiting, fevers, and chills.     MEDICATIONS    aspirin  81 mg Oral Daily     atorvastatin  80 mg Oral At Bedtime     lidocaine  1 patch Transdermal Q24h    And     lidocaine   Transdermal Q8H     melatonin  6 mg Oral At Bedtime     methocarbamol  500 mg Oral 4x Daily     metoprolol succinate ER  12.5 mg Oral At Bedtime     multivitamin w/minerals  1 tablet Oral Daily     polyethylene glycol  17 g Oral Daily     senna-docusate  2 tablet Oral BID     Urivarx Capsules (patient supplied supplement)   2 capsule Oral Daily        acetaminophen, bisacodyl, diazepam, diclofenac, morphine, naloxone **OR** naloxone **OR** naloxone **OR** naloxone, simethicone     PHYSICAL EXAM  /59 (BP Location: Right arm)   Pulse 81   Temp 98.5  F (36.9  C) (Oral)   Resp 16   Ht 1.854 m (6' 1\")   Wt 93 kg (205 lb 1.6 oz)   SpO2 98%   BMI 27.06 kg/m    Gen: awake alert  HEENT: NCAT, EOMI  CV: rrr  Pulm: non labored clear on room air  Abd: non distended non tender   Ext: warm dry without edema.   Neuro/MSK: alert, follows commands     LABS  CBC RESULTS:   Recent Labs   Lab Test 07/22/21  0551 07/15/21  0549 07/08/21  0654   WBC 5.5 7.6 7.4   RBC 3.46* 3.28* 3.47*   HGB 10.0* 9.8* 10.4*   HCT 32.8* 31.4* 33.7*   MCV 95 96 97   MCH 28.9 29.9 30.0   MCHC 30.5* 31.2* 30.9*   RDW 13.9 13.8 14.1    222 270     Last Basic Metabolic Panel:  Recent Labs   Lab Test 07/22/21  0551 07/19/21  0756 07/15/21  0549    138 136   POTASSIUM 3.9 3.9 3.9   CHLORIDE 105 103 102   CO2 32 34* 29   ANIONGAP 1* 1* 5   GLC 97 102* 92   BUN 16 16 16   CR 0.55* 0.57* 0.50*   GFRESTIMATED >90 >90 >90   TRENTON 9.3 " 9.3 9.4         Rehabilitation - continue comprehensive acute inpatient rehabilitation program with multidisciplinary approach including therapies, rehab nursing, and physiatry following. See interval history for updates.      ASSESSMENT AND PLAN    --Vitals stable. No lab today.  --Continue ongoing medical management.  --Continue therapies and plan of care.    Cely Bynum MD

## 2021-07-25 NOTE — PLAN OF CARE
"  VS: /59 (BP Location: Right arm)   Pulse 81   Temp 98.5  F (36.9  C) (Oral)   Resp 16   Ht 1.854 m (6' 1\")   Wt 93 kg (205 lb 1.6 oz)   SpO2 98%   BMI 27.06 kg/m       O2: 98% at RA   Output: Uses a urinal, staff empties   Last BM: 7/25   Activity: Transfers with assist of one , stand pivot   Skin: Back incision   Pain: Pain managed with PRN Morphine 30 mg and Valium 4 mg   CMS: Per baseline   Dressing: None   Diet: Regular diet   LDA: None   Equipment: walker   Plan: Continue to monitor pain    Additional Info:        "

## 2021-07-25 NOTE — PLAN OF CARE
FOCUS/GOAL  Medical management    ASSESSMENT, INTERVENTIONS AND CONTINUING PLAN FOR GOAL:  Pt is alert and oriented. Continues to endorse excruciating pain in back into R leg. However, pt will be asleep when RN returns with PRN medication less than 5 minutes later. PRN Morphine given x2 as well as PRN Valium x1 per patient request. Assist of 1 pivot. Continent of bladder using urinal at bedside independently with staff emptying. Continent BM this shift. Appeared to be sleeping soundly on rounds.

## 2021-07-25 NOTE — PLAN OF CARE
Discharge Planner Post-Acute Rehab PT:      Discharge Plan: home w/ his wife, she is older and limited w/ ability to provide physical assist     Precautions: falls, spine; [place] , PTSD     Current Status:  Bed Mobility: Mod I w/ hospital bed   Transfer: CGA - squat pivot transfer   Gait: ww, (R) AFO, close w/c follow, 20'  Stairs: NT   Balance: ind EOB     Assessment: Pt continues to progress with mobility toward discharge goals.  Pt reports wanting to focus on gait with fww in anticipation of discharge at end of week.  Overall Nustep and Standing frame continue to go well.     Other Barriers to Discharge (DME, Family Training, etc): None identified at this time.  Discussed w/c needs for home.  He insists that he has a chair at home that will work for him and that nothing more needs to be done. Would like  Prescription for hosp bed but wants to wait for delivery until after he tries the bed at home. Vendor in [place] is [employer]. Chart note completed and signed by MD 7/22.

## 2021-07-25 NOTE — PLAN OF CARE
FOCUS/GOAL  Bladder management, Pain management, Mobility, and Safety management    ASSESSMENT, INTERVENTIONS AND CONTINUING PLAN FOR GOAL:  Patient is alert and oriented x 4. Uses his call light appropriately. Assist of 1 stand pivot for transfers. PRN Morphine given (see MAR) for low back pain which helped. Continent of bladder using the urinal at bedside.

## 2021-07-26 ENCOUNTER — APPOINTMENT (OUTPATIENT)
Dept: OCCUPATIONAL THERAPY | Facility: CLINIC | Age: 71
DRG: 559 | End: 2021-07-26
Attending: PHYSICAL MEDICINE & REHABILITATION
Payer: COMMERCIAL

## 2021-07-26 ENCOUNTER — APPOINTMENT (OUTPATIENT)
Dept: PHYSICAL THERAPY | Facility: CLINIC | Age: 71
DRG: 559 | End: 2021-07-26
Attending: PHYSICAL MEDICINE & REHABILITATION
Payer: COMMERCIAL

## 2021-07-26 LAB
ANION GAP SERPL CALCULATED.3IONS-SCNC: <1 MMOL/L (ref 3–14)
BUN SERPL-MCNC: 11 MG/DL (ref 7–30)
CALCIUM SERPL-MCNC: 8.7 MG/DL (ref 8.5–10.1)
CHLORIDE BLD-SCNC: 106 MMOL/L (ref 94–109)
CO2 SERPL-SCNC: 32 MMOL/L (ref 20–32)
CREAT SERPL-MCNC: 0.53 MG/DL (ref 0.66–1.25)
GFR SERPL CREATININE-BSD FRML MDRD: >90 ML/MIN/1.73M2
GLUCOSE BLD-MCNC: 96 MG/DL (ref 70–99)
POTASSIUM BLD-SCNC: 3.9 MMOL/L (ref 3.4–5.3)
SODIUM SERPL-SCNC: 138 MMOL/L (ref 133–144)

## 2021-07-26 PROCEDURE — 36415 COLL VENOUS BLD VENIPUNCTURE: CPT | Performed by: PHYSICIAN ASSISTANT

## 2021-07-26 PROCEDURE — 250N000013 HC RX MED GY IP 250 OP 250 PS 637: Performed by: PHYSICAL MEDICINE & REHABILITATION

## 2021-07-26 PROCEDURE — 97116 GAIT TRAINING THERAPY: CPT | Mod: GP | Performed by: REHABILITATION PRACTITIONER

## 2021-07-26 PROCEDURE — 97110 THERAPEUTIC EXERCISES: CPT | Mod: GP

## 2021-07-26 PROCEDURE — 250N000013 HC RX MED GY IP 250 OP 250 PS 637: Performed by: PHYSICIAN ASSISTANT

## 2021-07-26 PROCEDURE — 80048 BASIC METABOLIC PNL TOTAL CA: CPT | Performed by: PHYSICIAN ASSISTANT

## 2021-07-26 PROCEDURE — 97535 SELF CARE MNGMENT TRAINING: CPT | Mod: GO

## 2021-07-26 PROCEDURE — 97530 THERAPEUTIC ACTIVITIES: CPT | Mod: GP

## 2021-07-26 PROCEDURE — 128N000003 HC R&B REHAB

## 2021-07-26 PROCEDURE — 99233 SBSQ HOSP IP/OBS HIGH 50: CPT | Performed by: PHYSICAL MEDICINE & REHABILITATION

## 2021-07-26 PROCEDURE — 97110 THERAPEUTIC EXERCISES: CPT | Mod: GO

## 2021-07-26 RX ADMIN — MORPHINE SULFATE 30 MG: 15 TABLET ORAL at 13:04

## 2021-07-26 RX ADMIN — METHOCARBAMOL 500 MG: 500 TABLET, FILM COATED ORAL at 08:53

## 2021-07-26 RX ADMIN — Medication 12.5 MG: at 21:54

## 2021-07-26 RX ADMIN — METHOCARBAMOL 500 MG: 500 TABLET, FILM COATED ORAL at 19:53

## 2021-07-26 RX ADMIN — SENNOSIDES AND DOCUSATE SODIUM 2 TABLET: 8.6; 5 TABLET ORAL at 21:55

## 2021-07-26 RX ADMIN — MORPHINE SULFATE 30 MG: 15 TABLET ORAL at 08:53

## 2021-07-26 RX ADMIN — METHOCARBAMOL 500 MG: 500 TABLET, FILM COATED ORAL at 13:04

## 2021-07-26 RX ADMIN — Medication 1 TABLET: at 08:53

## 2021-07-26 RX ADMIN — MORPHINE SULFATE 30 MG: 15 TABLET ORAL at 16:30

## 2021-07-26 RX ADMIN — MORPHINE SULFATE 30 MG: 15 TABLET ORAL at 20:02

## 2021-07-26 RX ADMIN — METHOCARBAMOL 500 MG: 500 TABLET, FILM COATED ORAL at 16:18

## 2021-07-26 RX ADMIN — DIAZEPAM 6 MG: 2 TABLET ORAL at 10:17

## 2021-07-26 RX ADMIN — MELATONIN TAB 3 MG 6 MG: 3 TAB at 23:47

## 2021-07-26 RX ADMIN — ATORVASTATIN CALCIUM 80 MG: 40 TABLET, FILM COATED ORAL at 21:54

## 2021-07-26 RX ADMIN — ASPIRIN 81 MG: 81 TABLET ORAL at 08:53

## 2021-07-26 RX ADMIN — MORPHINE SULFATE 30 MG: 15 TABLET ORAL at 00:14

## 2021-07-26 RX ADMIN — MORPHINE SULFATE 30 MG: 15 TABLET ORAL at 23:47

## 2021-07-26 RX ADMIN — POLYETHYLENE GLYCOL 3350 17 G: 17 POWDER, FOR SOLUTION ORAL at 08:54

## 2021-07-26 RX ADMIN — MORPHINE SULFATE 30 MG: 15 TABLET ORAL at 04:36

## 2021-07-26 RX ADMIN — SENNOSIDES AND DOCUSATE SODIUM 2 TABLET: 8.6; 5 TABLET ORAL at 08:53

## 2021-07-26 RX ADMIN — LIDOCAINE 1 PATCH: 560 PATCH PERCUTANEOUS; TOPICAL; TRANSDERMAL at 20:23

## 2021-07-26 RX ADMIN — DICLOFENAC SODIUM 4 G: 10 GEL TOPICAL at 20:11

## 2021-07-26 NOTE — PLAN OF CARE
Patient is A&Ox4, able to make needs known, using call light appropriately.  VSS, LS clear. CMS impaired per baseline, Neuros are intact. Pain well controlled, taking 30mg morphine Q3H. BS present, Patient is passing gas, LBM 07/25, Voiding spontaneously in the urinal. Tolerating regular diet. Denies dizziness, SOB & CP. Continue POC.

## 2021-07-26 NOTE — PLAN OF CARE
Discharge Planner Post-Acute Rehab PT:     Discharge Plan: home w/ his wife, she is older and limited w/ ability to provide physical assist     Precautions: falls, spine; [place] , PTSD    Current Status:  Bed Mobility: mod I with rails from hospital bed.   Transfer: squat pivot CGA to min A   Gait: R afo // bars down and back x 3, progress up right posture. And less UE support.    Stairs: NT   Balance: CGA     Assessment:  AM standing frame- PM amb in // bars progress up right posture. Pt demo STS to WW x 5. Pt demo increased flexed posture with WW and limited Wt shift ability. Pt stating increased fatigue and R leg pain near end of session.     Other Barriers to Discharge (DME, Family Training, etc):   None identified at this time.  Discussed w/c needs for home.  He insists that he has a chair at home that will work for him and that nothing more needs to be done. Would like  Prescription for hosp bed but wants to wait for delivery until after he tries the bed at home. Vendor in [place] is [employer]. Chart note completed and signed by MD 7/22.

## 2021-07-26 NOTE — PROGRESS NOTES
"  [facility]   Acute Rehabilitation Unit  Daily progress note    INTERVAL HISTORY  Jorje was in standing frame. Has some movement in right toes. He has been working on foot bike and feels this has helped his progress.  Denies sob, chest pain, headaches, dizziness, bladder without issues, bowel now regular with bowel regimen.  Ongoing acute on chronic pain though reports sleep better now.    Patient has area on lateral portion of his heel that was a problem area. AFO was heated and pushed out in the area of concern. Patient did trial adjustments and was happy with the fit of the brace.     Current Status:  Bed Mobility: mod I with rails from hospital bed.   Transfer: squat pivot CGA to min A   Gait: R afo // bars down and back x 3, progress up right posture. And less UE support.    Stairs: NT   Balance: CGA     MEDICATIONS    aspirin  81 mg Oral Daily     atorvastatin  80 mg Oral At Bedtime     lidocaine  1 patch Transdermal Q24h    And     lidocaine   Transdermal Q8H     melatonin  6 mg Oral At Bedtime     methocarbamol  500 mg Oral 4x Daily     metoprolol succinate ER  12.5 mg Oral At Bedtime     multivitamin w/minerals  1 tablet Oral Daily     polyethylene glycol  17 g Oral Daily     senna-docusate  2 tablet Oral BID     Urivarx Capsules (patient supplied supplement)   2 capsule Oral Daily        acetaminophen, bisacodyl, diazepam, diclofenac, morphine, naloxone **OR** naloxone **OR** naloxone **OR** naloxone, simethicone     PHYSICAL EXAM  /66 (BP Location: Right arm)   Pulse 80   Temp 97.8  F (36.6  C) (Oral)   Resp 18   Ht 1.854 m (6' 1\")   Wt 93 kg (205 lb 1.6 oz)   SpO2 97%   BMI 27.06 kg/m    Gen: awake alert  HEENT: NCAT, EOMI  CV: rrr  Pulm: non labored clear on room air  Abd: non distended non tender   Ext: warm dry without edema.   Neuro/MSK: alert, follows commands     LABS  CBC RESULTS:   Recent Labs   Lab Test 07/22/21  0551 07/15/21  0549 " 07/08/21  0654   WBC 5.5 7.6 7.4   RBC 3.46* 3.28* 3.47*   HGB 10.0* 9.8* 10.4*   HCT 32.8* 31.4* 33.7*   MCV 95 96 97   MCH 28.9 29.9 30.0   MCHC 30.5* 31.2* 30.9*   RDW 13.9 13.8 14.1    222 270     Last Basic Metabolic Panel:  Recent Labs   Lab Test 07/26/21  0558 07/22/21  0551 07/19/21  0756    138 138   POTASSIUM 3.9 3.9 3.9   CHLORIDE 106 105 103   CO2 32 32 34*   ANIONGAP <1* 1* 1*   GLC 96 97 102*   BUN 11 16 16   CR 0.53* 0.55* 0.57*   GFRESTIMATED >90 >90 >90   TRENTON 8.7 9.3 9.3         Rehabilitation - continue comprehensive acute inpatient rehabilitation program with multidisciplinary approach including therapies, rehab nursing, and physiatry following. See interval history for updates.      ASSESSMENT AND PLAN    Darleen Simmons is a 70 year old man with past medical history of  HTN, HLP, CAD, ischemic cardiomyopathy s/p ICD placement, PAD s/p left SFA POBA for latissimus dorsi flap to left calf, atrial fibrillation/flutter,  flat back syndrome now s/p T7-pelvis revision PSIF with T12-L1 3CO and L5 PSO on 6/21/21 with Jazmyne Akins/Herb.  Admitted to acute rehab 7/7/21      Flatback syndrome-   Chronic back pain  S/p T7-S1 posterior spinal fusion per Dr. 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 Afterwards he experienced quadriplegia but he slowly was able to regain his ability to ambulate. In 2018, he underwent posterior decompression thoracolumbar fusion at [facility]. At that time It was thought that he would be primarily a sitter and he was fused in a fairly straight posture.  -  Up with assist until independent. No excessive bending or twisting. No lifting >10 lbs x 6 weeks  -WBAT  -pain management as below  -continue PT/OT  - right AFO- consult orthotics to open joint.   -f/u Dr. Akins        CAD s/p MI CABG  Ischemic cardiomyopathy   s/p Pacemaker & ICD (medtronic)  -echo 5/4/21Mildly (EF 45-50%) reduced left  "ventricular function is present. There is a pattern of wall motion abnormalities consistent with a prior RCA and, possibly, LCx infarction.Stress test 5/5/21  -continue lipitor  -continue asa     A-fib/ Aflutter- not anticoagulated prior to admission per pcp recs  -continue metoprolol xl 12.5 mg     Acute blood loss Anemia- Estimated blood loss ~ 4320 ml with 1630 returned via cell saver.  Hgb 10.0 7/22. Stable.  -trend     Acute on Chronic back pain- on morphine 15-30 mg reportedly taking qid prior to admission. Ongoing complaint though improving activity tolerance, reports \"nerve pain\" offered gabapentin/lyrica which patient declined. Resistant in further changes to med regimen.   -continue lidocaine, and voltaren reports finds beneficial  -continue robaxin scheduled  -continue tylenol, valium, morphine 15-30 mg prn     Depression- monitor mood, reportedly PTSD as well, and resistance to psychiatry/ medications for mood.   -psychology consult for emotional support.      PAD-  S/p L SFA baloon angioplasty. 2/11/21 MARIAMA Right:  Moderate peripheral arterial disease starting at or proximal to the superficial femoral level. Left:  Moderate peripheral arterial disease location not well determined.       Metabolic encephalopathy- prolonged surgery, pain meds (improved)   -reorient as needed  -monitor- hold meds for sedation     Urinary retention- acute retention post operatively, no noted issues during rehab stay.   -continue prior to admission supplement (urivax)     Pressure injury- wound care as ordered - apparently healing well.   Evaluated by WON on 7/6/21  -wound care as ordered.     1. Adjustment to disability:  Psychology for emotional support  2. FEN: reg  3. Bowel: constipated improved.   4. Bladder: chronic urgency- monitor  5. DVT Prophylaxis: mechanical  6. GI Prophylaxis: reg diet  7. Code: full   8. Disposition: goal for home   9. ELOS: 7/30/21  10. Follow up Appointments on Discharge: pcp, ortho " spine    Franklyn Clarke MD

## 2021-07-26 NOTE — PROGRESS NOTES
Patient has area on lateral portion of his heel that was a problem area. AFO was heated and pushed out in the area of concern. Patient did trial adjustments and was happy with the fit of the brace.   ASTER Gmienez

## 2021-07-26 NOTE — PLAN OF CARE
FOCUS/GOAL  Medical management    ASSESSMENT, INTERVENTIONS AND CONTINUING PLAN FOR GOAL:  Pt is alert and oriented. Received PRN valium from PM nurse at beginning of NOC shift. Complained of pain in back into RLE. PRN morphine given x2. Assist of 1 pivot. Continent of bladder using urinal independently with staff emptying. Appeared to be sleeping on rounds.

## 2021-07-26 NOTE — PLAN OF CARE
Discharge Planner Post-Acute Rehab OT:      Discharge Plan: home with wife home ot     Precautions: spinal , Heel protectors on when in bed (beige open toe socks with gel paddings, due to bone spurs on heels)     Current Status:  Functional tsfers: sba w/ low pivot squat bed to w/c  cga to l with standing pivot transfer to l. Improved bed mobility from sit to supine w/mod I flat bed and mod I supine to eob to l and r bed flat and hob raised both options at home.   ADLs:G/H:  sba to doff shoes, sba to don/doff knee brace and SBA seatedU/b dressing: pullover shirt SBA doff/don at EOB. Improved trunk control but variable.   L/B dressing:  supine sba underwear and Min A in supine.  Mod A when standing w/ FWW.   Feet: min-mod A to doff r knee brace and mod a to don b knee braces sba l shoe r mod a with afo and shoe heel guard shoe horn  Vision/Cognition: tba     Assessment: pt  This ot focus on shoulder and elbow ex supine with incresing to 3 lb weights with ex pt able to verbalize and demo ex and ex program via phone with pics of all ex.Also  focused on increasing transfers stand and piviot to l and pt able to take 6 steps forward and 6 steps back from sitting eob with fww.pt having difficult with heel guard afo shoe horn pt reeducated and took pic of method on his phone Con't OT per POC.  Continue to progress tsfers onto commode/toilet and standing tolerance w/ FWW.       Other Barriers to Discharge (DME, Family Training, etc): dme needs  Pt owns queen hob and feet adjustable with rail bed, wheel chair,  Stair/lift chair for his split level home,walker, reacher, long shoe horn etc. Pt reports home is accessible by ramp and he had wide door frames for w/c accessibility. Pt owns 2 reachers. Owns a commode and bed pan per wife.

## 2021-07-26 NOTE — PLAN OF CARE
FOCUS/GOAL  Bowel management, Bladder management, Pain management, Mobility, Skin integrity, and Safety management    ASSESSMENT, INTERVENTIONS AND CONTINUING PLAN FOR GOAL:  A/O, VS stable. Regular/thin pills whole. Ate some of meal. Continent of bowel and bladder. Pain in back and legs with medication given to help relieve. No new skin concerns. Assist of 1 pivot to transfer. Calls appropriately with light. Continue POC.

## 2021-07-26 NOTE — PLAN OF CARE
A&O x4, able to make needs known. Denies SOB/chest pain/dizziness/headache. C/o constant aching pain in back radiating down RLE -- managed with PRN morphine x1, PRN valium x1, voltaren gel x1, lidocaine patch applied. CMS per baseline. Urinal at bedside, LBM 7/25. Regular diet. Continue to monitor.

## 2021-07-27 ENCOUNTER — APPOINTMENT (OUTPATIENT)
Dept: OCCUPATIONAL THERAPY | Facility: CLINIC | Age: 71
DRG: 559 | End: 2021-07-27
Attending: PHYSICAL MEDICINE & REHABILITATION
Payer: COMMERCIAL

## 2021-07-27 ENCOUNTER — APPOINTMENT (OUTPATIENT)
Dept: PHYSICAL THERAPY | Facility: CLINIC | Age: 71
DRG: 559 | End: 2021-07-27
Attending: PHYSICAL MEDICINE & REHABILITATION
Payer: COMMERCIAL

## 2021-07-27 PROCEDURE — 128N000003 HC R&B REHAB

## 2021-07-27 PROCEDURE — 97110 THERAPEUTIC EXERCISES: CPT | Mod: GO

## 2021-07-27 PROCEDURE — 97530 THERAPEUTIC ACTIVITIES: CPT | Mod: GP

## 2021-07-27 PROCEDURE — 250N000013 HC RX MED GY IP 250 OP 250 PS 637: Performed by: PHYSICAL MEDICINE & REHABILITATION

## 2021-07-27 PROCEDURE — 250N000013 HC RX MED GY IP 250 OP 250 PS 637: Performed by: PHYSICIAN ASSISTANT

## 2021-07-27 PROCEDURE — 97535 SELF CARE MNGMENT TRAINING: CPT | Mod: GO

## 2021-07-27 PROCEDURE — 97110 THERAPEUTIC EXERCISES: CPT | Mod: GP

## 2021-07-27 PROCEDURE — 99233 SBSQ HOSP IP/OBS HIGH 50: CPT | Performed by: PHYSICAL MEDICINE & REHABILITATION

## 2021-07-27 RX ADMIN — METHOCARBAMOL 500 MG: 500 TABLET, FILM COATED ORAL at 20:50

## 2021-07-27 RX ADMIN — Medication 1 TABLET: at 08:54

## 2021-07-27 RX ADMIN — MORPHINE SULFATE 30 MG: 15 TABLET ORAL at 08:57

## 2021-07-27 RX ADMIN — DICLOFENAC SODIUM 4 G: 10 GEL TOPICAL at 03:26

## 2021-07-27 RX ADMIN — METHOCARBAMOL 500 MG: 500 TABLET, FILM COATED ORAL at 12:29

## 2021-07-27 RX ADMIN — MORPHINE SULFATE 30 MG: 15 TABLET ORAL at 03:20

## 2021-07-27 RX ADMIN — METHOCARBAMOL 500 MG: 500 TABLET, FILM COATED ORAL at 16:45

## 2021-07-27 RX ADMIN — SENNOSIDES AND DOCUSATE SODIUM 2 TABLET: 8.6; 5 TABLET ORAL at 20:51

## 2021-07-27 RX ADMIN — Medication 12.5 MG: at 22:58

## 2021-07-27 RX ADMIN — MORPHINE SULFATE 30 MG: 15 TABLET ORAL at 16:44

## 2021-07-27 RX ADMIN — BISACODYL 10 MG: 10 SUPPOSITORY RECTAL at 20:55

## 2021-07-27 RX ADMIN — ASPIRIN 81 MG: 81 TABLET ORAL at 08:53

## 2021-07-27 RX ADMIN — ATORVASTATIN CALCIUM 80 MG: 40 TABLET, FILM COATED ORAL at 22:58

## 2021-07-27 RX ADMIN — SENNOSIDES AND DOCUSATE SODIUM 2 TABLET: 8.6; 5 TABLET ORAL at 08:53

## 2021-07-27 RX ADMIN — METHOCARBAMOL 500 MG: 500 TABLET, FILM COATED ORAL at 08:54

## 2021-07-27 RX ADMIN — MORPHINE SULFATE 30 MG: 15 TABLET ORAL at 12:29

## 2021-07-27 RX ADMIN — MELATONIN TAB 3 MG 6 MG: 3 TAB at 22:59

## 2021-07-27 RX ADMIN — MORPHINE SULFATE 30 MG: 15 TABLET ORAL at 20:50

## 2021-07-27 RX ADMIN — LIDOCAINE 1 PATCH: 560 PATCH PERCUTANEOUS; TOPICAL; TRANSDERMAL at 20:52

## 2021-07-27 RX ADMIN — SIMETHICONE 375 MG: 125 TABLET, CHEWABLE ORAL at 12:29

## 2021-07-27 RX ADMIN — POLYETHYLENE GLYCOL 3350 17 G: 17 POWDER, FOR SOLUTION ORAL at 08:53

## 2021-07-27 ASSESSMENT — MIFFLIN-ST. JEOR: SCORE: 1722.41

## 2021-07-27 NOTE — PLAN OF CARE
FOCUS/GOAL  Pain management and Medical management    ASSESSMENT, INTERVENTIONS AND CONTINUING PLAN FOR GOAL:    Pt is A&Ox4. Make needs known. Constant lower back pain is partially relieved with PRN morphine sulfate every 3 hours per patient's request. Voltaren gel applied for right shoulder pain as well. Independent with bed mobility. Continent of bowel and bladder. Uses the urinal independently for voiding with staff to empty. Lidocaine patch in place on the left upper back area. Will continue POC.

## 2021-07-27 NOTE — PROGRESS NOTES
[facility]   Acute Rehabilitation Unit  Daily progress note    INTERVAL HISTORY  Jorje was in his room, in bed. Reports he is exhausted as he did too much in therapies. Reports he is on track for 7/30, though Insurance told him he could stay a few days longer if needed.      Has some movement in right toes. He has been working on foot bike and feels this has helped his progress.  Denies sob, chest pain, headaches, dizziness, bladder without issues, bowel now regular with bowel regimen.  Ongoing acute on chronic pain though reports sleep better now.    Patient has area on lateral portion of his heel that was a problem area. AFO was heated and pushed out in the area of concern. Patient did trial adjustments and was happy with the fit of the brace.     Current Status:    Functional tsfers: sba w/ low pivot squat bed to w/c  cga to l with standing pivot transfer to l. Improved bed mobility from sit to supine w/mod I flat bed and mod I supine to eob to l and r bed flat and hob raised both options at home.   ADLs:G/H:  sba to doff shoes, sba to don/doff knee brace and SBA seatedU/b dressing: pullover shirt SBA doff/don at EOB. Improved trunk control but variable.   L/B dressing:  supine sba underwear and Min A in supine.  Mod A when standing w/ FWW.   Feet: min-mod A to doff r knee brace and mod a to don b knee braces sba l shoe r mod a with afo and shoe heel guard shoe horn  Vision/Cognition: tba    Bed Mobility: mod I with rails from hospital bed.   Transfer: squat pivot CGA to min A   Gait: R afo // bars down and back x 3, progress up right posture. And less UE support.    Stairs: NT   Balance: CGA     MEDICATIONS    aspirin  81 mg Oral Daily     atorvastatin  80 mg Oral At Bedtime     lidocaine  1 patch Transdermal Q24h    And     lidocaine   Transdermal Q8H     melatonin  6 mg Oral At Bedtime     methocarbamol  500 mg Oral 4x Daily     metoprolol succinate ER  12.5 mg Oral At  "Bedtime     multivitamin w/minerals  1 tablet Oral Daily     polyethylene glycol  17 g Oral Daily     senna-docusate  2 tablet Oral BID     Urivarx Capsules (patient supplied supplement)   2 capsule Oral Daily        acetaminophen, bisacodyl, diazepam, diclofenac, morphine, naloxone **OR** naloxone **OR** naloxone **OR** naloxone, simethicone     PHYSICAL EXAM  /59 (BP Location: Left arm)   Pulse 81   Temp 97.4  F (36.3  C) (Oral)   Resp 18   Ht 1.854 m (6' 1\")   Wt 91.9 kg (202 lb 8 oz)   SpO2 97%   BMI 26.72 kg/m    Gen: awake alert  HEENT: NCAT, EOMI  CV: rrr  Pulm: non labored clear on room air  Abd: non distended non tender   Ext: warm dry without edema.   Neuro/MSK: alert, follows commands     LABS  CBC RESULTS:   Recent Labs   Lab Test 07/22/21  0551 07/15/21  0549 07/08/21  0654   WBC 5.5 7.6 7.4   RBC 3.46* 3.28* 3.47*   HGB 10.0* 9.8* 10.4*   HCT 32.8* 31.4* 33.7*   MCV 95 96 97   MCH 28.9 29.9 30.0   MCHC 30.5* 31.2* 30.9*   RDW 13.9 13.8 14.1    222 270     Last Basic Metabolic Panel:  Recent Labs   Lab Test 07/26/21  0558 07/22/21  0551 07/19/21  0756    138 138   POTASSIUM 3.9 3.9 3.9   CHLORIDE 106 105 103   CO2 32 32 34*   ANIONGAP <1* 1* 1*   GLC 96 97 102*   BUN 11 16 16   CR 0.53* 0.55* 0.57*   GFRESTIMATED >90 >90 >90   TRENTON 8.7 9.3 9.3         Rehabilitation - continue comprehensive acute inpatient rehabilitation program with multidisciplinary approach including therapies, rehab nursing, and physiatry following. See interval history for updates.      ASSESSMENT AND PLAN    Darleen Simmons is a 70 year old man with past medical history of  HTN, HLP, CAD, ischemic cardiomyopathy s/p ICD placement, PAD s/p left SFA POBA for latissimus dorsi flap to left calf, atrial fibrillation/flutter,  flat back syndrome now s/p T7-pelvis revision PSIF with T12-L1 3CO and L5 PSO on 6/21/21 with Jazmyne Akins/Herb.  Admitted to acute rehab 7/7/21    Flatback syndrome-   Chronic back " "pain  S/p T7-S1 posterior spinal fusion per Dr. 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 Afterwards he experienced quadriplegia but he slowly was able to regain his ability to ambulate. In 2018, he underwent posterior decompression thoracolumbar fusion at [facility]. At that time It was thought that he would be primarily a sitter and he was fused in a fairly straight posture.  -  Up with assist until independent. No excessive bending or twisting. No lifting >10 lbs x 6 weeks  -WBAT  -pain management as below  -continue PT/OT  - right AFO- consulted orthotics to open joint. Done.  -f/u Dr. Akins        CAD s/p MI CABG  Ischemic cardiomyopathy   s/p Pacemaker & ICD (medtronic)  -echo 5/4/21Mildly (EF 45-50%) reduced left ventricular function is present. There is a pattern of wall motion abnormalities consistent with a prior RCA and, possibly, LCx infarction.Stress test 5/5/21  -continue lipitor  -continue asa     A-fib/ Aflutter- not anticoagulated prior to admission per pcp recs  -continue metoprolol xl 12.5 mg     Acute blood loss Anemia- Estimated blood loss ~ 4320 ml with 1630 returned via cell saver.  Hgb 10.0 7/22. Stable.  -trend     Acute on Chronic back pain- on morphine 15-30 mg reportedly taking qid prior to admission. Ongoing complaint though improving activity tolerance, reports \"nerve pain\" offered gabapentin/lyrica which patient declined. Resistant in further changes to med regimen.   -continue lidocaine, and voltaren reports finds beneficial  -continue robaxin scheduled  -continue tylenol, valium 4-6 mg q6h prn, morphine 15-30 mg prn     Depression- monitor mood, reportedly PTSD as well, and resistance to psychiatry/ medications for mood.   -psychology consult for emotional support.      PAD-  S/p L SFA baloon angioplasty. 2/11/21 MARIAMA Right:  Moderate peripheral arterial disease starting at or proximal to the superficial " femoral level. Left:  Moderate peripheral arterial disease location not well determined.       Metabolic encephalopathy- prolonged surgery, pain meds (improved)   -reorient as needed  -monitor- hold meds for sedation     Urinary retention- acute retention post operatively, no noted issues during rehab stay.   -continue prior to admission supplement (urivax)     Pressure injury- wound care as ordered - apparently healing well.   Evaluated by WON on 7/6/21  -wound care as ordered.     1. Adjustment to disability:  Psychology for emotional support  2. FEN: reg  3. Bowel: constipated improved.   4. Bladder: chronic urgency- monitor  5. DVT Prophylaxis: mechanical  6. GI Prophylaxis: reg diet  7. Code: full   8. Disposition: goal for home   9. ELOS: 7/30/21  10. Follow up Appointments on Discharge: pcp, ortho spine    Franklyn Clarke MD

## 2021-07-27 NOTE — PLAN OF CARE
FOCUS/GOAL  Bowel management, Bladder management, Pain management, and Safety management    ASSESSMENT, INTERVENTIONS AND CONTINUING PLAN FOR GOAL:  Pt alert and oriented x4, in pain and requesting for pain medications every 3 hours which offered slight relief. On regular diet and thin liquids, ate 50% for supper. Continent of bowel and bladder, uses urinal. A1 pivot transfer between wheelchair and bed. Given PRN Morphine 30mg 2x this pm shift for pain of 9/10 and follow up pain assessment of 8/10 after an hour. Sleeping at end of shift.

## 2021-07-27 NOTE — PLAN OF CARE
Discharge Planner Post-Acute Rehab OT:      Discharge Plan: home with wife home ot     Precautions: spinal , Heel protectors on when in bed (beige open toe socks with gel paddings, due to bone spurs on heels)     Current Status:  Functional tsfers: sba w/ low pivot squat bed to w/c  cga to l with standing pivot transfer to l. Improved bed mobility from sit to supine w/mod I flat bed and mod I supine to eob to l and r bed flat and hob raised both options at home.   Toilet tsfer SBA w/ use of grab bar to and from w/c. Pt declined to allow this OT or nsg assist w/ cares. Pt reported he completed his own toilet hygiene. Pt was CGA sit to stand w/ grab bar and max A to manage clothing- pulling pants back up. Per nsg pt was SBA to manage clothing prior to toileting tasks.   ADLs:G/H:  sba to doff shoes, sba to don/doff knee brace and SBA seatedU/b dressing: pullover shirt SBA doff/don at EOB. Improved trunk control but variable.   L/B dressing:  supine sba underwear and Min A in supine.  Mod A when standing w/ FWW.   Feet: min-mod A to doff r knee brace and mod a to don b knee braces sba l shoe r mod a with afo and shoe heel guard shoe horn  Vision/Cognition: tba     Assessment: Pt progressing functional tsfers. Ease w/ tsfers noted but pt still needs reminders for safety cues w/ bringing down arms rests for low pivot side squat. Pt participates w/ LB ADLs w/ AE variably requiring additional time, moderate difficulty and effort.  Pt declines to complete transfers standing w/ FWW but rather prefers low pivot side squats. Pt is progressing strength and activity tolerance for discharge home on Friday. Continue to progress standing tolerance and tsfers as appropriate.         Other Barriers to Discharge (DME, Family Training, etc): dme needs  Pt owns queen hob and feet adjustable with rail bed, wheel chair,  Stair/lift chair for his split level home,walker, long shoe horn etc. Pt reports home is accessible by ramp and he had  wide door frames for w/c accessibility. Pt owns 2 reachers. Owns a commode and bed pan per wife. Family education completed on 7.24.21.

## 2021-07-27 NOTE — PLAN OF CARE
FOCUS/GOAL  Bowel management, Bladder management, Pain management, Mobility, Skin integrity, and Safety management    ASSESSMENT, INTERVENTIONS AND CONTINUING PLAN FOR GOAL:  A/O, VS stable. Regular/thin, pills whole. Ate most of meal. Continent of bowel and bladder. Pain in back and legs with medication helping to relieve. Assist of 1 stand pivot to transfer. No new skin concerns. Calls appropriately with light. Continue POC.

## 2021-07-28 ENCOUNTER — APPOINTMENT (OUTPATIENT)
Dept: PHYSICAL THERAPY | Facility: CLINIC | Age: 71
DRG: 559 | End: 2021-07-28
Attending: PHYSICAL MEDICINE & REHABILITATION
Payer: COMMERCIAL

## 2021-07-28 ENCOUNTER — APPOINTMENT (OUTPATIENT)
Dept: OCCUPATIONAL THERAPY | Facility: CLINIC | Age: 71
DRG: 559 | End: 2021-07-28
Attending: PHYSICAL MEDICINE & REHABILITATION
Payer: COMMERCIAL

## 2021-07-28 PROCEDURE — 128N000003 HC R&B REHAB

## 2021-07-28 PROCEDURE — 97116 GAIT TRAINING THERAPY: CPT | Mod: GP | Performed by: REHABILITATION PRACTITIONER

## 2021-07-28 PROCEDURE — 97535 SELF CARE MNGMENT TRAINING: CPT | Mod: GO

## 2021-07-28 PROCEDURE — 999N000125 HC STATISTIC PATIENT MED CONFERENCE < 30 MIN

## 2021-07-28 PROCEDURE — 97530 THERAPEUTIC ACTIVITIES: CPT | Mod: GO

## 2021-07-28 PROCEDURE — 250N000013 HC RX MED GY IP 250 OP 250 PS 637: Performed by: PHYSICAL MEDICINE & REHABILITATION

## 2021-07-28 PROCEDURE — 97530 THERAPEUTIC ACTIVITIES: CPT | Mod: GP | Performed by: REHABILITATION PRACTITIONER

## 2021-07-28 PROCEDURE — 250N000013 HC RX MED GY IP 250 OP 250 PS 637: Performed by: PHYSICIAN ASSISTANT

## 2021-07-28 PROCEDURE — 999N000150 HC STATISTIC PT MED CONFERENCE < 30 MIN

## 2021-07-28 PROCEDURE — 97110 THERAPEUTIC EXERCISES: CPT | Mod: GO

## 2021-07-28 PROCEDURE — 99233 SBSQ HOSP IP/OBS HIGH 50: CPT | Performed by: PHYSICAL MEDICINE & REHABILITATION

## 2021-07-28 RX ADMIN — MORPHINE SULFATE 30 MG: 15 TABLET ORAL at 07:45

## 2021-07-28 RX ADMIN — METHOCARBAMOL 500 MG: 500 TABLET, FILM COATED ORAL at 11:58

## 2021-07-28 RX ADMIN — SIMETHICONE 375 MG: 125 TABLET, CHEWABLE ORAL at 17:57

## 2021-07-28 RX ADMIN — ATORVASTATIN CALCIUM 80 MG: 40 TABLET, FILM COATED ORAL at 21:48

## 2021-07-28 RX ADMIN — METHOCARBAMOL 500 MG: 500 TABLET, FILM COATED ORAL at 07:44

## 2021-07-28 RX ADMIN — MELATONIN TAB 3 MG 6 MG: 3 TAB at 21:48

## 2021-07-28 RX ADMIN — METHOCARBAMOL 500 MG: 500 TABLET, FILM COATED ORAL at 21:48

## 2021-07-28 RX ADMIN — Medication 1 TABLET: at 07:44

## 2021-07-28 RX ADMIN — MORPHINE SULFATE 30 MG: 15 TABLET ORAL at 21:48

## 2021-07-28 RX ADMIN — MORPHINE SULFATE 30 MG: 15 TABLET ORAL at 17:51

## 2021-07-28 RX ADMIN — DICLOFENAC SODIUM 4 G: 10 GEL TOPICAL at 00:10

## 2021-07-28 RX ADMIN — MORPHINE SULFATE 30 MG: 15 TABLET ORAL at 03:59

## 2021-07-28 RX ADMIN — ASPIRIN 81 MG: 81 TABLET ORAL at 07:45

## 2021-07-28 RX ADMIN — LIDOCAINE 1 PATCH: 560 PATCH PERCUTANEOUS; TOPICAL; TRANSDERMAL at 21:54

## 2021-07-28 RX ADMIN — DIAZEPAM 6 MG: 2 TABLET ORAL at 00:26

## 2021-07-28 RX ADMIN — METHOCARBAMOL 500 MG: 500 TABLET, FILM COATED ORAL at 17:50

## 2021-07-28 RX ADMIN — MORPHINE SULFATE 30 MG: 15 TABLET ORAL at 15:19

## 2021-07-28 RX ADMIN — Medication 12.5 MG: at 21:48

## 2021-07-28 RX ADMIN — SENNOSIDES AND DOCUSATE SODIUM 2 TABLET: 8.6; 5 TABLET ORAL at 21:48

## 2021-07-28 RX ADMIN — SENNOSIDES AND DOCUSATE SODIUM 2 TABLET: 8.6; 5 TABLET ORAL at 07:44

## 2021-07-28 RX ADMIN — MORPHINE SULFATE 30 MG: 15 TABLET ORAL at 11:58

## 2021-07-28 RX ADMIN — DICLOFENAC SODIUM 4 G: 10 GEL TOPICAL at 21:54

## 2021-07-28 RX ADMIN — MORPHINE SULFATE 30 MG: 15 TABLET ORAL at 00:10

## 2021-07-28 NOTE — PROGRESS NOTES
[facility]   Acute Rehabilitation Unit  Daily progress note    INTERVAL HISTORY  Jorje was in his room, in bed. Reports he is doing well. TR held and reviewed. Reports he is on track for 7/30. Pleased he will get his preferred therapists at discharge.    He has some movement in right toes. He has been working on foot bike and feels this has helped his progress.  Denies sob, chest pain, headaches, dizziness, bladder without issues, bowel now regular with bowel regimen.  Ongoing acute on chronic pain though sleeping better now.    Patient has area on lateral portion of his heel that was a problem area. AFO was heated and pushed out in the area of concern. Patient did trial adjustments and was happy with the fit of the brace. No new concerns.    Current Status:    OT: sba w/ low pivot squat bed to w/c  cga to l with standing pivot transfer to l. Improved bed mobility from sit to supine w/mod I flat bed and mod I supine to eob to l and r bed flat and hob raised both options at home.   Toilet tsfer SBA w/ use of grab bar to and from w/c. Pt declined to allow this OT or nsg assist w/ cares. Pt reported he completed his own toilet hygiene. Pt was CGA sit to stand w/ grab bar and max A to manage clothing- pulling pants back up. Per nsg pt was SBA to manage clothing prior to toileting tasks.   ADLs:G/H:  sba to doff shoes, sba to don/doff knee brace and SBA seatedU/b dressing: pullover shirt SBA doff/don at EOB. Improved trunk control but variable.   L/B dressing:  supine sba underwear and Min A in supine.  Mod A when standing w/ FWW.   Feet: min-mod A to doff r knee brace and mod a to don b knee braces sba l shoe r mod a with afo and shoe heel guard shoe horn  Vision/Cognition: tba    PT:  Bed Mobility: mod I with rails from hospital bed.   Transfer: squat pivot CGA to min A   Gait: R afo // bars down and back x 3, progress up right posture. And less UE support.    Stairs: NT  "  Balance: CGA     MEDICATIONS    aspirin  81 mg Oral Daily     atorvastatin  80 mg Oral At Bedtime     lidocaine  1 patch Transdermal Q24h    And     lidocaine   Transdermal Q8H     melatonin  6 mg Oral At Bedtime     methocarbamol  500 mg Oral 4x Daily     metoprolol succinate ER  12.5 mg Oral At Bedtime     multivitamin w/minerals  1 tablet Oral Daily     polyethylene glycol  17 g Oral Daily     senna-docusate  2 tablet Oral BID     Urivarx Capsules (patient supplied supplement)   2 capsule Oral Daily        acetaminophen, bisacodyl, diazepam, diclofenac, morphine, naloxone **OR** naloxone **OR** naloxone **OR** naloxone, simethicone     PHYSICAL EXAM  /55 (BP Location: Right arm)   Pulse 80   Temp (!) 96.6  F (35.9  C) (Oral)   Resp 16   Ht 1.854 m (6' 1\")   Wt 91.9 kg (202 lb 8 oz)   SpO2 97%   BMI 26.72 kg/m    Gen: awake alert  HEENT: NCAT, EOMI  CV: rrr  Pulm: non labored clear on room air  Abd: non distended non tender   Ext: warm dry without edema.   Neuro/MSK: alert, follows commands. [place], has hearing aides.     LABS  CBC RESULTS:   Recent Labs   Lab Test 07/22/21  0551 07/15/21  0549 07/08/21  0654   WBC 5.5 7.6 7.4   RBC 3.46* 3.28* 3.47*   HGB 10.0* 9.8* 10.4*   HCT 32.8* 31.4* 33.7*   MCV 95 96 97   MCH 28.9 29.9 30.0   MCHC 30.5* 31.2* 30.9*   RDW 13.9 13.8 14.1    222 270     Last Basic Metabolic Panel:  Recent Labs   Lab Test 07/26/21  0558 07/22/21  0551 07/19/21  0756    138 138   POTASSIUM 3.9 3.9 3.9   CHLORIDE 106 105 103   CO2 32 32 34*   ANIONGAP <1* 1* 1*   GLC 96 97 102*   BUN 11 16 16   CR 0.53* 0.55* 0.57*   GFRESTIMATED >90 >90 >90   TRENTON 8.7 9.3 9.3         Rehabilitation - continue comprehensive acute inpatient rehabilitation program with multidisciplinary approach including therapies, rehab nursing, and physiatry following. See interval history for updates.      ASSESSMENT AND PLAN    Darleen Simmons is a 70 year old man with past medical history of  HTN, " "HLP, CAD, ischemic cardiomyopathy s/p ICD placement, PAD s/p left SFA POBA for latissimus dorsi flap to left calf, atrial fibrillation/flutter,  flat back syndrome now s/p T7-pelvis revision PSIF with T12-L1 3CO and L5 PSO on 6/21/21 with Jazmyne Akins/Herb.  Admitted to acute rehab 7/7/21    Flatback syndrome-   Chronic back pain  S/p T7-S1 posterior spinal fusion per Dr. 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 Afterwards he experienced quadriplegia but he slowly was able to regain his ability to ambulate. In 2018, he underwent posterior decompression thoracolumbar fusion at [facility] ER. At that time It was thought that he would be primarily a sitter and he was fused in a fairly straight posture.  -  Up with assist until independent. No excessive bending or twisting. No lifting >10 lbs x 6 weeks  -WBAT  -pain management as below  -continue PT/OT  - right AFO- consulted orthotics to open joint. Done.  -f/u Dr. Akins        CAD s/p MI CABG  Ischemic cardiomyopathy   s/p Pacemaker & ICD (medtronic)  -echo 5/4/21Mildly (EF 45-50%) reduced left ventricular function is present. There is a pattern of wall motion abnormalities consistent with a prior RCA and, possibly, LCx infarction.Stress test 5/5/21  -continue lipitor  -continue asa     A-fib/ Aflutter- not anticoagulated prior to admission per pcp recs  -continue metoprolol xl 12.5 mg     Acute blood loss Anemia- Estimated blood loss ~ 4320 ml with 1630 returned via cell saver.  Hgb 10.0 7/22. Stable.  -trend     Acute on Chronic back pain- on morphine 15-30 mg reportedly taking qid prior to admission. Ongoing complaint though improving activity tolerance, reports \"nerve pain\" offered gabapentin/lyrica which patient declined. Resistant in further changes to med regimen.   -continue lidocaine, and voltaren reports finds beneficial  -continue robaxin scheduled  -continue tylenol, valium 4-6 mg q6h " prn, morphine 15-30 mg prn     Depression- monitor mood, reportedly PTSD as well, and resistance to psychiatry/ medications for mood.   -psychology consult for emotional support.      PAD-  S/p L SFA baloon angioplasty. 2/11/21 MARIAMA Right:  Moderate peripheral arterial disease starting at or proximal to the superficial femoral level. Left:  Moderate peripheral arterial disease location not well determined.       Metabolic encephalopathy- prolonged surgery, pain meds (improved)   -reorient as needed  -monitor- hold meds for sedation     Urinary retention- acute retention post operatively, no noted issues during rehab stay.   -continue prior to admission supplement (urivax)     Pressure injury- wound care as ordered - apparently healing well.   Evaluated by WON on 7/6/21  -wound care as ordered.     1. Adjustment to disability:  Psychology for emotional support  2. FEN: reg  3. Bowel: constipated improved.   4. Bladder: chronic urgency- monitor  5. DVT Prophylaxis: mechanical  6. GI Prophylaxis: reg diet  7. Code: full   8. Disposition: goal for home   9. ELOS: 7/30/21  10. Follow up Appointments on Discharge: pcp, ortho spine    Continue plans and cares as outlined.    Franklyn Clarke MD

## 2021-07-28 NOTE — PLAN OF CARE
Discharge Planner Post-Acute Rehab PT:     Discharge Plan:  home w/ his wife, she is older and limited w/ ability to provide physical assist     Precautions: falls, spine; [place] , PTSD    Current Status:  Bed Mobility: mod I with bed rail - min A for sit to supine for Errol LE with shoes on.   Transfer: mod I with scoot pivot to and from W/C, CGA with stand pivot with WW. Pt cont be limited by STS with fatigue and stiffness.   Gait: 18' x 1 today with WW and close W/C follow.   Stairs: NT   Balance: SBA sitting.     Assessment:  pt cont to progress functional scoot and stand pivot transfers with WW. Pt is limited in stand pivot by fatigue and stiffness for STS. Wife unable to assist but pt stating has close friends that can assist at anytime if needed.     Other Barriers to Discharge (DME, Family Training, etc):   None identified at this time.  Discussed w/c needs for home.  He insists that he has a chair at home that will work for him and that nothing more needs to be done. Would like  Prescription for hosp bed but wants to wait for delivery until after he tries the bed at home. Vendor in [place] is [employer]. Chart note completed and signed by MD 7/22.

## 2021-07-28 NOTE — PLAN OF CARE
Alert and oriented x4. Continent of bowel and bladder; LBM 7/27. Reports significant pain in back, neck, and leg. Morphine given x2 with some relief. Assist of 1 stand/pivot wheelchair based. Regular thin diet.   Bed alarm on for safety, call light within reach, Ok to continue with care plan.

## 2021-07-28 NOTE — CARE CONFERENCE
Acute Rehab Care Conference/Team Rounds      Type: Team Rounds    Present: Dr Franklyn Calrke, Ca Hernandez PA, Lidya Brown RN, Earl Herron PT, Heather Brewster OT, Nuvia Kim LICSW, Dianelys Graves Mgr, Ingris Fagan Dietician, Patient Jorje Simmons      Discharge Barriers/Treatment/Education    Rehab Diagnosis:  Orthopaedic Disorders 08.9 Other Orthopaedic, T7-pelvis spinal fusion revision    Active Medical Co-morbidities/Prognosis:   Patient Active Problem List   Diagnosis     Flatback syndrome     H/O spinal fusion     H/O spinal cord injury     Ischemic cardiomyopathy     S/P spinal fusion        Safety: Pt is alert and oriented. Uses the call light appropriately. Bed alarm on, call light in reach.     Pain: Has constant pain on the lower back that radiates to the the right leg. Managed with PRN morphine every 3 hours and Valium which patient said only relieves pain from a 9/10 to a 5-6/10 pain scale. Has occasional right shoulder pain and Voltaren gel is applied with some relief.     Medications, Skin, Tubes/Lines: Takes medications whole with water or soda. Back incision has cdi dressing that is changed everyday or as needed. Errol thigh incision sites are healing well, approximated and pink in color. No lines or tubes.     Swallowing/Nutrition: No concerns    Bowel/Bladder: Continent of bowel and bladder. LBM 7/27. Independent with using the urinal and staff to empty.     Psychosocial: Lives in a split-level house with spouse and 2 cats in [place], [place]. 9 steps to enter and a flight to the upper level. Ramp to enter home and stair lift. Pt reports he was independent with all mobility with use of FWW prior to surgery, states he can navigate stairs but often uses the lift. Hearing aides and glasses. Manage own meds and finances. Wife A with household chores. Hx PTSD, Health Psych following. Wife is primary support. 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 BCBS plan, Rebecca Rosado  Joey Ph: [phone].    ADLs/IADLs:Functional tsfers: sba w/ low pivot squat bed to w/c  cga to l with standing pivot transfer to l. Improved bed mobility from sit to supine w/mod I flat bed and mod I supine to eob to l and r bed flat and hob raised both options at home.   Toilet tsfer SBA w/ use of grab bar to and from w/c. Pt able to perform tricia cares.. Pt was CGA sit to stand w/ grab bar and max A to manage clothing- pulling pants back up SBA to manage clothing sitting on toilet leaning l and r prior to toileting tasks.   U/b dressing: pullover shirt mod I doff/don at EOB. Improved trunk control but variable.   L/B dressing:  supine sba underwear and Min A in supine.  Mod A when sitting and max a to adjust over hips standing standing w/ FWW.   Feet: min-mod A to doff r knee brace and mod a to don b knee braces sba l shoe r mod a with afo and shoe heel guard shoe horn    Mobility: Pt is preparing discharge this week. Transfers progressing to CGA squat/pivot. Slow gait progression, ambulating 20' with FWW and R AFO, still requiring close w/c follow. Pt has been working on gait progression and standing frame to improve upright tolerance. Given spouse's inability to perform physical assist, likely more w/c based at discharge with HH PT progression. Pt has a w/c ready for home.    Cognition/Language: No concerns    Community Re-Entry:    Transportation: Pt will need CGA to min-A from family, family training prior to discharge.    Decision maker: self    Plan of Care and goals reviewed and updated.    Discharge Plan/Recommendations    Fall Precautions: continue    Patient/Family input to goals: Yes    Anticipated rehab needs following discharge: Home with home care    Anticipated care giver support after discharge: Family    Estimated length of stay: 7/30/21    Overall plan for the patient: Continue IP Rehabilitation      Utilization Review and Continued Stay Justification    Medical Necessity Criteria:    For any  criteria that is not met, please document reason and plan for discharge, transfer, or modification of plan of care to address.    Requires intensive rehabilitation program to treat functional deficits?: Yes    Requires 3x per week or greater involvement of rehabilitation physician to oversee rehabilitation program?: Yes    Requires rehabilitation nursing interventions?: Yes    Patient is making functional progress?: Yes    There is a potential for additional functional progress? Yes    Patient is participating in therapy 3 hours per day a minimum of 5 days per week or 15 hours per week in 7 day period?:Yes    Has discharge needs that require coordinated discharge planning approach?:Yes          Final Physician Sign off    Statement of Approval: I approve the plan of care    Patient Goals  Social Work Goals:Confirm discharge recommendations with therapy, coordinate safe discharge plan and remain available to support and assist as needed.       OT Frequency: daily 90 min for 21 days  OT goal: hygiene/grooming: modified independent  OT goal: upper body dressing: Modified independent  OT goal: lower body dressing: Modified independent  OT goal: upper body bathing: Modified independent  OT goal: lower body bathing: Modified independent  OT goal: bed mobility: Modified independent  OT Goal: transfer: Modified independent  OT goal: toilet transfer/toileting: Modified independent  OT goal: meal preparation: Supervision/stand-by assist  OT goal: home management: Supervision/stand-by assist           OT goal 1: pt will be sba shower/tub transfer with dme  OT goal 2: pt will be mod I b u/e hep within spinal precautions                PT Frequency: daily x 90 minutes  PT goal: bed mobility:  (met)  PT goal: transfers: Modified independent  PT goal: gait: Modified independent (TBD, may be mixed mobility for home)  PT goal: stairs:  (has stair glide and ramp, pt hopes to walk stairs)  PT goal: perform aerobic activity with stable  cardiovascular response:  (met)     PT goal 1: car transfer w/ min assist                                                                      Patient Goal:  Pain Management: Pt will utilize pain medications and non-parmacological methods of pain management to participate in all therapies.                             Goal: Skin Integrity: Pt will reposition as necessary to help prevent pressure ulcers while on ARU.                    Goal: Safety Management: Pt will use call light to make needs known while on ARU.

## 2021-07-28 NOTE — DISCHARGE INSTRUCTIONS
Follow up Appointments    - Primary Care  You are scheduled to see Dr. Dunphy on Tuesday, August 3rd at 10:00 AM.    Address  [facility]                          [address] [address]  Phone   [phone]  Fax                  [phone]    - Orthopedics  You are scheduled to see Dr. Akhil Akins on Thursday, August 5th at 12:30 PM.    Address [facility]                          [facility]                          4th Floor    [address] [place] [address]  Phone   [phone]

## 2021-07-28 NOTE — PLAN OF CARE
FOCUS/GOAL  Pain management and Medical management    ASSESSMENT, INTERVENTIONS AND CONTINUING PLAN FOR GOAL:    Pt is alert and oriented. In a better mood tonight. Very appreciative to staff. Complained of right shoulder, back and right leg pain. Given PRN morphine x2, Voltaren gel and Valium this shift. Continent of bowel and bladder. Uses the urinal independently and staff to empty. Sleeping during rounds. Will continue POC.

## 2021-07-28 NOTE — PLAN OF CARE
FOCUS/GOAL  Bowel management, Bladder management, Pain management, and Mobility    ASSESSMENT, INTERVENTIONS AND CONTINUING PLAN FOR GOAL:    Pt alert and oriented x 4, pleasant and cooperative, complaining of pain on his back radiating to his R leg and foot, requested for Morphine every 3 hours which were effective and letting him rest in between doses. Continent of bladder, uses urinal and felt he was constipated, dulcolax suppository given per request with result - medium, formed bm. On regular, thin diet but preferred not to eat and had some of his drinks/supply. Assist of 1 with wheelchair for transfers.

## 2021-07-28 NOTE — PROGRESS NOTES
Team rounds this morning. On track for discharge Fri 07/30. Pt verbally consented to IMM and aware that HC all set up. Pt denied any questions or concerns for SW. Per OT, pt will plans to arrive early on Friday morning and hoping for a early discharge to beat traffic going [place]. MD notified of this in team huddle today.     Elizabeth spoke with Heather Jones-[facility] Care Coordinator (Office: [phone], Direct: [phone], Fax: [phone], [email or web address]). ROSE provided update RE: pt progress and needs at home. Barb plans to f/u with pt and pt wife for a re-assessment and to request additional PCA services in the home. Per request, ROSE plans to fax discharge summary and ppwk prior to discharge.     Elizabeth called and left vm with updates to [employer] insurance Rebecca DAILEY PH: [phone]. ROSE notified Rebecca of the possibility that pt will request/want a hospital bed for home.     ROSE plans to send discharge ppwk and updates to [facility] PH: [phone], F: [phone] prior to discharge.     No other SW needs identified at this time. SW will remain available if needs arise.     Nuvia Kim [facility]   [facility]   Direct Phone: [phone]  I   Pager: [phone]  I  Fax: [phone]

## 2021-07-29 ENCOUNTER — APPOINTMENT (OUTPATIENT)
Dept: OCCUPATIONAL THERAPY | Facility: CLINIC | Age: 71
DRG: 559 | End: 2021-07-29
Attending: PHYSICAL MEDICINE & REHABILITATION
Payer: COMMERCIAL

## 2021-07-29 ENCOUNTER — APPOINTMENT (OUTPATIENT)
Dept: PHYSICAL THERAPY | Facility: CLINIC | Age: 71
DRG: 559 | End: 2021-07-29
Attending: PHYSICAL MEDICINE & REHABILITATION
Payer: COMMERCIAL

## 2021-07-29 LAB — SARS-COV-2 RNA RESP QL NAA+PROBE: NEGATIVE

## 2021-07-29 PROCEDURE — 250N000013 HC RX MED GY IP 250 OP 250 PS 637: Performed by: PHYSICIAN ASSISTANT

## 2021-07-29 PROCEDURE — 99233 SBSQ HOSP IP/OBS HIGH 50: CPT | Performed by: PHYSICAL MEDICINE & REHABILITATION

## 2021-07-29 PROCEDURE — 97530 THERAPEUTIC ACTIVITIES: CPT | Mod: GP | Performed by: PHYSICAL THERAPIST

## 2021-07-29 PROCEDURE — U0005 INFEC AGEN DETEC AMPLI PROBE: HCPCS | Performed by: PHYSICIAN ASSISTANT

## 2021-07-29 PROCEDURE — 250N000013 HC RX MED GY IP 250 OP 250 PS 637: Performed by: PHYSICAL MEDICINE & REHABILITATION

## 2021-07-29 PROCEDURE — 97535 SELF CARE MNGMENT TRAINING: CPT | Mod: GO

## 2021-07-29 PROCEDURE — 97110 THERAPEUTIC EXERCISES: CPT | Mod: GP | Performed by: PHYSICAL THERAPIST

## 2021-07-29 PROCEDURE — 128N000003 HC R&B REHAB

## 2021-07-29 RX ORDER — LIDOCAINE 4 G/G
1 PATCH TOPICAL EVERY 24 HOURS
Qty: 30 PATCH | Refills: 0 | Status: SHIPPED | OUTPATIENT
Start: 2021-07-29

## 2021-07-29 RX ORDER — METHOCARBAMOL 500 MG/1
500 TABLET, FILM COATED ORAL 4 TIMES DAILY
Qty: 120 TABLET | Refills: 0 | Status: SHIPPED | OUTPATIENT
Start: 2021-07-29 | End: 2021-07-29

## 2021-07-29 RX ORDER — DIAZEPAM 2 MG
4-6 TABLET ORAL EVERY 8 HOURS PRN
COMMUNITY
Start: 2021-07-29

## 2021-07-29 RX ORDER — MORPHINE SULFATE 15 MG/1
15-30 TABLET ORAL EVERY 4 HOURS PRN
Qty: 40 TABLET | Refills: 0 | COMMUNITY
Start: 2021-07-29

## 2021-07-29 RX ORDER — METHOCARBAMOL 500 MG/1
500 TABLET, FILM COATED ORAL 4 TIMES DAILY PRN
Qty: 120 TABLET | Refills: 0 | Status: SHIPPED | OUTPATIENT
Start: 2021-07-29

## 2021-07-29 RX ADMIN — METHOCARBAMOL 500 MG: 500 TABLET, FILM COATED ORAL at 16:39

## 2021-07-29 RX ADMIN — BISACODYL 10 MG: 10 SUPPOSITORY RECTAL at 18:13

## 2021-07-29 RX ADMIN — DIAZEPAM 6 MG: 2 TABLET ORAL at 01:54

## 2021-07-29 RX ADMIN — POLYETHYLENE GLYCOL 3350 17 G: 17 POWDER, FOR SOLUTION ORAL at 07:54

## 2021-07-29 RX ADMIN — MORPHINE SULFATE 30 MG: 15 TABLET ORAL at 13:21

## 2021-07-29 RX ADMIN — ATORVASTATIN CALCIUM 80 MG: 40 TABLET, FILM COATED ORAL at 21:02

## 2021-07-29 RX ADMIN — Medication 1 TABLET: at 07:46

## 2021-07-29 RX ADMIN — MELATONIN TAB 3 MG 6 MG: 3 TAB at 21:01

## 2021-07-29 RX ADMIN — DICLOFENAC SODIUM 4 G: 10 GEL TOPICAL at 02:12

## 2021-07-29 RX ADMIN — MORPHINE SULFATE 30 MG: 15 TABLET ORAL at 16:40

## 2021-07-29 RX ADMIN — MORPHINE SULFATE 30 MG: 15 TABLET ORAL at 06:59

## 2021-07-29 RX ADMIN — METHOCARBAMOL 500 MG: 500 TABLET, FILM COATED ORAL at 11:50

## 2021-07-29 RX ADMIN — Medication 12.5 MG: at 21:01

## 2021-07-29 RX ADMIN — SENNOSIDES AND DOCUSATE SODIUM 2 TABLET: 8.6; 5 TABLET ORAL at 07:46

## 2021-07-29 RX ADMIN — SIMETHICONE 375 MG: 125 TABLET, CHEWABLE ORAL at 21:02

## 2021-07-29 RX ADMIN — MORPHINE SULFATE 30 MG: 15 TABLET ORAL at 21:02

## 2021-07-29 RX ADMIN — SENNOSIDES AND DOCUSATE SODIUM 2 TABLET: 8.6; 5 TABLET ORAL at 21:01

## 2021-07-29 RX ADMIN — METHOCARBAMOL 500 MG: 500 TABLET, FILM COATED ORAL at 21:02

## 2021-07-29 RX ADMIN — LIDOCAINE 1 PATCH: 560 PATCH PERCUTANEOUS; TOPICAL; TRANSDERMAL at 21:00

## 2021-07-29 RX ADMIN — MORPHINE SULFATE 30 MG: 15 TABLET ORAL at 10:06

## 2021-07-29 RX ADMIN — MORPHINE SULFATE 30 MG: 15 TABLET ORAL at 01:53

## 2021-07-29 RX ADMIN — ASPIRIN 81 MG: 81 TABLET ORAL at 07:46

## 2021-07-29 RX ADMIN — DIAZEPAM 6 MG: 2 TABLET ORAL at 07:46

## 2021-07-29 RX ADMIN — METHOCARBAMOL 500 MG: 500 TABLET, FILM COATED ORAL at 07:46

## 2021-07-29 RX ADMIN — SIMETHICONE 375 MG: 125 TABLET, CHEWABLE ORAL at 16:49

## 2021-07-29 NOTE — PLAN OF CARE
"FOCUS/GOAL  Medical management    ASSESSMENT, INTERVENTIONS AND CONTINUING PLAN FOR GOAL:  Patient slept between cares, somnolent at times. He called for assistance and communicates his needs. He used a urinal independently once, and toilet once. He transferred with assistance of one to the w/c. C/O pain left foot, right leg from hip to knee and low back. Gave him Oxycodone, valium and applied Voltaren gel to right leg once, he slept soon after. At 0650, he called, asked for morphine, he was somnolent, offered him only 15 mg of Morphine, he became angry. \" I need 30 mg, you are not a doctor\" He was given 30 mg, he was fully awake at this time.   Continue to assess his level of alertness before giving him Morphine and Diazepam.     "

## 2021-07-29 NOTE — PROGRESS NOTES
"  [facility]   Acute Rehabilitation Unit  Daily progress note    INTERVAL HISTORY  Jorje was seen sitting up in bed reports doing well offers no new complaints states he is ready to go home and that follow up and home care is set, we reviewed medications for discharge.  Patient asked appropriate questions, familiar with medications.     MEDICATIONS    aspirin  81 mg Oral Daily     atorvastatin  80 mg Oral At Bedtime     lidocaine  1 patch Transdermal Q24h    And     lidocaine   Transdermal Q8H     melatonin  6 mg Oral At Bedtime     methocarbamol  500 mg Oral 4x Daily     metoprolol succinate ER  12.5 mg Oral At Bedtime     multivitamin w/minerals  1 tablet Oral Daily     polyethylene glycol  17 g Oral Daily     senna-docusate  2 tablet Oral BID     Urivarx Capsules (patient supplied supplement)   2 capsule Oral Daily        acetaminophen, bisacodyl, diazepam, diclofenac, morphine, naloxone **OR** naloxone **OR** naloxone **OR** naloxone, simethicone     PHYSICAL EXAM  /56 (BP Location: Left arm)   Pulse 81   Temp 98.2  F (36.8  C)   Resp 20   Ht 1.854 m (6' 1\")   Wt 91.9 kg (202 lb 8 oz)   SpO2 93%   BMI 26.72 kg/m    Gen: awake alert  HEENT: NCAT, EOMI  Pulm: non labored on room air  Abd: non distended non tender   Ext: dry without edema.   Neuro/MSK: alert, follows commands.  hearing aides in place.     LABS  CBC RESULTS:   Recent Labs   Lab Test 07/22/21  0551 07/15/21  0549 07/08/21  0654   WBC 5.5 7.6 7.4   RBC 3.46* 3.28* 3.47*   HGB 10.0* 9.8* 10.4*   HCT 32.8* 31.4* 33.7*   MCV 95 96 97   MCH 28.9 29.9 30.0   MCHC 30.5* 31.2* 30.9*   RDW 13.9 13.8 14.1    222 270     Last Basic Metabolic Panel:  Recent Labs   Lab Test 07/26/21  0558 07/22/21  0551 07/19/21  0756    138 138   POTASSIUM 3.9 3.9 3.9   CHLORIDE 106 105 103   CO2 32 32 34*   ANIONGAP <1* 1* 1*   GLC 96 97 102*   BUN 11 16 16   CR 0.53* 0.55* 0.57*   GFRESTIMATED >90 >90 >90   TRENTON 8.7 " 9.3 9.3       Rehabilitation - continue comprehensive acute inpatient rehabilitation program with multidisciplinary approach including therapies, rehab nursing, and physiatry following. See interval history for updates.      ASSESSMENT AND PLAN    Darleen Simmons is a 70 year old man with past medical history of  HTN, HLP, CAD, ischemic cardiomyopathy s/p ICD placement, PAD s/p left SFA POBA for latissimus dorsi flap to left calf, atrial fibrillation/flutter,  flat back syndrome now s/p T7-pelvis revision PSIF with T12-L1 3CO and L5 PSO on 6/21/21 with Jazmyne Akins/Herb.  Admitted to acute rehab 7/7/21    Flatback syndrome-   Chronic back pain  S/p T7-S1 posterior spinal fusion per Dr. 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 Afterwards he experienced quadriplegia but he slowly was able to regain his ability to ambulate. In 2018, he underwent posterior decompression thoracolumbar fusion at [facility]. At that time It was thought that he would be primarily a sitter and he was fused in a fairly straight posture.  -  Up with assist until independent. No excessive bending or twisting. No lifting >10 lbs x 6 weeks  -WBAT  -pain management as below  -continue PT/OT  - right AFO- consulted orthotics to open joint. Done.  -f/u Dr. Akins as scheduled.         CAD s/p MI CABG  Ischemic cardiomyopathy   s/p Pacemaker & ICD (medtronic)  -echo 5/4/21Mildly (EF 45-50%) reduced left ventricular function is present. There is a pattern of wall motion abnormalities consistent with a prior RCA and, possibly, LCx infarction.Stress test 5/5/21  -continue lipitor  -continue asa     A-fib/ Aflutter- not anticoagulated prior to admission per pcp recs  -continue metoprolol xl 12.5 mg     Acute blood loss Anemia- Estimated blood loss ~ 4320 ml with 1630 returned via cell saver.  Hgb 10.0 7/22. Stable.  -trend     Acute on Chronic back pain- on morphine 15-30 mg  "reportedly taking qid prior to admission. Ongoing complaint though improving activity tolerance, reports \"nerve pain\" offered gabapentin/lyrica which patient declined. Resistant in further changes to med regimen.   -continue lidocaine, and voltaren reports finds beneficial  -continue robaxin,  tylenol, valium 4-6 mg q6h prn, morphine 15-30 mg prn     Depression- monitor mood, reportedly PTSD as well, and resistance to psychiatry/ medications for mood.      PAD-  S/p L SFA baloon angioplasty. 2/11/21 MARIAMA Right:  Moderate peripheral arterial disease starting at or proximal to the superficial femoral level. Left:  Moderate peripheral arterial disease location not well determined.       Metabolic encephalopathy- prolonged surgery, pain meds (improved)   -reorient as needed  -monitor- hold meds for sedation     Urinary retention- acute retention post operatively, no noted issues during rehab stay.   -continue prior to admission supplement (urivax)     Pressure injury- wound care as ordered - apparently healing well.   Evaluated by WON on 7/6/21  -wound care as ordered.     1. Adjustment to disability:  Psychology for emotional support  2. FEN: reg  3. Bowel: constipated improved.   4. Bladder: chronic urgency- monitor  5. DVT Prophylaxis: mechanical  6. GI Prophylaxis: reg diet  7. Code: full   8. Disposition: goal for home   9. ELOS: 7/30/21  10. Follow up Appointments on Discharge: pcp, ortho spine      Ca Hernandez PA-C  PM&R         "

## 2021-07-29 NOTE — PLAN OF CARE
Occupational Therapy Discharge Summary    Reason for therapy discharge:    Discharged to home with home therapy.    Progress towards therapy goal(s). See goals on Care Plan in [facility] electronic health record for goal details.  Goals partially met.  Barriers to achieving goals:   discharge from facility.    Therapy recommendation(s):    Continued therapy is recommended.  Rationale/Recommendations:  home ot then op to increase pt to highest functional level.

## 2021-07-29 NOTE — PLAN OF CARE
Patient is A&Ox4, makes needs known. A1 pivot transfers. PRN morphine given this shift to provide relief for back pain. Valium and robaxin also given. Dressing changed to back incision. Incision looks good. LBM yesterday. Voids in urinal. Continue with POC

## 2021-07-29 NOTE — PLAN OF CARE
Patient is A&Ox4, makes needs known. A1 pivot transfers. PRN morphine given x3 this shift to provide relief for back pain. PRN simethicone given for gas discomfort. LBM yesterday. Voids in urinal. Continue with POC.

## 2021-07-29 NOTE — PLAN OF CARE
Physical Therapy Discharge Summary    Reason for therapy discharge:    Discharged to home with home therapy.    Progress towards therapy goal(s). See goals on Care Plan in [facility] electronic health record for goal details.  Goals partially met.  Barriers to achieving goals:   Initially pain was a barrier. Currently acute on chronic LE weakness, poor back and trunk ROM from spinal fusion.    Therapy recommendation(s):    Continued therapy is recommended.  Rationale/Recommendations:  Has met goals to allow for discharge home w/ assist from his family and a PCA.  Could do transfers ind but he prefers SBA at this time. Recommend ongoing PT services initially in home and progressing to OP at [employer] where he as worked in the past.

## 2021-07-30 ENCOUNTER — APPOINTMENT (OUTPATIENT)
Dept: PHYSICAL THERAPY | Facility: CLINIC | Age: 71
DRG: 559 | End: 2021-07-30
Attending: PHYSICAL MEDICINE & REHABILITATION
Payer: COMMERCIAL

## 2021-07-30 VITALS
RESPIRATION RATE: 16 BRPM | DIASTOLIC BLOOD PRESSURE: 51 MMHG | TEMPERATURE: 96.8 F | SYSTOLIC BLOOD PRESSURE: 134 MMHG | WEIGHT: 202.5 LBS | BODY MASS INDEX: 26.84 KG/M2 | HEART RATE: 84 BPM | HEIGHT: 73 IN | OXYGEN SATURATION: 98 %

## 2021-07-30 PROCEDURE — 99239 HOSP IP/OBS DSCHRG MGMT >30: CPT | Performed by: PHYSICAL MEDICINE & REHABILITATION

## 2021-07-30 PROCEDURE — 250N000013 HC RX MED GY IP 250 OP 250 PS 637: Performed by: PHYSICIAN ASSISTANT

## 2021-07-30 PROCEDURE — 97530 THERAPEUTIC ACTIVITIES: CPT | Mod: GP | Performed by: PHYSICAL THERAPIST

## 2021-07-30 PROCEDURE — 250N000013 HC RX MED GY IP 250 OP 250 PS 637: Performed by: PHYSICAL MEDICINE & REHABILITATION

## 2021-07-30 RX ADMIN — METHOCARBAMOL 500 MG: 500 TABLET, FILM COATED ORAL at 09:42

## 2021-07-30 RX ADMIN — METHOCARBAMOL 500 MG: 500 TABLET, FILM COATED ORAL at 12:37

## 2021-07-30 RX ADMIN — MORPHINE SULFATE 30 MG: 15 TABLET ORAL at 00:04

## 2021-07-30 RX ADMIN — ASPIRIN 81 MG: 81 TABLET ORAL at 09:42

## 2021-07-30 RX ADMIN — Medication 1 TABLET: at 09:42

## 2021-07-30 RX ADMIN — DICLOFENAC SODIUM 4 G: 10 GEL TOPICAL at 11:02

## 2021-07-30 RX ADMIN — MORPHINE SULFATE 30 MG: 15 TABLET ORAL at 04:57

## 2021-07-30 RX ADMIN — MORPHINE SULFATE 30 MG: 15 TABLET ORAL at 09:39

## 2021-07-30 RX ADMIN — DIAZEPAM 6 MG: 2 TABLET ORAL at 00:04

## 2021-07-30 RX ADMIN — Medication 1000 MG: at 11:02

## 2021-07-30 RX ADMIN — SENNOSIDES AND DOCUSATE SODIUM 2 TABLET: 8.6; 5 TABLET ORAL at 09:42

## 2021-07-30 RX ADMIN — MORPHINE SULFATE 30 MG: 15 TABLET ORAL at 12:37

## 2021-07-30 RX ADMIN — DIAZEPAM 6 MG: 2 TABLET ORAL at 06:17

## 2021-07-30 NOTE — PROGRESS NOTES
Discharge home today. Wife transport. IMM completed. HC set up. SWer faxed updated clinicals and MD orders to:     --Heather Jones-[facility] Care Coordinator   Office: [phone], Direct: [phone], Fax: [phone], [email or web address]  &  --[facility] PH: [phone], F: [phone]     [facility]   [facility]   Direct Phone: [phone]  I   Pager: [phone]  I  Fax: [phone]

## 2021-07-30 NOTE — PLAN OF CARE
FOCUS/GOAL  Bowel management, Bladder management, Pain management, and Mobility    ASSESSMENT, INTERVENTIONS AND CONTINUING PLAN FOR GOAL:  Ao1 for pivot transfers, w/c based for mobility. Pt able to appropriately direct his cares. Continent of both bladder and small BM (not visualized by writer, per pt's report) today, using toilet in bathroom. Tylenol and voltaren gel given per PRN orders x1 this afternoon for c/o pain, with minimal effect reported. Orders received for discharge to home today; discharge instructions and home medications reviewed with pt and his wife. Questions encouraged, and both verbalized understanding. Pt did express concerns about his discharge paperwork instructing to not drive, and what the time frame would be until he was able to drive again; writer reinforced that his ability to drive would be further evaluated by the team that would be taking over his care after discharge. Pt requested to receive PRN MSIR, as he was concerned about pain management on the long trip to his home. Writer notified Dr Clarke regarding timing of MSIR, and if it was ok to give MSIR prior to discharge; Dr Clarke said was ok to give MSIR 30mg at time of discharge, given as instructed. Writer also performed dressing change to incision prior to discharge, pt and wife verbalize understanding of how to care for incision. Pt had all personal belongings at time of discharge, escorted to wife's car by ARU staff via wheelchair. Ao2 needed for pivot transfer to car.

## 2021-07-30 NOTE — PLAN OF CARE
FOCUS/GOAL  Medical management    ASSESSMENT, INTERVENTIONS AND CONTINUING PLAN FOR GOAL:  Patient slept well, he requested Morphine each time he woke up for  Right leg pain. He was somnolent at times, difficult to arouse him. He took Valium and Morphine at 0004, Morphine at 0457 and Diazepam at 0610, constant right leg pain. He used urinal independently. Plan to discharge to home today, staff to send dressing change supplies.

## 2021-07-30 NOTE — DISCHARGE SUMMARY
[facility]   Acute Rehabilitation Unit  Discharge summary     Date of Admission: 7/7/2021  Date of Discharge: 7/30/21  Disposition: home  Primary Care Physician: Dunphy, Tyler J  Attending physician: Dr. Clarke    DISCHARGE DIAGNOSIS  Acute on chronic back pain   S/p T7-S1 posterior spinal fusion  Cad  A-fib/ flutter      BRIEF SUMMARY  Darleen Simmons is a 70 year old man with past medical history of  HTN, HLP, CAD, ischemic cardiomyopathy s/p ICD placement, PAD s/p left SFA POBA for latissimus dorsi flap to left calf, atrial fibrillation/flutter,  flat back syndrome now s/p T7-pelvis revision PSIF with T12-L1 3CO and L5 PSO on 6/21/21 with Jazmyne Akins/Herb.  Admitted to acute rehab 7/7/21      REHABILITATION COURSE  PT: Continued therapy is recommended.  Rationale/Recommendations:  Has met goals to allow for discharge home w/ assist from his family and a PCA.  Could do transfers ind but he prefers SBA at this time. Recommend ongoing PT services initially in home and progressing to OP at [employer] where he as worked in the past.    OT: Continued therapy is recommended.  Rationale/Recommendations:  home ot then op to increase pt to highest functional level.    MEDICAL COURSE    Flatback syndrome-   S/p T7-S1 posterior spinal fusion per Dr. 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 Afterwards he experienced quadriplegia but he slowly was able to regain his ability to ambulate. In 2018, he underwent posterior decompression thoracolumbar fusion at [facility]. At that time It was thought that he would be primarily a sitter and he was fused in a fairly straight posture.  -  Up with assist until independent. No excessive bending or twisting. No lifting >10 lbs x 6 weeks  -WBAT  -pain management as below  -continue PT/OT home care upon discharge.   - right AFO-  -f/u Dr. Akins as scheduled.  "     CAD s/p MI CABG  Ischemic cardiomyopathy   s/p Pacemaker & ICD (medtronic)  -echo 5/4/21Mildly (EF 45-50%) reduced left ventricular function is present. There is a pattern of wall motion abnormalities consistent with a prior RCA and, possibly, LCx infarction.Stress test 5/5/21  -continue lipitor  -continue asa     A-fib/ Aflutter- not anticoagulated prior to admission per pcp recs  -continue metoprolol xl 12.5 mg     Acute blood loss Anemia- Estimated blood loss ~ 4320 ml with 1630 returned via cell saver.  Hgb 10.0 7/22. Stable.     Acute on Chronic back pain- on morphine 15-30 mg reportedly taking qid prior to admission. Ongoing complaint though improving activity tolerance, reports \"nerve pain\" offered gabapentin/lyrica which patient declined. Resistant in further changes to med regimen.   -continue lidocaine, and voltaren reports finds beneficial  -continue robaxin,  tylenol, valium 4-6 mg q6h prn, morphine 15-30 mg prn       PAD-  S/p L SFA baloon angioplasty. 2/11/21 MARIAMA Right:  Moderate peripheral arterial disease starting at or proximal to the superficial femoral level. Left:  Moderate peripheral arterial disease location not well determined.       Urinary retention- acute retention post operatively, no noted issues during rehab stay.   -continue prior to admission supplement (urivax)    DISCHARGE MEDICATIONS  Discharge Medication List as of 7/30/2021  1:17 PM      START taking these medications    Details   Lidocaine (LIDOCARE) 4 % Patch Place 1 patch onto the skin every 24 hours To prevent lidocaine toxicity, patient should be patch free for 12 hrs daily.Disp-30 patch, [id number]         CONTINUE these medications which have CHANGED    Details   diazepam (VALIUM) 2 MG tablet Take 2-3 tablets (4-6 mg) by mouth every 8 hours as needed for muscle spasms, Historical      diclofenac (VOLTAREN) 1 % topical gel Apply 2 g topically 4 times daily as needed (shoulder pain), Disp-350 g, R-0, E-PrescribeOk to " dispense smaller tube pending availability      methocarbamol (ROBAXIN) 500 MG tablet Take 1 tablet (500 mg) by mouth 4 times daily as needed for muscle spasms (allow at least 4 hours between doses up to 4 pills total daily), Disp-120 tablet, R-0, E-Prescribe      morphine (MSIR) 15 MG IR tablet Take 1-2 tablets (15-30 mg) by mouth every 4 hours as needed for severe pain, Disp-40 tablet, R-0, Historical         CONTINUE these medications which have NOT CHANGED    Details   acetaminophen (TYLENOL) 500 MG tablet Take 1 tablet (500 mg) by mouth 2 times daily as needed for mild pain or fever, Disp-60 tablet, R-0, Transitional      aspirin 81 MG EC tablet Take 1 tablet (81 mg) by mouth daily, Transitional      atorvastatin (LIPITOR) 80 MG tablet Take 80 mg by mouth At Bedtime , Historical      metoprolol succinate ER (TOPROL-XL) 25 MG 24 hr tablet Take 0.5 tablets (12.5 mg) by mouth At Bedtime, Transitional      multivitamin, therapeutic with minerals (THERA-VIT-M) TABS tablet Take 1 tablet by mouth daily, Historical      !! NONFORMULARY Uriva Rx - supplement for bladder tone, Historical      polyethylene glycol (MIRALAX) 17 GM/Dose powder Take 17 g by mouth 2 times daily, Transitional      senna-docusate (SENOKOT-S/PERICOLACE) 8.6-50 MG tablet Take 2 tablets by mouth 2 times daily, Disp-30 tablet, Transitional      simethicone (MYLICON) 125 MG chewable tablet Take 375 mg by mouth as needed for intestinal gas, Historical      naloxone (NARCAN) 4 MG/0.1ML nasal spray Spray 4 mg into one nostril alternating nostrils once as needed for opioid reversal every 2-3 minutes until assistance arrives, Historical      nitroGLYcerin (NITROSTAT) 0.4 MG sublingual tablet Place 0.4 mg under the tongue as needed, Historical      !! NONFORMULARY Follinex supplement - herbal supplement for hair growth., Historical       !! - Potential duplicate medications found. Please discuss with provider.      STOP taking these medications        ascorbic acid 250 MG CHEW Comments:   Reason for Stopping:         D3-50 1.25 MG (79505 UT) capsule Comments:   Reason for Stopping:         EPINEPHrine (ANY BX GENERIC EQUIV) 0.3 MG/0.3ML injection 2-pack Comments:   Reason for Stopping:         fluticasone (FLONASE) 50 MCG/ACT nasal spray Comments:   Reason for Stopping:         hydrocortisone (CORTAID) 1 % external cream Comments:   Reason for Stopping:         vitamin E (TOCOPHEROL) 200 units (90 mg) capsule Comments:   Reason for Stopping:         zinc sulfate (ZINCATE) 220 (50 Zn) MG capsule Comments:   Reason for Stopping:                 DISCHARGE INSTRUCTIONS AND FOLLOW UP  Discharge Procedure Orders   Home care nursing referral     Home Care PT Referral for Hospital Discharge   Referral Priority: Routine Referral Type: Home Health Therapies & Aides   Number of Visits Requested: 1     Home Care OT Referral for Hospital Discharge   Referral Priority: Routine Referral Type: Consultation   Number of Visits Requested: 1     Reason for your hospital stay   Order Comments: Admitted for rehabilitation following spinal surgery.     Activity   Order Comments: Your activity upon discharge: activity as tolerated up to wheel chair activity as directed by therapy team.  Continue PT/OT.  Family to assist as recommended, no driving.     Order Specific Question Answer Comments   Is discharge order? Yes      Adult [facility] Follow-up and recommended labs and tests   Order Comments: Follow up as scheduled with primary care and Dr. Akins     Appointments on [place] and/or [facility] (with [facility] or [facility] provider or service). Call [phone] if you haven't heard regarding these appointments within 7 days of discharge.     MD face to face encounter   Order Comments: Documentation of Face to Face and Certification for Home Health Services    I certify that patient: Darleen Simmons is under my care and that I, or a nurse practitioner or physician's assistant working with  me, had a face-to-face encounter that meets the physician face-to-face encounter requirements with this patient on: 7/29/2021.    This encounter with the patient was in whole, or in part, for the following medical condition, which is the primary reason for home health care: impaired strength/activity tolerance.    I certify that, based on my findings, the following services are medically necessary home health services: Nursing, Occupational Therapy, and Physical Therapy.    My clinical findings support the need for the above services because: Nurse is needed: To assess  after changes in medications or other medical regimen, Occupational Therapy Services are needed to assess and treat cognitive ability and address ADL safety due to impairment in functional mobility., and Physical Therapy Services are needed to assess and treat the following functional impairments: strength/activity tolerance.    Further, I certify that my clinical findings support that this patient is homebound (i.e. absences from home require considerable and taxing effort and are for medical reasons or Orthod[facility] services or infrequently or of short duration when for other reasons) because: Requires assistance of another person or specialized equipment to access medical services because patient: Is unable to operate assistive equipment on their own...    Based on the above findings. I certify that this patient is confined to the home and needs intermittent skilled nursing care, physical therapy and/or speech therapy.  The patient is under my care, and I have initiated the establishment of the plan of care.  This patient will be followed by a physician who will periodically review the plan of care.  Physician/Provider to provide follow up care: Dunphy, Tyler J    Attending hospital physician (the Medicare certified [place] provider): Franklyn Clarke MD  Physician Signature: See electronic signature associated with these discharge orders.  Date:  7/29/2021     Diet   Order Comments: Follow this diet upon discharge:  Regular Diet Adult     Order Specific Question Answer Comments   Is discharge order? Yes           PHYSICAL EXAMINATION    Most recent Vital Signs:   Vitals:    07/29/21 0750 07/29/21 1624 07/29/21 2105 07/30/21 0825   BP: 139/70 130/56 131/47 134/51   BP Location: Right arm Left arm Left arm Left arm   Pulse: 80 81 80 84   Resp: 20 16  16   Temp: 97  F (36.1  C) 98.2  F (36.8  C)  96.8  F (36  C)   TempSrc: Oral   Oral   SpO2: 100% 93%  98%   Weight:       Height:       General: awake alert nad  Resp: non labored clear on room air  CV: reg  Ab: soft non tender  Skin: incision cdi well approximated  Extremities: warm dry without edema      35 minutes spent in discharge, including >50% in counseling and coordination of care, medication review and plan of care recommended on follow up.     Discharge summary was forwarded to Dunphy, Tyler J (PCP) at the time of discharge, so as to bridge from hospital to outpatient care.     It was our pleasure to care for Darleen Simmons during this hospitalization. Please do not hesitate to contact me should there be questions regarding the hospital course or discharge plan.          Ca Hernandez PA-C  Physical Medicine and Rehabilitation

## 2021-07-30 NOTE — PLAN OF CARE
Patient is Alert and oriented x4, makes needs known. A1 pivot transfer to w/c. Voids spontaneously using urinal. LBM today after suppository. Simethicone given x2 for gas discomfort. PRN morphine x2 given for back pain, provides some relief. Continue with POC.

## 2021-08-05 ENCOUNTER — TELEPHONE (OUTPATIENT)
Dept: ORTHOPEDICS | Facility: CLINIC | Age: 71
End: 2021-08-05

## 2021-08-05 NOTE — TELEPHONE ENCOUNTER
[facility] Call Center    Phone Message    May a detailed message be left on voicemail: yes     Reason for Call: Other: Voicemail left 08-04 @ 7:36pm-lady asking if they can change in person visit today to a video visit because patient is not able to be transport to the car    Action Taken: Message routed to:  [facility]    Travel Screening: Not Applicable

## 2021-08-12 ENCOUNTER — OFFICE VISIT (OUTPATIENT)
Dept: ORTHOPEDICS | Facility: CLINIC | Age: 71
End: 2021-08-12
Payer: COMMERCIAL

## 2021-08-12 ENCOUNTER — TELEPHONE (OUTPATIENT)
Dept: ORTHOPEDICS | Facility: CLINIC | Age: 71
End: 2021-08-12

## 2021-08-12 ENCOUNTER — ANCILLARY PROCEDURE (OUTPATIENT)
Dept: GENERAL RADIOLOGY | Facility: CLINIC | Age: 71
End: 2021-08-12
Attending: ORTHOPAEDIC SURGERY
Payer: COMMERCIAL

## 2021-08-12 ENCOUNTER — ANCILLARY PROCEDURE (OUTPATIENT)
Dept: CT IMAGING | Facility: CLINIC | Age: 71
End: 2021-08-12
Attending: PHYSICIAN ASSISTANT
Payer: COMMERCIAL

## 2021-08-12 VITALS — WEIGHT: 202 LBS | HEIGHT: 73 IN | BODY MASS INDEX: 26.77 KG/M2

## 2021-08-12 DIAGNOSIS — R29.898 WEAKNESS OF RIGHT FOOT: ICD-10-CM

## 2021-08-12 DIAGNOSIS — Z98.1 S/P SPINAL FUSION: ICD-10-CM

## 2021-08-12 DIAGNOSIS — M40.30 FLATBACK SYNDROME: ICD-10-CM

## 2021-08-12 DIAGNOSIS — Z98.1 S/P SPINAL FUSION: Primary | ICD-10-CM

## 2021-08-12 PROCEDURE — 72131 CT LUMBAR SPINE W/O DYE: CPT | Performed by: STUDENT IN AN ORGANIZED HEALTH CARE EDUCATION/TRAINING PROGRAM

## 2021-08-12 PROCEDURE — 72170 X-RAY EXAM OF PELVIS: CPT | Performed by: RADIOLOGY

## 2021-08-12 PROCEDURE — 72128 CT CHEST SPINE W/O DYE: CPT | Performed by: STUDENT IN AN ORGANIZED HEALTH CARE EDUCATION/TRAINING PROGRAM

## 2021-08-12 PROCEDURE — 72082 X-RAY EXAM ENTIRE SPI 2/3 VW: CPT | Performed by: STUDENT IN AN ORGANIZED HEALTH CARE EDUCATION/TRAINING PROGRAM

## 2021-08-12 PROCEDURE — 99024 POSTOP FOLLOW-UP VISIT: CPT | Performed by: PHYSICIAN ASSISTANT

## 2021-08-12 RX ORDER — METHYLPREDNISOLONE 4 MG
TABLET, DOSE PACK ORAL
COMMUNITY
Start: 2021-08-09

## 2021-08-12 ASSESSMENT — MIFFLIN-ST. JEOR: SCORE: 1720.15

## 2021-08-12 NOTE — LETTER
8/12/2021         RE: Darleen Simmons  [id number] [address] [facility] [address]        Dear Colleague,    Thank you for referring your patient, Darleen Simmons, to the [facility] [place]. Please see a copy of my visit note below.    HISTORY OF PRESENT ILLNESS:  Darleen Simmons returns now postop from a lumbar pedicle subtraction osteotomy and a thoracic level osteotomy.  He has had a very elham course postoperatively.  He was in rehab for a while.  He did not get along with our rehab physicians and went home and has been doing home health care but not succeeding very well.  He has got significant right lower extremity weakness, the etiology of which is unclear, probably from manipulation through the scar and around the time of surgery.  We did order a CT scan today, which I have reviewed.  This does not show any obvious neural compression that is new or significant to explain his problem.    We have had an extended discussion about his frustration.   I would like to see him in an inpatient rehab facility where he is able to cooperate with and has confidence in the team.  Apparently that is not going to happen at my rehab facility, where we had him before.  I have reached out to Dr. Montana at [facility], who originally referred him to me, and asked for Dr. Sherif Killian's contact info.  Dr. Killian and Dr. Montana had operated on this individual.  I have not had success in the past trying to get patients referred into [facility] rehab, and so I am hoping that maybe we could get Dr. Killian to evaluate Mr. Simmons and see if he can get him into rehab that way, potentially, as well as see if he has some alternative explanation for what is going on with him, as he has seen and evaluated this patient in the past.  The CT information was obtained after the patient left the clinic, and I will ask my team to reach out to him to provide him that information tomorrow.              I spoke with   Rubén who will reach out to the director of rehab at [place] to see if we can get Darleen into the [place] rehab program.    Akhil Akins MD          REASON FOR VISIT: RECHECK    REFERRING PHYSICIAN: No ref. provider found     PRIMARY CARE PHYSICIAN: Dunphy, Tyler J    PROCEDURE PERFORMED:    1.  O-arm Stealth guided T7 through sacrum posterior segmental spinal instrumentation.  2.  T7 through sacrum posterior spinal fusion with local harvest autograft.  3.  Pedicle subtraction osteotomy L5, 22 modifier  4.  Three-column osteotomy, T12-L1.  5.  Removal and reinsertion of spinal instrumentation T9 through pelvis.  6.  De martin pelvic fixation, 22 modifier  7.  De martin pedicle screw fixation T7-T8 and De martin redirection of pedicle screws T9.  8.  Mobilization of paraspinous muscle space, greater than 40 cm (42cm)  9.  Primary dural repair, necessitating additional removal of bone.    HISTORY OF PRESENT ILLNESS: Darleen Simmons is a 70 year old male who presents for  Routine 6 week follow up s/p above procedure with Dr. Akins (DOS 6/21/21).He has had hard postoperative course. Difficulties at TCU, did not like physicians there. [place] they were discharged home too early, and not taught how to safely transfer at home. Having excruciating RLE pain, radiates from buttock to posterolateral thigh and calf into 1st and 2nd toe (L5 pattern). Feels weak in right leg. Upset he feels more disabled now than he did prior to surgery.     Oswestry score: 91     PHYSICAL EXAM:   Constitutional - Patient is healthy, well-nourished and appears stated age. Patient in wheelchair today. Looks tired.   Respiratory - Patient is breathing normally and in no respiratory distress.   Skin - No suspicious rashes or lesions.   Psychiatric - Normal mood and affect.   Cardiovascular - Peripheral pulses are normal.   Eyes - Visual acuity is normal to the written word.   ENT - Hearing intact to the spoken word.   GI - No abdominal  distention.   Musculoskeletal - patient in wheelchair. Wearing right AFO. Hypersensitive to touch throughout RLE, especially in calf area.         LOWER EXTREMITY Left Right   Hip flexion 4/5 4/5   Knee flexion 5/5 5/5   Knee extension 5/5 4/5   Ankle dorsiflexion 4/5 2/5   Ankle plantarflexion 5/5 2/5   Great toe extension 5/5 1/5               IMAGIN2021 XR EOS full spine standing AP/lateral views: stable fusion hardware in place without evidence of failure or acute fracture. See full radiologic report in chart.    CLINICAL ASSESSMENT:   1. 6wks s/p T7-pelvis PSF, L5 PSO, three column osteotomy T12-L1, revision hardware T9- pelvis; difficult post-operative course with severe RLE radicular pain in L5 distribution    DISCUSSION/PLAN:   Had discussion today with patient about the difficulties he has faced postoperatively. His time in TCU did not go well and he and his wife are unhappy with the care they received. Do not feel they were ready or safe to be discharged home when they were. Are looking to be seen by new physiatrist and rehab facility, preferably at the [facility]. We will provide this referral. Discussed his RLE symptoms, which seem to be radicular in nature. We will get CT scan to assess for nerve compression, but it is possible that the radiculopathy is from nerve irritation from intra-operative manipulation from the nerves. We would expect this type of nerve irritation to improve between 6-12 weeks post-op. We can assess with MRI and potentially EMG if CT scan does not show stenosis to explain symptoms.     All questions and concerns were answered to the patient's apparent satisfaction before leaving the clinic.     Thank you for allowing Dr. Akins and I to participate in the care of Darleen Simmons.    Respectfully,  Annette Duran PA-C    CC  Copy to patient  Jorje Simmons  [id number] [address]

## 2021-08-12 NOTE — PROGRESS NOTES
"REASON FOR CONSULTATION: RECHECK (DOS 6-21-21 PSF & TLIF Thoracic & Lumbar with pelvic Fixation for HX. Spinal Cord injury S/P Fusion & Flatback )     REFERRING PHYSICIAN: No ref. provider found     PRIMARY CARE PHYSICIAN: Dunphy, Tyler J    HISTORY OF PRESENT ILLNESS: Darleen Simmons is a 70 year old male who presents with *** for the past ***. Pain is described as ***. Alleviating factors include: ***. Aggravating factors include: ***. Patient has the most difficulty with ***.     Oswestry Disability Score: ***  Afjjrh30: ***  Pain Scale: ***      Past medical, past surgical, social, family history, medications, and allergies reviewed on the orthopaedic surgery intake form which is scanned into the electronic record with pertinent positives commented on.    Allergies   Allergen Reactions     Wasp Venom Protein Shortness Of Breath and Swelling     sob  swelling  Other reaction(s): Swelling  sob  \"black wasps\"     Adhesive Tape      Paper tape - rash     Hydroxyzine Other (See Comments)     Nightmares     Influenza Vaccines Other (See Comments)     Avoid influenza vaccine, history of Guillain [place]      Lorazepam Anxiety     Other reaction(s): Irritability, Mental Status Change, Other (see comments)  Pt states he becomes very angry and mean after taking at [facility].    Tolerates diazepam       Past Medical History:   Diagnosis Date     Acute osteomyelitis of left lower leg (H) 2017     Acute superficial venous thrombosis of lower extremity, right 2018     Atrial flutter (H)      Automatic implantable cardioverter-defibrillator in situ      CAD (coronary artery disease)      Depression      Flatback syndrome      History of acute inferior wall MI      HTN (hypertension)      Hyperlipidemia LDL goal <100      Ischemic cardiomyopathy      Kyphosis (acquired) (postural)      JOANNA (obstructive sleep apnea)      PAD (peripheral artery disease) (H)      Paroxysmal atrial fibrillation (H)      PVD (peripheral vascular " disease) (H)        Past Surgical History:   Procedure Laterality Date     BYPASS GRAFT ARTERY CORONARY  1986     Decompression posterior lumbar and instrumented fusion       Endovascular femoral and/or popliteal and/or tibial artery angioplasty/stent       Hardware removal tib/fib       I and D left extremity wound       Knee hardware removal       KNEE SURGERY       OPTICAL TRACKING SYSTEM FUSION POSTERIOR SPINE THORACIC THREE+ LEVELS N/A 2021    Procedure: o-arm/stealth assisted Posterior spinal fusion Thoracic 7 to Sacral 1 with segmental instrumentation Thoracic 7 to 8; reinsertion of instrumentation Thoracic 9 to Sacral 1; pelvic fixation;  Surgeon: Akhil Akins MD;  Location: [facility] OR     OPTICAL TRACKING SYSTEM FUSION SPINE POSTERIOR LUMBAR THREE+ LEVELS N/A 2021    Procedure: Posterior 3 column osteotomy T12-L1; pedicle subtraction osteotomy Lumbar 5;  Surgeon: Akhil Akins MD;  Location: [facility] OR     STSG left extremity wound       TONSILLECTOMY         Family History   Problem Relation Age of Onset     Coronary Artery Disease Father      Cancer Mother      Heart Disease Sister      Suicide Brother      Heart Disease Sister        Social History     Tobacco Use     Smoking status: Former Smoker     Types: Cigarettes     Quit date: 1988     Years since quittin.6     Smokeless tobacco: Former User   Substance Use Topics     Alcohol use: Not Currently       Current Outpatient Medications   Medication     acetaminophen (TYLENOL) 500 MG tablet     aspirin 81 MG EC tablet     atorvastatin (LIPITOR) 80 MG tablet     diazepam (VALIUM) 2 MG tablet     diclofenac (VOLTAREN) 1 % topical gel     Lidocaine (LIDOCARE) 4 % Patch     methocarbamol (ROBAXIN) 500 MG tablet     methylPREDNISolone (MEDROL DOSEPAK) 4 MG tablet therapy pack     metoprolol succinate ER (TOPROL-XL) 25 MG 24 hr tablet     morphine (MSIR) 15 MG IR tablet     multivitamin, therapeutic with minerals (THERA-VIT-M) TABS  tablet     naloxone (NARCAN) 4 MG/0.1ML nasal spray     nitroGLYcerin (NITROSTAT) 0.4 MG sublingual tablet     NONFORMULARY     NONFORMULARY     senna-docusate (SENOKOT-S/PERICOLACE) 8.6-50 MG tablet     simethicone (MYLICON) 125 MG chewable tablet     No current facility-administered medications for this visit.       PHYSICAL EXAM:   Constitutional - Patient is healthy, well-nourished and appears stated age***.   Respiratory - Patient is breathing normally and in no respiratory distress***.   Skin - No suspicious rashes or lesions***.   Psychiatric - Normal mood and affect***.   Cardiovascular - Extremities are warm and well perfused***.   Eyes - Visual acuity is normal to the written word***.   ENT - Hearing intact to the spoken word***.   GI - No abdominal distention***.   Musculoskeletal - Non-antalgic gait without use of assistive devices***.        Thoracic Spine:    Appearance - Normal (loss of normal kyphosis?***)    Palpation - Non-tender to palpation     (T-spine?, paraspinals?***)    Strength/ROM - deferred            Lumbar Spine:    Appearance - Normal (loss of normal lordosis?***)    Palpation - Non-tender to palpation    ROM - Full      (Flex/ext, lat flexion, lat rotation, +/- pain?***)    Motor -        LOWER EXTREMITY*** Left Right   Hip flexion 4+/5 4/5   Knee flexion 4/5 4/5   Knee extension 4/5 3/5   Ankle dorsiflexion 4/5 2/5   Ankle plantarflexion 5/5 4/5   Great toe extension 5/5 1/5           Neurologic - Sensation intact to light touch bilaterally. Hypersensitivity to light touch L5 distribution RLE     IMAGING: ***    CLINICAL ASSESSMENT:   1. ***     DISCUSSION/PLAN:   ***  - CT lumbar w/o contrast  - referral to PMR, [place]  - orthotics for new AFO    All questions and concerns were answered to the patient's apparent satisfaction before leaving the clinic.     Thank you for allowing us to see this pleasant patient.  Dr. Akins and I are happy to be involved in his***her  care.    Respectfully,  Annette Duran PA-C    CC  Copy to patient     [id number] [address]

## 2021-08-12 NOTE — NURSING NOTE
"Reason For Visit:   Chief Complaint   Patient presents with     RECHECK     DOS 6-21-21 PSF & TLIF Thoracic & Lumbar with pelvic Fixation for HX. Spinal Cord injury S/P Fusion & Flatback        Primary MD: Dunphy, Tyler J  Ref. MD: Est    ?  No  Occupation Disabled Vet     Date of injury: 2009  Type of injury: Fall, also hurt while in the service     Date of surgery: 2018  Type of surgery: Decompression Posterior ThoracoLumbar, Instrumented Fusion.    6/21/2021 O-arm Stealth guided T7 through sacrum posterior segmental spinal instrumentation.  2.  T7 through sacrum posterior spinal fusion with local harvest autograft.  3.  Pedicle subtraction osteotomy L5, 22 modifier  4.  Three-column osteotomy, T12-L1.  5.  Removal and reinsertion of spinal instrumentation T9 through pelvis.  6.  De martin pelvic fixation, 22 modifier  7.  De martin pedicle screw fixation T7-T8 and De martin redirection of pedicle screws T9.  8.  Mobilization of paraspinous muscle space, greater than 40 cm (42cm)  9.  Primary dural repair, necessitating additional removal of bone       Smoker: No  Request smoking cessation information: No    Ht 1.854 m (6' 1\")   Wt 91.6 kg (202 lb)   BMI 26.65 kg/m      Pain Assessment  Patient Currently in Pain: Yes  0-10 Pain Scale: 9  Primary Pain Location: Back    Oswestry (PEDRITO) Questionnaire    OSWESTRY DISABILITY INDEX 8/12/2021   Count 9   Sum 41   Oswestry Score (%) 91.11   Some recent data might be hidden        Visual Analog Pain Scale  Back Pain Scale 0-10: 8.5  Right leg pain: 8.5  Left leg pain: 0  Neck Pain Scale 0-10: 0  Right arm pain: 0  Left arm pain: 0    Promis 10 Assessment    PROMIS 10 4/28/2021   In general, would you say your health is: Very good   In general, would you say your quality of life is: Fair   In general, how would you rate your physical health? Poor   In general, how would you rate your mental health, including your mood and your ability to think? Excellent   In general, " how would you rate your satisfaction with your social activities and relationships? Very good   In general, please rate how well you carry out your usual social activities and roles Very good   To what extent are you able to carry out your everyday physical activities such as walking, climbing stairs, carrying groceries, or moving a chair? Completely   How often have you been bothered by emotional problems such as feeling anxious, depressed or irritable? Often   How would you rate your fatigue on average? Very severe   How would you rate your pain on average?   0 = No Pain  to  10 = Worst Imaginable Pain 5   Some recent data might be hidden                Magnolia Vela LPN

## 2021-08-12 NOTE — PROGRESS NOTES
HISTORY OF PRESENT ILLNESS:  Darleen Simmons returns now postop from a lumbar pedicle subtraction osteotomy and a thoracic level osteotomy.  He has had a very elham course postoperatively.  He was in rehab for a while.  He did not get along with our rehab physicians and went home and has been doing home health care but not succeeding very well.  He has got significant right lower extremity weakness, the etiology of which is unclear, probably from manipulation through the scar and around the time of surgery.  We did order a CT scan today, which I have reviewed.  This does not show any obvious neural compression that is new or significant to explain his problem.    We have had an extended discussion about his frustration.   I would like to see him in an inpatient rehab facility where he is able to cooperate with and has confidence in the team.  Apparently that is not going to happen at my rehab facility, where we had him before.  I have reached out to Dr. Montana at [facility], who originally referred him to me, and asked for Dr. Sherif Killian's contact info.  Dr. Killian and Dr. Montana had operated on this individual.  I have not had success in the past trying to get patients referred into [facility] rehab, and so I am hoping that maybe we could get Dr. Killian to evaluate Mr. Simmons and see if he can get him into rehab that way, potentially, as well as see if he has some alternative explanation for what is going on with him, as he has seen and evaluated this patient in the past.  The CT information was obtained after the patient left the clinic, and I will ask my team to reach out to him to provide him that information tomorrow.              I spoke with Dr. Montana who will reach out to the director of rehab at [place] to see if we can get Darleen into the [place] rehab program.    Akhil Akins MD

## 2021-08-13 ENCOUNTER — TELEPHONE (OUTPATIENT)
Dept: ORTHOPEDICS | Facility: CLINIC | Age: 71
End: 2021-08-13

## 2021-08-13 LAB
RADIOLOGIST FLAGS: ABNORMAL
RADIOLOGIST FLAGS: ABNORMAL

## 2021-08-13 NOTE — TELEPHONE ENCOUNTER
I spoke with patient's wife now, informed her of results. She states he has no SOB, no cough. He does have h/o TB. I noted that this is new since June CT. They have upcoming appt with PCP Dr. Dunphy and he can see CT results through care everywhere. I asked her to raise this issue with Dr. Dunphy for further recs on treatment or following it.

## 2021-08-13 NOTE — PROGRESS NOTES
REASON FOR VISIT: RECHECK    REFERRING PHYSICIAN: No ref. provider found     PRIMARY CARE PHYSICIAN: Dunphy, Tyler J    PROCEDURE PERFORMED:    1.  O-arm Stealth guided T7 through sacrum posterior segmental spinal instrumentation.  2.  T7 through sacrum posterior spinal fusion with local harvest autograft.  3.  Pedicle subtraction osteotomy L5, 22 modifier  4.  Three-column osteotomy, T12-L1.  5.  Removal and reinsertion of spinal instrumentation T9 through pelvis.  6.  De martin pelvic fixation, 22 modifier  7.  De martin pedicle screw fixation T7-T8 and De martin redirection of pedicle screws T9.  8.  Mobilization of paraspinous muscle space, greater than 40 cm (42cm)  9.  Primary dural repair, necessitating additional removal of bone.    HISTORY OF PRESENT ILLNESS: Darleen Simmons is a 70 year old male who presents for  Routine 6 week follow up s/p above procedure with Dr. Akins (DOS 6/21/21).He has had hard postoperative course. Difficulties at TCU, did not like physicians there. [place] they were discharged home too early, and not taught how to safely transfer at home. Having excruciating RLE pain, radiates from buttock to posterolateral thigh and calf into 1st and 2nd toe (L5 pattern). Feels weak in right leg. Upset he feels more disabled now than he did prior to surgery.     Oswestry score: 91     PHYSICAL EXAM:   Constitutional - Patient is healthy, well-nourished and appears stated age. Patient in wheelchair today. Looks tired.   Respiratory - Patient is breathing normally and in no respiratory distress.   Skin - No suspicious rashes or lesions.   Psychiatric - Normal mood and affect.   Cardiovascular - Peripheral pulses are normal.   Eyes - Visual acuity is normal to the written word.   ENT - Hearing intact to the spoken word.   GI - No abdominal distention.   Musculoskeletal - patient in wheelchair. Wearing right AFO. Hypersensitive to touch throughout RLE, especially in calf area.         LOWER EXTREMITY Left  Right   Hip flexion 4/5 4/5   Knee flexion 5/5 5/5   Knee extension 5/5 4/5   Ankle dorsiflexion 4/5 2/5   Ankle plantarflexion 5/5 2/5   Great toe extension 5/5 1/5               IMAGIN2021 XR EOS full spine standing AP/lateral views: stable fusion hardware in place without evidence of failure or acute fracture. See full radiologic report in chart.    CLINICAL ASSESSMENT:   1. 6wks s/p T7-pelvis PSF, L5 PSO, three column osteotomy T12-L1, revision hardware T9- pelvis; difficult post-operative course with severe RLE radicular pain in L5 distribution    DISCUSSION/PLAN:   Had discussion today with patient about the difficulties he has faced postoperatively. His time in TCU did not go well and he and his wife are unhappy with the care they received. Do not feel they were ready or safe to be discharged home when they were. Are looking to be seen by new physiatrist and rehab facility, preferably at the [facility]. We will provide this referral. Discussed his RLE symptoms, which seem to be radicular in nature. We will get CT scan to assess for nerve compression, but it is possible that the radiculopathy is from nerve irritation from intra-operative manipulation from the nerves. We would expect this type of nerve irritation to improve between 6-12 weeks post-op. We can assess with MRI and potentially EMG if CT scan does not show stenosis to explain symptoms.     All questions and concerns were answered to the patient's apparent satisfaction before leaving the clinic.     Thank you for allowing Dr. Akins and I to participate in the care of Darleen Simmons.    Respectfully,  Annette Duran PA-C    CC  Copy to patient    [address] [address] [facility] [address]

## 2021-08-13 NOTE — TELEPHONE ENCOUNTER
Alda EDEN and Dr Akins's team is out so RN called and spoke with patient and spouse to report the incidental findings of suspected of aspiration pneumonia. RN called and patient stated he was looked at and his lungs were clear and RN did suggest that it is still a good idea to monitor for any shortness or breath or difficulty breathing, then he should go to the nearest urgent care to get treatment.  Patient and spouse also wanted to speak with Alda EDEN and RN told them Alda is out but will return next Monday.   RN also notified Alda EDEN to follow up next Monday.    Hannah Hampton RN

## 2021-08-13 NOTE — TELEPHONE ENCOUNTER
See dictation of todays appt for plan.    I called wife & pt &  told them   reviewed 2 CTs done after clinic appt today & he stated do not show anything that explains his ongoing Leg weakness.   ordered RTN appt with DR.Rick Killian at [facility]  [phone] fax#[phone].   Pt has seen  before.     sent a message to .    I faxed a new consult order & will have [place] our Rad. Imaging coord.  Send our imaging & reports to [facility].    Wife & pt agreed. Wife stated she will call for appt.    Call back prn. They agreed.  EL./Alda Ellis RN.

## 2021-08-16 ENCOUNTER — TELEPHONE (OUTPATIENT)
Dept: ORTHOPEDICS | Facility: CLINIC | Age: 71
End: 2021-08-16

## 2021-08-16 NOTE — TELEPHONE ENCOUNTER
See phone message.  See dictation from  Th appt & see my triage note from after clinic TH 8-12-21.    I called wife back.  She states she has called  clinic Neurosurgery at [facility]  twice & still has not heard back about  RTN appt.  She states last seen by DR. Killian only about 1 year ago.  States they told her they did not have our consult order so I refaxed it again.  Our Imaging already sent to [facility].    She will continue to call them for appt.   I did review that she understands the Triage notes from Fri 8-13-21 Incidental Finding on imaging reports Aspiration Pneumonia & they have F/U appt with Primary at home  This week.  Call back prn,.  She agreed.  HELADIO./Alda Ellis RN.

## 2021-08-16 NOTE — TELEPHONE ENCOUNTER
Health Call Center    Phone Message    May a detailed message be left on voicemail: yes     Reason for Call: Other: Patient's spouse said she was told to call back today to talk to Alda about going to the [facility].     Action Taken: Message routed to:  [facility] ([facility] Orthopedics    Travel Screening: Not Applicable

## 2021-08-18 DIAGNOSIS — Z98.1 S/P SPINAL FUSION: Primary | ICD-10-CM

## 2021-08-23 NOTE — TELEPHONE ENCOUNTER
[facility] Call Center    Phone Message    May a detailed message be left on voicemail: yes     Reason for Call: Other: Patient's spouse called to say the phone conversation between Dr. Killian from [facility] and Dr. Akins never took place.  Patient cannot move forward until they know what to do.    Please call patient or spouse to discuss.    Action Taken: Message routed to:  [facility] ortho    Travel Screening: Not Applicable

## 2021-09-09 ENCOUNTER — ANCILLARY PROCEDURE (OUTPATIENT)
Dept: GENERAL RADIOLOGY | Facility: CLINIC | Age: 71
End: 2021-09-09
Attending: ORTHOPAEDIC SURGERY
Payer: COMMERCIAL

## 2021-09-09 ENCOUNTER — OFFICE VISIT (OUTPATIENT)
Dept: ORTHOPEDICS | Facility: CLINIC | Age: 71
End: 2021-09-09
Payer: COMMERCIAL

## 2021-09-09 DIAGNOSIS — Z98.1 S/P SPINAL FUSION: Primary | ICD-10-CM

## 2021-09-09 PROCEDURE — 72082 X-RAY EXAM ENTIRE SPI 2/3 VW: CPT | Performed by: STUDENT IN AN ORGANIZED HEALTH CARE EDUCATION/TRAINING PROGRAM

## 2021-09-09 PROCEDURE — 99024 POSTOP FOLLOW-UP VISIT: CPT | Performed by: ORTHOPAEDIC SURGERY

## 2021-09-09 NOTE — LETTER
9/9/2021         RE: Darleen Simmons  [address] [facility] [address]        Dear Colleague,    Thank you for referring your patient, Darleen Simmons, to the [facility] [place]. Please see a copy of my visit note below.    HISTORY OF PRESENT ILLNESS:  Darleen returns today now about 3 months out from his revision spine surgery, osteotomy.  He has had a very elahm postoperative course.  He has essentially no extensor strength in his right lower extremity and is having profound troubles even with pivot transfers right now.  I do not understand why this is happening.  We did a postoperative CT and I do not see local residual nerve compression.  We did have significant challenges with dural scarring, mobilization, dural tear and subsequent dural repair, but I do not have an explanation for the pattern of problem that he is currently experiencing.  I went through an extended explanation.  Darleen is obviously quite depressed about this as he feels like he is worse off than he was before the surgery and that may be an accurate statement.    On imaging today, his alignment is improved compared to preoperatively.  There is no evidence of wilfredo breakage, displacement or loosening.    ASSESSMENT:  Residual worsened postoperative neurologic status after lumbar pedicle subtraction osteotomy.    PLAN:  I have reached out to my colleagues at [facility] who had referred him to me and I would really like for him to see Dr. Sherif Killian from Neurosurgery who has treated Darleen in the past.  Dr. Montana who did his primary surgery with Dr. Killian has been out on medical leave.  I did reach out to Dr. Montana and he told me that he would talk to the chief of their Rehabilitation Unit to see if there was a way to have Darleen get some additional rehabilitation time to see if this could help facilitate his recovery.  Apparently, that has not happened yet.  They are waiting on a call back today from [facility] for him to be scheduled, hopefully with Dr. Killian or Dr. Santos for evaluation.  I will try to reach out to them and see if I can communicate with them here in the near future.  We have also made copies of his imaging studies to send with them on a CD today so that they have that information available.  I do think it would be reasonable to get a metallic artifact reduction sequence MRI to get a better sense of what is going on with the neural elements.  I explained all this to Darleen and to his wife Joe.    My total face-to-face time with them was in excess of 45 minutes.      I called and spoke with Dr. Montana personally. I have not yet been able to personally speak to Dr. Killian. Dr. Montana indicated he would try to help with getting Darleen to be seen.    Again, thank you for allowing me to participate in the care of your patient.        Sincerely,        Akhil Akins MD

## 2021-09-09 NOTE — NURSING NOTE
Reason For Visit:   Chief Complaint   Patient presents with     RECHECK     Has not been to see  [facility] yet.  F/[facility] 2 [facility]s [facility]12-21 at [facility]. DOS 6-21-21 PSF & TLIF Thoracic & Lumbar with pelvic Fixation for HX. Spinal Cord injury S/P Fusion & Flatback        Primary MD: Dunphy, Tyler J  Ref. MD: Est    ?  No  Occupation Disabled Vet     Date of injury: 2009  Type of injury: Fall, also hurt while in the service     Date of surgery: 2018  Type of surgery: Decompression Posterior ThoracoLumbar, Instrumented Fusion.     6/21/2021 O-arm Stealth guided T7 through sacrum posterior segmental spinal instrumentation.  2.  T7 through sacrum posterior spinal fusion with local harvest autograft.  3.  Pedicle subtraction osteotomy L5, 22 modifier  4.  Three-column osteotomy, T12-L1.  5.  Removal and reinsertion of spinal instrumentation T9 through pelvis.  6.  De martin pelvic fixation, 22 modifier  7.  De martin pedicle screw fixation T7-T8 and De martin redirection of pedicle screws T9.  8.  Mobilization of paraspinous muscle space, greater than 40 cm (42cm)  9.  Primary dural repair, necessitating additional removal of bone        Smoker: No  Request smoking cessation information: No    There were no vitals taken for this visit.    Pain Assessment  Patient Currently in Pain: Yes  0-10 Pain Scale: 5  Primary Pain Location: Back    Oswestry (PEDRITO) Questionnaire    OSWESTRY DISABILITY INDEX 9/9/2021   Count 8   Sum 34   Oswestry Score (%) 85   Some recent data might be hidden        Visual Analog Pain Scale  Back Pain Scale 0-10: 5.5  Right leg pain: 6  Left leg pain: 0  Neck Pain Scale 0-10: 0  Right arm pain: 0  Left arm pain: 0    Promis 10 Assessment    PROMIS 10 4/28/2021   In general, would you say your health is: Very good   In general, would you say your quality of life is: Fair   In general, how would you rate your physical health? Poor   In general, how would you rate your mental health, including  your mood and your ability to think? Excellent   In general, how would you rate your satisfaction with your social activities and relationships? Very good   In general, please rate how well you carry out your usual social activities and roles Very good   To what extent are you able to carry out your everyday physical activities such as walking, climbing stairs, carrying groceries, or moving a chair? Completely   How often have you been bothered by emotional problems such as feeling anxious, depressed or irritable? Often   How would you rate your fatigue on average? Very severe   How would you rate your pain on average?   0 = No Pain  to  10 = Worst Imaginable Pain 5   Some recent data might be hidden                Magnolia Vela LPN

## 2021-09-09 NOTE — PROGRESS NOTES
HISTORY OF PRESENT ILLNESS:  Darleen returns today now about 3 months out from his revision spine surgery, osteotomy.  He has had a very elham postoperative course.  He has essentially no extensor strength in his right lower extremity and is having profound troubles even with pivot transfers right now.  I do not understand why this is happening.  We did a postoperative CT and I do not see local residual nerve compression.  We did have significant challenges with dural scarring, mobilization, dural tear and subsequent dural repair, but I do not have an explanation for the pattern of problem that he is currently experiencing.  I went through an extended explanation.  Darleen is obviously quite depressed about this as he feels like he is worse off than he was before the surgery and that may be an accurate statement.    On imaging today, his alignment is improved compared to preoperatively.  There is no evidence of wilfredo breakage, displacement or loosening.    ASSESSMENT:  Residual worsened postoperative neurologic status after lumbar pedicle subtraction osteotomy.    PLAN:  I have reached out to my colleagues at [facility] who had referred him to me and I would really like for him to see Dr. Sherif Killian from Neurosurgery who has treated Darleen in the past.  Dr. Montana who did his primary surgery with Dr. Killian has been out on medical leave.  I did reach out to Dr. Montana and he told me that he would talk to the chief of their Rehabilitation Unit to see if there was a way to have Darleen get some additional rehabilitation time to see if this could help facilitate his recovery.  Apparently, that has not happened yet.  They are waiting on a call back today from [facility] for him to be scheduled, hopefully with Dr. Killian or Dr. Santos for evaluation.  I will try to reach out to them and see if I can communicate with them here in the near future.  We have also made copies of his imaging studies to send with them on a CD today  so that they have that information available.  I do think it would be reasonable to get a metallic artifact reduction sequence MRI to get a better sense of what is going on with the neural elements.  I explained all this to Darleen and to his wife Joe.    My total face-to-face time with them was in excess of 45 minutes.      I called and spoke with Dr. Montana personally. I have not yet been able to personally speak to Dr. Killian. Dr. Montana indicated he would try to help with getting Darleen to be seen.

## 2021-12-05 ENCOUNTER — HEALTH MAINTENANCE LETTER (OUTPATIENT)
Age: 71
End: 2021-12-05

## 2021-12-20 ENCOUNTER — TELEPHONE (OUTPATIENT)
Dept: ORTHOPEDICS | Facility: CLINIC | Age: 71
End: 2021-12-20
Payer: COMMERCIAL

## 2021-12-20 NOTE — TELEPHONE ENCOUNTER
[facility] Call Center    Phone Message    May a detailed message be left on voicemail: yes     Reason for Call Please call Patient. Images Ready to take to [place] at Doctor visit  Action Taken: Message routed to:  [facility] [place]    Travel Screening: Not Applicable

## 2021-12-21 NOTE — TELEPHONE ENCOUNTER
See phone message.  I called back & spoke to pt & wife to clarify what he wants.  They stated that through their [facility] My Chart system, they cant view the XR images that were done at [facility] so he wanted us to make a disc when he is here tomorrow for appt. Of the [facility] Xr's   He stated he contacted [facility] who said images dont  exist in their system anymore since they exported them to [facility].  I told him he must have misunderstood because imaging is a permanent part of his record & will not disappear for many years.  I advised he must sign ERYN at [facility] & request imaging from them not us & he agreed.   He stated he already has a disc of imaging done at [facility].  [facility] appt tomorrow.  Call back prn.pt agreed.  Alda Ellis RN.

## 2021-12-22 ENCOUNTER — OFFICE VISIT (OUTPATIENT)
Dept: ORTHOPEDICS | Facility: CLINIC | Age: 71
End: 2021-12-22
Payer: COMMERCIAL

## 2021-12-22 ENCOUNTER — ANCILLARY PROCEDURE (OUTPATIENT)
Dept: GENERAL RADIOLOGY | Facility: CLINIC | Age: 71
End: 2021-12-22
Attending: ORTHOPAEDIC SURGERY
Payer: COMMERCIAL

## 2021-12-22 VITALS — HEIGHT: 72 IN | BODY MASS INDEX: 26.41 KG/M2 | WEIGHT: 195 LBS

## 2021-12-22 DIAGNOSIS — Z98.1 S/P SPINAL FUSION: Primary | ICD-10-CM

## 2021-12-22 DIAGNOSIS — Z98.1 S/P SPINAL FUSION: ICD-10-CM

## 2021-12-22 DIAGNOSIS — Z87.828 H/O SPINAL CORD INJURY: ICD-10-CM

## 2021-12-22 DIAGNOSIS — M40.30 FLATBACK SYNDROME: ICD-10-CM

## 2021-12-22 PROCEDURE — 72170 X-RAY EXAM OF PELVIS: CPT | Performed by: RADIOLOGY

## 2021-12-22 PROCEDURE — 99214 OFFICE O/P EST MOD 30 MIN: CPT | Performed by: ORTHOPAEDIC SURGERY

## 2021-12-22 ASSESSMENT — MIFFLIN-ST. JEOR: SCORE: 1667.51

## 2021-12-22 NOTE — PROGRESS NOTES
HISTORY OF PRESENT ILLNESS:  Darleen returns for followup after a complex pedicle subtraction osteotomy after prior spinal fusion.  He has a history of spinal cord injury.  He sustained additional neurologic deterioration as a result of the surgery, and he is left with approximately a 7-10 degree angular coronal malalignment.  Surgery was in 06/2021.  He has been seen at [facility] as a second opinion for his neurologic deterioration to determine if there were any other potential interventions that could help.  He saw Dr. Sherif Killian from Neurosurgery, Dr. Regan Santos and Dr. Rodríguez from Neurology, and their notes are available for me today to review.  He has had additional electrodiagnostic testing and thoracic MRIs.  The interpretation from Dr. Santos's note was there was no significant residual stenosis, which is what I had ascertained from previous CT scans, and I was sending him as a problem of:  I do not understand why his neurologic deterioration has occurred in the way that it has.  There is reasonable conjuncture that perhaps arachnoiditis and stretching versus peripheral extraspinal nerve stretching as opposed to direct intraoperative neural injury given that there was no intraoperative neuromonitoring changes at his surgery.  Certainly, I am disappointed with the exacerbation of his symptoms, and today we talked through what I see going on and what potential options may or may not be.  I told him that clearly he has some coronal imbalance based on his seated x-rays from September.  I obtained a Vee view of his pelvis today.  There is no evidence of failure of the pelvic fixation.  It appears that I did not get him adequately perpendicular to his pelvis at the time of surgery, and given all of the complicated course that he has had and the challenges, I am less optimistic that additional surgery is highly likely to resolve his problems, but he is so debilitated now that he cannot sit for more  than about 45 minutes because of the coronal imbalance.  This has limited his ability to drive, to watch TV, and he cannot even sit for a prolonged period in a La-Z-Boy type chair.  He describes what I best can characterize as fasciculations in his left lumbar spine area.  I think these may be fasciculations from reinnervation.  Whether it is the quadratus lumborum or the iliopsoas I am not sure.  He finds these quite distressing.  In addition, he has paresthesias, hypesthesias right greater than left lower extremity with some clawing of his toes and he is currently utilizing an AFO.                  ASSESSMENT:    1.  Flat back syndrome, status post fusion initially at [facility] when he was a spinal cord injury.  He was fused in a seated position.  Subsequently, he recovered ambulatory status.  I then performed a pedicle subtraction osteotomy in an attempt to give him appropriate sagittal realignment.  This has not achieved the desired goals.  Although it did result in significant realignment in the sagittal plane, he does have worsened coronal balance with additional neurologic deterioration from unclear etiology.    PLAN:  We had a very extended discussion today lasting more than an hour reviewing what I see, what I do not see, and then I told him the 2 people that I would talk to about this, given this nature of the problem and whether or not it would be worth additional intervention to try to make this better, would be Dr. Joel Holguin at [place] in [place] and Dr. Shahzad Marks at [facility] in [place].  I will try to put together a PowerPoint and reach out to them and see if they think there is a chance to help improve his status and if it would be worth his traveling to see them.  He would like to close the loop with me in approximately 2 months.  We also talked about that pain medication he is being well managed currently by his primary, Dr. Tyler Dunphy, and the Pain Clinic folks at [facility], and he  has asked specifically that communication go to Dr. Killian at [facility] at [place], and we will certainly respect that request.      I was able to put together a summary set of of images and sent them to Lay Marks and Indira for review and discussion about risk benefit profile of surgery to improve the coronal alignment.    Akhil Akins MD

## 2021-12-22 NOTE — LETTER
[address] [place] [address]  Phone: [phone]  Fax: [phone]      Patient:  Darleen Simmons  :   1950  MRN:     [id number]        Mr.Gale SAMANTHA Simmons  [address] [place] [address]      2021    Dear ,    I had the opportunity to communicate with Dr. Shahzad Marks at [facility], Dr. Vlad Jacobson at [place] and Dr. Joel Holguin at [place] in [place]. All indicated that they would be happy to see you. Dr. Marks would probably be the easiest to go see in [place].  Dr. Holguin suggested that you consider a virtual visit with him rather than flying out. He and I talked a little bit more about your case and he would like to see a current CT scan which certainly is a reasonable thing to do.  I would be happy to order that CT of the thoracic spine lumbar spine and pelvis if you wish.    Electronically signed by:  Akhil Akins MD

## 2021-12-22 NOTE — NURSING NOTE
Reason For Visit:   Chief Complaint   Patient presents with     RECHECK     F/U [facility]surgery appt.  F/U 2 CTs done 8-12-21 at [facility]. DOS 6-21-21 PSF & TLIF Thoracic & Lumbar with pelvic Fixation for HX. Spinal Cord injury S/P Fusion & Flatback        Primary MD: Dunphy, Tyler J  Ref. MD: Est    ?  No  Occupation Disabled Vet     Date of injury: 2009  Type of injury: Fall, also hurt while in the service     Date of surgery: 2018  Type of surgery: Decompression Posterior ThoracoLumbar, Instrumented Fusion.     6/21/2021 O-arm Stealth guided T7 through sacrum posterior segmental spinal instrumentation. T7 through sacrum posterior spinal fusion with local harvest autograft. Pedicle subtraction osteotomy L5, 22 modifier Three-column osteotomy, T12-L1.   Removal and reinsertion of spinal instrumentation T9 through pelvis.  De martin pelvic fixation, 22 modifier De martin pedicle screw fixation T7-T8 and De martin redirection of pedicle screws T9.  Mobilization of paraspinous muscle space, greater than 40 cm (42cm)  Primary dural repair, necessitating additional removal of bone        Smoker: No  Request smoking cessation information: No    Ht 1.829 m (6')   Wt 88.5 kg (195 lb)   BMI 26.45 kg/m      Pain Assessment  Patient Currently in Pain: Yes  0-10 Pain Scale: 8  Primary Pain Location: Back    Oswestry (PEDRITO) Questionnaire    OSWESTRY DISABILITY INDEX 12/22/2021   Count 9   Sum 37   Oswestry Score (%) 82.22   Some recent data might be hidden        Visual Analog Pain Scale  Back Pain Scale 0-10: 8  Right leg pain: 5  Left leg pain: 0  Neck Pain Scale 0-10: 0  Right arm pain: 0  Left arm pain: 0    Promis 10 Assessment    PROMIS 10 12/22/2021   In general, would you say your health is: Excellent   In general, would you say your quality of life is: Poor   In general, how would you rate your physical health? Poor   In general, how would you rate your mental health, including your mood and your ability  to think? Fair   In general, how would you rate your satisfaction with your social activities and relationships? Poor   In general, please rate how well you carry out your usual social activities and roles Poor   To what extent are you able to carry out your everyday physical activities such as walking, climbing stairs, carrying groceries, or moving a chair? Not at all   How often have you been bothered by emotional problems such as feeling anxious, depressed or irritable? Rarely   How would you rate your fatigue on average? Severe   How would you rate your pain on average?   0 = No Pain  to  10 = Worst Imaginable Pain 10   In general, would you say your health is: 5   In general, would you say your quality of life is: 1   In general, how would you rate your physical health? 1   In general, how would you rate your mental health, including your mood and your ability to think? 2   In general, how would you rate your satisfaction with your social activities and relationships? 1   In general, please rate how well you carry out your usual social activities and roles. (This includes activities at home, at work and in your community, and responsibilities as a parent, child, spouse, employee, friend, etc.) 1   To what extent are you able to carry out your everyday physical activities such as walking, climbing stairs, carrying groceries, or moving a chair? 1   In the past 7 days, how often have you been bothered by emotional problems such as feeling anxious, depressed, or irritable? 2   In the past 7 days, how would you rate your fatigue on average? 4   In the past 7 days, how would you rate your pain on average, where 0 means no pain, and 10 means worst imaginable pain? 10   Global Mental Health Score 8   Global Physical Health Score 5   PROMIS TOTAL - SUBSCORES 13   Some recent data might be hidden                Magnolia Vela LPN

## 2021-12-22 NOTE — LETTER
12/22/2021         RE: Darleen Simmons  [address] [address] [facility] [address]        Dear Colleague,    Thank you for referring your patient, Darleen Simmons, to the [facility] [place]. Please see a copy of my visit note below.    HISTORY OF PRESENT ILLNESS:  Darleen returns for followup after a complex pedicle subtraction osteotomy after prior spinal fusion.  He has a history of spinal cord injury.  He sustained additional neurologic deterioration as a result of the surgery, and he is left with approximately a 7-10 degree angular coronal malalignment.  Surgery was in 06/2021.  He has been seen at [facility] as a second opinion for his neurologic deterioration to determine if there were any other potential interventions that could help.  He saw Dr. Sherif Killian from Neurosurgery, Dr. Regan Santos and Dr. Rodríguez from Neurology, and their notes are available for me today to review.  He has had additional electrodiagnostic testing and thoracic MRIs.  The interpretation from Dr. Santos's note was there was no significant residual stenosis, which is what I had ascertained from previous CT scans, and I was sending him as a problem of:  I do not understand why his neurologic deterioration has occurred in the way that it has.  There is reasonable conjuncture that perhaps arachnoiditis and stretching versus peripheral extraspinal nerve stretching as opposed to direct intraoperative neural injury given that there was no intraoperative neuromonitoring changes at his surgery.  Certainly, I am disappointed with the exacerbation of his symptoms, and today we talked through what I see going on and what potential options may or may not be.  I told him that clearly he has some coronal imbalance based on his seated x-rays from September.  I obtained a Vee view of his pelvis today.  There is no evidence of failure of the pelvic fixation.  It appears that I did not get him adequately  perpendicular to his pelvis at the time of surgery, and given all of the complicated course that he has had and the challenges, I am less optimistic that additional surgery is highly likely to resolve his problems, but he is so debilitated now that he cannot sit for more than about 45 minutes because of the coronal imbalance.  This has limited his ability to drive, to watch TV, and he cannot even sit for a prolonged period in a La-Z-Boy type chair.  He describes what I best can characterize as fasciculations in his left lumbar spine area.  I think these may be fasciculations from reinnervation.  Whether it is the quadratus lumborum or the iliopsoas I am not sure.  He finds these quite distressing.  In addition, he has paresthesias, hypesthesias right greater than left lower extremity with some clawing of his toes and he is currently utilizing an AFO.                  ASSESSMENT:    1.  Flat back syndrome, status post fusion initially at [facility] when he was a spinal cord injury.  He was fused in a seated position.  Subsequently, he recovered ambulatory status.  I then performed a pedicle subtraction osteotomy in an attempt to give him appropriate sagittal realignment.  This has not achieved the desired goals.  Although it did result in significant realignment in the sagittal plane, he does have worsened coronal balance with additional neurologic deterioration from unclear etiology.    PLAN:  We had a very extended discussion today lasting more than an hour reviewing what I see, what I do not see, and then I told him the 2 people that I would talk to about this, given this nature of the problem and whether or not it would be worth additional intervention to try to make this better, would be Dr. Joel Holguin at [place] in [place] and Dr. Shahzad Marks at [facility] in [place].  I will try to put together a PowerPoint and reach out to them and see if they think there is a chance to help improve his status and if it  would be worth his traveling to see them.  He would like to close the loop with me in approximately 2 months.  We also talked about that pain medication he is being well managed currently by his primary, Dr. Tyler Dunphy, and the Pain Clinic folks at [facility], and he has asked specifically that communication go to Dr. Killian at [facility] at [place], and we will certainly respect that request.      I was able to put together a summary set of of images and sent them to Lay Marks and Indira for review and discussion about risk benefit profile of surgery to improve the coronal alignment.    Akhil Akins MD

## 2021-12-29 ENCOUNTER — DOCUMENTATION ONLY (OUTPATIENT)
Dept: ORTHOPEDICS | Facility: CLINIC | Age: 71
End: 2021-12-29
Payer: COMMERCIAL

## 2021-12-29 NOTE — PROGRESS NOTES
Writer mailed out the correspondence letter that Dr. Akins completed today.     Magnolia Vela LPN

## 2022-01-14 ENCOUNTER — TELEPHONE (OUTPATIENT)
Dept: ORTHOPEDICS | Facility: CLINIC | Age: 72
End: 2022-01-14
Payer: COMMERCIAL

## 2022-01-14 NOTE — TELEPHONE ENCOUNTER
[facility] Call Center    Phone Message    May a detailed message be left on voicemail: yes     Reason for Call: Other: Patient stated that he received Dr. Akins's letter and would like to talk to Alda more about it. I asked what on the letter needs to be discussed but he refused. He said that it doesn't make any sense dragging me into details and he rather talk to Alda about it.     Action Taken: Message routed to:  [facility] ([facility]): Orthopedics    Travel Screening: Not Applicable

## 2022-01-15 NOTE — TELEPHONE ENCOUNTER
See phone message.  I called back on fri 1-14-22 & spoke to wife & pt.    See 's letter dated 12-29-21.  They have decided they might pursue consult out of state from letter but have to check & see what imaging he had done recently at [place] & request that imaging.  If he did not have Ct scans done at [place] & they need us to order the 3 Ct scans per  letter Thoracic, Lumbar & Pelvis then they will call us back.  I also provided them with ph# for medical records to request our records be sent to pt so they can  provide to out of state consult if they decide to go there.  Call back prn. They agreed. W.O./Alda Ellis RN.

## 2022-01-26 ENCOUNTER — TELEPHONE (OUTPATIENT)
Dept: ORTHOPEDICS | Facility: CLINIC | Age: 72
End: 2022-01-26
Payer: COMMERCIAL

## 2022-01-26 DIAGNOSIS — M40.30 FLATBACK SYNDROME: ICD-10-CM

## 2022-01-26 DIAGNOSIS — S24.109D: ICD-10-CM

## 2022-01-26 DIAGNOSIS — Z98.1 S/P SPINAL FUSION: Primary | ICD-10-CM

## 2022-01-26 NOTE — TELEPHONE ENCOUNTER
[facility] Call Center    Phone Message    May a detailed message be left on voicemail: yes     Reason for Call: Other: Patient has been really sick. He would like a call back from Alda. He is stating he needs to schedule for test      Action Taken: Message routed to:  [facility]o    Travel Screening: Not Applicable

## 2022-01-27 NOTE — TELEPHONE ENCOUNTER
See phone messages.  See Letter from  dated 12-29-21 ordering 3 Ct scans.  I called back & spoke to wife & pt.    They want the scans done at [facility] in [place] Rad ph [phone] fax#[phone].  Faxed 3 new orders.    They are still deciding if they will try to do any consults out of state as mentioned in letter.  She understands to sign ERYN at [place] & at our medical records dept. To send imaging & records to consults.    She has ph# for med records. RTC appt 3-10-22.  Call back prn. Pt & wife  agreed.  W.O./Alda Ellis RN.

## 2022-01-27 NOTE — TELEPHONE ENCOUNTER
[facility] Call Center    Phone Message    May a detailed message be left on voicemail: yes     Reason for Call: Other: Patients wife is calling again (there is a prev message from 1/14 w/ Alda that this message connects to ))     Action Taken: Message routed to:  [facility] ortho    Travel Screening: Not Applicable     They would like Alda to c/b and discuss ordering the 3 CT scans , Lumbar , Thoracic and Pelvic , and she would like them done in [place]. They are just now getting back due to being sick and would like to get the ball rolling now taht they are feeling better

## 2022-01-31 ENCOUNTER — TELEPHONE (OUTPATIENT)
Dept: ORTHOPEDICS | Facility: CLINIC | Age: 72
End: 2022-01-31
Payer: COMMERCIAL

## 2022-01-31 NOTE — TELEPHONE ENCOUNTER
[facility] Call Center    Phone Message:  Pt's wife called, requesting for CT LUMBAR order to be sent to [facility] in [place], [place]    Imaging link:    [email or web address]    Fax: [phone]     Pt's wife would like a follow up call:  [phone]    May a detailed message be left on voicemail: Yes     Reason for Call: Other: CT LUMBAR ORDER:  FAX     Action Taken: Message routed to:  [facility] Team    Travel Screening: Not Applicable

## 2022-01-31 NOTE — TELEPHONE ENCOUNTER
Called the patients wife back and she explained that they say they have lost one of the CT scan orders that we faxed over to them. I explained that I would refax all 3 CT orders again. Patient thanked me for the call.

## 2022-03-10 ENCOUNTER — ANCILLARY PROCEDURE (OUTPATIENT)
Dept: GENERAL RADIOLOGY | Facility: CLINIC | Age: 72
End: 2022-03-10
Attending: ORTHOPAEDIC SURGERY
Payer: COMMERCIAL

## 2022-03-10 ENCOUNTER — OFFICE VISIT (OUTPATIENT)
Dept: ORTHOPEDICS | Facility: CLINIC | Age: 72
End: 2022-03-10
Payer: COMMERCIAL

## 2022-03-10 VITALS — BODY MASS INDEX: 26.41 KG/M2 | HEIGHT: 72 IN | WEIGHT: 195 LBS

## 2022-03-10 DIAGNOSIS — M40.30 FLATBACK SYNDROME: Primary | ICD-10-CM

## 2022-03-10 DIAGNOSIS — Z98.1 S/P SPINAL FUSION: ICD-10-CM

## 2022-03-10 DIAGNOSIS — Z98.1 HX OF SPINAL FUSION: ICD-10-CM

## 2022-03-10 DIAGNOSIS — S24.109D: ICD-10-CM

## 2022-03-10 DIAGNOSIS — Z98.1 S/P SPINAL FUSION: Primary | ICD-10-CM

## 2022-03-10 PROCEDURE — 77073 BONE LENGTH STUDIES: CPT | Performed by: STUDENT IN AN ORGANIZED HEALTH CARE EDUCATION/TRAINING PROGRAM

## 2022-03-10 PROCEDURE — 99214 OFFICE O/P EST MOD 30 MIN: CPT | Performed by: ORTHOPAEDIC SURGERY

## 2022-03-10 PROCEDURE — 72170 X-RAY EXAM OF PELVIS: CPT | Performed by: RADIOLOGY

## 2022-03-10 PROCEDURE — 72082 X-RAY EXAM ENTIRE SPI 2/3 VW: CPT | Performed by: STUDENT IN AN ORGANIZED HEALTH CARE EDUCATION/TRAINING PROGRAM

## 2022-03-10 RX ORDER — CEFDINIR 300 MG/1
300 CAPSULE ORAL 2 TIMES DAILY
COMMUNITY
Start: 2022-02-07 | End: 2022-03-21

## 2022-03-10 NOTE — LETTER
3/10/2022         RE: Darleen Simmons  [id number] [address] [facility] [address]        Dear Colleague,    Thank you for referring your patient, Darleen Simmons, to the [facility] [place]. Please see a copy of my visit note below.    FOLLOWUP     HISTORY OF PRESENT ILLNESS:  Darleen has a complex history with spinal cord injury, fusion by Dr. Montana at [place] in a position to provide optimal sitting.  However, he recovered and walked and he was left with flat-back syndrome.  We did a lumbar pedicle subtraction osteotomy, bringing him back and getting reasonable alignment but there was dense scarring of the dura.  There was a dural tear which was reconstructed and he has had significant neurologic recovery issues.  His right lower extremity has residual deficits and today, he tells me that he has had some improvement in hip abduction and adduction and that he is able to get a little bit of motion of his extensor hallucis longus.  He is having bilateral foot cramping.  I interpret this as progressive neural recovery, in part due to time and in part due to his significant efforts at rehabilitation.  He has had CT scans performed at an outside facility in February that we reviewed today.  I do not see any evidence of instrumentation breakage.  He had plain films done today.  Again, no evidence of instrumentation breakage.  It looks like the posterolateral fusion is progressing at the L5 pedicle subtraction osteotomy site.  There is not L4-L5 interbody bone healing but it does appear that there is posterior bone healing.  There is no evidence of screw loosening or wilfredo breakage.              ASSESSMENT:  Flat-back syndrome, complicated reconstruction, neurologic deficit, now with some progressive neurologic improvement 9 months out from surgery.    PLAN:  We had an extended discussion today talking about the extent of his limitations and prognosis going forward.  At this point, I think we  continue with PT rehab and follow him up in 3 months.  If something changes, I am happy to see him back, if need be, prior to that.    Akhil Akins MD

## 2022-03-10 NOTE — PROGRESS NOTES
FOLLOWUP     HISTORY OF PRESENT ILLNESS:  Darleen has a complex history with spinal cord injury, fusion by Dr. Montana at [place] in a position to provide optimal sitting.  However, he recovered and walked and he was left with flat-back syndrome.  We did a lumbar pedicle subtraction osteotomy, bringing him back and getting reasonable alignment but there was dense scarring of the dura.  There was a dural tear which was reconstructed and he has had significant neurologic recovery issues.  His right lower extremity has residual deficits and today, he tells me that he has had some improvement in hip abduction and adduction and that he is able to get a little bit of motion of his extensor hallucis longus.  He is having bilateral foot cramping.  I interpret this as progressive neural recovery, in part due to time and in part due to his significant efforts at rehabilitation.  He has had CT scans performed at an outside facility in February that we reviewed today.  I do not see any evidence of instrumentation breakage.  He had plain films done today.  Again, no evidence of instrumentation breakage.  It looks like the posterolateral fusion is progressing at the L5 pedicle subtraction osteotomy site.  There is not L4-L5 interbody bone healing but it does appear that there is posterior bone healing.  There is no evidence of screw loosening or wilfredo breakage.              ASSESSMENT:  Flat-back syndrome, complicated reconstruction, neurologic deficit, now with some progressive neurologic improvement 9 months out from surgery.    PLAN:  We had an extended discussion today talking about the extent of his limitations and prognosis going forward.  At this point, I think we continue with PT rehab and follow him up in 3 months.  If something changes, I am happy to see him back, if need be, prior to that.

## 2022-03-10 NOTE — NURSING NOTE
Reason For Visit:   Chief Complaint   Patient presents with     RECHECK     F/U [facility] appt. F/U 3 CT scans to be done in Jan or Feb 2022 at [facility] in [place].   DOS 6-21-21 PSF & TLIF Thoracic & Lumbar with pelvic Fixation for HX. Spinal Cord injury S/P Fusion & Flatback        Primary MD: Dunphy, Tyler J  Ref. MD: Est    ?  No  Occupation Disabled Vet     Date of injury: 2009  Type of injury: Fall, also hurt while in the service     Date of surgery: 2018  Type of surgery: Decompression Posterior ThoracoLumbar, Instrumented Fusion.     6/21/2021 O-arm Stealth guided T7 through sacrum posterior segmental spinal instrumentation. T7 through sacrum posterior spinal fusion with local harvest autograft. Pedicle subtraction osteotomy L5, 22 modifier Three-column osteotomy, T12-L1.   Removal and reinsertion of spinal instrumentation T9 through pelvis.  De martin pelvic fixation, 22 modifier De martin pedicle screw fixation T7-T8 and De martin redirection of pedicle screws T9.  Mobilization of paraspinous muscle space, greater than 40 cm (42cm)  Primary dural repair, necessitating additional removal of bone        Smoker: No  Request smoking cessation information: No    Ht 1.829 m (6')   Wt 88.5 kg (195 lb)   BMI 26.45 kg/m      Pain Assessment  Patient Currently in Pain: Yes  0-10 Pain Scale: 6  Primary Pain Location: Back    Oswestry (PEDRITO) Questionnaire    OSWESTRY DISABILITY INDEX 3/10/2022   Count 9   Sum 35   Oswestry Score (%) 77.78   Some recent data might be hidden        Visual Analog Pain Scale  Back Pain Scale 0-10: 6  Right leg pain: 5  Left leg pain: 5  Neck Pain Scale 0-10: 0  Right arm pain: 0  Left arm pain: 0    Promis 10 Assessment    PROMIS 10 12/22/2021   In general, would you say your health is: Excellent   In general, would you say your quality of life is: Poor   In general, how would you rate your physical health? Poor   In general, how would you rate your mental  health, including your mood and your ability to think? Fair   In general, how would you rate your satisfaction with your social activities and relationships? Poor   In general, please rate how well you carry out your usual social activities and roles Poor   To what extent are you able to carry out your everyday physical activities such as walking, climbing stairs, carrying groceries, or moving a chair? Not at all   How often have you been bothered by emotional problems such as feeling anxious, depressed or irritable? Rarely   How would you rate your fatigue on average? Severe   How would you rate your pain on average?   0 = No Pain  to  10 = Worst Imaginable Pain 10   In general, would you say your health is: 5   In general, would you say your quality of life is: 1   In general, how would you rate your physical health? 1   In general, how would you rate your mental health, including your mood and your ability to think? 2   In general, how would you rate your satisfaction with your social activities and relationships? 1   In general, please rate how well you carry out your usual social activities and roles. (This includes activities at home, at work and in your community, and responsibilities as a parent, child, spouse, employee, friend, etc.) 1   To what extent are you able to carry out your everyday physical activities such as walking, climbing stairs, carrying groceries, or moving a chair? 1   In the past 7 days, how often have you been bothered by emotional problems such as feeling anxious, depressed, or irritable? 2   In the past 7 days, how would you rate your fatigue on average? 4   In the past 7 days, how would you rate your pain on average, where 0 means no pain, and 10 means worst imaginable pain? 10   Global Mental Health Score 8   Global Physical Health Score 5   PROMIS TOTAL - SUBSCORES 13   Some recent data might be hidden                Magnolia Vela LPN

## 2022-03-14 ENCOUNTER — TELEPHONE (OUTPATIENT)
Dept: ORTHOPEDICS | Facility: CLINIC | Age: 72
End: 2022-03-14
Payer: COMMERCIAL

## 2022-03-14 NOTE — TELEPHONE ENCOUNTER
[facility] Call Center    Phone Message:  Pt's wife would like CARLO to know that she is faxing CARLO the pt's Reimbursement Authorization Form.      Pt's wife would also like a CALL BACK from CARLO.    May a detailed message be left on voicemail: Yes     Reason for Call: Other: Reimbursement Authorization Form     Action Taken: Message routed to:  [facility] Team to CARLO EDEN    Travel Screening: Not Applicable

## 2022-03-16 NOTE — TELEPHONE ENCOUNTER
See phone message. I called back & spoke to wife & told her I verified with Lakeshia  who does paperwork that she did receive form & she will have  sign tomorrow afternoon when he is in clinic next.  Call back prn. She agreed.    Alda Ellis RN.

## 2022-03-17 ENCOUNTER — DOCUMENTATION ONLY (OUTPATIENT)
Dept: ORTHOPEDICS | Facility: CLINIC | Age: 72
End: 2022-03-17
Payer: COMMERCIAL

## 2022-03-17 NOTE — PROGRESS NOTES
Mileage reimbursement form signed and sent back to pt via mail  Copy taken and sent to be scanned into chart    Lakeshia Edwards

## 2022-06-01 DIAGNOSIS — Z98.1 S/P SPINAL FUSION: Primary | ICD-10-CM

## 2022-06-09 ENCOUNTER — TELEPHONE (OUTPATIENT)
Dept: ORTHOPEDICS | Facility: CLINIC | Age: 72
End: 2022-06-09
Payer: COMMERCIAL

## 2022-06-09 NOTE — TELEPHONE ENCOUNTER
[facility] Call Center    Phone Message    May a detailed message be left on voicemail: yes     Reason for Call: Other: Pt would like a call back to discuss how he's doing. Please call      Action Taken: Other: [facility]    Travel Screening: Not Applicable

## 2022-06-09 NOTE — TELEPHONE ENCOUNTER
See phone message.  I called pt & wife back.  They stated they had to cancel todays appt because he has been sick with a GI bug being treated by Primary & getting better now but has slowed down his rehab.  Doing PT daily at home.    They RSC for Sept. But wanted sooner so I made appt for 7-7-22.    Call back prn.  They agreed.  Alda Ellis RN.

## 2022-07-07 ENCOUNTER — ANCILLARY PROCEDURE (OUTPATIENT)
Dept: GENERAL RADIOLOGY | Facility: CLINIC | Age: 72
End: 2022-07-07
Attending: ORTHOPAEDIC SURGERY
Payer: COMMERCIAL

## 2022-07-07 ENCOUNTER — OFFICE VISIT (OUTPATIENT)
Dept: ORTHOPEDICS | Facility: CLINIC | Age: 72
End: 2022-07-07
Payer: COMMERCIAL

## 2022-07-07 VITALS — BODY MASS INDEX: 26.82 KG/M2 | WEIGHT: 198 LBS | HEIGHT: 72 IN

## 2022-07-07 DIAGNOSIS — R29.898 WEAKNESS OF RIGHT FOOT: ICD-10-CM

## 2022-07-07 DIAGNOSIS — S24.109D: ICD-10-CM

## 2022-07-07 DIAGNOSIS — Z98.1 S/P SPINAL FUSION: Primary | ICD-10-CM

## 2022-07-07 DIAGNOSIS — M79.2 NEUROPATHIC PAIN: ICD-10-CM

## 2022-07-07 DIAGNOSIS — M40.30 FLATBACK SYNDROME: ICD-10-CM

## 2022-07-07 DIAGNOSIS — Z98.1 S/P SPINAL FUSION: ICD-10-CM

## 2022-07-07 PROCEDURE — 99215 OFFICE O/P EST HI 40 MIN: CPT | Performed by: ORTHOPAEDIC SURGERY

## 2022-07-07 PROCEDURE — 72082 X-RAY EXAM ENTIRE SPI 2/3 VW: CPT | Mod: GC | Performed by: RADIOLOGY

## 2022-07-07 PROCEDURE — 72170 X-RAY EXAM OF PELVIS: CPT | Performed by: RADIOLOGY

## 2022-07-07 ASSESSMENT — PATIENT HEALTH QUESTIONNAIRE - PHQ9: SUM OF ALL RESPONSES TO PHQ QUESTIONS 1-9: 10

## 2022-07-07 NOTE — LETTER
7/7/2022         RE: Darleen Simmons  [id number] [address] [facility] [facility] [address]        Dear Colleague,    Thank you for referring your patient, Darleen Simmons, to the [facility] [place]. Please see a copy of my visit note below.    HISTORY OF PRESENT ILLNESS:  Darleen Simmons returns today for discussion about his current status.  He is 1 year out from a lumbar pedicle subtraction osteotomy, which was complicated by dural tear and extensive dural reconstruction.      He has had ongoing significant pain in the lower extremities and deteriorated function compared to prior to the surgery.  His history is complicated in that he sustained injury in a parachute jump at [place] in 1970 where he had a bad landing trying to avoid someone who had run onto the drop zone and his right lower extremity was caught badly behind him and cause some hip and injuries and problems.      Subsequently as a result of this right lower extremity problem, he was walking upstairs in 2009 when the lower extremity gave way and he fell sustaining 2 spinal fractures with a spinal cord injury.  He was treated at [facility], where he was fused, anticipating he would not walk again and was potentially fused in a kyphotic position for facilitating wheelchair ambulation.    Through profound perseverance on his part, he regained the ability to walk and then subsequently was limited by the kyphotic positioning.      He was referred to me by Dr. Montana from [facility], specifically to talk about doing a high-risk lumbar pedicle subtraction osteotomy.      We had very extended discussion before the surgery and it was his desire to proceed.  The surgery was done and was particularly challenging due in part to the scar tissue that was existent and subsequently, he has had functional deterioration compared to his preoperative status in terms of muscle strength and worsening pain.    His current visual analog pain scale is a  6.  His Oswestry score is 77.      Darleen presents in a wheelchair today and wants to have a discussion prior to his wife being in the room.  We did have this discussion and he has not been able to return to work, which he had planned to do.  He is in financial distress because he is not able to work.  He is trying to pursue further disability evaluation and determination from the [facility].  He has approximately 50% disability related to his knees, but he has no disability associated with his parachute injury and the subsequent spinal injury.  He also wanted to discuss issues today associated with the less than optimal outcome from his surgery and we had a candid a prolonged conversation about this.    We also had an extended discussion today with his wife present in the room that I do not see that there is additional surgery on his spine that is likely to improve his function at this point.  He is 1 year out and I do not expect substantial additional functional gains.  I do think that he may well be a candidate for a spinal cord stimulator and I have recommended that he follow up with the folks at [facility] about this and I will try to reach out to either Dr. Sherif Killian in Neurosurgery or Regan Santos or Luis Iraheta also in Neurosurgery there as I think he will need a surgical laminotomy in order to be able to place and the leads.    IMAGING:  Radiographic exam today includes a seated AP and lateral full spine and Vee view of the pelvis.  Imaging is compared to 03/10/2022.  It is unclear, but there is possibly a set plug dislodgement that I cannot confirm that may be present on the lateral.  It is unclear if this is simply dislodgement or if it is obliquity of the tulip head.  On the Vee view in the AP imaging, there is not evidence of a displaced set plug present.  If this plug is displaced, I suspect it is cephalad to the right S2AI screw.    I will see if I can make some phone calls today to my colleagues  at [facility] and see if we can help facilitate getting him in to be seen.  He has previously been seen by Dr. Thomason at [facility] and I think that reconnecting with Rehab to help optimize his function given his current status makes sense that the discussion about pain strategies are reasonable.        I made a number of phone calls to try to close the loop for [place]. I spoke to Dr. Santos and asked for contact info for Dr. Killian. I subsequently spoke to Dr. Montana and found out that Dr. Killian is out right now and not available. Dr. Montana indicated he would try to provide the right contact for spinal cord stimulation. I also subsequently spoke to Dr. Iraheta.  I also had a conversation with Daisy Adorno JD.  Akhil Akins MD

## 2022-07-07 NOTE — NURSING NOTE
Reason For Visit:   Chief Complaint   Patient presents with     RECHECK     DOS 6-21-21 PSF & TLIF Thoracic & Lumbar with pelvic Fixation for HX. Spinal Cord injury S/P Fusion & Flatback        Primary MD: Dunphy, Tyler J  Ref. MD: Est    ?  No  Occupation Disabled Vet     Date of injury: 2009  Type of injury: Fall, also hurt while in the service     Date of surgery: 2018  Type of surgery: Decompression Posterior ThoracoLumbar, Instrumented Fusion.     6/21/2021 O-arm Stealth guided T7 through sacrum posterior segmental spinal instrumentation. T7 through sacrum posterior spinal fusion with local harvest autograft. Pedicle subtraction osteotomy L5, 22 modifier Three-column osteotomy, T12-L1.   Removal and reinsertion of spinal instrumentation T9 through pelvis.  De martin pelvic fixation, 22 modifier De martin pedicle screw fixation T7-T8 and De martin redirection of pedicle screws T9.  Mobilization of paraspinous muscle space, greater than 40 cm (42cm)  Primary dural repair, necessitating additional removal of bone        Smoker: No  Request smoking cessation information: No    Ht 1.829 m (6')   Wt 89.8 kg (198 lb)   BMI 26.85 kg/m      Pain Assessment  Patient Currently in Pain: Yes  0-10 Pain Scale: 6  Primary Pain Location: Back    Oswestry (PEDRITO) Questionnaire    OSWESTRY DISABILITY INDEX 7/7/2022   Count 8   Sum 31   Oswestry Score (%) 77.5   Some recent data might be hidden        Visual Analog Pain Scale  Back Pain Scale 0-10: 6  Right leg pain: 6  Left leg pain: 6  Neck Pain Scale 0-10: 0  Right arm pain: 0  Left arm pain: 0    Promis 10 Assessment    PROMIS 10 7/7/2022   In general, would you say your health is: Excellent   In general, would you say your quality of life is: Poor   In general, how would you rate your physical health? Poor   In general, how would you rate your mental health, including your mood and your ability to think? Good   In general, how would you rate your satisfaction with your  social activities and relationships? Poor   In general, please rate how well you carry out your usual social activities and roles Poor   To what extent are you able to carry out your everyday physical activities such as walking, climbing stairs, carrying groceries, or moving a chair? Not at all   How often have you been bothered by emotional problems such as feeling anxious, depressed or irritable? Often   How would you rate your fatigue on average? Severe   How would you rate your pain on average?   0 = No Pain  to  10 = Worst Imaginable Pain 4   In general, would you say your health is: 5   In general, would you say your quality of life is: 1   In general, how would you rate your physical health? 1   In general, how would you rate your mental health, including your mood and your ability to think? 3   In general, how would you rate your satisfaction with your social activities and relationships? 1   In general, please rate how well you carry out your usual social activities and roles. (This includes activities at home, at work and in your community, and responsibilities as a parent, child, spouse, employee, friend, etc.) 1   To what extent are you able to carry out your everyday physical activities such as walking, climbing stairs, carrying groceries, or moving a chair? 1   In the past 7 days, how often have you been bothered by emotional problems such as feeling anxious, depressed, or irritable? 4   In the past 7 days, how would you rate your fatigue on average? 4   In the past 7 days, how would you rate your pain on average, where 0 means no pain, and 10 means worst imaginable pain? 4   Global Mental Health Score 7   Global Physical Health Score 7   PROMIS TOTAL - SUBSCORES 14   Some recent data might be hidden                Magnolia Vela LPN

## 2022-07-07 NOTE — PROGRESS NOTES
HISTORY OF PRESENT ILLNESS:  Darleen Simmons returns today for discussion about his current status.  He is 1 year out from a lumbar pedicle subtraction osteotomy, which was complicated by dural tear and extensive dural reconstruction.      He has had ongoing significant pain in the lower extremities and deteriorated function compared to prior to the surgery.  His history is complicated in that he sustained injury in a parachute jump at [employer] in 1970 where he had a bad landing trying to avoid someone who had run onto the drop zone and his right lower extremity was caught badly behind him and cause some hip and injuries and problems.      Subsequently as a result of this right lower extremity problem, he was walking upstairs in 2009 when the lower extremity gave way and he fell sustaining 2 spinal fractures with a spinal cord injury.  He was treated at [facility], where he was fused, anticipating he would not walk again and was potentially fused in a kyphotic position for facilitating wheelchair ambulation.    Through profound perseverance on his part, he regained the ability to walk and then subsequently was limited by the kyphotic positioning.      He was referred to me by Dr. Montana from [facility], specifically to talk about doing a high-risk lumbar pedicle subtraction osteotomy.      We had very extended discussion before the surgery and it was his desire to proceed.  The surgery was done and was particularly challenging due in part to the scar tissue that was existent and subsequently, he has had functional deterioration compared to his preoperative status in terms of muscle strength and worsening pain.    His current visual analog pain scale is a 6.  His Oswestry score is 77.      Darleen presents in a wheelchair today and wants to have a discussion prior to his wife being in the room.  We did have this discussion and he has not been able to return to work, which he had planned to do.  He is in financial  distress because he is not able to work.  He is trying to pursue further disability evaluation and [facility].  He has approximately 50% disability related to his knees, but he has no disability associated with his parachute injury and the subsequent spinal injury.  He also wanted to discuss issues today associated with the less than optimal outcome from his surgery and we had a candid a prolonged conversation about this.    We also had an extended discussion today with his wife present in the room that I do not see that there is additional surgery on his spine that is likely to improve his function at this point.  He is 1 year out and I do not expect substantial additional functional gains.  I do think that he may well be a candidate for a spinal cord stimulator and I have recommended that he follow up with the folks at [facility] about this and I will try to reach out to either Dr. Sherif Killian in Neurosurgery or Regan Santos or Luis Iraheta also in Neurosurgery there as I think he will need a surgical laminotomy in order to be able to place and the leads.    IMAGING:  Radiographic exam today includes a seated AP and lateral full spine and Vee view of the pelvis.  Imaging is compared to 03/10/2022.  It is unclear, but there is possibly a set plug dislodgement that I cannot confirm that may be present on the lateral.  It is unclear if this is simply dislodgement or if it is obliquity of the tulip head.  On the Vee view in the AP imaging, there is not evidence of a displaced set plug present.  If this plug is displaced, I suspect it is cephalad to the right S2AI screw.    I will see if I can make some phone calls today to my colleagues at [facility] and see if we can help facilitate getting him in to be seen.  He has previously been seen by Dr. Thomason at [facility] and I think that reconnecting with Rehab to help optimize his function given his current status makes sense that the  discussion about pain strategies are reasonable.        I made a number of phone calls to try to close the loop for Kel[place]. I spoke to Dr. Santos and asked for contact info for Dr. Killian. I subsequently spoke to Dr. Montana and found out that Dr. Killian is out right now and not available. Dr. Montana indicated he would try to provide the right contact for spinal cord stimulation. I also subsequently spoke to Dr. Iraheta.  I also had a conversation with Daisy Adorno JD.  Akhil Akins MD     I was able to speak with Dr. Sherif Killian, [facility]. He will reach out to the patient and begin the process for consideration of spinal cord stimulation.  Akhil Akins MD

## 2022-09-13 NOTE — PROGRESS NOTES
07/08/21 1310   Quick Adds   Type of Visit Initial PT Evaluation   Living Environment   People in home spouse   Current Living Arrangements house  (split level)   Home Accessibility   (has ramp and stair lift)   Transportation Anticipated family or friend will provide   Living Environment Comments Lives in a split-level house with 9 steps to enter and a flight to the upper levelThe house is a wheelchair ramp chair stairlift on stairs pt was walking up stairs and using stairchair lift  tubshower combo stepping into tunb and standing for shower, higher toilet elevating hob and feet with bed rail on bed   Self-Care   Usual Activity Tolerance moderate   Current Activity Tolerance poor   Regular Exercise Yes   Activity/Exercise Type other (see comments)  (daily use of standing frame and/or nu step)   Equipment Currently Used at Home walker, rolling  (stair glide, ramp, w/c previously; (B) hinged knee brace)   Disability/Function   Hearing Difficulty or Deaf yes   Patient's preferred means of communication verbal   Describe hearing loss bilateral hearing loss   Use of hearing assistive devices right hearing aid;left hearing aid   Hearing Management Prefers (L) ear   Difficulty Eating/Swallowing no   Change in Functional Status Since Onset of Current Illness/Injury yes   General Information   Onset of Illness/Injury or Date of Surgery 06/21/21   Referring Physician Tricia   Patient/Family Therapy Goals Statement (PT) Return to home, ambulate   Pertinent History of Current Problem (include personal factors and/or comorbidities that impact the POC) Flat back syndrome, revision of previous fusion   Existing Precautions/Restrictions spinal;fall   Heart Disease Risk Factors Medical history   General Observations ICD, pacemaker   Cognition   Orientation Status (Cognition) disoriented to;situation;time   Affect/Mental Status (Cognition) confused;unable/difficult to assess   Follows Commands (Cognition) follows one-step  Juan Manuel Garcia (:  1983) is a 34 commands   Behavioral Issues difficulty managing stress;overwhelmed easily   Memory Deficit (Cognition) short-term memory;unable/difficult to assess   Pain Assessment   Patient Currently in Pain Yes, see Vital Sign flowsheet   Integumentary/Edema   Integumentary/Edema no deficits were identifed   Posture    Posture Not impaired   Left Knee Extension/Flexion ROM   Left Knee Extension/Flexion PROM - degrees lacks 20   Right Knee Extension/Flexion ROM   Right Knee Extension/Flexion PROM - degrees lacks 20   Right Ankle Dorsiflexion ROM   Right Ankle Dorsiflexion PROM - degrees lacks 15   Strength   Strength Comments UE 5/5, (L) LE 5/5   MMT: Ankle, Rehab Eval   Ankle Dorsiflexion - Right Side (0/5) zero, right   Balance   Balance Comments sits EOB w/o assist, stands w/ UE support   Sensory Examination   Sensory Perception Comments intact to light touch and vibration    Clinical Impression   Criteria for Skilled Therapeutic Intervention yes, treatment indicated   PT Diagnosis (PT) impaired functional mobility   Influenced by the following impairments LE ROM, LE strength, pain   Functional limitations due to impairments assist w/ all functional mobility   Clinical Presentation Unstable/Unpredictable   Clinical Decision Making (Complexity) high complexity   Therapy Frequency (PT) 2x/day   Predicted Duration of Therapy Intervention (days/wks) 21 days   Planned Therapy Interventions (PT) bed mobility training;gait training;E-stim;neuromuscular re-education;orthotic fitting/training;stair training;strengthening;stretching;TENS;transfer training;wheelchair management/propulsion training   Anticipated Equipment Needs at Discharge (PT)   (has w/c and walker at home, needs (R) AFO)   Risk & Benefits of therapy have been explained evaluation/treatment results reviewed;care plan/treatment goals reviewed;risks/benefits reviewed;current/potential barriers reviewed;participants voiced agreement with care plan;participants  Nerves: No cranial nerve deficit. Deep Tendon Reflexes:      Reflex Scores:       Bicep reflexes are 2+ on the right side and 2+ on the left side. Patellar reflexes are 2+ on the right side and 2+ on the left side. Psychiatric:         Mood and Affect: Mood normal.         Behavior: Behavior normal.            An electronic signature was used to authenticate this note.     --Manuelito Bynum MD included;patient   Clinical Impression Comments 69 y/o male, acute on chronic pain and asking for meds frequently although cognition and attention appears impaired from narcotic use; will benefit from skilled PT services to improve all areas of mobility so he can return home w/ his family.    Total Evaluation Time   Total Evaluation Time (Minutes) 30

## 2022-09-22 ENCOUNTER — TELEPHONE (OUTPATIENT)
Dept: ORTHOPEDICS | Facility: CLINIC | Age: 72
End: 2022-09-22

## 2022-09-22 NOTE — TELEPHONE ENCOUNTER
Writer called and talked with patient on the phone. Writer asked if pt was planning on still coming for the 9/28/22 apt with provider as the apt note stated will determine after the 7/7/22 apt. At that appointment there was discussion about a spine stimulator placement at [facility]. Pt states that has not happened and has not had luck with the [facility] regarding his back. Pt is going to call the clinic back if needs to change that appointment as pt thought they would be following up in 6 months from last apt.     Magnolia Vela LPN

## 2022-09-25 ENCOUNTER — HEALTH MAINTENANCE LETTER (OUTPATIENT)
Age: 72
End: 2022-09-25

## 2023-01-03 ENCOUNTER — TELEPHONE (OUTPATIENT)
Dept: ORTHOPEDICS | Facility: CLINIC | Age: 73
End: 2023-01-03

## 2023-01-03 NOTE — TELEPHONE ENCOUNTER
Writer called and talked with patient on the phone. Writer called per request of provider to set up a phone visit on 1/4/23 or 1/6/23. Pt states that at this time is unable to do that as they are currently at the [facility] and are unavailable to have a phone visit. Pt states they will call to schedule when they are av available.     Magnolia Vela LPN

## 2023-01-05 ENCOUNTER — TELEPHONE (OUTPATIENT)
Dept: ORTHOPEDICS | Facility: CLINIC | Age: 73
End: 2023-01-05

## 2023-01-05 NOTE — TELEPHONE ENCOUNTER
Writer called and left patient a voice message stating the appointment scheduled with provider next week is not needed at this time. Per provider a phone visit is recommended. When pt is ready they can call the clinic and a phone visit with Dr. Akins can be coordinated and scheduled. Pt is to call clinic back when ready to schedule. Writer will cancel the in person visit at this time scheduled 1/12/2023 at 1:30 pm.     Magnolia Vela LPN

## 2023-01-30 ENCOUNTER — HEALTH MAINTENANCE LETTER (OUTPATIENT)
Age: 73
End: 2023-01-30

## 2023-11-13 NOTE — CONSULTS
Health Psychology                  Clinic    Department of Medicine  Cirea Mejía, Ph.D., L.P. [phone]                          [facility]  [facility] Yared Calloway, Ph.D.,  L.P. [phone]                 3rd Floor  [place] Mail Code [address]78   Denise Dia, Ph.D., L.P. [phone]    [address] Alejandrina Mccullough, Ph.D., L.P. [phone]            [place] [address]          Colin Calixto, Ph.D., A.B.ARBEN.ARBEN., L.P. [phone]      Natalia Duff, Ph.D., L.P. [phone]      Inpatient Health Psychology Consultation*    DOS: 07/09/2021    REFERRAL SOURCE:  Franklyn Clarke MD, [facility], [facility].    REASON FOR REFERRAL:  Darleen Simmons ([place]ne) is a 70-year-old man, currently on the [facility] following a planned T7-S1 fusion with Dr. Akins on 06/21/2021.  Mr. Simmons has a significant history of psychiatric issues, including PTSD that appears to have not been well treated.  Mr. Simmons's spinal issues and psychiatric issues appear to all be related to his  service in [place]. The PTSD is reported to have been exacerbated by misfiring of his ICD multiple times as well as by past spinal surgeries. This evaluation was requested to assess his current emotional status and to make treatment recommendations.    HISTORY OF PRESENT ILLNESS:  Most of the information that I have on Mr. Simmons's history is based on review of his medical record as well as some information that he provided today.  However, his presentation today was clearly not completely alert and oriented.  Mr. Simmons appeared to be very somnolent, and struggling to keep his eyes open for much of our conversation.  I offered to allow him to use this time to rest before his next rehabilitation therapy and he chose to raise the head of his bed, so that he could stay awake more easily to  "participate in conversation.    Mr. Simmons volunteered that he lives with significant emotional distress related to his  service.  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  He went on to say that \"at that time\" most veterans chose to avoid care provided for them by the [facility], and he chose the same thing, resulting in getting little treatment for his issues.    I note that documentation in his medical record indicates that he had been diagnosed with PTSD by psychiatry \"in the very distant past\" and saw that provider for about 7 years.  There is also documentation of a Psychiatry consultation during an inpatient medical hospitalization with [facility] 05/24/2014. This admission to a medical floor was apparently triggered by chest pain and shortness of breath.  He had seen the same psychiatry provider in clinic in 3 weeks previously and had been diagnosed with a neurocognitive disorder as well as delirium, with both noted to be related to multiple factors including medications.  I note that documentation indicates that on meeting that same psychiatric provider while in hospital on 05/24/2014, Mr Simmons had no memory of having seen the same provider only weeks earlier.  There is also information in that note indicating that he had become verbally aggressive with his wife and there was concern about her ability to continue caring for him. In addition, the psychiatric consultant noted that Mr. Simmons was making statements that seem to be delusional about winning millions of dollars in a [employer].    In our conversation today, Mr. Simmons also exhibited some beliefs that appear to be untrue and possibly delusional.  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  He also made a statement about one of his daughter's having \"sung for the [employer] before she was in 10th grade\" and went on to tell me that she chose a different professional path.  Mr. Simmons also appeared to be extremely sleepy and forcing himself to stay awake.  His speech was somewhat slurred at many points during our conversation.    Mr. Simmons first reported being very angry about having \"not gotten what I was promised\" and talked about being able to walk on admission before surgery and now not being able to walk, which he sees as a failure of surgery.  He then went on to tell me that while he feels he has not gotten all of the results that he hopes from his surgery, that he does see himself making improvements since his admission to the [facility]. He expressed a belief that he is not receiving an adequate level of pain medication.  He stated his belief that he can tolerate a 3/10 on a 1-10 scale, and maybe even a 4.  He believes that the pain is affecting his ability to sleep effectively.  When asked if anxious thinking or PTSD reactions may be contributing to the difficulty with sleep, he stated his belief that it is only physical pain that is causing these difficulties.    Mr. Simmons  had asked that we leave some time for him to rest before his next rehabilitation therapy, which we did.  He agreed that I may check on him next week.    SOCIAL HISTORY:  Mr. Simmons clearly attempted to provide me with some personal history, but this was very disjointed, circumstantial, and difficult to follow in a linear way.  He talked about having 2 earlier marriages from which he has 3 children.  He would not speak about his children other than to tell me he has no contact with them; this was before he told me about 1 of his daughters singing for [employer].  He tells me that he and his current wife, Ayah, have been  for over 30 years.  " He was unable to provide any additional information about his personal history that was clear and understandable.     MEDICAL HISTORY:  Please see Mr. Simmons's [facility] electronic medical record for more complete information on his medical history, current admission and status, and all medications.    PSYCHIATRIC HISTORY:  As above in HPI.  I did find one Psychiatry consultation note from 05/24/2014, written by Jamie Nunez MD at [facility].  That provider indicated that he has seen Mr. Simmons previously in clinic and diagnosed him with a neurocognitive disorder as well as delirium. There is note of a past history of PTSD. It is somewhat striking that Mr. Simmons's presentation with me today was quite similar to his presentation with that psychiatric provider in 2014.  There are multiple mentions of recommendations for referral to psychiatry within his medical records and notations that he did not follow up on these recommendations.  No other significant psychiatric history was available at the time of this consultation.    BEHAVIORAL IMPRESSIONS:  As noted above, Mr. Simmons presented for this evaluation resting between rehabilitation therapies.  He was not at all alert and was oriented to place and self.  A full mental status examination was not undertaken.  He reported his mood as irritated, angry and upset.  He exhibited a very somnolent affect with little variation.  Speech was very circumstantial, tangential, and he was not able to respond effectively to concrete questions.  It was unclear if he was actually aware of an interactive conversation, as he often seemed to be focused on the thoughts in his mind and articulating them externally.  Insight and judgment appeared to be impaired.  He exhibited some delusional thinking with some of the history that he provided.  He did not endorse overt symptoms of psychosis.     IMPRESSION AND PLAN:  [place] ([place]ne)  T[place] is a 70-year-old man, [facility] following a revision spinal fusion with Dr. Akins.  Mr. Simmons has a significant psychiatric history that he has chosen not to have treated over many years, apparently much of which is the result of experiences he went through while serving in [place] as a paratrooper.  Injuries during this service are also the source of his initial spinal difficulties.  Mr. Simmons has reportedly been diagnosed in the past with post-traumatic stress disorder and there have been some concerns about depression as well.  He has a high use of benzodiazepines documented.  He also had an experience with his internal cardiac defibrillator of a broken lead that led to him receiving over 20 unnecessary shocks from the ICD, which was thought at that time to exacerbate his already present symptoms of post-traumatic stress disorder. I see no record of treatment for the increased anxiety following the ICD issues.    Given Mr Simmons's focus on pain management and concern that his chronic use of opiate pain medication may be affecting his mental status, it may be worthwhile to consider consult from the Pain Service.    It is unclear if there is any potential benefit in requesting a consult from psychiatry during this hospitalization.  At the same time, given that Mr. Simmons has consistently refused to see psychiatry on an outpatient basis, it may be a worthwhile request.  It is unclear how these issues are affecting his ability to participate in rehabilitation therapies.  I will plan to follow Mr. Simmons through this [facility] admission to assess my ability to provide additional support for him and for his ability to participate in his rehabilitation work.    DIAGNOSES:    1.  Delirium (F05).  2.  Neurocognitive disorder (G31.84).  3.  Post-traumatic stress disorder, by history.  4.  Depression, unspecified by history.    Ciera Mejía, PhD, LP        D: 07/09/2021    T: 2021   MT: LBMT1/DCQA    Name:     RUFINA BLAS  MRN:      [id number]        Account:      [id number]   :      1950           Consult Date: 2021     Document: [id number]    cc:  Franklyn Clarke MD      (V5) oriented

## 2024-03-02 ENCOUNTER — HEALTH MAINTENANCE LETTER (OUTPATIENT)
Age: 74
End: 2024-03-02

## 2025-03-15 ENCOUNTER — HEALTH MAINTENANCE LETTER (OUTPATIENT)
Age: 75
End: 2025-03-15

## (undated) DEVICE — DRSG STERI STRIP 1/2X4" R1547

## (undated) DEVICE — SU MONOCRYL 3-0 PS-2 18" UND Y497G

## (undated) DEVICE — Device

## (undated) DEVICE — SYR 03ML LL W/O NDL 309657

## (undated) DEVICE — DRAPE SHEET MED 44X70" 9355

## (undated) DEVICE — IOM SUPPLY

## (undated) DEVICE — BLADE BONE MILL STRK 3.2MM FINE 5400-702-000

## (undated) DEVICE — ESU CORD BIPOLAR GREEN 10-4000

## (undated) DEVICE — SOL WATER IRRIG 1000ML BOTTLE 2F7114

## (undated) DEVICE — PACK SPINE INSTRUMENT DRAPE 76091-ACM7820

## (undated) DEVICE — BLADE KNIFE SURG 11 371111

## (undated) DEVICE — SPONGE RAY-TEC 4X8" 7318

## (undated) DEVICE — LINEN TOWEL PACK X6 WHITE 5487

## (undated) DEVICE — SU VICRYL 0 CT-1 CR 8X27" UND JJ41G

## (undated) DEVICE — SOL ADH LIQUID BENZOIN SWAB 0.6ML C1544

## (undated) DEVICE — SUCTION TIP YANKAUER STR K87

## (undated) DEVICE — SU VICRYL 2-0 CT-1 27" UND J259H

## (undated) DEVICE — RX VISTASEAL FIBRIN SEALANT W/THROMBIN 10ML VST10

## (undated) DEVICE — MARKER SPHERES PASSIVE MEDT PACK 5 8801075

## (undated) DEVICE — SPONGE COTTONOID 1X3" 80-1408

## (undated) DEVICE — DRAPE LAP W/ARMBOARD 29410

## (undated) DEVICE — RX SURGIFLO HEMOSTATIC MATRIX W/THROMBIN 8ML 2994

## (undated) DEVICE — KIT PATIENT CARE JACKSON SPINE PACK 5808PV

## (undated) DEVICE — SU SILK 2-0 SH 30" K833H

## (undated) DEVICE — ESU GROUND PAD UNIVERSAL W/O CORD

## (undated) DEVICE — DRSG AQUACEL AG 3.5X9.75" HYDROFIBER 412011

## (undated) DEVICE — BLADE CLIPPER SGL USE 9680

## (undated) DEVICE — TEASPOON METAL STERILE 17506/24

## (undated) DEVICE — DRSG AQUACEL AG EXTRA 8X12" 420679

## (undated) DEVICE — DRAPE O ARM TUBE 9732722

## (undated) DEVICE — CELL SAVER

## (undated) DEVICE — DRAPE SHEET REV FOLD 3/4 9349

## (undated) DEVICE — SU PROLENE 6-0 BV-1 DA 24" 8805H

## (undated) DEVICE — SPONGE SURGIFOAM 100 1974

## (undated) DEVICE — DRAIN HEMOVAC RESERVOIR KIT 10FR 1/8" MED 00-2550-002-10

## (undated) DEVICE — WIPES FOLEY CARE SURESTEP PROVON DFC100

## (undated) DEVICE — SU VICRYL 1 CT-1 CR 8X18" J741D

## (undated) DEVICE — PACK SET-UP STD 9102

## (undated) DEVICE — SU ETHILON 3-0 PS-1 18" 1663H

## (undated) DEVICE — STPL SKIN 35W ROTATING HEAD PRW35

## (undated) DEVICE — LINEN TOWEL PACK X30 5481

## (undated) DEVICE — CATH TRAY FOLEY SURESTEP 16FR W/TMP PRB STLK LATEX A319416AM

## (undated) DEVICE — TOOL DISSECT MIDAS MR8 14CM MATCH HEAD 3MM MR8-14MH30

## (undated) DEVICE — SPONGE LAP 18X18" X8435

## (undated) DEVICE — SOL NACL 0.9% IRRIG 1000ML BOTTLE 2F7124

## (undated) DEVICE — SU VICRYL 1 CT-1 27" UND J261H

## (undated) RX ORDER — CALCIUM CHLORIDE 100 MG/ML
INJECTION INTRAVENOUS; INTRAVENTRICULAR
Status: DISPENSED
Start: 2021-06-21

## (undated) RX ORDER — CEFAZOLIN SODIUM 2 G/100ML
INJECTION, SOLUTION INTRAVENOUS
Status: DISPENSED
Start: 2021-06-21

## (undated) RX ORDER — VANCOMYCIN HYDROCHLORIDE 1 G/20ML
INJECTION, POWDER, LYOPHILIZED, FOR SOLUTION INTRAVENOUS
Status: DISPENSED
Start: 2021-06-21

## (undated) RX ORDER — METOPROLOL TARTRATE 1 MG/ML
INJECTION, SOLUTION INTRAVENOUS
Status: DISPENSED
Start: 2021-06-21

## (undated) RX ORDER — FENTANYL CITRATE 50 UG/ML
INJECTION, SOLUTION INTRAMUSCULAR; INTRAVENOUS
Status: DISPENSED
Start: 2021-06-21

## (undated) RX ORDER — FENTANYL CITRATE-0.9 % NACL/PF 10 MCG/ML
PLASTIC BAG, INJECTION (ML) INTRAVENOUS
Status: DISPENSED
Start: 2021-06-21

## (undated) RX ORDER — REGADENOSON 0.08 MG/ML
INJECTION, SOLUTION INTRAVENOUS
Status: DISPENSED
Start: 2021-05-05

## (undated) RX ORDER — MORPHINE SULFATE 2 MG/ML
INJECTION, SOLUTION INTRAMUSCULAR; INTRAVENOUS
Status: DISPENSED
Start: 2021-06-21

## (undated) RX ORDER — EPHEDRINE SULFATE 50 MG/ML
INJECTION, SOLUTION INTRAMUSCULAR; INTRAVENOUS; SUBCUTANEOUS
Status: DISPENSED
Start: 2021-06-21

## (undated) RX ORDER — ACETAMINOPHEN 325 MG/1
TABLET ORAL
Status: DISPENSED
Start: 2021-06-21

## (undated) RX ORDER — CHLORHEXIDINE GLUCONATE ORAL RINSE 1.2 MG/ML
SOLUTION DENTAL
Status: DISPENSED
Start: 2021-06-21

## (undated) RX ORDER — MAGNESIUM SULFATE HEPTAHYDRATE 40 MG/ML
INJECTION, SOLUTION INTRAVENOUS
Status: DISPENSED
Start: 2021-06-21

## (undated) RX ORDER — GABAPENTIN 300 MG/1
CAPSULE ORAL
Status: DISPENSED
Start: 2021-06-21

## (undated) RX ORDER — SODIUM CHLORIDE 9 MG/ML
INJECTION, SOLUTION INTRAVENOUS
Status: DISPENSED
Start: 2021-06-21